# Patient Record
Sex: FEMALE | Race: WHITE | NOT HISPANIC OR LATINO | Employment: FULL TIME | URBAN - METROPOLITAN AREA
[De-identification: names, ages, dates, MRNs, and addresses within clinical notes are randomized per-mention and may not be internally consistent; named-entity substitution may affect disease eponyms.]

---

## 2017-01-16 ENCOUNTER — TRANSFERRED RECORDS (OUTPATIENT)
Dept: HEALTH INFORMATION MANAGEMENT | Facility: CLINIC | Age: 47
End: 2017-01-16

## 2017-01-18 ENCOUNTER — HOSPITAL ENCOUNTER (OUTPATIENT)
Dept: OCCUPATIONAL THERAPY | Facility: CLINIC | Age: 47
Setting detail: THERAPIES SERIES
End: 2017-01-18
Attending: INTERNAL MEDICINE
Payer: COMMERCIAL

## 2017-01-18 PROCEDURE — 97140 MANUAL THERAPY 1/> REGIONS: CPT | Mod: GO | Performed by: OCCUPATIONAL THERAPIST

## 2017-01-18 PROCEDURE — 40000445 ZZHC STATISTIC OT VISIT, LYMPHEDEMA: Performed by: OCCUPATIONAL THERAPIST

## 2017-01-27 ENCOUNTER — TRANSFERRED RECORDS (OUTPATIENT)
Dept: HEALTH INFORMATION MANAGEMENT | Facility: CLINIC | Age: 47
End: 2017-01-27

## 2017-02-01 ENCOUNTER — HOSPITAL ENCOUNTER (OUTPATIENT)
Facility: CLINIC | Age: 47
Setting detail: SPECIMEN
Discharge: HOME | End: 2017-02-01
Attending: INTERNAL MEDICINE | Admitting: INTERNAL MEDICINE
Payer: COMMERCIAL

## 2017-02-01 ENCOUNTER — ONCOLOGY VISIT (OUTPATIENT)
Dept: ONCOLOGY | Facility: CLINIC | Age: 47
End: 2017-02-01
Attending: INTERNAL MEDICINE
Payer: COMMERCIAL

## 2017-02-01 VITALS
HEIGHT: 64 IN | SYSTOLIC BLOOD PRESSURE: 137 MMHG | BODY MASS INDEX: 32.95 KG/M2 | HEART RATE: 95 BPM | OXYGEN SATURATION: 97 % | WEIGHT: 193 LBS | RESPIRATION RATE: 16 BRPM | TEMPERATURE: 97 F | DIASTOLIC BLOOD PRESSURE: 97 MMHG

## 2017-02-01 DIAGNOSIS — C50.919 MALIGNANT NEOPLASM OF FEMALE BREAST, UNSPECIFIED LATERALITY, UNSPECIFIED SITE OF BREAST: Primary | ICD-10-CM

## 2017-02-01 LAB
ALBUMIN SERPL-MCNC: 4 G/DL (ref 3.4–5)
ALP SERPL-CCNC: 68 U/L (ref 40–150)
ALT SERPL W P-5'-P-CCNC: 39 U/L (ref 0–50)
ANION GAP SERPL CALCULATED.3IONS-SCNC: 9 MMOL/L (ref 3–14)
AST SERPL W P-5'-P-CCNC: 29 U/L (ref 0–45)
BILIRUB SERPL-MCNC: 0.4 MG/DL (ref 0.2–1.3)
BUN SERPL-MCNC: 17 MG/DL (ref 7–30)
CALCIUM SERPL-MCNC: 9.5 MG/DL (ref 8.5–10.1)
CHLORIDE SERPL-SCNC: 104 MMOL/L (ref 94–109)
CO2 SERPL-SCNC: 26 MMOL/L (ref 20–32)
CREAT SERPL-MCNC: 0.69 MG/DL (ref 0.52–1.04)
ERYTHROCYTE [DISTWIDTH] IN BLOOD BY AUTOMATED COUNT: 13.7 % (ref 10–15)
GFR SERPL CREATININE-BSD FRML MDRD: NORMAL ML/MIN/1.7M2
GLUCOSE SERPL-MCNC: 97 MG/DL (ref 70–99)
HCT VFR BLD AUTO: 42.2 % (ref 35–47)
HGB BLD-MCNC: 13.6 G/DL (ref 11.7–15.7)
MCH RBC QN AUTO: 26.9 PG (ref 26.5–33)
MCHC RBC AUTO-ENTMCNC: 32.2 G/DL (ref 31.5–36.5)
MCV RBC AUTO: 83 FL (ref 78–100)
PLATELET # BLD AUTO: 335 10E9/L (ref 150–450)
POTASSIUM SERPL-SCNC: 4 MMOL/L (ref 3.4–5.3)
PROT SERPL-MCNC: 8 G/DL (ref 6.8–8.8)
RBC # BLD AUTO: 5.06 10E12/L (ref 3.8–5.2)
SODIUM SERPL-SCNC: 139 MMOL/L (ref 133–144)
WBC # BLD AUTO: 5.2 10E9/L (ref 4–11)

## 2017-02-01 PROCEDURE — 99211 OFF/OP EST MAY X REQ PHY/QHP: CPT | Mod: 25

## 2017-02-01 PROCEDURE — 85027 COMPLETE CBC AUTOMATED: CPT | Performed by: INTERNAL MEDICINE

## 2017-02-01 PROCEDURE — 99211 OFF/OP EST MAY X REQ PHY/QHP: CPT

## 2017-02-01 PROCEDURE — 36415 COLL VENOUS BLD VENIPUNCTURE: CPT

## 2017-02-01 PROCEDURE — 86300 IMMUNOASSAY TUMOR CA 15-3: CPT | Performed by: INTERNAL MEDICINE

## 2017-02-01 PROCEDURE — 99213 OFFICE O/P EST LOW 20 MIN: CPT | Performed by: INTERNAL MEDICINE

## 2017-02-01 PROCEDURE — 80053 COMPREHEN METABOLIC PANEL: CPT | Performed by: INTERNAL MEDICINE

## 2017-02-01 ASSESSMENT — PAIN SCALES - GENERAL: PAINLEVEL: NO PAIN (0)

## 2017-02-01 NOTE — PROGRESS NOTES
"Shantell Wagner is a 46 year old female who presents for:  Chief Complaint   Patient presents with     Oncology Clinic Visit        Initial Vitals:  There were no vitals taken for this visit. Estimated body mass index is 32.65 kg/(m^2) as calculated from the following:    Height as of 11/2/16: 1.626 m (5' 4\").    Weight as of 11/21/16: 86.32 kg (190 lb 4.8 oz).. There is no height or weight on file to calculate BSA. BP completed using cuff size: regular  Data Unavailable No LMP recorded. Allergies and medications reviewed.     Medications: Medication refills not needed today.  Pharmacy name entered into [employer]: [id number] - [place], [place] - [address]    Comments: Follow up    8 minutes for nursing intake (face to face time)   Ching Sevilla CMA     DISCHARGE PLAN:  Next appointments: See patient instruction section  Departure Mode: Ambulatory  Accompanied by:   0 minutes for nursing discharge (face to face time)   Ching Sevilla CMA              "

## 2017-02-01 NOTE — PATIENT INSTRUCTIONS
RTC MD early March - Scheduled for 3/1/17   JV    Labs today-Jessica       AVS given to patient 2/1/17  JV

## 2017-02-01 NOTE — MR AVS SNAPSHOT
After Visit Summary   2/1/2017    Shantell Wagner    MRN: [id number]           Patient Information     Date Of Birth          1970        Visit Information        Provider Department      2/1/2017 4:00 PM Tata Knott MD [facility]        Today's Diagnoses     Malignant neoplasm of female breast, unspecified laterality, unspecified site of breast (H)    -  1       Care Instructions    RTC MD early March - Scheduled for 3/1/17   JV    Labs today-done per dirk       AVS given to patient 2/1/17  JV            Follow-ups after your visit        Your next 10 appointments already scheduled     Mar 01, 2017  3:30 PM   Return Visit with Tata Knott MD   [facility] ([facility])    [facility]  [address]  [facility] [id number] [phone]            Mar 17, 2017 11:00 AM   Lymphedema Treatment with Layla Redmond OT   [facility] Lymphedema OT ([facility])    [address]  [facility] [address] [phone]              Who to contact     If you have questions or need follow up information about today's clinic visit or your schedule please contact [facility] directly at [phone].  Normal or non-critical lab and imaging results will be communicated to you by MyChart, letter or phone within 4 business days after the clinic has received the results. If you do not hear from us within 7 days, please contact the clinic through MyChart or phone. If you have a critical or abnormal lab result, we will notify you by phone as soon as possible.  Submit refill requests through [employer] or call your pharmacy and they will forward the refill request to us. Please allow 3 business days for your refill to be completed.          Additional Information About Your Visit        [employer] Information     [employer] gives you secure access to  "your electronic health record. If you see a primary care provider, you can also send messages to your care team and make appointments. If you have questions, please call your primary care clinic.  If you do not have a primary care provider, please call [phone] and they will assist you.        Care EveryWhere ID     This is your Care EveryWhere ID. This could be used by other organizations to access your [place] medical records  [id number]        Your Vitals Were     Pulse Temperature Respirations Height BMI (Body Mass Index) Pulse Oximetry    95 97  F (36.1  C) (Tympanic) 16 1.626 m (5' 4\") 33.11 kg/m2 97%       Blood Pressure from Last 3 Encounters:   02/01/17 137/97   11/21/16 106/80   11/02/16 123/87    Weight from Last 3 Encounters:   02/01/17 87.544 kg (193 lb)   11/21/16 86.32 kg (190 lb 4.8 oz)   11/02/16 86.637 kg (191 lb)              We Performed the Following     Ca27.29  breast tumor marker     CBC with platelets     Comprehensive metabolic panel (BMP + Alb, Alk Phos, ALT, AST, Total. Bili, TP)        Primary Care Provider Office Phone # Fax #    NIKKI Santos Ra [facility] [phone]       [facility] [address] [address] [facility] [address]        Thank you!     Thank you for choosing [facility]  for your care. Our goal is always to provide you with excellent care. Hearing back from our patients is one way we can continue to improve our services. Please take a few minutes to complete the written survey that you may receive in the mail after your visit with us. Thank you!             Your Updated Medication List - Protect others around you: Learn how to safely use, store and throw away your medicines at www.disposemymeds.org.          This list is accurate as of: 2/1/17  5:14 PM.  Always use your most recent med list.                   Brand Name Dispense Instructions for use    B-12 1000 MCG Tbcr     100 tablet    Take 1,000 mcg by mouth daily    "    calcium carb 1250 mg (500 mg [place])/vitamin D 200 units 500-200 MG-UNIT per tablet    OSCAL with D     Take 1 tablet by mouth daily       desonide 0.05 % cream    DESOWEN     Apply topically 2 times daily as needed       fluticasone 50 MCG/ACT spray    FLONASE     Spray 1-2 sprays into both nostrils daily as needed for rhinitis or allergies       gabapentin 100 MG capsule    NEURONTIN    270 capsule    Take 3 capsules (300 mg) by mouth At Bedtime Okay to increase by 1 capsule every 3 days at bedtime until a dose of 300mg at night is reached.       glucosamine-chondroitin 500-400 MG Caps per capsule      Take 1 capsule by mouth daily       HYDROcodone-acetaminophen 5-325 MG per tablet    NORCO    10 tablet    Take 1-2 tablets by mouth every 4 hours as needed for other (Moderate to Severe Pain)       IBU- MG Tabs      1 TABLET EVERY 4 TO 6 HOURS AS NEEDED       ketoconazole 2 % cream    NIZORAL     Apply topically daily as needed for itching       LORazepam 0.5 MG tablet    ATIVAN    30 tablet    Take 1 tablet (0.5 mg) by mouth every 4 hours as needed (Anxiety, Nausea/Vomiting or Sleep)       omeprazole 20 MG CR capsule    priLOSEC     Take 20 mg by mouth daily       order for DME     1 Units    Hair prosthesis for chemotherapy induced alopecia       simvastatin 20 MG tablet    ZOCOR    90 tablet    Take 1 tablet (20 mg) by mouth At Bedtime       tamoxifen 20 MG tablet    NOLVADEX    90 tablet    Take 1 tablet (20 mg) by mouth daily

## 2017-02-02 LAB — CANCER AG27-29 SERPL-ACNC: 26 U/ML (ref 0–39)

## 2017-02-02 NOTE — PROGRESS NOTES
HISTORY OF PRESENT ILLNESS:  Shantell Wagner is a 46-year-old patient who comes in today for interim followup.  She has a history of right-sided breast cancer.  She has a followup with Shantell Che MD.  She carries a genetic mutation called CHEK2.  She presented with a locally advanced right-sided breast cancer, a 2.2 cm mass in the right breast with a large 2.7 cm mass in the right axilla.  She had Adriamycin, Cytoxan and weekly Taxol.  Had a very nice response.  Tumor was estrogen receptor positive and HER-2 receptor negative.  She had bilateral mastectomies done and currently has tissue expanders in.  She is now right in the middle of radiation therapy.  She had an excellent response to the neoadjuvant chemotherapy.  Once she has finished her radiation, she will be a candidate for adjuvant hormonal therapy.  She was premenopausal coming into it so standard therapy would be to start with tamoxifen.  She is currently in the menopause.  The other option for her would be to see if she would be a candidate for the Pallas trial.  I think to be a candidate for the Pallas trial, we would need to push her into the menopause permanently.  I will discuss this with the research nurse and then I will get back to the patient before she finishes her radiation therapy.      REVIEW OF SYSTEMS:  A 14-point comprehensive review of systems is otherwise unremarkable.      MEDICATIONS:  As charted.      ALLERGIES:  As charted.      PHYSICAL EXAMINATION:   VITAL SIGNS:  Stable.   HEENT:  Oropharynx clear, mucous membranes moist.   NECK:  Has no masses or goiter.   LYMPH:  There is no cervical, supraclavicular or infraclavicular adenopathy.   CHEST:  Clear to auscultation and percussion bilaterally.   HEART:  S1, S2 normal.  No added sounds or murmurs.   ABDOMEN:  Soft and nontender, no hepatosplenomegaly.   EXTREMITIES:  Legs are without tenderness or edema.   LYMPHATIC:  No evidence of lymphedema.   BREASTS:  The patient is status  post bilateral mastectomies.  The scars look good.  She has got tissue expanders in place.  Right and left axillae are negative.   SKIN:  Has no rashes or lesions.      IMPRESSION:  A 46-year-old patient with a locally advanced right-sided breast cancer.  She had a very nice pathological response to neoadjuvant chemotherapy.  In the end she had some isolated tumor cells in 3 lymph nodes, but nothing in the breast.  She is undergoing adjuvant radiation therapy.  She will see me in early March.  We will either start her on standard therapy which would be tamoxifen and then switch over to an aromatase inhibitor if she continues in the menopause.  The other option is a Pallas trial which we may have to push her into the menopause for.  I will discuss this with the research nurse and get back to her.  All of the above has been discussed with the patient and her  today and they are in agreement with the plan.         MICH ORTIZ MD             D: 2017 18:34   T: 2017 22:34   MT: LQ      Name:     CADE GARCÍA   MRN:      [id number]        Account:      [id number]   :      1970           Visit Date:   2017      Document: [id number]       cc: Margaret Longo NP

## 2017-02-14 ENCOUNTER — TELEPHONE (OUTPATIENT)
Dept: FAMILY MEDICINE | Facility: CLINIC | Age: 47
End: 2017-02-14

## 2017-02-14 NOTE — TELEPHONE ENCOUNTER
Can we please call the pt and make sure she is okay to wait until tomorrow with symptoms listed regarding tomorrows appt.  ENRIQUE

## 2017-02-15 ENCOUNTER — OFFICE VISIT (OUTPATIENT)
Dept: FAMILY MEDICINE | Facility: CLINIC | Age: 47
End: 2017-02-15
Payer: COMMERCIAL

## 2017-02-15 VITALS
HEART RATE: 90 BPM | BODY MASS INDEX: 32.82 KG/M2 | OXYGEN SATURATION: 97 % | SYSTOLIC BLOOD PRESSURE: 138 MMHG | WEIGHT: 191.2 LBS | DIASTOLIC BLOOD PRESSURE: 88 MMHG | TEMPERATURE: 97.8 F

## 2017-02-15 DIAGNOSIS — R07.89 ATYPICAL CHEST PAIN: Primary | ICD-10-CM

## 2017-02-15 LAB
D DIMER PPP FEU-MCNC: 1.1 UG/ML FEU (ref 0–0.5)
ERYTHROCYTE [DISTWIDTH] IN BLOOD BY AUTOMATED COUNT: 14 % (ref 10–15)
HCT VFR BLD AUTO: 41.6 % (ref 35–47)
HGB BLD-MCNC: 13.6 G/DL (ref 11.7–15.7)
MCH RBC QN AUTO: 27.4 PG (ref 26.5–33)
MCHC RBC AUTO-ENTMCNC: 32.7 G/DL (ref 31.5–36.5)
MCV RBC AUTO: 84 FL (ref 78–100)
PLATELET # BLD AUTO: 291 10E9/L (ref 150–450)
RBC # BLD AUTO: 4.97 10E12/L (ref 3.8–5.2)
WBC # BLD AUTO: 5.7 10E9/L (ref 4–11)

## 2017-02-15 PROCEDURE — 85027 COMPLETE CBC AUTOMATED: CPT | Performed by: NURSE PRACTITIONER

## 2017-02-15 PROCEDURE — 36415 COLL VENOUS BLD VENIPUNCTURE: CPT | Performed by: NURSE PRACTITIONER

## 2017-02-15 PROCEDURE — 99214 OFFICE O/P EST MOD 30 MIN: CPT | Performed by: NURSE PRACTITIONER

## 2017-02-15 PROCEDURE — 85379 FIBRIN DEGRADATION QUANT: CPT | Performed by: NURSE PRACTITIONER

## 2017-02-15 PROCEDURE — 80053 COMPREHEN METABOLIC PANEL: CPT | Performed by: NURSE PRACTITIONER

## 2017-02-15 PROCEDURE — 93000 ELECTROCARDIOGRAM COMPLETE: CPT | Performed by: NURSE PRACTITIONER

## 2017-02-15 NOTE — PROGRESS NOTES
SUBJECTIVE:                                                    Shantell Wagner is a 46 year old female who presents to clinic today for the following health issues:      Chest Pain      Onset: 2 weeks    Description (location/character/radiation/duration): Chest pain, SOB    Intensity:  moderate    Accompanying signs and symptoms:        Shortness of breath: YES       Sweating: no        Nausea/vomitting: no        Palpitations: no        Other (fevers/chills/cough/heartburn/lightheadedness): no     History (similar episodes/previous evaluation): None    Precipitating or alleviating factors:       Worse with exertion: YES       Worse with breathing: YES       Related to eating: no        Better with burping: no     Therapies tried and outcome: None     Symptoms started about 2.5 weeks ago.  She has the pain with and without exertion.  Mostly notable with deep inspiration.  She recently started running, now consistent over the past 1 month.  She is running/walking about 3 miles twice weekly.  She has a hx of R sided breast cancer, s/p chemo, double mastectomy, tissue expanders in place.  Now receiving radiation, daily.  Finishes 2/27/17.    No fevers, no recent illness.  No cough.  There is pain in the chest, mostly with inspiration.  No GI symptoms.  Normal urinary and bowel habits.    She is a non smoker.  No personal or family hx of blood clots.  No recent travel, no recent leg pain.    Problem list and histories reviewed & adjusted, as indicated.  Additional history: as documented    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     Lymphedema     Past Surgical History   Procedure Laterality Date     Hc  reconstr nose  1983     after accident     Carpal tunnel release rt/lt  3/2010     Lasik bilateral       Insert port vascular access Left 4/22/2016     Procedure: INSERT PORT VASCULAR ACCESS;  Surgeon: Nereyda Flowers MD;  Location: [facility] OR     Mastectomy modified radical Right 10/17/2016     Procedure: MASTECTOMY MODIFIED RADICAL;  Surgeon: Nereyda Flowers MD;  Location: [facility] OR     Mastectomy modified radical, sentinel node, combined Left 10/17/2016     Procedure: COMBINED MASTECTOMY MODIFIED RADICAL, SENTINEL NODE;  Surgeon: Nereyda Flowers MD;  Location: [facility] OR     Remove catheter vascular access Left 10/17/2016     Procedure: REMOVE CATHETER VASCULAR ACCESS;  Surgeon: Nereyda Flowers MD;  Location: [facility] OR     Insert tissue expander breast bilateral Bilateral 10/17/2016     Procedure: INSERT TISSUE EXPANDER BREAST BILATERAL;  Surgeon: Jenna Vazquez MD;  Location: [facility] OR       Social History   Substance Use Topics     Smoking status: Never Smoker     Smokeless tobacco: Never Used     Alcohol use No     Family History   Problem Relation Age of Onset     C.A.D. Father      bypass surg age 68     Breast Cancer Maternal Grandmother 70     dx'ed age 80s     Breast Cancer Sister 38     breast ca     Type 1 Diabetes Mother      DIABETES Mother      Coronary Artery Disease Father      Prostate Cancer Father          Problem list, Medication list, Allergies, and Medical/Social/Surgical histories reviewed in [facility] and updated as appropriate.    ROS:  SEE HPI.    OBJECTIVE:                                                    /88 (BP Location: Left arm, Cuff Size: Adult Large)  Pulse 90  Temp 97.8  F (36.6  C) (Oral)  Wt 191 lb 3.2 oz (86.7 kg)  SpO2 97%  BMI 32.82 kg/m2  Body mass index is 32.82 kg/(m^2).  GENERAL: healthy, alert and no distress  NECK: no adenopathy, no asymmetry, masses, or scars and thyroid normal to palpation  RESP: lungs clear to auscultation - no rales, rhonchi or wheezes  CV:  regular rate and rhythm, normal S1 S2, no S3 or S4, no murmur, click or rub, no peripheral edema and peripheral pulses strong  ABDOMEN: soft, nontender, no hepatosplenomegaly, no masses and bowel sounds normal  PSYCH: mentation appears normal, affect normal/bright    Diagnostic Test Results:  No results found for this or any previous visit (from the past 24 hour(s)).     ASSESSMENT/PLAN:                                                    1. Atypical chest pain  46 y.o. Female, hx of breast cancer, s/p chemo, mastectomies, now receiving radiation.  Here today with c/o chest pain.  Discussed initial eval today.  EKG sinus rhythm.  After labs reviewed, will message Dr. Knott to see if there is anything additional that is recommended from them.    I am wondering if this is relating more to her tissue expanders and recent increase in activity that is causing the discomfort in the chest wall.  Pt agrees with plan and verbalized understanding.  - CBC with platelets  - D dimer, quantitative  - Comprehensive metabolic panel (BMP + Alb, Alk Phos, ALT, AST, Total. Bili, TP)  - EKG 12-lead complete w/read - Clinics    NIKKI Santos Ra, CNP  [facility]

## 2017-02-15 NOTE — NURSING NOTE
"Chief Complaint   Patient presents with     Chest Pain       Initial /88 (BP Location: Left arm, Cuff Size: Adult Large)  Pulse 90  Temp 97.8  F (36.6  C) (Oral)  Wt 191 lb 3.2 oz (86.7 kg)  SpO2 97%  BMI 32.82 kg/m2 Estimated body mass index is 32.82 kg/(m^2) as calculated from the following:    Height as of 2/1/17: 5' 4\" (1.626 m).    Weight as of this encounter: 191 lb 3.2 oz (86.7 kg).  Medication Reconciliation: complete   Jeane MENDOZA M.A.      "

## 2017-02-15 NOTE — MR AVS SNAPSHOT
After Visit Summary   2/15/2017    Shantell Wagner    MRN: [id number]           Patient Information     Date Of Birth          1970        Visit Information        Provider Department      2/15/2017 11:00 AM Margaret Longo Ra, APRN CNP [facility]        Today's Diagnoses     Atypical chest pain    -  1      Care Instructions    I will talk with Dr. Knott.  Monitor your symptoms closely, get help if needed.  I will let you know about your labs.        Follow-ups after your visit        Your next 10 appointments already scheduled     Mar 01, 2017  3:30 PM CST   Return Visit with Tata Knott MD   [facility] ([facility])    [facility]  [address]  [facility] [id number] [phone]            Mar 17, 2017 11:00 AM CDT   Lymphedema Treatment with Layla Redmond OT   [facility] Lymphedema OT ([facility])    [address]  [facility] [address] [phone]              Who to contact     If you have questions or need follow up information about today's clinic visit or your schedule please contact [facility] directly at [phone].  Normal or non-critical lab and imaging results will be communicated to you by MyChart, letter or phone within 4 business days after the clinic has received the results. If you do not hear from us within 7 days, please contact the clinic through MyChart or phone. If you have a critical or abnormal lab result, we will notify you by phone as soon as possible.  Submit refill requests through [employer] or call your pharmacy and they will forward the refill request to us. Please allow 3 business days for your refill to be completed.          Additional Information About Your Visit        MyChart Information     [employer] gives you secure access to your electronic health record. If you see a primary care provider, you can  also send messages to your care team and make appointments. If you have questions, please call your primary care clinic.  If you do not have a primary care provider, please call [phone] and they will assist you.        Care EveryWhere ID     This is your Care EveryWhere ID. This could be used by other organizations to access your [place] medical records  [id number]        Your Vitals Were     Pulse Temperature Pulse Oximetry BMI (Body Mass Index)          90 97.8  F (36.6  C) (Oral) 97% 32.82 kg/m2         Blood Pressure from Last 3 Encounters:   02/15/17 138/88   02/01/17 (!) 137/97   11/21/16 106/80    Weight from Last 3 Encounters:   02/15/17 191 lb 3.2 oz (86.7 kg)   02/01/17 193 lb (87.5 kg)   11/21/16 190 lb 4.8 oz (86.3 kg)              We Performed the Following     CBC with platelets     Comprehensive metabolic panel (BMP + Alb, Alk Phos, ALT, AST, Total. Bili, TP)     D dimer, quantitative        Primary Care Provider Office Phone # Fax #    Margaret NIKKI Condon [facility] [phone]       [facility] [address] [address] [address]        Thank you!     Thank you for choosing [facility]  for your care. Our goal is always to provide you with excellent care. Hearing back from our patients is one way we can continue to improve our services. Please take a few minutes to complete the written survey that you may receive in the mail after your visit with us. Thank you!             Your Updated Medication List - Protect others around you: Learn how to safely use, store and throw away your medicines at www.disposemymeds.org.          This list is accurate as of: 2/15/17 11:59 AM.  Always use your most recent med list.                   Brand Name Dispense Instructions for use    B-12 1000 MCG Tbcr     100 tablet    Take 1,000 mcg by mouth daily       calcium carb 1250 mg (500 mg [place])/vitamin D 200 units 500-200 MG-UNIT per tablet    OSCAL with D     Take 1  tablet by mouth daily       desonide 0.05 % cream    DESOWEN     Apply topically 2 times daily as needed       fluticasone 50 MCG/ACT spray    FLONASE     Spray 1-2 sprays into both nostrils daily as needed for rhinitis or allergies       gabapentin 100 MG capsule    NEURONTIN    270 capsule    Take 3 capsules (300 mg) by mouth At Bedtime Okay to increase by 1 capsule every 3 days at bedtime until a dose of 300mg at night is reached.       glucosamine-chondroitin 500-400 MG Caps per capsule      Take 1 capsule by mouth daily       HYDROcodone-acetaminophen 5-325 MG per tablet    NORCO    10 tablet    Take 1-2 tablets by mouth every 4 hours as needed for other (Moderate to Severe Pain)       IBU- MG Tabs      1 TABLET EVERY 4 TO 6 HOURS AS NEEDED       ketoconazole 2 % cream    NIZORAL     Apply topically daily as needed for itching       LORazepam 0.5 MG tablet    ATIVAN    30 tablet    Take 1 tablet (0.5 mg) by mouth every 4 hours as needed (Anxiety, Nausea/Vomiting or Sleep)       omeprazole 20 MG CR capsule    priLOSEC     Take 20 mg by mouth daily       order for DME     1 Units    Hair prosthesis for chemotherapy induced alopecia       simvastatin 20 MG tablet    ZOCOR    90 tablet    Take 1 tablet (20 mg) by mouth At Bedtime       tamoxifen 20 MG tablet    NOLVADEX    90 tablet    Take 1 tablet (20 mg) by mouth daily

## 2017-02-15 NOTE — PATIENT INSTRUCTIONS
I will talk with Dr. Knott.  Monitor your symptoms closely, get help if needed.  I will let you know about your labs.

## 2017-02-16 ENCOUNTER — TELEPHONE (OUTPATIENT)
Dept: FAMILY MEDICINE | Facility: CLINIC | Age: 47
End: 2017-02-16

## 2017-02-16 LAB
ALBUMIN SERPL-MCNC: 4.3 G/DL (ref 3.4–5)
ALP SERPL-CCNC: 75 U/L (ref 40–150)
ALT SERPL W P-5'-P-CCNC: 37 U/L (ref 0–50)
ANION GAP SERPL CALCULATED.3IONS-SCNC: 9 MMOL/L (ref 3–14)
AST SERPL W P-5'-P-CCNC: 19 U/L (ref 0–45)
BILIRUB SERPL-MCNC: 0.6 MG/DL (ref 0.2–1.3)
BUN SERPL-MCNC: 17 MG/DL (ref 7–30)
CALCIUM SERPL-MCNC: 10 MG/DL (ref 8.5–10.1)
CHLORIDE SERPL-SCNC: 104 MMOL/L (ref 94–109)
CO2 SERPL-SCNC: 30 MMOL/L (ref 20–32)
CREAT SERPL-MCNC: 0.93 MG/DL (ref 0.52–1.04)
GFR SERPL CREATININE-BSD FRML MDRD: 65 ML/MIN/1.7M2
GLUCOSE SERPL-MCNC: 88 MG/DL (ref 70–99)
POTASSIUM SERPL-SCNC: 4.1 MMOL/L (ref 3.4–5.3)
PROT SERPL-MCNC: 7.9 G/DL (ref 6.8–8.8)
SODIUM SERPL-SCNC: 143 MMOL/L (ref 133–144)

## 2017-02-16 NOTE — TELEPHONE ENCOUNTER
Called pt to discuss lab results.  Normal EKG, normal CBC.  Elevated d dimer.  Pt continues to report feeling fine.  No change.  She is on her way to [place], returning Saturday.  Reviewed what this test could mean, risks, benefits.  Because her symptoms have been mild/stable, no change over the past 2.5 weeks, still able to exercise decision was made to monitor very closely.  Watch for leg swelling, chest pain, sob, really any change at all.  If any change she will present to the ED in [place].  Otherwise, she will call here on Monday and we will discuss plan further.  Pt agrees with plan and verbalized understanding.  ENRIQUE

## 2017-02-17 ENCOUNTER — TELEPHONE (OUTPATIENT)
Dept: FAMILY MEDICINE | Facility: CLINIC | Age: 47
End: 2017-02-17

## 2017-02-17 ENCOUNTER — TRANSFERRED RECORDS (OUTPATIENT)
Dept: HEALTH INFORMATION MANAGEMENT | Facility: CLINIC | Age: 47
End: 2017-02-17

## 2017-02-17 NOTE — TELEPHONE ENCOUNTER
Patient spoke to triage nurse stating did not need order or records faxed to hospital. They would do CT as she had pos D-dimer.    LM on patient's voice mail to have results faxed to us and gave fax number.    Lila Heaton RN

## 2017-02-17 NOTE — TELEPHONE ENCOUNTER
Patient asking if can drive back from [place] today to have CT done. Huddled with ENRIQUE.     Advised patient can have done at ER in [place]. Instructed patient to call back with hospital info and we will fax order and labs to them.    Patient agreed to call back with information requested.    Lila Heaton RN

## 2017-03-01 ENCOUNTER — ONCOLOGY VISIT (OUTPATIENT)
Dept: ONCOLOGY | Facility: CLINIC | Age: 47
End: 2017-03-01
Attending: INTERNAL MEDICINE
Payer: COMMERCIAL

## 2017-03-01 ENCOUNTER — HOSPITAL ENCOUNTER (OUTPATIENT)
Facility: CLINIC | Age: 47
Setting detail: SPECIMEN
Discharge: HOME | End: 2017-03-01
Attending: INTERNAL MEDICINE | Admitting: INTERNAL MEDICINE
Payer: COMMERCIAL

## 2017-03-01 VITALS
OXYGEN SATURATION: 98 % | DIASTOLIC BLOOD PRESSURE: 92 MMHG | BODY MASS INDEX: 32.78 KG/M2 | HEART RATE: 85 BPM | RESPIRATION RATE: 16 BRPM | HEIGHT: 64 IN | WEIGHT: 192 LBS | SYSTOLIC BLOOD PRESSURE: 133 MMHG | TEMPERATURE: 97 F

## 2017-03-01 DIAGNOSIS — C50.911 MALIGNANT NEOPLASM OF RIGHT FEMALE BREAST, UNSPECIFIED SITE OF BREAST: Primary | ICD-10-CM

## 2017-03-01 LAB
HBA1C MFR BLD: 5.8 % (ref 4.3–6)
HCG SERPL QL: NEGATIVE

## 2017-03-01 PROCEDURE — 83036 HEMOGLOBIN GLYCOSYLATED A1C: CPT | Performed by: INTERNAL MEDICINE

## 2017-03-01 PROCEDURE — 99211 OFF/OP EST MAY X REQ PHY/QHP: CPT | Mod: 25

## 2017-03-01 PROCEDURE — 99211 OFF/OP EST MAY X REQ PHY/QHP: CPT

## 2017-03-01 PROCEDURE — 99214 OFFICE O/P EST MOD 30 MIN: CPT | Performed by: INTERNAL MEDICINE

## 2017-03-01 PROCEDURE — 84703 CHORIONIC GONADOTROPIN ASSAY: CPT | Performed by: INTERNAL MEDICINE

## 2017-03-01 PROCEDURE — 36415 COLL VENOUS BLD VENIPUNCTURE: CPT

## 2017-03-01 RX ORDER — TAMOXIFEN CITRATE 20 MG/1
20 TABLET ORAL DAILY
Qty: 90 TABLET | Refills: 3 | Status: SHIPPED | OUTPATIENT
Start: 2017-03-01 | End: 2017-06-15

## 2017-03-01 ASSESSMENT — PAIN SCALES - GENERAL: PAINLEVEL: NO PAIN (0)

## 2017-03-01 NOTE — PROGRESS NOTES
HISTORY OF PRESENT ILLNESS:  Ms. Shantell Wagner is 46 years old with a history of right-sided breast cancer.  She also carries a CHEK2 mutation.  She presented with a locally advanced right-sided breast cancer, 2.2 cm mass in the right breast with a 2.7 cm mass in the right axilla.  Had a very nice response to Adriamycin and Cytoxan followed by Taxol.  Her tumor is ER positive and HER-2 receptor negative.  She had bilateral mastectomies done and now has had radiation therapy to the right breast and axillary area.  She is about to embark upon adjuvant hormonal therapy.  I have talked to her about the [place] trial.  I would start her on tamoxifen as she was premenopausal coming in to the disease and she has not had any resumption of her menstrual periods yet, which she may do over the next 18 months.  We have discussed the risks, benefits and side effects of tamoxifen today.  I have also discussed the Pallas trial with her today and she will meet with the research nurse to discuss further.  The patient understands the rationale for recommending the Pallas trial and is willing to proceed.      REVIEW OF SYSTEMS:  A 14-point comprehensive review of systems is remarkable for some fatigue as she recovering from radiation therapy, but she tolerated radiation therapy quite well.      MEDICATIONS:  As charted.      ALLERGIES:  As charted.      PHYSICAL EXAMINATION:   VITAL SIGNS:  Stable.   HEENT:  Oropharynx clear, mucous membranes moist.   NECK:  Has no masses or goiter.   LYMPH:  There is no cervical, supraclavicular or infraclavicular adenopathy.   CHEST:  Clear to auscultation and percussion bilaterally.   HEART:  S1, S2 normal.  No added sounds or murmurs.   ABDOMEN:  Soft and nontender, no hepatosplenomegaly.   EXTREMITIES:  Legs are without tenderness or edema.   BREASTS:  The patient is status post bilateral mastectomies, scars look good.  She has got tissue expanders in place.  Right and left axilla are negative.   She has got radiation changes on the right side.   SKIN:  Has no rashes or lesions.   LYMPHATIC:  No evidence of lymphedema.      IMPRESSION:  A 46-year-old patient with a history of locally advanced right-sided breast cancer with a very nice response to neoadjuvant chemotherapy and she is now status post adjuvant radiation therapy.  We are going to just start her on tamoxifen and she is a candidate for the P[place] trial.  She will meet with the research nurse today to discuss the details of the Pallas trial.  I have discussed the risks, benefits and side effects of the tamoxifen today and I have discussed the rationale behind the Pallas trial.  She is in agreement with the plan.         MICH ORTIZ MD             D: 2017 16:04   T: 2017 16:32   MT: JUSTINO      Name:     CADE GARCÍA   MRN:      [id number]        Account:      [id number]   :      1970           Visit Date:   2017      Document: [id number]       cc: Margaret Longo NP

## 2017-03-01 NOTE — NURSING NOTE
"Shantell Wagner is a 46 year old female who presents for:  Chief Complaint   Patient presents with     Oncology Clinic Visit     Malignant neoplasm of female breast-Follow up        Initial Vitals:  BP (!) 133/92  Pulse 85  Temp 97  F (36.1  C) (Tympanic)  Resp 16  Ht 1.626 m (5' 4\")  Wt 87.1 kg (192 lb)  SpO2 98%  BMI 32.96 kg/m2 Estimated body mass index is 32.96 kg/(m^2) as calculated from the following:    Height as of this encounter: 1.626 m (5' 4\").    Weight as of this encounter: 87.1 kg (192 lb).. Body surface area is 1.98 meters squared. BP completed using cuff size: regular  No Pain (0) No LMP recorded. Allergies and medications reviewed.     Medications: Medication refills not needed today.  Pharmacy name entered into [employer]: [id number] - [place], [place] - [address]    Comments: Follow up    8 minutes for nursing intake (face to face time)   Ching Sevilla CMA     DISCHARGE PLAN:  Next appointments: See patient instruction section  Departure Mode: Ambulatory  Accompanied by: self  0 minutes for nursing discharge (face to face time)   Ching Sevilla CMA            "

## 2017-03-13 DIAGNOSIS — E78.5 HYPERLIPIDEMIA LDL GOAL <160: ICD-10-CM

## 2017-03-13 NOTE — TELEPHONE ENCOUNTER
simvastatin (ZOCOR) 20 MG tablet     Last Written Prescription Date: 3/18/16  Last Fill Quantity: 90, # refills: 3  Last Office Visit with G, P or [facility] prescribing provider: 2/15/17       Lab Results   Component Value Date    CHOL 218 03/18/2016     Lab Results   Component Value Date    HDL 46 03/18/2016     Lab Results   Component Value Date     03/18/2016     Lab Results   Component Value Date    TRIG 175 03/18/2016     Lab Results   Component Value Date    CHOLHDLRATIO 3.6 03/06/2015

## 2017-03-16 RX ORDER — SIMVASTATIN 20 MG
20 TABLET ORAL AT BEDTIME
Qty: 30 TABLET | Refills: 0 | Status: SHIPPED | OUTPATIENT
Start: 2017-03-16 | End: 2017-03-28

## 2017-03-17 ENCOUNTER — HOSPITAL ENCOUNTER (OUTPATIENT)
Dept: OCCUPATIONAL THERAPY | Facility: CLINIC | Age: 47
Setting detail: THERAPIES SERIES
End: 2017-03-17
Attending: INTERNAL MEDICINE
Payer: COMMERCIAL

## 2017-03-17 PROCEDURE — 97140 MANUAL THERAPY 1/> REGIONS: CPT | Mod: GO | Performed by: OCCUPATIONAL THERAPIST

## 2017-03-17 PROCEDURE — 40000445 ZZHC STATISTIC OT VISIT, LYMPHEDEMA: Performed by: OCCUPATIONAL THERAPIST

## 2017-03-17 NOTE — PROGRESS NOTES
Outpatient Occupational Therapy Discharge Note     Patient: Shantell Wagner  : 1970  Insurance:   Payor/Plan Subscriber Name Rel Member # Group #   [facility] HSHANTELL MERCADO*  [id number]       [id number]       Beginning/End Dates of Reporting Period:  17 to 3/17/2017    Referring Provider: Dr. Knott    Therapy Diagnosis: Lymphedema, decreased ROM    Client Self Report:   Pt has no pain.  She didn't realize her ROM was less again.    Objective Measurements:    Sh flex R 146*, L 157*  Her best was R 156*.  She will resume stretching.      Outcome Measures (most recent score):  Lymphedema Life Impact Scale (score range 0-72). A higher score indicates greater impairment.: 0    Goals:   Goal Identifier 1   Goal Description Pt will increase B sh ROM 10* to fit into the XRT machine comfortably.   Target Date 17   Date Met  17   Progress:goal met     Goal Identifier 2   Goal Description Pt will be indepedent in ex precautions and know weight training for breast ca survivors to decrease risk of flares of lymphedema.   Target Date 17   Date Met  16   Progress:goal met     Goal Identifier 3   Goal Description Pt will reports better movement at work and less pain /swelling with chest wall reduction.   Target Date 17   Date Met  17   Progress:goal met   Progress Toward Goals:   Progress this reporting period: Pt was seen for 6 sessions. She initially presented with pain, loss of ROM and pea'de orange in the breast.  She has no further issues with this.  She lost her gain in ROM due to radiation, but will now resume the stretching program.  She has a bit of fibrosis on the R deltoid and is doing MLD with this.      Plan:  Discharge from therapy.    Discharge:    Reason for Discharge: Patient has met all goals.    Equipment Issued: none    Discharge Plan: Patient to continue home program.

## 2017-03-23 NOTE — PROGRESS NOTES
SUBJECTIVE:     CC: Shantell Wagner is an 46 year old woman who presents for preventive health visit.     Physical   Annual:     Getting at least 3 servings of Calcium per day::  Yes    Bi-annual eye exam::  Yes    Dental care twice a year::  Yes    Sleep apnea or symptoms of sleep apnea::  None    Frequency of exercise::  2-3 days/week    Duration of exercise::  30-45 minutes    Taking medications regularly::  Yes    Medication side effects::  None    Additional concerns today::  No      Today's PHQ-2 Score:   PHQ-2 ( 1999 Pfizer) 3/21/2017   Little interest or pleasure in doing things Not at all   Feeling down, depressed or hopeless Not at all   PHQ-2 Score 0       Abuse: Current or Past(Physical, Sexual or Emotional)- No  Do you feel safe in your environment - Yes    Social History   Substance Use Topics     Smoking status: Never Smoker     Smokeless tobacco: Never Used     Alcohol use No     The patient does not drink >3 drinks per day nor >7 drinks per week.    Recent Labs   Lab Test  03/18/16   1020  03/06/15   0846  02/20/14   0847   CHOL  218*  217*  184   HDL  46*  60  48*   LDL  137*  121  112   TRIG  175*  180*  123   CHOLHDLRATIO   --   3.6  3.8   NHDL  172*   --    --        Reviewed orders with patient.  Reviewed health maintenance and updated orders accordingly - Yes    Pertinent mammograms are reviewed under the imaging tab.  History of abnormal Pap smear: NO - age 30- 65 PAP every 3 years recommended    Reviewed and updated as needed this visit by clinical staff         Reviewed and updated as needed this visit by Provider        Past Medical History:   Diagnosis Date     Abnormal Pap smear, can't excl hi gd sq intraepithelial lesion (ASC-H) 02/20/13    Cylinder/ECC= NEGRITA 1. Repeat HPV screen and ECC 12 months from colp.     Nose fracture 1983    Accident, needed surgery        ROS:  C: NEGATIVE for fever, chills, change in weight  I: NEGATIVE for worrisome rashes, moles or lesions  E: NEGATIVE for  vision changes or irritation  ENT: NEGATIVE for ear, mouth and throat problems  R: NEGATIVE for significant cough or SOB  B: NEGATIVE for masses, tenderness or discharge  CV: NEGATIVE for chest pain, palpitations or peripheral edema  GI: NEGATIVE for nausea, abdominal pain, heartburn, or change in bowel habits  : NEGATIVE for unusual urinary or vaginal symptoms. Periods are regular.  M: NEGATIVE for significant arthralgias or myalgia  N: NEGATIVE for weakness, dizziness or paresthesias  P: NEGATIVE for changes in mood or affect    Problem list, Medication list, Allergies, and Medical/Social/Surgical histories reviewed in [facility] and updated as appropriate.  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     Lymphedema     Past Surgical History:   Procedure Laterality Date     CARPAL TUNNEL RELEASE RT/LT  3/2010     HC RECONSTR NOSE  1983    after accident     INSERT PORT VASCULAR ACCESS Left 4/22/2016    Procedure: INSERT PORT VASCULAR ACCESS;  Surgeon: Nereyda Flowers MD;  Location: [facility] OR     INSERT TISSUE EXPANDER BREAST BILATERAL Bilateral 10/17/2016    Procedure: INSERT TISSUE EXPANDER BREAST BILATERAL;  Surgeon: Jenna Vazquez MD;  Location: [facility] OR     LASIK BILATERAL       MASTECTOMY MODIFIED RADICAL Right 10/17/2016    Procedure: MASTECTOMY MODIFIED RADICAL;  Surgeon: Nereyda Flowers MD;  Location: [facility] OR     MASTECTOMY MODIFIED RADICAL, SENTINEL NODE, COMBINED Left 10/17/2016    Procedure: COMBINED MASTECTOMY MODIFIED RADICAL, SENTINEL NODE;  Surgeon: Nereyda Flowers MD;  Location: [facility] OR     REMOVE CATHETER VASCULAR ACCESS Left 10/17/2016    Procedure: REMOVE CATHETER VASCULAR ACCESS;   Surgeon: Nereyda Flowers MD;  Location: [facility] OR       Social History   Substance Use Topics     Smoking status: Never Smoker     Smokeless tobacco: Never Used     Alcohol use No     Family History   Problem Relation Age of Onset     C.A.D. Father      bypass surg age 68     Coronary Artery Disease Father      Prostate Cancer Father      Breast Cancer Sister 38     breast ca     Type 1 Diabetes Mother      DIABETES Mother      Breast Cancer Maternal Grandmother 70     dx'ed age 80s         OBJECTIVE:     There were no vitals taken for this visit.  EXAM:  GENERAL: healthy, alert and no distress  EYES: Eyes grossly normal to inspection, PERRL and conjunctivae and sclerae normal  HENT: ear canals and TM's normal, nose and mouth without ulcers or lesions  NECK: no adenopathy, no asymmetry, masses, or scars and thyroid normal to palpation  RESP: lungs clear to auscultation - no rales, rhonchi or wheezes  CV: regular rate and rhythm, normal S1 S2, no S3 or S4, no murmur, click or rub, no peripheral edema and peripheral pulses strong  ABDOMEN: soft, nontender, no hepatosplenomegaly, no masses and bowel sounds normal  MS: no gross musculoskeletal defects noted, no edema  SKIN: no suspicious lesions or rashes  NEURO: Normal strength and tone, mentation intact and speech normal  PSYCH: mentation appears normal, affect normal/bright    ASSESSMENT/PLAN:     1. Encounter for routine adult health examination without abnormal findings  46 y.o. Female, hx of breast cancer, s/p chemo, radiation.  Here for WWE.    2. Hyperlipidemia LDL goal <160  Labs today.  Compliant with med.  Will refill after reviewed.  Pt agrees with plan and verbalized understanding.  - LIPID REFLEX TO DIRECT LDL PANEL  - Comprehensive metabolic panel (BMP + Alb, Alk Phos, ALT, AST, Total. Bili, TP)    3. Pain in both lower extremities  Uses for neuropathy following chemo.  Works well, takes every night.  Refilled.  - gabapentin (NEURONTIN) 300 MG capsule;  "Take 1 capsule (300 mg) by mouth At Bedtime  Dispense: 90 capsule; Refill: 3    COUNSELING:  Reviewed preventive health counseling, as reflected in patient instructions         reports that she has never smoked. She has never used smokeless tobacco.    Estimated body mass index is 32.96 kg/(m^2) as calculated from the following:    Height as of 3/1/17: 5' 4\" (1.626 m).    Weight as of 3/1/17: 192 lb (87.1 kg).       Counseling Resources:  ATP IV Guidelines  Pooled Cohorts Equation Calculator  Breast Cancer Risk Calculator  FRAX Risk Assessment  ICSI Preventive Guidelines  Dietary Guidelines for Americans, 2010  USDA's MyPlate  ASA Prophylaxis  Lung CA Screening    NIKKI Santos Ra, CNP  [facility]  "

## 2017-03-24 ENCOUNTER — HOSPITAL ENCOUNTER (OUTPATIENT)
Facility: CLINIC | Age: 47
Setting detail: SPECIMEN
Discharge: HOME | End: 2017-03-24
Attending: INTERNAL MEDICINE | Admitting: INTERNAL MEDICINE
Payer: COMMERCIAL

## 2017-03-24 ENCOUNTER — OFFICE VISIT (OUTPATIENT)
Dept: FAMILY MEDICINE | Facility: CLINIC | Age: 47
End: 2017-03-24
Payer: COMMERCIAL

## 2017-03-24 ENCOUNTER — ONCOLOGY VISIT (OUTPATIENT)
Dept: ONCOLOGY | Facility: CLINIC | Age: 47
End: 2017-03-24
Attending: INTERNAL MEDICINE
Payer: COMMERCIAL

## 2017-03-24 VITALS
OXYGEN SATURATION: 96 % | DIASTOLIC BLOOD PRESSURE: 80 MMHG | RESPIRATION RATE: 10 BRPM | SYSTOLIC BLOOD PRESSURE: 112 MMHG | WEIGHT: 189 LBS | HEIGHT: 64 IN | BODY MASS INDEX: 32.27 KG/M2 | TEMPERATURE: 98.1 F

## 2017-03-24 DIAGNOSIS — M79.604 PAIN IN BOTH LOWER EXTREMITIES: ICD-10-CM

## 2017-03-24 DIAGNOSIS — Z00.00 ENCOUNTER FOR ROUTINE ADULT HEALTH EXAMINATION WITHOUT ABNORMAL FINDINGS: Primary | ICD-10-CM

## 2017-03-24 DIAGNOSIS — C50.911 MALIGNANT NEOPLASM OF RIGHT FEMALE BREAST, UNSPECIFIED SITE OF BREAST: ICD-10-CM

## 2017-03-24 DIAGNOSIS — M79.605 PAIN IN BOTH LOWER EXTREMITIES: ICD-10-CM

## 2017-03-24 DIAGNOSIS — E78.5 HYPERLIPIDEMIA LDL GOAL <160: ICD-10-CM

## 2017-03-24 LAB
BASOPHILS # BLD AUTO: 0 10E9/L (ref 0–0.2)
BASOPHILS NFR BLD AUTO: 0.9 %
DIFFERENTIAL METHOD BLD: NORMAL
EOSINOPHIL # BLD AUTO: 0.1 10E9/L (ref 0–0.7)
EOSINOPHIL NFR BLD AUTO: 1.9 %
ERYTHROCYTE [DISTWIDTH] IN BLOOD BY AUTOMATED COUNT: 13.3 % (ref 10–15)
HCG SERPL QL: NEGATIVE
HCT VFR BLD AUTO: 44.2 % (ref 35–47)
HGB BLD-MCNC: 14.8 G/DL (ref 11.7–15.7)
IMM GRANULOCYTES # BLD: 0 10E9/L (ref 0–0.4)
IMM GRANULOCYTES NFR BLD: 0.2 %
LYMPHOCYTES # BLD AUTO: 1 10E9/L (ref 0.8–5.3)
LYMPHOCYTES NFR BLD AUTO: 21 %
MCH RBC QN AUTO: 28.6 PG (ref 26.5–33)
MCHC RBC AUTO-ENTMCNC: 33.5 G/DL (ref 31.5–36.5)
MCV RBC AUTO: 85 FL (ref 78–100)
MONOCYTES # BLD AUTO: 0.3 10E9/L (ref 0–1.3)
MONOCYTES NFR BLD AUTO: 6.9 %
NEUTROPHILS # BLD AUTO: 3.2 10E9/L (ref 1.6–8.3)
NEUTROPHILS NFR BLD AUTO: 69.1 %
NRBC # BLD AUTO: 0 10*3/UL
NRBC BLD AUTO-RTO: 0 /100
PLATELET # BLD AUTO: 277 10E9/L (ref 150–450)
RBC # BLD AUTO: 5.18 10E12/L (ref 3.8–5.2)
WBC # BLD AUTO: 4.6 10E9/L (ref 4–11)

## 2017-03-24 PROCEDURE — 85025 COMPLETE CBC W/AUTO DIFF WBC: CPT | Performed by: INTERNAL MEDICINE

## 2017-03-24 PROCEDURE — 99396 PREV VISIT EST AGE 40-64: CPT | Performed by: NURSE PRACTITIONER

## 2017-03-24 PROCEDURE — 36415 COLL VENOUS BLD VENIPUNCTURE: CPT

## 2017-03-24 PROCEDURE — 80053 COMPREHEN METABOLIC PANEL: CPT | Performed by: NURSE PRACTITIONER

## 2017-03-24 PROCEDURE — 84703 CHORIONIC GONADOTROPIN ASSAY: CPT | Performed by: INTERNAL MEDICINE

## 2017-03-24 PROCEDURE — 80061 LIPID PANEL: CPT | Performed by: NURSE PRACTITIONER

## 2017-03-24 RX ORDER — GABAPENTIN 300 MG/1
300 CAPSULE ORAL AT BEDTIME
Qty: 90 CAPSULE | Refills: 3 | Status: SHIPPED | OUTPATIENT
Start: 2017-03-24 | End: 2018-03-06

## 2017-03-24 RX ORDER — SIMVASTATIN 20 MG
20 TABLET ORAL AT BEDTIME
Qty: 30 TABLET | Refills: 0 | Status: CANCELLED | OUTPATIENT
Start: 2017-03-24

## 2017-03-24 NOTE — MR AVS SNAPSHOT
After Visit Summary   3/24/2017    Shantell Wagner    MRN: [id number]           Patient Information     Date Of Birth          1970        Visit Information        Provider Department      3/24/2017 10:30 AM  ONCOLOGY NURSE [facility]        Today's Diagnoses     Malignant neoplasm of right female breast, unspecified site of breast (H)           Follow-ups after your visit        Your next 10 appointments already scheduled     Apr 03, 2017  3:30 PM CDT   Return Visit with Tata Knott MD   [facility])    [facility]  [address]  [address]  [facility] [id number] [phone]              Who to contact     If you have questions or need follow up information about today's clinic visit or your schedule please contact [facility] directly at [phone].  Normal or non-critical lab and imaging results will be communicated to you by MyChart, letter or phone within 4 business days after the clinic has received the results. If you do not hear from us within 7 days, please contact the clinic through [employer] or phone. If you have a critical or abnormal lab result, we will notify you by phone as soon as possible.  Submit refill requests through [employer] or call your pharmacy and they will forward the refill request to us. Please allow 3 business days for your refill to be completed.          Additional Information About Your Visit        MyChart Information     [employer] gives you secure access to your electronic health record. If you see a primary care provider, you can also send messages to your care team and make appointments. If you have questions, please call your primary care clinic.  If you do not have a primary care provider, please call [phone] and they will assist you.        Care EveryWhere ID     This is your Care EveryWhere ID. This could be used  by other organizations to access your [place] medical records  [id number]         Blood Pressure from Last 3 Encounters:   03/24/17 112/80   03/01/17 (!) 133/92   02/15/17 138/88    Weight from Last 3 Encounters:   03/24/17 85.7 kg (189 lb)   03/01/17 87.1 kg (192 lb)   02/15/17 86.7 kg (191 lb 3.2 oz)              We Performed the Following     CBC with platelets and differential     HCG, qualitative          Today's Medication Changes          These changes are accurate as of: 3/24/17 10:51 AM.  If you have any questions, ask your nurse or doctor.               These medicines have changed or have updated prescriptions.        Dose/Directions    gabapentin 300 MG capsule   Commonly known as:  NEURONTIN   This may have changed:    - medication strength  - additional instructions   Used for:  Pain in both lower extremities   Changed by:  Margaret Longo Ra, APRN CNP        Dose:  300 mg   Take 1 capsule (300 mg) by mouth At Bedtime   Quantity:  90 capsule   Refills:  3            Where to get your medicines      These medications were sent to [id number] - [place], [place] - [address] [address]  [address] [address] [address]     Phone:  [phone]     gabapentin 300 MG capsule                Primary Care Provider Office Phone # Fax #    NIKKI Santos Ra, [phone]       [facility] [address] [facility] [address]        Thank you!     Thank you for choosing [facility]  for your care. Our goal is always to provide you with excellent care. Hearing back from our patients is one way we can continue to improve our services. Please take a few minutes to complete the written survey that you may receive in the mail after your visit with us. Thank you!             Your Updated Medication List - Protect others around you: Learn how to safely use, store and throw away your medicines at www.disposemymeds.org.          This list is accurate  as of: 3/24/17 10:51 AM.  Always use your most recent med list.                   Brand Name Dispense Instructions for use    B-12 1000 MCG Tbcr     100 tablet    Take 1,000 mcg by mouth daily       calcium carb 1250 mg (500 mg [place])/vitamin D 200 units 500-200 MG-UNIT per tablet    OSCAL with D     Take 1 tablet by mouth daily       desonide 0.05 % cream    DESOWEN     Apply topically 2 times daily as needed       fluticasone 50 MCG/ACT spray    FLONASE     Spray 1-2 sprays into both nostrils daily as needed for rhinitis or allergies Reported on 3/24/2017       gabapentin 300 MG capsule    NEURONTIN    90 capsule    Take 1 capsule (300 mg) by mouth At Bedtime       glucosamine-chondroitin 500-400 MG Caps per capsule      Take 1 capsule by mouth daily       HYDROcodone-acetaminophen 5-325 MG per tablet    NORCO    10 tablet    Take 1-2 tablets by mouth every 4 hours as needed for other (Moderate to Severe Pain)       IBU- MG Tabs      1 TABLET EVERY 4 TO 6 HOURS AS NEEDED       ketoconazole 2 % cream    NIZORAL     Apply topically daily as needed for itching       LORazepam 0.5 MG tablet    ATIVAN    30 tablet    Take 1 tablet (0.5 mg) by mouth every 4 hours as needed (Anxiety, Nausea/Vomiting or Sleep)       omeprazole 20 MG CR capsule    priLOSEC     Take 20 mg by mouth daily       order for DME     1 Units    Hair prosthesis for chemotherapy induced alopecia       simvastatin 20 MG tablet    ZOCOR    30 tablet    Take 1 tablet (20 mg) by mouth At Bedtime       * tamoxifen 20 MG tablet    NOLVADEX    90 tablet    Take 1 tablet (20 mg) by mouth daily       * tamoxifen 20 MG tablet    NOLVADEX    90 tablet    Take 1 tablet (20 mg) by mouth daily       * Notice:  This list has 2 medication(s) that are the same as other medications prescribed for you. Read the directions carefully, and ask your doctor or other care provider to review them with you.

## 2017-03-24 NOTE — MR AVS SNAPSHOT
After Visit Summary   3/24/2017    Shantell Wagner    MRN: [id number]           Patient Information     Date Of Birth          1970        Visit Information        Provider Department      3/24/2017 9:00 AM Margaret Longo Ra, NIKKI [facility]        Today's Diagnoses     Hyperlipidemia LDL goal <160        Pain in both lower extremities          Care Instructions      Preventive Health Recommendations  Female Ages 40 to 49    Yearly exam:     See your health care provider every year in order to  1. Review health changes.   2. Discuss preventive care.    3. Review your medicines if your doctor prescribed any.      Get a Pap test every three years (unless you have an abnormal result and your provider advises testing more often).      If you get Pap tests with HPV test, you only need to test every 5 years, unless you have an abnormal result. You do not need a Pap test if your uterus was removed (hysterectomy) and you have not had cancer.      You should be tested each year for STDs (sexually transmitted diseases), if you're at risk.       Ask your doctor if you should have a mammogram.      Have a colonoscopy (test for colon cancer) if someone in your family has had colon cancer or polyps before age 50.       Have a cholesterol test every 5 years.       Have a diabetes test (fasting glucose) after age 45. If you are at risk for diabetes, you should have this test every 3 years.    Shots: Get a flu shot each year. Get a tetanus shot every 10 years.     Nutrition:     Eat at least 5 servings of fruits and vegetables each day.    Eat whole-grain bread, whole-wheat pasta and brown rice instead of white grains and rice.    Talk to your provider about Calcium and Vitamin D.     Lifestyle    Exercise at least 150 minutes a week (an average of 30 minutes a day, 5 days a week). This will help you control your weight and prevent disease.    Limit alcohol to one drink per day.    No  smoking.     Wear sunscreen to prevent skin cancer.    See your dentist every six months for an exam and cleaning.        Follow-ups after your visit        Your next 10 appointments already scheduled     Mar 24, 2017 10:30 AM CDT   Return Visit with RH ONCOLOGY NURSE   [facility] ([facility])    [facility]  [address]  [facility] [address]   [phone]            Apr 03, 2017  3:30 PM CDT   Return Visit with Tata Knott MD   [facility] ([facility])    [facility]  [address]  [facility] [address]   [phone]              Who to contact     If you have questions or need follow up information about today's clinic visit or your schedule please contact [facility] directly at [phone].  Normal or non-critical lab and imaging results will be communicated to you by [employer], letter or phone within 4 business days after the clinic has received the results. If you do not hear from us within 7 days, please contact the clinic through [employer] or phone. If you have a critical or abnormal lab result, we will notify you by phone as soon as possible.  Submit refill requests through [employer] or call your pharmacy and they will forward the refill request to us. Please allow 3 business days for your refill to be completed.          Additional Information About Your Visit        [employer] Information     [employer] gives you secure access to your electronic health record. If you see a primary care provider, you can also send messages to your care team and make appointments. If you have questions, please call your primary care clinic.  If you do not have a primary care provider, please call [phone] and they will assist you.        Care EveryWhere ID     This is your Care EveryWhere ID. This could be used by other organizations to access your  "[facility] medical records  [id number]        Your Vitals Were     Temperature Respirations Height Pulse Oximetry BMI (Body Mass Index)       98.1  F (36.7  C) (Oral) 10 5' 4\" (1.626 m) 96% 32.44 kg/m2        Blood Pressure from Last 3 Encounters:   03/24/17 112/80   03/01/17 (!) 133/92   02/15/17 138/88    Weight from Last 3 Encounters:   03/24/17 189 lb (85.7 kg)   03/01/17 192 lb (87.1 kg)   02/15/17 191 lb 3.2 oz (86.7 kg)              We Performed the Following     Comprehensive metabolic panel (BMP + Alb, Alk Phos, ALT, AST, Total. Bili, TP)     LIPID REFLEX TO DIRECT LDL PANEL          Today's Medication Changes          These changes are accurate as of: 3/24/17  9:37 AM.  If you have any questions, ask your nurse or doctor.               These medicines have changed or have updated prescriptions.        Dose/Directions    gabapentin 300 MG capsule   Commonly known as:  NEURONTIN   This may have changed:    - medication strength  - additional instructions   Used for:  Pain in both lower extremities   Changed by:  Margaret Longo Ra, APRN CNP        Dose:  300 mg   Take 1 capsule (300 mg) by mouth At Bedtime   Quantity:  90 capsule   Refills:  3            Where to get your medicines      These medications were sent to [facility]/pharmacy #[id number] - [place], [place] - [address] [address]  [address] [facility] [address]     Phone:  [phone]     gabapentin 300 MG capsule                Primary Care Provider Office Phone # Fax #    NIKKI Santos Ra [phone]       [facility] [address] [address] [facility] [facility] [address]        Thank you!     Thank you for choosing [facility]  for your care. Our goal is always to provide you with excellent care. Hearing back from our patients is one way we can continue to improve our services. Please take a few minutes to complete the written survey that you may receive in the mail after your visit with us. Thank you!           "   Your Updated Medication List - Protect others around you: Learn how to safely use, store and throw away your medicines at www.disposemymeds.org.          This list is accurate as of: 3/24/17  9:37 AM.  Always use your most recent med list.                   Brand Name Dispense Instructions for use    B-12 1000 MCG Tbcr     100 tablet    Take 1,000 mcg by mouth daily       calcium carb 1250 mg (500 mg [place])/vitamin D 200 units 500-200 MG-UNIT per tablet    OSCAL with D     Take 1 tablet by mouth daily       desonide 0.05 % cream    DESOWEN     Apply topically 2 times daily as needed       fluticasone 50 MCG/ACT spray    FLONASE     Spray 1-2 sprays into both nostrils daily as needed for rhinitis or allergies Reported on 3/24/2017       gabapentin 300 MG capsule    NEURONTIN    90 capsule    Take 1 capsule (300 mg) by mouth At Bedtime       glucosamine-chondroitin 500-400 MG Caps per capsule      Take 1 capsule by mouth daily       HYDROcodone-acetaminophen 5-325 MG per tablet    NORCO    10 tablet    Take 1-2 tablets by mouth every 4 hours as needed for other (Moderate to Severe Pain)       IBU- MG Tabs      1 TABLET EVERY 4 TO 6 HOURS AS NEEDED       ketoconazole 2 % cream    NIZORAL     Apply topically daily as needed for itching       LORazepam 0.5 MG tablet    ATIVAN    30 tablet    Take 1 tablet (0.5 mg) by mouth every 4 hours as needed (Anxiety, Nausea/Vomiting or Sleep)       omeprazole 20 MG CR capsule    priLOSEC     Take 20 mg by mouth daily       order for DME     1 Units    Hair prosthesis for chemotherapy induced alopecia       simvastatin 20 MG tablet    ZOCOR    30 tablet    Take 1 tablet (20 mg) by mouth At Bedtime       * tamoxifen 20 MG tablet    NOLVADEX    90 tablet    Take 1 tablet (20 mg) by mouth daily       * tamoxifen 20 MG tablet    NOLVADEX    90 tablet    Take 1 tablet (20 mg) by mouth daily       * Notice:  This list has 2 medication(s) that are the same as other medications  prescribed for you. Read the directions carefully, and ask your doctor or other care provider to review them with you.

## 2017-03-24 NOTE — NURSING NOTE
"Chief Complaint   Patient presents with     Physical       Initial /80 (BP Location: Left arm, Patient Position: Chair, Cuff Size: Adult Regular)  Temp 98.1  F (36.7  C) (Oral)  Resp 10  Ht 5' 4\" (1.626 m)  Wt 189 lb (85.7 kg)  SpO2 96%  BMI 32.44 kg/m2 Estimated body mass index is 32.44 kg/(m^2) as calculated from the following:    Height as of this encounter: 5' 4\" (1.626 m).    Weight as of this encounter: 189 lb (85.7 kg).  Medication Reconciliation: complete   Dee Ramirez,CHAPO      "

## 2017-03-24 NOTE — NURSING NOTE
Medical Assistant Note:  Shantell Wagner presents today for Lab draw.    Patient seen by provider today: No.   present during visit today: Not Applicable.    Concerns: No Concerns.    Procedure:  Labs drawn: Yes, Lab was also drawn for research today, was was given to them.    Post Assessment:  Labs drawn without difficulty: Yes.    Discharge Plan:  Patient discharged in stable condition accompanied by: self.    Jessica Fuentes MA

## 2017-03-25 LAB
ALBUMIN SERPL-MCNC: 4.2 G/DL (ref 3.4–5)
ALP SERPL-CCNC: 60 U/L (ref 40–150)
ALT SERPL W P-5'-P-CCNC: 26 U/L (ref 0–50)
ANION GAP SERPL CALCULATED.3IONS-SCNC: 7 MMOL/L (ref 3–14)
AST SERPL W P-5'-P-CCNC: 18 U/L (ref 0–45)
BILIRUB SERPL-MCNC: 0.8 MG/DL (ref 0.2–1.3)
BUN SERPL-MCNC: 18 MG/DL (ref 7–30)
CALCIUM SERPL-MCNC: 9.9 MG/DL (ref 8.5–10.1)
CHLORIDE SERPL-SCNC: 105 MMOL/L (ref 94–109)
CHOLEST SERPL-MCNC: 210 MG/DL
CO2 SERPL-SCNC: 30 MMOL/L (ref 20–32)
CREAT SERPL-MCNC: 0.79 MG/DL (ref 0.52–1.04)
GFR SERPL CREATININE-BSD FRML MDRD: 79 ML/MIN/1.7M2
GLUCOSE SERPL-MCNC: 89 MG/DL (ref 70–99)
HDLC SERPL-MCNC: 52 MG/DL
LDLC SERPL CALC-MCNC: 125 MG/DL
NONHDLC SERPL-MCNC: 158 MG/DL
POTASSIUM SERPL-SCNC: 4.3 MMOL/L (ref 3.4–5.3)
PROT SERPL-MCNC: 8.1 G/DL (ref 6.8–8.8)
SODIUM SERPL-SCNC: 142 MMOL/L (ref 133–144)
TRIGL SERPL-MCNC: 164 MG/DL

## 2017-03-28 RX ORDER — SIMVASTATIN 20 MG
20 TABLET ORAL AT BEDTIME
Qty: 90 TABLET | Refills: 3 | Status: SHIPPED | OUTPATIENT
Start: 2017-03-28 | End: 2018-04-10

## 2017-03-30 ENCOUNTER — TELEPHONE (OUTPATIENT)
Dept: ONCOLOGY | Facility: CLINIC | Age: 47
End: 2017-03-30

## 2017-03-30 NOTE — TELEPHONE ENCOUNTER
Sasha Oden, Research RN called to inform staff that patient has been randomized to Endocrine therapy versus Investigational drug.  Sasha will contact patient to let her know.  Patient will meet with Research RN at next appt on 4/13/17 to discuss specifics of study.  Patient will also need study labs on return.  Alejandro Martinez RN, BSN, OCN  [facility]  Patient Care Coordinator

## 2017-03-30 NOTE — TELEPHONE ENCOUNTER
Received message from Randi Research RN ~ change in appt for trial start date.  4/3 was orig requested start date for study and date had been changed to 4/13.  Writer was not informed of change and had no idea as to rational for change.  Did contact Sasha DE LEÓN In Randi's absence who confirmed starting on 4/13 was not a problem and would not compromise the study.  Randi notified.  Alejandro Martinez, RN, BSN, OCN  [facility]  Patient Care Coordinator

## 2017-04-13 ENCOUNTER — ONCOLOGY VISIT (OUTPATIENT)
Dept: ONCOLOGY | Facility: CLINIC | Age: 47
End: 2017-04-13
Attending: INTERNAL MEDICINE
Payer: COMMERCIAL

## 2017-04-13 ENCOUNTER — HOSPITAL ENCOUNTER (OUTPATIENT)
Facility: CLINIC | Age: 47
Setting detail: SPECIMEN
Discharge: HOME | End: 2017-04-13
Attending: INTERNAL MEDICINE | Admitting: INTERNAL MEDICINE
Payer: COMMERCIAL

## 2017-04-13 VITALS
BODY MASS INDEX: 32.01 KG/M2 | OXYGEN SATURATION: 97 % | DIASTOLIC BLOOD PRESSURE: 87 MMHG | RESPIRATION RATE: 16 BRPM | HEIGHT: 64 IN | HEART RATE: 82 BPM | WEIGHT: 187.5 LBS | TEMPERATURE: 97 F | SYSTOLIC BLOOD PRESSURE: 128 MMHG

## 2017-04-13 DIAGNOSIS — C50.411 MALIGNANT NEOPLASM OF UPPER-OUTER QUADRANT OF RIGHT FEMALE BREAST (H): Primary | ICD-10-CM

## 2017-04-13 LAB
ALBUMIN SERPL-MCNC: 4.1 G/DL (ref 3.4–5)
ALP SERPL-CCNC: 52 U/L (ref 40–150)
ALT SERPL W P-5'-P-CCNC: 33 U/L (ref 0–50)
ANION GAP SERPL CALCULATED.3IONS-SCNC: 6 MMOL/L (ref 3–14)
AST SERPL W P-5'-P-CCNC: 24 U/L (ref 0–45)
BASOPHILS # BLD AUTO: 0 10E9/L (ref 0–0.2)
BASOPHILS NFR BLD AUTO: 0.7 %
BILIRUB SERPL-MCNC: 0.9 MG/DL (ref 0.2–1.3)
BUN SERPL-MCNC: 19 MG/DL (ref 7–30)
CALCIUM SERPL-MCNC: 9.5 MG/DL (ref 8.5–10.1)
CHLORIDE SERPL-SCNC: 102 MMOL/L (ref 94–109)
CO2 SERPL-SCNC: 29 MMOL/L (ref 20–32)
CREAT SERPL-MCNC: 0.75 MG/DL (ref 0.52–1.04)
DIFFERENTIAL METHOD BLD: NORMAL
EOSINOPHIL # BLD AUTO: 0.1 10E9/L (ref 0–0.7)
EOSINOPHIL NFR BLD AUTO: 2.6 %
ERYTHROCYTE [DISTWIDTH] IN BLOOD BY AUTOMATED COUNT: 13.5 % (ref 10–15)
GFR SERPL CREATININE-BSD FRML MDRD: 83 ML/MIN/1.7M2
GLUCOSE SERPL-MCNC: 94 MG/DL (ref 70–99)
HCT VFR BLD AUTO: 44.5 % (ref 35–47)
HGB BLD-MCNC: 14.6 G/DL (ref 11.7–15.7)
IMM GRANULOCYTES # BLD: 0 10E9/L (ref 0–0.4)
IMM GRANULOCYTES NFR BLD: 0.2 %
LYMPHOCYTES # BLD AUTO: 1.1 10E9/L (ref 0.8–5.3)
LYMPHOCYTES NFR BLD AUTO: 24.9 %
MCH RBC QN AUTO: 28.3 PG (ref 26.5–33)
MCHC RBC AUTO-ENTMCNC: 32.8 G/DL (ref 31.5–36.5)
MCV RBC AUTO: 86 FL (ref 78–100)
MONOCYTES # BLD AUTO: 0.3 10E9/L (ref 0–1.3)
MONOCYTES NFR BLD AUTO: 7.5 %
NEUTROPHILS # BLD AUTO: 2.7 10E9/L (ref 1.6–8.3)
NEUTROPHILS NFR BLD AUTO: 64.1 %
NRBC # BLD AUTO: 0 10*3/UL
NRBC BLD AUTO-RTO: 0 /100
PLATELET # BLD AUTO: 286 10E9/L (ref 150–450)
POTASSIUM SERPL-SCNC: 3.8 MMOL/L (ref 3.4–5.3)
PROT SERPL-MCNC: 7.5 G/DL (ref 6.8–8.8)
RBC # BLD AUTO: 5.16 10E12/L (ref 3.8–5.2)
SODIUM SERPL-SCNC: 137 MMOL/L (ref 133–144)
WBC # BLD AUTO: 4.3 10E9/L (ref 4–11)

## 2017-04-13 PROCEDURE — 80053 COMPREHEN METABOLIC PANEL: CPT | Performed by: INTERNAL MEDICINE

## 2017-04-13 PROCEDURE — 99213 OFFICE O/P EST LOW 20 MIN: CPT | Performed by: INTERNAL MEDICINE

## 2017-04-13 PROCEDURE — 99211 OFF/OP EST MAY X REQ PHY/QHP: CPT

## 2017-04-13 PROCEDURE — 85025 COMPLETE CBC W/AUTO DIFF WBC: CPT | Performed by: INTERNAL MEDICINE

## 2017-04-13 ASSESSMENT — PAIN SCALES - GENERAL: PAINLEVEL: NO PAIN (0)

## 2017-04-13 NOTE — MR AVS SNAPSHOT
After Visit Summary   4/13/2017    Shantell Wagner    MRN: [id number]           Patient Information     Date Of Birth          1970        Visit Information        Provider Department      4/13/2017 8:00 AM Tata Knott MD [facility]      Today's Diagnoses     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)    -  1      Care Instructions    RTC MD 1 month   Scheduled  Romana MENDOZA    Cbc cmp today-Jessica       AVS given to patient          Follow-ups after your visit        Your next 10 appointments already scheduled     May 17, 2017  8:00 AM CDT   Return Visit with Tata Knott MD   [facility] ([facility])    [facility]  [address]  [facility] [address]  [facility] [id number] [phone]              Future tests that were ordered for you today     Open Future Orders        Priority Expected Expires Ordered    CBC with platelets and differential Routine 4/13/2017 4/13/2018 4/13/2017            Who to contact     If you have questions or need follow up information about today's clinic visit or your schedule please contact [facility] directly at [phone].  Normal or non-critical lab and imaging results will be communicated to you by MyChart, letter or phone within 4 business days after the clinic has received the results. If you do not hear from us within 7 days, please contact the clinic through [employer] or phone. If you have a critical or abnormal lab result, we will notify you by phone as soon as possible.  Submit refill requests through [employer] or call your pharmacy and they will forward the refill request to us. Please allow 3 business days for your refill to be completed.          Additional Information About Your Visit        MyChart Information     [employer] gives you secure access to your electronic health record. If you  "see a primary care provider, you can also send messages to your care team and make appointments. If you have questions, please call your primary care clinic.  If you do not have a primary care provider, please call [phone] and they will assist you.        Care EveryWhere ID     This is your Care EveryWhere ID. This could be used by other organizations to access your [place] medical records  [id number]        Your Vitals Were     Pulse Temperature Respirations Height Pulse Oximetry BMI (Body Mass Index)    82 97  F (36.1  C) (Tympanic) 16 1.626 m (5' 4\") 97% 32.18 kg/m2       Blood Pressure from Last 3 Encounters:   04/13/17 128/87   03/24/17 112/80   03/01/17 (!) 133/92    Weight from Last 3 Encounters:   04/13/17 85 kg (187 lb 8 oz)   03/24/17 85.7 kg (189 lb)   03/01/17 87.1 kg (192 lb)              We Performed the Following     CBC with platelets and differential     Comprehensive metabolic panel (BMP + Alb, Alk Phos, ALT, AST, Total. Bili, TP)          Today's Medication Changes          These changes are accurate as of: 4/13/17  8:50 AM.  If you have any questions, ask your nurse or doctor.               Stop taking these medicines if you haven't already. Please contact your care team if you have questions.     [address] [facility]                    Primary Care Provider Office Phone # Fax #    Margaret NIKKI Condon [facility] [phone]       [facility] [address]  [facility] [address]        Thank you!     Thank you for choosing [facility]  for your care. Our goal is always to provide you with excellent care. Hearing back from our patients is one way we can continue to improve our services. Please take a few minutes to complete the written survey that you may receive in the mail after your visit with us. Thank you!             Your Updated Medication List - Protect others around you: Learn how to safely use, store and throw away your medicines at " www.disposemymeds.org.          This list is accurate as of: 4/13/17  8:50 AM.  Always use your most recent med list.                   Brand Name Dispense Instructions for use    calcium carb 1250 mg (500 mg [place])/vitamin D 200 units 500-200 MG-UNIT per tablet    OSCAL with D     Take 1 tablet by mouth daily Reported on 4/13/2017       desonide 0.05 % cream    DESOWEN     Apply topically 2 times daily as needed       fluticasone 50 MCG/ACT spray    FLONASE     Spray 1-2 sprays into both nostrils daily as needed for rhinitis or allergies Reported on 3/24/2017       gabapentin 300 MG capsule    NEURONTIN    90 capsule    Take 1 capsule (300 mg) by mouth At Bedtime       glucosamine-chondroitin 500-400 MG Caps per capsule      Take 1 capsule by mouth daily       HYDROcodone-acetaminophen 5-325 MG per tablet    NORCO    10 tablet    Take 1-2 tablets by mouth every 4 hours as needed for other (Moderate to Severe Pain)       IBU- MG Tabs      1 TABLET EVERY 4 TO 6 HOURS AS NEEDED       ketoconazole 2 % cream    NIZORAL     Apply topically daily as needed for itching       LORazepam 0.5 MG tablet    ATIVAN    30 tablet    Take 1 tablet (0.5 mg) by mouth every 4 hours as needed (Anxiety, Nausea/Vomiting or Sleep)       omeprazole 20 MG CR capsule    priLOSEC     Take 20 mg by mouth daily       order for DME     1 Units    Hair prosthesis for chemotherapy induced alopecia       simvastatin 20 MG tablet    ZOCOR    90 tablet    Take 1 tablet (20 mg) by mouth At Bedtime       * tamoxifen 20 MG tablet    NOLVADEX    90 tablet    Take 1 tablet (20 mg) by mouth daily       * tamoxifen 20 MG tablet    NOLVADEX    90 tablet    Take 1 tablet (20 mg) by mouth daily       * Notice:  This list has 2 medication(s) that are the same as other medications prescribed for you. Read the directions carefully, and ask your doctor or other care provider to review them with you.

## 2017-04-13 NOTE — NURSING NOTE
"Shantell Wagner is a 46 year old female who presents for:  Chief Complaint   Patient presents with     Oncology Clinic Visit     Breast Cancer - Study appt        Initial Vitals:  /87  Pulse 82  Temp 97  F (36.1  C) (Tympanic)  Resp 16  Ht 1.626 m (5' 4\")  Wt 85 kg (187 lb 8 oz)  SpO2 97%  BMI 32.18 kg/m2 Estimated body mass index is 32.18 kg/(m^2) as calculated from the following:    Height as of this encounter: 1.626 m (5' 4\").    Weight as of this encounter: 85 kg (187 lb 8 oz).. Body surface area is 1.96 meters squared. BP completed using cuff size: regular  Data Unavailable No LMP recorded. Allergies and medications reviewed.     Medications: Medication refills not needed today.  Pharmacy name entered into [employer]: [id number] - [facility] [address]    Comments: Follow up    8 minutes for nursing intake (face to face time)   Ching Sevilla CMA     DISCHARGE PLAN:  Next appointments: See patient instruction section  Departure Mode: Ambulatory  Accompanied by: self  0 minutes for nursing discharge (face to face time)   Ching Sevilla CMA            "

## 2017-04-13 NOTE — PROGRESS NOTES
HISTORY OF PRESENT ILLNESS:  Shantell Wagner is 46 years old with a history of a JAK2 mutation and locally advanced right-sided breast cancer.  She is here today for interim followup.  She presented with a 2.2 cm mass in the right breast and a 2.7 cm mass in the right axilla, had a nice response to Adriamycin and Cytoxan, followed by Taxol.  The tumor is estrogen receptor positive, HER-2 receptor negative.  She then had bilateral mastectomies and finished up on adjuvant radiation therapy on the right side in 02/2017.  She is currently on the PALLAS trial.  She got randomized to the tamoxifen only arm.  She comes in today for interim followup.  She is doing well on the PALLAS trial.  The tamoxifen is causing some hot flashes, but overall she is tolerating it quite well.  We discussed the side effect profile of tamoxifen today.      REVIEW OF SYSTEMS:  A 14-point comprehensive review of systems is otherwise unremarkable.      MEDICATIONS:  As charted.      ALLERGIES:  As charted.      PHYSICAL EXAMINATION:   GENERAL:  She is a well-appearing lady in no acute distress.   VITAL SIGNS:  Stable.   HEENT:  Oropharynx clear, mucous membranes moist.   NECK:  Has no masses or goiter.   LYMPH:  There is no cervical, supraclavicular or infraclavicular adenopathy.   CHEST:  Clear to auscultation and percussion bilaterally.   HEART:  S1, S2 normal.  No added sounds or murmurs.   ABDOMEN:  Soft and nontender, no hepatosplenomegaly.   EXTREMITIES:  Legs are without tenderness or edema.   BREASTS:  The patient is status post bilateral mastectomies, scars look good.  She has tissue expanders in.  Right and left axilla are negative.  She has radiation changes on the right side.   SKIN:  There are no rashes or lesions.   LYMPHATIC:  No evidence of lymphedema.      IMPRESSION:  Patient with a history of an advanced right-sided breast cancer with a nice response to neoadjuvant chemotherapy.  She also has a JAK2 mutation.  She is going  to continue on tamoxifen.  She is on the PALLAS trial.  I will see her back per the trial in a month.  She will get study labs done today including regular routine labs.         MICH ORTIZ MD             D: 2017 09:11   T: 2017 09:28   MT: SK      Name:     CADE GARCÍA   MRN:      [id number]        Account:      [id number]   :      1970           Visit Date:   2017      Document: [id number]

## 2017-05-01 DIAGNOSIS — L20.9 ATOPIC DERMATITIS: ICD-10-CM

## 2017-05-01 DIAGNOSIS — R21 RASH: Primary | ICD-10-CM

## 2017-05-01 NOTE — TELEPHONE ENCOUNTER
desonide (DESOWEN)      Last Written Prescription Date: historical  Last Fill Quantity: na,  # refills: na   Last Office Visit with [facility] or  Health prescribing provider: 3/24/17                                         Next 5 appointments (look out 90 days)     May 17, 2017  8:00 AM CDT   Return Visit with Tata Knott MD   [facility] ([facility])    [facility]  [address]  [place] [address]   [phone]                    ketoconazole (NIZORAL)      Last Written Prescription Date: historical  Last Fill Quantity: na,  # refills: na   Last Office Visit with [facility] or  Health prescribing provider: 3/24/17                                         Next 5 appointments (look out 90 days)     May 17, 2017  8:00 AM CDT   Return Visit with Tata Knott MD   [facility] ([facility])    [facility]  [address]  [place] [address]   [phone]

## 2017-05-01 NOTE — TELEPHONE ENCOUNTER
Called patient to ask about how/where she is using this.   Left message on her mobile number to call back.   Keeley Westfall, RN  Triage Nurse

## 2017-05-01 NOTE — TELEPHONE ENCOUNTER
I am okay refilling these but can you check with pt regarding use.  I am wondering where she is applying- I need to associate with a dx.  ENRIQUE

## 2017-05-02 NOTE — TELEPHONE ENCOUNTER
Patient uses occasionally for red, scaley, itching spots around lips and behind ears. Ok to wait until pcp back to send prescriptions.    Lila Heaton RN

## 2017-05-03 RX ORDER — KETOCONAZOLE 20 MG/G
CREAM TOPICAL
Qty: 30 G | Refills: 1 | Status: SHIPPED | OUTPATIENT
Start: 2017-05-03 | End: 2017-09-06

## 2017-05-03 RX ORDER — DESONIDE 0.5 MG/G
CREAM TOPICAL
Qty: 60 G | Refills: 0 | Status: SHIPPED | OUTPATIENT
Start: 2017-05-03 | End: 2017-09-06

## 2017-05-03 NOTE — TELEPHONE ENCOUNTER
Called patient to notify her of the refill.   Left message on her cell.     Keeley Westfall, KRISHNA  Triage Nurse

## 2017-05-17 ENCOUNTER — HOSPITAL ENCOUNTER (OUTPATIENT)
Facility: CLINIC | Age: 47
Setting detail: SPECIMEN
Discharge: HOME | End: 2017-05-17
Attending: INTERNAL MEDICINE | Admitting: INTERNAL MEDICINE
Payer: COMMERCIAL

## 2017-05-17 ENCOUNTER — ONCOLOGY VISIT (OUTPATIENT)
Dept: ONCOLOGY | Facility: CLINIC | Age: 47
End: 2017-05-17
Attending: INTERNAL MEDICINE
Payer: COMMERCIAL

## 2017-05-17 VITALS
OXYGEN SATURATION: 97 % | RESPIRATION RATE: 16 BRPM | BODY MASS INDEX: 31.92 KG/M2 | DIASTOLIC BLOOD PRESSURE: 87 MMHG | TEMPERATURE: 97.4 F | SYSTOLIC BLOOD PRESSURE: 125 MMHG | HEART RATE: 85 BPM | WEIGHT: 187 LBS | HEIGHT: 64 IN

## 2017-05-17 DIAGNOSIS — C50.411 MALIGNANT NEOPLASM OF UPPER-OUTER QUADRANT OF RIGHT FEMALE BREAST (H): Primary | ICD-10-CM

## 2017-05-17 DIAGNOSIS — C50.911 MALIGNANT NEOPLASM OF RIGHT FEMALE BREAST, UNSPECIFIED SITE OF BREAST: Primary | ICD-10-CM

## 2017-05-17 LAB
ALBUMIN SERPL-MCNC: 3.9 G/DL (ref 3.4–5)
ALP SERPL-CCNC: 59 U/L (ref 40–150)
ALT SERPL W P-5'-P-CCNC: 33 U/L (ref 0–50)
ANION GAP SERPL CALCULATED.3IONS-SCNC: 5 MMOL/L (ref 3–14)
AST SERPL W P-5'-P-CCNC: 21 U/L (ref 0–45)
BILIRUB SERPL-MCNC: 0.8 MG/DL (ref 0.2–1.3)
BUN SERPL-MCNC: 16 MG/DL (ref 7–30)
CALCIUM SERPL-MCNC: 10.2 MG/DL (ref 8.5–10.1)
CANCER AG27-29 SERPL-ACNC: 28 U/ML (ref 0–39)
CHLORIDE SERPL-SCNC: 106 MMOL/L (ref 94–109)
CO2 SERPL-SCNC: 30 MMOL/L (ref 20–32)
CREAT SERPL-MCNC: 0.76 MG/DL (ref 0.52–1.04)
ERYTHROCYTE [DISTWIDTH] IN BLOOD BY AUTOMATED COUNT: 12.7 % (ref 10–15)
GFR SERPL CREATININE-BSD FRML MDRD: 82 ML/MIN/1.7M2
GLUCOSE SERPL-MCNC: 97 MG/DL (ref 70–99)
HCT VFR BLD AUTO: 43.4 % (ref 35–47)
HGB BLD-MCNC: 14.2 G/DL (ref 11.7–15.7)
MCH RBC QN AUTO: 28.4 PG (ref 26.5–33)
MCHC RBC AUTO-ENTMCNC: 32.7 G/DL (ref 31.5–36.5)
MCV RBC AUTO: 87 FL (ref 78–100)
PLATELET # BLD AUTO: 285 10E9/L (ref 150–450)
POTASSIUM SERPL-SCNC: 4 MMOL/L (ref 3.4–5.3)
PROT SERPL-MCNC: 7.9 G/DL (ref 6.8–8.8)
RBC # BLD AUTO: 5 10E12/L (ref 3.8–5.2)
SODIUM SERPL-SCNC: 141 MMOL/L (ref 133–144)
WBC # BLD AUTO: 4.5 10E9/L (ref 4–11)

## 2017-05-17 PROCEDURE — 99211 OFF/OP EST MAY X REQ PHY/QHP: CPT | Mod: 25

## 2017-05-17 PROCEDURE — 36415 COLL VENOUS BLD VENIPUNCTURE: CPT

## 2017-05-17 PROCEDURE — 99214 OFFICE O/P EST MOD 30 MIN: CPT | Performed by: INTERNAL MEDICINE

## 2017-05-17 PROCEDURE — 99211 OFF/OP EST MAY X REQ PHY/QHP: CPT

## 2017-05-17 PROCEDURE — 85027 COMPLETE CBC AUTOMATED: CPT | Performed by: INTERNAL MEDICINE

## 2017-05-17 PROCEDURE — 86300 IMMUNOASSAY TUMOR CA 15-3: CPT | Performed by: INTERNAL MEDICINE

## 2017-05-17 PROCEDURE — 80053 COMPREHEN METABOLIC PANEL: CPT | Performed by: INTERNAL MEDICINE

## 2017-05-17 ASSESSMENT — PAIN SCALES - GENERAL: PAINLEVEL: NO PAIN (0)

## 2017-05-17 NOTE — MR AVS SNAPSHOT
After Visit Summary   5/17/2017    Shantell Wagner    MRN: [id number]           Patient Information     Date Of Birth          1970        Visit Information        Provider Department      5/17/2017 8:00 AM Tata Knott MD [facility]        Today's Diagnoses     Malignant neoplasm of right female breast, unspecified site of breast (H)    -  1      Care Instructions          RTC MD 4 weeks Scheduled  Romana D    Labs today -drawn by GN and on return Scheduled  Romana D  AVS given to patient          Follow-ups after your visit        Your next 10 appointments already scheduled     Antony 15, 2017  4:00 PM CDT   Return Visit with Tata Knott MD   [facility] ([facility])    [facility]  [address] [place]  [address] [address] [phone]              Who to contact     If you have questions or need follow up information about today's clinic visit or your schedule please contact [facility] directly at [phone].  Normal or non-critical lab and imaging results will be communicated to you by MyChart, letter or phone within 4 business days after the clinic has received the results. If you do not hear from us within 7 days, please contact the clinic through [employer] or phone. If you have a critical or abnormal lab result, we will notify you by phone as soon as possible.  Submit refill requests through [employer] or call your pharmacy and they will forward the refill request to us. Please allow 3 business days for your refill to be completed.          Additional Information About Your Visit        [employer] Information     [employer] gives you secure access to your electronic health record. If you see a primary care provider, you can also send messages to your care team and make appointments. If you have questions, please call your primary care clinic.  If you do  "not have a primary care provider, please call [phone] and they will assist you.        Care EveryWhere ID     This is your Care EveryWhere ID. This could be used by other organizations to access your [place] medical records  [id number]        Your Vitals Were     Pulse Temperature Respirations Height Pulse Oximetry BMI (Body Mass Index)    85 97.4  F (36.3  C) (Tympanic) 16 1.626 m (5' 4\") 97% 32.1 kg/m2       Blood Pressure from Last 3 Encounters:   05/17/17 125/87   04/13/17 128/87   03/24/17 112/80    Weight from Last 3 Encounters:   05/17/17 84.8 kg (187 lb)   04/13/17 85 kg (187 lb 8 oz)   03/24/17 85.7 kg (189 lb)              We Performed the Following     Ca27.29  breast tumor marker     CBC with platelets     Comprehensive metabolic panel (BMP + Alb, Alk Phos, ALT, AST, Total. Bili, TP)        Primary Care Provider Office Phone # Fax #    NIKKI Santos Ra [facility] [phone]       [facility] [address] [facility] [address]        Thank you!     Thank you for choosing [facility]  for your care. Our goal is always to provide you with excellent care. Hearing back from our patients is one way we can continue to improve our services. Please take a few minutes to complete the written survey that you may receive in the mail after your visit with us. Thank you!             Your Updated Medication List - Protect others around you: Learn how to safely use, store and throw away your medicines at www.disposemymeds.org.          This list is accurate as of: 5/17/17  9:03 AM.  Always use your most recent med list.                   Brand Name Dispense Instructions for use    calcium carb 1250 mg (500 mg [place])/vitamin D 200 units 500-200 MG-UNIT per tablet    OSCAL with D     Take 1 tablet by mouth daily Reported on 4/13/2017       * desonide 0.05 % cream    DESOWEN     Apply topically 2 times daily as needed       * desonide 0.05 % cream    DESOWEN    60 " g    APPLY TOPICALLY 2 TIMES DAILY       fluticasone 50 MCG/ACT spray    FLONASE     Spray 1-2 sprays into both nostrils daily as needed for rhinitis or allergies Reported on 3/24/2017       gabapentin 300 MG capsule    NEURONTIN    90 capsule    Take 1 capsule (300 mg) by mouth At Bedtime       glucosamine-chondroitin 500-400 MG Caps per capsule      Take 1 capsule by mouth daily       HYDROcodone-acetaminophen 5-325 MG per tablet    NORCO    10 tablet    Take 1-2 tablets by mouth every 4 hours as needed for other (Moderate to Severe Pain)       IBU- MG Tabs      1 TABLET EVERY 4 TO 6 HOURS AS NEEDED       * ketoconazole 2 % cream    NIZORAL     Apply topically daily as needed for itching       * ketoconazole 2 % cream    NIZORAL    30 g    APPLY TOPICALLY DAILY       LORazepam 0.5 MG tablet    ATIVAN    30 tablet    Take 1 tablet (0.5 mg) by mouth every 4 hours as needed (Anxiety, Nausea/Vomiting or Sleep)       omeprazole 20 MG CR capsule    priLOSEC     Take 20 mg by mouth daily       order for DME     1 Units    Hair prosthesis for chemotherapy induced alopecia       simvastatin 20 MG tablet    ZOCOR    90 tablet    Take 1 tablet (20 mg) by mouth At Bedtime       * tamoxifen 20 MG tablet    NOLVADEX    90 tablet    Take 1 tablet (20 mg) by mouth daily       * tamoxifen 20 MG tablet    NOLVADEX    90 tablet    Take 1 tablet (20 mg) by mouth daily       * Notice:  This list has 6 medication(s) that are the same as other medications prescribed for you. Read the directions carefully, and ask your doctor or other care provider to review them with you.

## 2017-05-17 NOTE — NURSING NOTE
"Oncology Rooming Note    May 17, 2017 8:09 AM   Shantell Wagner is a 46 year old female who presents for:    Chief Complaint   Patient presents with     Oncology Clinic Visit     Follow-up     Initial Vitals: /87  Pulse 85  Temp 97.4  F (36.3  C) (Tympanic)  Resp 16  Ht 1.626 m (5' 4\")  Wt 84.8 kg (187 lb)  SpO2 97%  BMI 32.1 kg/m2 Estimated body mass index is 32.1 kg/(m^2) as calculated from the following:    Height as of this encounter: 1.626 m (5' 4\").    Weight as of this encounter: 84.8 kg (187 lb). Body surface area is 1.96 meters squared.  No Pain (0) Comment: Data Unavailable   No LMP recorded.  Allergies reviewed: Yes  Medications reviewed: Yes    Medications: Medication refills not needed today.  Pharmacy name entered into [employer]: [id number] [address] [address]    Clinical concerns: Follow-up    8 minutes for nursing intake (face to face time)     Jessica Fuentes  DISCHARGE PLAN:  Next appointments: See patient instruction section  Departure Mode: Ambulatory  Accompanied by: self  0 minutes for nursing discharge (face to face time)   Jessica Fuentes                "

## 2017-05-17 NOTE — PATIENT INSTRUCTIONS
RTC MD 4 weeks Scheduled  Romana MENDOZA    Labs today -drawn by GN and on return Scheduled  Romana MENDOZA  AVS given to patient

## 2017-05-18 NOTE — PROGRESS NOTES
HISTORY OF PRESENT ILLNESS:  Shantell Wagner is a 46-year-old patient who comes in today for interim followup.  She has a CHEK2 mutation and she presented with a locally advanced right-sided breast cancer.  Her tumor was a 2.2 cm mass in the right breast with a 2.7 cm right axillary mass.  She had nice response to Adriamycin and Cytoxan followed by Taxol.  Her tumor was estrogen receptor positive, progesterone receptor negative.  She is currently on the Pallas trial.  She got randomized to tamoxifen only.  She comes in today for interim followup.  She is doing quite well.  She still has some fatigue.  She also has some hot flashes from the tamoxifen.  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  Her performance status today is 0.      REVIEW OF SYSTEMS:  A 14-point comprehensive review of systems is otherwise unremarkable.      MEDICATIONS:  As charted.      ALLERGIES:  As charted.      PHYSICAL EXAMINATION:   GENERAL:  She is well-appearing lady in no acute distress.   VITAL SIGNS:  Stable.     PERFORMANCE STATUS:  0.   HEENT:  Oropharynx clear, mucous membranes moist.   NECK:  Has no masses or goiter.   LYMPH:  There is no cervical, supraclavicular or infraclavicular adenopathy.   CHEST:  Clear to auscultation and percussion bilaterally.   HEART:  S1, S2 normal.  No added sounds or murmurs.   ABDOMEN:  Soft and nontender, no hepatosplenomegaly.   EXTREMITIES:  Legs are without tenderness or edema.   BREASTS:  The patient is status post bilateral mastectomies, scars look good.  Right and left axillae negative.  No palpable masses on the chest wall.  She still has some radiation changes on the right side.  Skin has no rashes or lesions.   LYMPHATIC:  No evidence of lymphedema.      DATA REVIEW:  Labs are pending at time of dictation.      IMPRESSION:  Patient with a history of right-sided breast cancer as outlined above.  She has a CHEK2 mutation.  She  will continue on the Pallas trial and on tamoxifen.  I am happy with how things are going for her.  I will see her back per the trial in 1 month.         MICH ORTIZ MD             D: 2017 12:45   T: 2017 21:21   MT:       Name:     CADE GARCÍA   MRN:      [id number]        Account:      [id number]   :      1970           Visit Date:   2017      Document: [id number]

## 2017-05-31 ENCOUNTER — TELEPHONE (OUTPATIENT)
Dept: ONCOLOGY | Facility: CLINIC | Age: 47
End: 2017-05-31

## 2017-05-31 NOTE — TELEPHONE ENCOUNTER
Called patient yesterday and left a voicemail and again today. Requested she call back to complete D15 phone assessment of AE's.

## 2017-06-01 NOTE — TELEPHONE ENCOUNTER
Patient responded to [employer] message. See [employer] encounter fur further documentation. Thanks.

## 2017-06-15 ENCOUNTER — HOSPITAL ENCOUNTER (OUTPATIENT)
Facility: CLINIC | Age: 47
Setting detail: SPECIMEN
Discharge: HOME | End: 2017-06-15
Attending: INTERNAL MEDICINE | Admitting: INTERNAL MEDICINE
Payer: COMMERCIAL

## 2017-06-15 ENCOUNTER — ONCOLOGY VISIT (OUTPATIENT)
Dept: ONCOLOGY | Facility: CLINIC | Age: 47
End: 2017-06-15
Attending: INTERNAL MEDICINE
Payer: COMMERCIAL

## 2017-06-15 VITALS
HEIGHT: 64 IN | RESPIRATION RATE: 16 BRPM | BODY MASS INDEX: 32.44 KG/M2 | OXYGEN SATURATION: 98 % | DIASTOLIC BLOOD PRESSURE: 96 MMHG | TEMPERATURE: 98 F | SYSTOLIC BLOOD PRESSURE: 158 MMHG | WEIGHT: 190 LBS | HEART RATE: 79 BPM

## 2017-06-15 DIAGNOSIS — C50.411 MALIGNANT NEOPLASM OF UPPER-OUTER QUADRANT OF RIGHT FEMALE BREAST (H): ICD-10-CM

## 2017-06-15 LAB
ALBUMIN SERPL-MCNC: 4 G/DL (ref 3.4–5)
ALP SERPL-CCNC: 55 U/L (ref 40–150)
ALT SERPL W P-5'-P-CCNC: 28 U/L (ref 0–50)
ANION GAP SERPL CALCULATED.3IONS-SCNC: 3 MMOL/L (ref 3–14)
AST SERPL W P-5'-P-CCNC: 21 U/L (ref 0–45)
BASOPHILS # BLD AUTO: 0 10E9/L (ref 0–0.2)
BASOPHILS NFR BLD AUTO: 0.9 %
BILIRUB SERPL-MCNC: 0.6 MG/DL (ref 0.2–1.3)
BUN SERPL-MCNC: 16 MG/DL (ref 7–30)
CALCIUM SERPL-MCNC: 9.2 MG/DL (ref 8.5–10.1)
CHLORIDE SERPL-SCNC: 104 MMOL/L (ref 94–109)
CO2 SERPL-SCNC: 32 MMOL/L (ref 20–32)
CREAT SERPL-MCNC: 0.78 MG/DL (ref 0.52–1.04)
DIFFERENTIAL METHOD BLD: NORMAL
EOSINOPHIL # BLD AUTO: 0.1 10E9/L (ref 0–0.7)
EOSINOPHIL NFR BLD AUTO: 2 %
ERYTHROCYTE [DISTWIDTH] IN BLOOD BY AUTOMATED COUNT: 12.1 % (ref 10–15)
GFR SERPL CREATININE-BSD FRML MDRD: 79 ML/MIN/1.7M2
GLUCOSE SERPL-MCNC: 118 MG/DL (ref 70–99)
HCT VFR BLD AUTO: 38.2 % (ref 35–47)
HGB BLD-MCNC: 12.5 G/DL (ref 11.7–15.7)
IMM GRANULOCYTES # BLD: 0 10E9/L (ref 0–0.4)
IMM GRANULOCYTES NFR BLD: 0.2 %
LYMPHOCYTES # BLD AUTO: 1.5 10E9/L (ref 0.8–5.3)
LYMPHOCYTES NFR BLD AUTO: 32.6 %
MCH RBC QN AUTO: 28.6 PG (ref 26.5–33)
MCHC RBC AUTO-ENTMCNC: 32.7 G/DL (ref 31.5–36.5)
MCV RBC AUTO: 87 FL (ref 78–100)
MONOCYTES # BLD AUTO: 0.3 10E9/L (ref 0–1.3)
MONOCYTES NFR BLD AUTO: 7.5 %
NEUTROPHILS # BLD AUTO: 2.6 10E9/L (ref 1.6–8.3)
NEUTROPHILS NFR BLD AUTO: 56.8 %
NRBC # BLD AUTO: 0 10*3/UL
NRBC BLD AUTO-RTO: 0 /100
PLATELET # BLD AUTO: 253 10E9/L (ref 150–450)
POTASSIUM SERPL-SCNC: 3.5 MMOL/L (ref 3.4–5.3)
PROT SERPL-MCNC: 7.5 G/DL (ref 6.8–8.8)
RBC # BLD AUTO: 4.37 10E12/L (ref 3.8–5.2)
SODIUM SERPL-SCNC: 139 MMOL/L (ref 133–144)
WBC # BLD AUTO: 4.5 10E9/L (ref 4–11)

## 2017-06-15 PROCEDURE — 85025 COMPLETE CBC W/AUTO DIFF WBC: CPT | Performed by: INTERNAL MEDICINE

## 2017-06-15 PROCEDURE — 80053 COMPREHEN METABOLIC PANEL: CPT | Performed by: INTERNAL MEDICINE

## 2017-06-15 PROCEDURE — 99214 OFFICE O/P EST MOD 30 MIN: CPT | Performed by: INTERNAL MEDICINE

## 2017-06-15 ASSESSMENT — PAIN SCALES - GENERAL: PAINLEVEL: MILD PAIN (2)

## 2017-06-15 NOTE — PATIENT INSTRUCTIONS
Research study requires that we see patient back in early September week of Sept 4th-8th with labs Hgb A1C, CBC with diff, and CMP plus study lab kit. Scheduled  Romana MENDOZA  AVS given to patient    Bone scan next available -Scheduled for 6/23/17 @ 9am for injection and 11am for scan. Yaritza LOVING MD 3 months with research labs-Scheduled. Yaritza NETTLESS printed and given to Pt. Yaritza FULLER.

## 2017-06-15 NOTE — NURSING NOTE
"Oncology Rooming Note    Ruth 15, 2017 4:08 PM   Shantell Wagner is a 46 year old female who presents for:    Chief Complaint   Patient presents with     Oncology Clinic Visit     Breast Cancer - follow up - Labs drawn today     Initial Vitals: BP (!) 165/109  Pulse 79  Temp 98  F (36.7  C) (Tympanic)  Resp 16  Ht 1.626 m (5' 4\")  Wt 86.2 kg (190 lb)  SpO2 98%  BMI 32.61 kg/m2 Estimated body mass index is 32.61 kg/(m^2) as calculated from the following:    Height as of this encounter: 1.626 m (5' 4\").    Weight as of this encounter: 86.2 kg (190 lb). Body surface area is 1.97 meters squared.  Mild Pain (2) Comment: 2-3 low back pain    No LMP recorded.  Allergies reviewed: Yes  Medications reviewed: Yes    Medications: Medication refills not needed today.  Pharmacy name entered into [employer]: [id number] [place] [place] [facility] [address] [facility]    Clinical concerns: 8     8 minutes for nursing intake (face to face time)     Ching Sevilla CMA     DISCHARGE PLAN:  Next appointments: See patient instruction section  Departure Mode: Ambulatory  Accompanied by: self  0 minutes for nursing discharge (face to face time)   Ching Sevilla CMA                  "

## 2017-06-16 NOTE — PROGRESS NOTES
Shantell Wagner is a 46-year-old patient who comes in today for interim followup.  Shantell has CHEK2 mutation.  She presented with a locally advanced right-sided breast cancer 2.2 cm and a 2.7 cm right axillary mass.  She had a very nice response to neoadjuvant chemotherapy with Adriamycin and Cytoxan, followed by Taxol.  Her tumor was estrogen receptor positive, progesterone receptor negative.  She is currently on the PALLAS trial and got randomized to tamoxifen only.  She comes in today for interim followup.  She has a little bit of hypertension in clinic today and on recheck it was 158/96.  We are going to recheck it again at the end of the clinic visit.  She is suffering from some low back pain.  She has had low back pain now for approximately 2 weeks.  It has been exacerbated by anything.  It does not seem to be getting better.  She has taken ibuprofen and Tylenol every day.  It seems severe enough to me that we would need to do a bone scan to make sure there is no evidence of bone metastasis.  On a PET/CT scan about a year ago she did have some degenerative disease at L5-S1 area, but this pain is around the area of the lower thoracic upper lumbar spine.  Patient also complains of some mild fatigue.  She has some hot flashes from the tamoxifen.  I have offered some medication for the hot flashes today, but she has declined.  Her performance status today is 0.      REVIEW OF SYSTEMS:  A 14-point comprehensive review of systems apart from what is outlined above is unremarkable.      MEDICATIONS:  As charted.      ALLERGIES:  As charted.      PHYSICAL EXAMINATION:     GENERAL:  Well-appearing lady in no acute distress.   VITAL SIGNS:  Stable.   HEENT:  Oropharynx clear, mucous membranes moist.   NECK:  No masses or goiter.   LYMPH:  There is no cervical, supraclavicular or infraclavicular adenopathy.   CHEST:  Clear to auscultation and percussion bilaterally.   HEART:  S1, S2 normal.  No added sounds or murmurs.    ABDOMEN:  Soft and nontender, no hepatosplenomegaly.   EXTREMITIES:  Legs are without tenderness or edema.   BREASTS:  The patient is status post bilateral mastectomy.  She has tissue expanders in.  No palpable masses on the chest wall.  She has radiation changes on the right side.  Skin has no rashes or lesions.  There is no evidence of lymphedema.  She has no palpable tenderness on the spine on her musculoskeletal exam.      DATA REVIEW:  White cell count 4.5, hemoglobin 12.5, platelets 253.  Alkaline phosphatase is normal today at 55.  The rest of her liver function tests are normal.      IMPRESSION:  Patient with a history of breast cancer.  She started with fairly high risk right-sided breast cancer.  She is currently on tamoxifen.  I am concerned about some low back pain that she is having.  We are going to do a bone scan on her.  We will also recheck her blood pressure before she leaves today.  Part of that might just be because she is in some discomfort.         MICH ORTIZ MD             D: 06/15/2017 16:46   T: 2017 06:35   MT: SK      Name:     CADE GARCÍA   MRN:      [id number]        Account:      [id number]   :      1970           Visit Date:   06/15/2017      Document: [id number]

## 2017-06-23 ENCOUNTER — HOSPITAL ENCOUNTER (OUTPATIENT)
Dept: NUCLEAR MEDICINE | Facility: CLINIC | Age: 47
Setting detail: NUCLEAR MEDICINE
Discharge: HOME | End: 2017-06-23
Attending: INTERNAL MEDICINE | Admitting: INTERNAL MEDICINE
Payer: COMMERCIAL

## 2017-06-23 ENCOUNTER — HOSPITAL ENCOUNTER (OUTPATIENT)
Dept: NUCLEAR MEDICINE | Facility: CLINIC | Age: 47
Setting detail: NUCLEAR MEDICINE
End: 2017-06-23
Attending: INTERNAL MEDICINE
Payer: COMMERCIAL

## 2017-06-23 DIAGNOSIS — C50.411 MALIGNANT NEOPLASM OF UPPER-OUTER QUADRANT OF RIGHT FEMALE BREAST (H): ICD-10-CM

## 2017-06-23 PROCEDURE — 34300033 ZZH RX 343: Performed by: INTERNAL MEDICINE

## 2017-06-23 PROCEDURE — 78306 BONE IMAGING WHOLE BODY: CPT

## 2017-06-23 PROCEDURE — A9561 TC99M OXIDRONATE: HCPCS | Performed by: INTERNAL MEDICINE

## 2017-06-23 RX ADMIN — Medication 25 MCI.: at 09:00

## 2017-08-18 ENCOUNTER — OFFICE VISIT (OUTPATIENT)
Dept: FAMILY MEDICINE | Facility: CLINIC | Age: 47
End: 2017-08-18
Payer: COMMERCIAL

## 2017-08-18 VITALS
DIASTOLIC BLOOD PRESSURE: 80 MMHG | OXYGEN SATURATION: 100 % | TEMPERATURE: 98.7 F | BODY MASS INDEX: 32.56 KG/M2 | SYSTOLIC BLOOD PRESSURE: 118 MMHG | WEIGHT: 189.7 LBS | HEART RATE: 84 BPM

## 2017-08-18 DIAGNOSIS — C50.411 MALIGNANT NEOPLASM OF UPPER-OUTER QUADRANT OF RIGHT FEMALE BREAST, UNSPECIFIED ESTROGEN RECEPTOR STATUS (H): ICD-10-CM

## 2017-08-18 DIAGNOSIS — Z01.818 PREOP GENERAL PHYSICAL EXAM: Primary | ICD-10-CM

## 2017-08-18 LAB
ERYTHROCYTE [DISTWIDTH] IN BLOOD BY AUTOMATED COUNT: 12.2 % (ref 10–15)
HCT VFR BLD AUTO: 41.3 % (ref 35–47)
HGB BLD-MCNC: 13.6 G/DL (ref 11.7–15.7)
MCH RBC QN AUTO: 28.6 PG (ref 26.5–33)
MCHC RBC AUTO-ENTMCNC: 32.9 G/DL (ref 31.5–36.5)
MCV RBC AUTO: 87 FL (ref 78–100)
PLATELET # BLD AUTO: 258 10E9/L (ref 150–450)
RBC # BLD AUTO: 4.75 10E12/L (ref 3.8–5.2)
WBC # BLD AUTO: 4 10E9/L (ref 4–11)

## 2017-08-18 PROCEDURE — 99214 OFFICE O/P EST MOD 30 MIN: CPT | Performed by: NURSE PRACTITIONER

## 2017-08-18 PROCEDURE — 85027 COMPLETE CBC AUTOMATED: CPT | Performed by: NURSE PRACTITIONER

## 2017-08-18 PROCEDURE — 36415 COLL VENOUS BLD VENIPUNCTURE: CPT | Performed by: NURSE PRACTITIONER

## 2017-08-18 PROCEDURE — 80053 COMPREHEN METABOLIC PANEL: CPT | Performed by: NURSE PRACTITIONER

## 2017-08-18 NOTE — PROGRESS NOTES
[facility]  [address] [facility] [address] [phone]  Dept: [phone]    PRE-OP EVALUATION:  Today's date: 2017    Shantell Wagner (: 1970) presents for pre-operative evaluation assessment as requested by Dr. Jenna Vazquez.  She requires evaluation and anesthesia risk assessment prior to undergoing surgery/procedure for treatment of breast reconstruction .  Proposed procedure:     COMBINED RECONSTRUCT BREAST BILATERAL, IMPLANT PROSTHESIS BILATERAL Bilateral General   BILATERAL SECOND STAGE BREAST RECONSTRUCTION WITH IMPLANT (ARLEN HAS NOT DECIDED SALINE VS SILICONE         Date of Surgery/ Procedure: 17  Time of Surgery/ Procedure: 9:20AM  Hospital/Surgical Facility: [facility]  Fax: [phone]  Primary Physician: Margaret Longo Ra  Type of Anesthesia Anticipated: General    Patient has a Health Care Directive or Living Will:  NO    Preop Questions 8/15/2017   1.  Do you have a history of heart attack, stroke, stent, bypass or surgery on an artery in the head, neck, heart or legs? No   2.  Do you ever have any pain or discomfort in your chest? No   3.  Do you have a history of  Heart Failure? No   4.   Are you troubled by shortness of breath when:  walking on a level surface, or up a slight hill, or at night? No   5.  Do you currently have a cold, bronchitis or other respiratory infection? No   6.  Do you have a cough, shortness of breath, or wheezing? No   7.  Do you sometimes get pains in the calves of your legs when you walk? No   8. Do you or anyone in your family have previous history of blood clots? No   9.  Do you or does anyone in your family have a serious bleeding problem such as prolonged bleeding following surgeries or cuts? No   10. Have you ever had problems with anemia or been told to take iron pills? No   11. Have you had any abnormal blood loss such as black, tarry or bloody stools, or abnormal vaginal bleeding? No   12. Have you ever had  a blood transfusion? No   13. Have you or any of your relatives ever had problems with anesthesia? No   14. Do you have sleep apnea, excessive snoring or daytime drowsiness? No   15. Do you have any prosthetic heart valves? No   16. Do you have prosthetic joints? No   17. Is there any chance that you may be pregnant? No           HPI:                                                      Brief HPI related to upcoming procedure:   Hx of R sided breast cancer and R axillary mass.  Responded to neoadjuvant chemotherapy.  She is currently on tamoxifen.    S/p bilateral mastectomy, tissue expanders in place, planned implant.        See problem list for active medical problems.  Problems all longstanding and stable, except as noted/documented.  See ROS for pertinent symptoms related to these conditions.                                                                                                  .    MEDICAL HISTORY:                                                    Patient Active Problem List    Diagnosis Date Noted     Lymphedema 11/04/2016     Priority: Medium     Breast cancer (H) 10/17/2016     Priority: Medium     Acquired absence of both breasts and nipples 10/17/2016     Priority: Medium     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) 04/28/2016     Priority: Medium     Abnormal Pap smear, can't excl hi gd sq intraepithelial lesion (ASC-H) 02/20/2013     Priority: Medium     02/20/13 ASC-H.   03/11/13 [place]/ECC= NEGRITA 1. Repeat HPV screen and ECC 12 months from colp. Due 03/2014 02/20/14 Pap= Normal, Neg HPV. Repeat pap with HPV in 12 months. Due 02/2015  03/06/15 Pap= Normal, Neg HPV. 3 yr co-testing       Ganglion of left wrist 02/09/2012     Priority: Medium     dorsum; seems a bit larger.       Hyperlipidemia LDL goal <160 05/04/2009     Priority: Medium     Obesity 05/01/2009     Priority: Medium     Plantar fasciitis 07/29/2008     Priority: Medium      Past Medical History:    Diagnosis Date     Abnormal Pap smear, can't excl hi gd sq intraepithelial lesion (ASC-H) 02/20/13    [place]/ECC= NEGRITA 1. Repeat HPV screen and ECC 12 months from colp.     Nose fracture 1983    Accident, needed surgery     Past Surgical History:   Procedure Laterality Date     CARPAL TUNNEL RELEASE RT/LT  3/2010     HC RECONSTR NOSE  1983    after accident     INSERT PORT VASCULAR ACCESS Left 4/22/2016    Procedure: INSERT PORT VASCULAR ACCESS;  Surgeon: Nereyda Flowers MD;  Location: [facility] OR     INSERT TISSUE EXPANDER BREAST BILATERAL Bilateral 10/17/2016    Procedure: INSERT TISSUE EXPANDER BREAST BILATERAL;  Surgeon: Jenna Vazquez MD;  Location: [facility] OR     LASIK BILATERAL       MASTECTOMY MODIFIED RADICAL Right 10/17/2016    Procedure: MASTECTOMY MODIFIED RADICAL;  Surgeon: Nereyda Flowers MD;  Location: [facility] OR     MASTECTOMY MODIFIED RADICAL, SENTINEL NODE, COMBINED Left 10/17/2016    Procedure: COMBINED MASTECTOMY MODIFIED RADICAL, SENTINEL NODE;  Surgeon: Nereyda Flowers MD;  Location: [facility] OR     REMOVE CATHETER VASCULAR ACCESS Left 10/17/2016    Procedure: REMOVE CATHETER VASCULAR ACCESS;  Surgeon: Nereyda Flowers MD;  Location: [facility] OR     Current Outpatient Prescriptions   Medication Sig Dispense Refill     Acetaminophen (TYLENOL PO) Take 500 mg by mouth       desonide (DESOWEN) 0.05 % cream APPLY TOPICALLY 2 TIMES DAILY 60 g 0     ketoconazole (NIZORAL) 2 % cream APPLY TOPICALLY DAILY 30 g 1     simvastatin (ZOCOR) 20 MG tablet Take 1 tablet (20 mg) by mouth At Bedtime 90 tablet 3     gabapentin (NEURONTIN) 300 MG capsule Take 1 capsule (300 mg) by mouth At Bedtime 90 capsule 3     tamoxifen (NOLVADEX) 20 MG tablet Take 1 tablet (20 mg) by mouth daily 90 tablet 3     desonide (DESOWEN) 0.05 % cream Apply topically 2 times daily as needed       fluticasone (FLONASE) 50 MCG/ACT nasal spray Spray 1-2 sprays into both nostrils daily as needed for rhinitis or allergies Reported on 3/24/2017        ketoconazole (NIZORAL) 2 % cream Apply topically daily as needed for itching       calcium carb 1250 mg, 500 mg [place],/vitamin D 200 units (OSCAL WITH D) 500-200 MG-UNIT per tablet Take 1 tablet by mouth daily Reported on 4/13/2017       omeprazole (PRILOSEC) 20 MG capsule Take 20 mg by mouth daily       glucosamine-chondroitin 500-400 MG CAPS Take 1 capsule by mouth daily       HYDROcodone-acetaminophen (NORCO) 5-325 MG per tablet Take 1-2 tablets by mouth every 4 hours as needed for other (Moderate to Severe Pain) 10 tablet 0     LORazepam (ATIVAN) 0.5 MG tablet Take 1 tablet (0.5 mg) by mouth every 4 hours as needed (Anxiety, Nausea/Vomiting or Sleep) 30 tablet 5     order for DME Hair prosthesis for chemotherapy induced alopecia 1 Units 0     IBU- MG OR TABS 1 TABLET EVERY 4 TO 6 HOURS AS NEEDED       OTC products: occasional use of ativan if she wakes in the night, nothing regular.  Occasionally uses ibuprofen or tylenol, aware of the need to DC tylenol 10 days prior to surgery.    Allergies   Allergen Reactions     No Known Drug Allergies       Latex Allergy: NO    Social History   Substance Use Topics     Smoking status: Never Smoker     Smokeless tobacco: Never Used     Alcohol use No     History   Drug Use No       REVIEW OF SYSTEMS:                                                    C: NEGATIVE for fever, chills, change in weight  I: NEGATIVE for worrisome rashes, moles or lesions  E: NEGATIVE for vision changes or irritation  E/M: NEGATIVE for ear, mouth and throat problems  R: NEGATIVE for significant cough or SOB  CV: NEGATIVE for chest pain, palpitations or peripheral edema  GI: NEGATIVE for nausea, abdominal pain, heartburn, or change in bowel habits  : NEGATIVE for frequency, dysuria, or hematuria  MUSCULOSKELETAL:Recent back pain resolved.  N: NEGATIVE for weakness, dizziness or paresthesias  E: NEGATIVE for temperature intolerance, skin/hair changes  H: NEGATIVE for bleeding problems  P:  NEGATIVE for changes in mood or affect    EXAM:                                                    /80  Pulse 84  Temp 98.7  F (37.1  C) (Oral)  Wt 189 lb 11.2 oz (86 kg)  SpO2 100%  BMI 32.56 kg/m2    GENERAL APPEARANCE: healthy, alert and no distress     EYES: Eyes grossly normal to inspection     HENT: ear canals and TM's normal and nose and mouth without ulcers or lesions     NECK: no adenopathy, no asymmetry, masses, or scars and thyroid normal to palpation     RESP: lungs clear to auscultation - no rales, rhonchi or wheezes     CV: regular rates and rhythm, normal S1 S2, no S3 or S4 and no murmur, click or rub     ABDOMEN:  soft, nontender, no HSM or masses and bowel sounds normal     SKIN: no suspicious lesions or rashes     NEURO: Normal strength and tone, sensory exam grossly normal, mentation intact and speech normal     PSYCH: mentation appears normal. and affect normal/bright     LYMPHATICS: No axillary, cervical, or supraclavicular nodes    DIAGNOSTICS:                                                      Labs Resulted Today:   Results for orders placed or performed in visit on 08/18/17   Comprehensive metabolic panel (BMP + Alb, Alk Phos, ALT, AST, Total. Bili, TP)   Result Value Ref Range    Sodium 141 133 - 144 mmol/L    Potassium 4.4 3.4 - 5.3 mmol/L    Chloride 105 94 - 109 mmol/L    Carbon Dioxide 28 20 - 32 mmol/L    Anion Gap 8 3 - 14 mmol/L    Glucose 97 70 - 99 mg/dL    Urea Nitrogen 25 7 - 30 mg/dL    Creatinine 0.84 0.52 - 1.04 mg/dL    GFR Estimate 73 >60 mL/min/1.7m2    GFR Estimate If Black 89 >60 mL/min/1.7m2    Calcium 9.8 8.5 - 10.1 mg/dL    Bilirubin Total 0.9 0.2 - 1.3 mg/dL    Albumin 4.0 3.4 - 5.0 g/dL    Protein Total 7.9 6.8 - 8.8 g/dL    Alkaline Phosphatase 57 40 - 150 U/L    ALT 34 0 - 50 U/L    AST 28 0 - 45 U/L   CBC with platelets   Result Value Ref Range    WBC 4.0 4.0 - 11.0 10e9/L    RBC Count 4.75 3.8 - 5.2 10e12/L    Hemoglobin 13.6 11.7 - 15.7 g/dL     Hematocrit 41.3 35.0 - 47.0 %    MCV 87 78 - 100 fl    MCH 28.6 26.5 - 33.0 pg    MCHC 32.9 31.5 - 36.5 g/dL    RDW 12.2 10.0 - 15.0 %    Platelet Count 258 150 - 450 10e9/L       Recent Labs   Lab Test  06/15/17   1559  05/17/17   0849   03/01/17   1636   HGB  12.5  14.2   < >   --    PLT  253  285   < >   --    NA  139  141   < >   --    POTASSIUM  3.5  4.0   < >   --    CR  0.78  0.76   < >   --    A1C   --    --    --   5.8    < > = values in this interval not displayed.        IMPRESSION:                                                    Reason for surgery/procedure: S/p bilateral mastectomy, tissue expanders in place, planned implants.  Diagnosis/reason for consult: preop    The proposed surgical procedure is considered INTERMEDIATE risk.    REVISED CARDIAC RISK INDEX  The patient has the following serious cardiovascular risks for perioperative complications such as (MI, PE, VFib and 3  AV Block):  No serious cardiac risks  INTERPRETATION: 0 risks: Class I (very low risk - 0.4% complication rate)  Exercises without cardiac symptoms.    The patient has the following additional risks for perioperative complications:  No identified additional risks      ICD-10-CM    1. Preop general physical exam Z01.818 Comprehensive metabolic panel (BMP + Alb, Alk Phos, ALT, AST, Total. Bili, TP)     CBC with platelets   2. Malignant neoplasm of 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 C50.411        RECOMMENDATIONS:                                                      APPROVAL GIVEN to proceed with proposed procedure, without further diagnostic evaluation       Signed Electronically by: NIKKI Santos Ra CNP    Copy of this evaluation report is provided to requesting physician.    [place] Preop Guidelines

## 2017-08-18 NOTE — MR AVS SNAPSHOT
After Visit Summary   8/18/2017    Shantell Wagner    MRN: [id number]           Patient Information     Date Of Birth          1970        Visit Information        Provider Department      8/18/2017 10:00 AM Margraet Longo Ra, APRN CNP [place] [facility]        Today's Diagnoses     Preop general physical exam    -  1      Care Instructions      Before Your Surgery      Call your surgeon if there is any change in your health. This includes signs of a cold or flu (such as a sore throat, runny nose, cough, rash or fever).    Do not smoke, drink alcohol or take over the counter medicine (unless your surgeon or primary care doctor tells you to) for the 24 hours before and after surgery.    If you take prescribed drugs: Follow your doctor s orders about which medicines to take and which to stop until after surgery.    Eating and drinking prior to surgery: follow the instructions from your surgeon    Take a shower or bath the night before surgery. Use the soap your surgeon gave you to gently clean your skin. If you do not have soap from your surgeon, use your regular soap. Do not shave or scrub the surgery site.  Wear clean pajamas and have clean sheets on your bed.           Follow-ups after your visit        Your next 10 appointments already scheduled     Sep 06, 2017  3:30 PM CDT   Return Visit with Tata Knott MD   [facility]    [facility]  [address]  [facility] [id number] [phone]            Sep 13, 2017   Procedure with Jenna Vazquez MD   [facility] (--)    [address]  [facility] [id number] [phone]              Who to contact     If you have questions or need follow up information about today's clinic visit or your schedule please contact [facility] directly at [phone].  Normal or non-critical lab and  imaging results will be communicated to you by MyChart, letter or phone within 4 business days after the clinic has received the results. If you do not hear from us within 7 days, please contact the clinic through [employer] or phone. If you have a critical or abnormal lab result, we will notify you by phone as soon as possible.  Submit refill requests through [employer] or call your pharmacy and they will forward the refill request to us. Please allow 3 business days for your refill to be completed.          Additional Information About Your Visit        [employer] Information     [employer] gives you secure access to your electronic health record. If you see a primary care provider, you can also send messages to your care team and make appointments. If you have questions, please call your primary care clinic.  If you do not have a primary care provider, please call [phone] and they will assist you.        Care EveryWhere ID     This is your Care EveryWhere ID. This could be used by other organizations to access your [place] medical records  [id number]        Your Vitals Were     Pulse Temperature Pulse Oximetry BMI (Body Mass Index)          84 98.7  F (37.1  C) (Oral) 100% 32.56 kg/m2         Blood Pressure from Last 3 Encounters:   08/18/17 118/80   06/15/17 (!) 158/96   05/17/17 125/87    Weight from Last 3 Encounters:   08/18/17 189 lb 11.2 oz (86 kg)   06/15/17 190 lb (86.2 kg)   05/17/17 187 lb (84.8 kg)              We Performed the Following     CBC with platelets     Comprehensive metabolic panel (BMP + Alb, Alk Phos, ALT, AST, Total. Bili, TP)        Primary Care Provider Office Phone # Fax #    Margaret NIKKI Condon [facility] [phone] [address]  [facility] [address]        Equal Access to Services     TOSHIA JOSEPH : Hadii see escalera Sobertram, waaxda luqadaha, qaybta kaalmada akuayakiara, jie patel. So [facility] [phone].    ATENCIÓN: Marifer lopez  español, tiene a barber disposición servicios gratuitos de asistencia lingüística. [place] huggins [phone].    We comply with applicable federal civil rights laws and [place] laws. We do not discriminate on the basis of race, color, national origin, age, disability sex, sexual orientation or gender identity.            Thank you!     Thank you for choosing [facility]  for your care. Our goal is always to provide you with excellent care. Hearing back from our patients is one way we can continue to improve our services. Please take a few minutes to complete the written survey that you may receive in the mail after your visit with us. Thank you!             Your Updated Medication List - Protect others around you: Learn how to safely use, store and throw away your medicines at www.disposemymeds.org.          This list is accurate as of: 8/18/17 10:26 AM.  Always use your most recent med list.                   Brand Name Dispense Instructions for use Diagnosis    calcium carb 1250 mg (500 mg [place])/vitamin D 200 units 500-200 MG-UNIT per tablet    OSCAL with D     Take 1 tablet by mouth daily Reported on 4/13/2017        * desonide 0.05 % cream    DESOWEN     Apply topically 2 times daily as needed        * desonide 0.05 % cream    DESOWEN    60 g    APPLY TOPICALLY 2 TIMES DAILY    Rash       gabapentin 300 MG capsule    NEURONTIN    90 capsule    Take 1 capsule (300 mg) by mouth At Bedtime    Pain in both lower extremities       glucosamine-chondroitin 500-400 MG Caps per capsule      Take 1 capsule by mouth daily        HYDROcodone-acetaminophen 5-325 MG per tablet    NORCO    10 tablet    Take 1-2 tablets by mouth every 4 hours as needed for other (Moderate to Severe Pain)    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)       IBU- MG Tabs      1 TABLET EVERY 4 TO 6 HOURS AS NEEDED    Acute pharyngitis       * ketoconazole 2 % cream    NIZORAL     Apply topically daily as  needed for itching        * ketoconazole 2 % cream    NIZORAL    30 g    APPLY TOPICALLY DAILY    Rash       LORazepam 0.5 MG tablet    ATIVAN    30 tablet    Take 1 tablet (0.5 mg) by mouth every 4 hours as needed (Anxiety, Nausea/Vomiting or Sleep)    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)       omeprazole 20 MG CR capsule    priLOSEC     Take 20 mg by mouth daily        order for DME     1 Units    Hair prosthesis for chemotherapy induced alopecia    Malignant neoplasm of right female breast, unspecified site of breast       simvastatin 20 MG tablet    ZOCOR    90 tablet    Take 1 tablet (20 mg) by mouth At Bedtime    Hyperlipidemia LDL goal <160       tamoxifen 20 MG tablet    NOLVADEX    90 tablet    Take 1 tablet (20 mg) by mouth daily    Malignant neoplasm of right female breast, unspecified site of breast       TYLENOL PO      Take 500 mg by mouth        * Notice:  This list has 4 medication(s) that are the same as other medications prescribed for you. Read the directions carefully, and ask your doctor or other care provider to review them with you.

## 2017-08-19 LAB
ALBUMIN SERPL-MCNC: 4 G/DL (ref 3.4–5)
ALP SERPL-CCNC: 57 U/L (ref 40–150)
ALT SERPL W P-5'-P-CCNC: 34 U/L (ref 0–50)
ANION GAP SERPL CALCULATED.3IONS-SCNC: 8 MMOL/L (ref 3–14)
AST SERPL W P-5'-P-CCNC: 28 U/L (ref 0–45)
BILIRUB SERPL-MCNC: 0.9 MG/DL (ref 0.2–1.3)
BUN SERPL-MCNC: 25 MG/DL (ref 7–30)
CALCIUM SERPL-MCNC: 9.8 MG/DL (ref 8.5–10.1)
CHLORIDE SERPL-SCNC: 105 MMOL/L (ref 94–109)
CO2 SERPL-SCNC: 28 MMOL/L (ref 20–32)
CREAT SERPL-MCNC: 0.84 MG/DL (ref 0.52–1.04)
GFR SERPL CREATININE-BSD FRML MDRD: 73 ML/MIN/1.7M2
GLUCOSE SERPL-MCNC: 97 MG/DL (ref 70–99)
POTASSIUM SERPL-SCNC: 4.4 MMOL/L (ref 3.4–5.3)
PROT SERPL-MCNC: 7.9 G/DL (ref 6.8–8.8)
SODIUM SERPL-SCNC: 141 MMOL/L (ref 133–144)

## 2017-09-06 ENCOUNTER — ONCOLOGY VISIT (OUTPATIENT)
Dept: ONCOLOGY | Facility: CLINIC | Age: 47
End: 2017-09-06
Attending: INTERNAL MEDICINE
Payer: COMMERCIAL

## 2017-09-06 ENCOUNTER — HOSPITAL ENCOUNTER (OUTPATIENT)
Facility: CLINIC | Age: 47
Setting detail: SPECIMEN
Discharge: HOME | End: 2017-09-06
Attending: INTERNAL MEDICINE | Admitting: INTERNAL MEDICINE
Payer: COMMERCIAL

## 2017-09-06 VITALS
RESPIRATION RATE: 16 BRPM | OXYGEN SATURATION: 97 % | BODY MASS INDEX: 32.95 KG/M2 | WEIGHT: 193 LBS | SYSTOLIC BLOOD PRESSURE: 144 MMHG | TEMPERATURE: 97.2 F | HEIGHT: 64 IN | HEART RATE: 92 BPM | DIASTOLIC BLOOD PRESSURE: 101 MMHG

## 2017-09-06 DIAGNOSIS — Z17.0 MALIGNANT NEOPLASM OF UPPER-OUTER QUADRANT OF RIGHT BREAST IN FEMALE, ESTROGEN RECEPTOR POSITIVE (H): Primary | ICD-10-CM

## 2017-09-06 DIAGNOSIS — E66.01 MORBID OBESITY, UNSPECIFIED OBESITY TYPE (H): ICD-10-CM

## 2017-09-06 DIAGNOSIS — C50.411 MALIGNANT NEOPLASM OF UPPER-OUTER QUADRANT OF RIGHT BREAST IN FEMALE, ESTROGEN RECEPTOR POSITIVE (H): Primary | ICD-10-CM

## 2017-09-06 LAB
ALBUMIN SERPL-MCNC: 3.9 G/DL (ref 3.4–5)
ALP SERPL-CCNC: 64 U/L (ref 40–150)
ALT SERPL W P-5'-P-CCNC: 31 U/L (ref 0–50)
ANION GAP SERPL CALCULATED.3IONS-SCNC: 5 MMOL/L (ref 3–14)
AST SERPL W P-5'-P-CCNC: 28 U/L (ref 0–45)
BASOPHILS # BLD AUTO: 0 10E9/L (ref 0–0.2)
BASOPHILS NFR BLD AUTO: 0.5 %
BILIRUB SERPL-MCNC: 0.3 MG/DL (ref 0.2–1.3)
BUN SERPL-MCNC: 21 MG/DL (ref 7–30)
CALCIUM SERPL-MCNC: 9.5 MG/DL (ref 8.5–10.1)
CHLORIDE SERPL-SCNC: 103 MMOL/L (ref 94–109)
CO2 SERPL-SCNC: 29 MMOL/L (ref 20–32)
CREAT SERPL-MCNC: 0.85 MG/DL (ref 0.52–1.04)
DIFFERENTIAL METHOD BLD: NORMAL
EOSINOPHIL # BLD AUTO: 0.1 10E9/L (ref 0–0.7)
EOSINOPHIL NFR BLD AUTO: 2.3 %
ERYTHROCYTE [DISTWIDTH] IN BLOOD BY AUTOMATED COUNT: 12.2 % (ref 10–15)
GFR SERPL CREATININE-BSD FRML MDRD: 72 ML/MIN/1.7M2
GLUCOSE SERPL-MCNC: 100 MG/DL (ref 70–99)
HBA1C MFR BLD: 5.7 % (ref 4.3–6)
HCT VFR BLD AUTO: 40.9 % (ref 35–47)
HGB BLD-MCNC: 13.7 G/DL (ref 11.7–15.7)
IMM GRANULOCYTES # BLD: 0 10E9/L (ref 0–0.4)
IMM GRANULOCYTES NFR BLD: 0.2 %
LYMPHOCYTES # BLD AUTO: 1.7 10E9/L (ref 0.8–5.3)
LYMPHOCYTES NFR BLD AUTO: 29.7 %
MCH RBC QN AUTO: 29.1 PG (ref 26.5–33)
MCHC RBC AUTO-ENTMCNC: 33.5 G/DL (ref 31.5–36.5)
MCV RBC AUTO: 87 FL (ref 78–100)
MONOCYTES # BLD AUTO: 0.4 10E9/L (ref 0–1.3)
MONOCYTES NFR BLD AUTO: 6.8 %
NEUTROPHILS # BLD AUTO: 3.4 10E9/L (ref 1.6–8.3)
NEUTROPHILS NFR BLD AUTO: 60.5 %
NRBC # BLD AUTO: 0 10*3/UL
NRBC BLD AUTO-RTO: 0 /100
PLATELET # BLD AUTO: 274 10E9/L (ref 150–450)
POTASSIUM SERPL-SCNC: 4.1 MMOL/L (ref 3.4–5.3)
PROT SERPL-MCNC: 7.9 G/DL (ref 6.8–8.8)
RBC # BLD AUTO: 4.7 10E12/L (ref 3.8–5.2)
SODIUM SERPL-SCNC: 137 MMOL/L (ref 133–144)
WBC # BLD AUTO: 5.6 10E9/L (ref 4–11)

## 2017-09-06 PROCEDURE — 99214 OFFICE O/P EST MOD 30 MIN: CPT | Performed by: INTERNAL MEDICINE

## 2017-09-06 PROCEDURE — 83036 HEMOGLOBIN GLYCOSYLATED A1C: CPT | Performed by: INTERNAL MEDICINE

## 2017-09-06 PROCEDURE — 85025 COMPLETE CBC W/AUTO DIFF WBC: CPT | Performed by: INTERNAL MEDICINE

## 2017-09-06 PROCEDURE — 36415 COLL VENOUS BLD VENIPUNCTURE: CPT

## 2017-09-06 PROCEDURE — 99211 OFF/OP EST MAY X REQ PHY/QHP: CPT

## 2017-09-06 PROCEDURE — 80053 COMPREHEN METABOLIC PANEL: CPT | Performed by: INTERNAL MEDICINE

## 2017-09-06 ASSESSMENT — PAIN SCALES - GENERAL: PAINLEVEL: NO PAIN (0)

## 2017-09-06 NOTE — PATIENT INSTRUCTIONS
Please await Dr. Knott's final recommendations.     Study requests patient be seen again November 16th with labs (CBC with diff, CMP) prior.     Lauren Aase, RN [facility] [phone] [facility]. [phone]      RTC MD Nov 16th-Scheduled. Yaritza DE LEÓN     Labs on return-Scheduled. Yaritza FULLER.  AVS printed and given to Pt. Yaritza FULLER.

## 2017-09-06 NOTE — MR AVS SNAPSHOT
After Visit Summary   9/6/2017    Shantell Wagner    MRN: [id number]           Patient Information     Date Of Birth          1970        Visit Information        Provider Department      9/6/2017 3:30 PM Tata Knott MD [facility]      Today's Diagnoses     Malignant neoplasm of 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    -  1    Morbid obesity, unspecified obesity type (H)        Malignant neoplasm of right female breast (H)        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)          Care Instructions    Please await Dr. Knott's final recommendations.     Study requests patient be seen again November 16th with labs (CBC with diff, CMP) prior.     Lauren Aase, RN [facility] [phone] Pgr. [phone]      RTC MD Nov 16th     Labs on return           Follow-ups after your visit        Your next 10 appointments already scheduled     Sep 13, 2017   Procedure with Jenna Vazquez MD   [facility] (--)    [address]  [facility] [address]   [phone]            Nov 16, 2017  8:00 AM CST   Return Visit with Tata Knott MD   [facility] ([facility])    [facility]  [address] [place] [address] [place] [id number] [phone]              Who to contact     If you have questions or need follow up information about today's clinic visit or your schedule please contact [facility] directly at [phone].  Normal or non-critical lab and imaging results will be communicated to you by MyChart, letter or phone within 4 business days after the clinic has received the results. If you do not hear from us within 7 days, please contact the clinic through MyChart or phone. If you have a critical or abnormal lab result, we will  "notify you by phone as soon as possible.  Submit refill requests through [employer] or call your pharmacy and they will forward the refill request to us. Please allow 3 business days for your refill to be completed.          Additional Information About Your Visit        [facility] Information     [employer] gives you secure access to your electronic health record. If you see a primary care provider, you can also send messages to your care team and make appointments. If you have questions, please call your primary care clinic.  If you do not have a primary care provider, please call [phone] and they will assist you.        Care EveryWhere ID     This is your Care EveryWhere ID. This could be used by other organizations to access your [facility] medical records  [id number]        Your Vitals Were     Pulse Temperature Respirations Height Pulse Oximetry BMI (Body Mass Index)    92 97.2  F (36.2  C) (Tympanic) 16 1.626 m (5' 4\") 97% 33.13 kg/m2       Blood Pressure from Last 3 Encounters:   09/06/17 (!) 144/101   08/18/17 118/80   06/15/17 (!) 158/96    Weight from Last 3 Encounters:   09/06/17 87.5 kg (193 lb)   08/18/17 86 kg (189 lb 11.2 oz)   06/15/17 86.2 kg (190 lb)              We Performed the Following     **A1C FUTURE anytime     CBC with platelets and differential     Comprehensive metabolic panel (BMP + Alb, Alk Phos, ALT, AST, Total. Bili, TP)          Today's Medication Changes          These changes are accurate as of: 9/6/17  4:16 PM.  If you have any questions, ask your nurse or doctor.               These medicines have changed or have updated prescriptions.        Dose/Directions    desonide 0.05 % cream   Commonly known as:  DESOWEN   This may have changed:  Another medication with the same name was removed. Continue taking this medication, and follow the directions you see here.        Apply topically 2 times daily as needed   Refills:  0       ketoconazole 2 % cream   Commonly known as:  NIZORAL "   This may have changed:  Another medication with the same name was removed. Continue taking this medication, and follow the directions you see here.        Apply topically daily as needed for itching   Refills:  0                Primary Care Provider Office Phone # Fax #    NIKKI Santos Ra, [phone] [address] [place] [address]        Equal Access to Services     [facility]: Hadii aad ku hadasho Soomaali, waaxda luqadaha, qaybta kaalmada adeegyada, waxay idiin hayaan adeeg kharash la'aan . So [facility] [phone].    ATENCIÓN: Si habla español, tiene a barber disposición servicios gratuitos de asistencia lingüística. Llame al [phone].    We comply with applicable federal civil rights laws and [place] laws. We do not discriminate on the basis of race, color, national origin, age, disability sex, sexual orientation or gender identity.            Thank you!     Thank you for choosing [facility]  for your care. Our goal is always to provide you with excellent care. Hearing back from our patients is one way we can continue to improve our services. Please take a few minutes to complete the written survey that you may receive in the mail after your visit with us. Thank you!             Your Updated Medication List - Protect others around you: Learn how to safely use, store and throw away your medicines at www.disposemymeds.org.          This list is accurate as of: 9/6/17  4:16 PM.  Always use your most recent med list.                   Brand Name Dispense Instructions for use Diagnosis    calcium carb 1250 mg (500 mg [place])/vitamin D 200 units 500-200 MG-UNIT per tablet    OSCAL with D     Take 1 tablet by mouth daily Reported on 4/13/2017        desonide 0.05 % cream    DESOWEN     Apply topically 2 times daily as needed        gabapentin 300 MG capsule    NEURONTIN    90 capsule    Take 1 capsule (300 mg) by mouth At Bedtime    Pain in both lower  extremities       glucosamine-chondroitin 500-400 MG Caps per capsule      Take 1 capsule by mouth daily        HYDROcodone-acetaminophen 5-325 MG per tablet    NORCO    10 tablet    Take 1-2 tablets by mouth every 4 hours as needed for other (Moderate to Severe Pain)    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)       IBU- MG Tabs      1 TABLET EVERY 4 TO 6 HOURS AS NEEDED    Acute pharyngitis       ketoconazole 2 % cream    NIZORAL     Apply topically daily as needed for itching        LORazepam 0.5 MG tablet    ATIVAN    30 tablet    Take 1 tablet (0.5 mg) by mouth every 4 hours as needed (Anxiety, Nausea/Vomiting or Sleep)    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)       omeprazole 20 MG CR capsule    priLOSEC     Take 20 mg by mouth daily        order for DME     1 Units    Hair prosthesis for chemotherapy induced alopecia    Malignant neoplasm of right female breast, unspecified site of breast       simvastatin 20 MG tablet    ZOCOR    90 tablet    Take 1 tablet (20 mg) by mouth At Bedtime    Hyperlipidemia LDL goal <160       tamoxifen 20 MG tablet    NOLVADEX    90 tablet    Take 1 tablet (20 mg) by mouth daily    Malignant neoplasm of right female breast, unspecified site of breast       TYLENOL PO      Take 500 mg by mouth

## 2017-09-06 NOTE — NURSING NOTE
"Oncology Rooming Note    September 6, 2017 3:50 PM   Shantell Wagner is a 46 year old female who presents for:    Chief Complaint   Patient presents with     Oncology Clinic Visit     Follow up     Initial Vitals: BP (!) 144/101  Pulse 92  Temp 97.2  F (36.2  C) (Tympanic)  Resp 16  Ht 1.626 m (5' 4\")  Wt 87.5 kg (193 lb)  SpO2 97%  BMI 33.13 kg/m2 Estimated body mass index is 33.13 kg/(m^2) as calculated from the following:    Height as of this encounter: 1.626 m (5' 4\").    Weight as of this encounter: 87.5 kg (193 lb). Body surface area is 1.99 meters squared.  No Pain (0) Comment: Data Unavailable   No LMP recorded.  Allergies reviewed: Yes  Medications reviewed: Yes    Medications: Medication refills not needed today.  Pharmacy name entered into [email or web address]: [id number] - [place], [place] - [address]    Clinical concerns: follow up     8 minutes for nursing intake (face to face time)     Ching Sevilla CMA     DISCHARGE PLAN:  Next appointments: See patient instruction section  Departure Mode: Ambulatory  Accompanied by: self  0 minutes for nursing discharge (face to face time)   Ching Sevilla CMA                  "

## 2017-09-06 NOTE — PROGRESS NOTES
HISTORY OF PRESENT ILLNESS:  Ms. Shantell Wagner is a 46-year-old patient who comes in today for interim followup.  Shantell presented in the spring of 2016 with a locally advanced right-sided breast cancer, 2.2 cm and a 2.7 cm right axillary mass.  She had a very nice response to neoadjuvant Adriamycin and Cytoxan followed by Taxol.  Her tumor was estrogen receptor positive and progesterone receptor negative.  She started on adjuvant hormonal therapy and is currently on the [place] trial.  She got randomized to tamoxifen only.  Last time I saw her, she was complaining of some bony discomfort and we did a bone scan on 06/23/2017, that turned out to be negative.  She is feeling much better today.  She gets hot flashes from the tamoxifen, and she occasionally gets some leg cramping.  We have discussed both of these today.  Other than that, she is working full-time and tolerating the tamoxifen quite well.      REVIEW OF SYSTEMS:  A 14-point comprehensive review of systems apart from what is outlined is unremarkable.      MEDICATIONS:  As charted.      ALLERGIES:  As charted.      PHYSICAL EXAMINATION:   GENERAL:  Her overall performance status is 0.   VITAL SIGNS:  Stable.   HEENT:  Oropharynx clear, mucous membranes moist.   NECK:  Has no masses or goiter.  No cervical, supraclavicular or infraclavicular adenopathy.   CHEST:  Clear to auscultation and percussion bilaterally.   HEART:  S1, S2 normal.  No added sounds, no murmurs.   ABDOMEN:  Soft and nontender, no hepatosplenomegaly.   EXTREMITIES:  Legs are without tenderness or edema.   BREASTS:  Patient is status post bilateral mastectomies.  The scars look good.  Right and left axillae are negative.  She has got tissue expanders in.  She is getting them switched out next week.   SKIN:  There are no rashes or lesions.   LYMPHATIC:  No evidence of lymphedema.      DATA REVIEW:  White cell count 5.6, hemoglobin 13.7, platelets 274.      IMPRESSION:  Patient with a  history of a high risk right-sided breast cancer currently on tamoxifen on the P[place] trial.  She is doing well on the tamoxifen.  Her performance status is good today.  She also carries CHEK2 mutation.  I will see her back in 2017.         MICH ORTIZ MD             D: 2017 16:07   T: 2017 17:36   MT: TD      Name:     CADE GARCÍA   MRN:      [id number]        Account:      [id number]   :      1970           Visit Date:   2017      Document: [id number]

## 2017-09-11 NOTE — H&P (VIEW-ONLY)
[facility]  [address] [facility] [address] [phone]  Dept: [phone]    PRE-OP EVALUATION:  Today's date: 2017    Shantell Wagner (: 1970) presents for pre-operative evaluation assessment as requested by Dr. Jenna Vazquez.  She requires evaluation and anesthesia risk assessment prior to undergoing surgery/procedure for treatment of breast reconstruction .  Proposed procedure:     COMBINED RECONSTRUCT BREAST BILATERAL, IMPLANT PROSTHESIS BILATERAL Bilateral General   BILATERAL SECOND STAGE BREAST RECONSTRUCTION WITH IMPLANT (ARLEN HAS NOT DECIDED SALINE VS SILICONE         Date of Surgery/ Procedure: 17  Time of Surgery/ Procedure: 9:20AM  Hospital/Surgical Facility: [facility]  Fax: [phone]  Primary Physician: Margaret Longo Ra  Type of Anesthesia Anticipated: General    Patient has a Health Care Directive or Living Will:  NO    Preop Questions 8/15/2017   1.  Do you have a history of heart attack, stroke, stent, bypass or surgery on an artery in the head, neck, heart or legs? No   2.  Do you ever have any pain or discomfort in your chest? No   3.  Do you have a history of  Heart Failure? No   4.   Are you troubled by shortness of breath when:  walking on a level surface, or up a slight hill, or at night? No   5.  Do you currently have a cold, bronchitis or other respiratory infection? No   6.  Do you have a cough, shortness of breath, or wheezing? No   7.  Do you sometimes get pains in the calves of your legs when you walk? No   8. Do you or anyone in your family have previous history of blood clots? No   9.  Do you or does anyone in your family have a serious bleeding problem such as prolonged bleeding following surgeries or cuts? No   10. Have you ever had problems with anemia or been told to take iron pills? No   11. Have you had any abnormal blood loss such as black, tarry or bloody stools, or abnormal vaginal bleeding? No   12. Have you ever had  a blood transfusion? No   13. Have you or any of your relatives ever had problems with anesthesia? No   14. Do you have sleep apnea, excessive snoring or daytime drowsiness? No   15. Do you have any prosthetic heart valves? No   16. Do you have prosthetic joints? No   17. Is there any chance that you may be pregnant? No           HPI:                                                      Brief HPI related to upcoming procedure:   Hx of R sided breast cancer and R axillary mass.  Responded to neoadjuvant chemotherapy.  She is currently on tamoxifen.    S/p bilateral mastectomy, tissue expanders in place, planned implant.        See problem list for active medical problems.  Problems all longstanding and stable, except as noted/documented.  See ROS for pertinent symptoms related to these conditions.                                                                                                  .    MEDICAL HISTORY:                                                    Patient Active Problem List    Diagnosis Date Noted     Lymphedema 11/04/2016     Priority: Medium     Breast cancer (H) 10/17/2016     Priority: Medium     Acquired absence of both breasts and nipples 10/17/2016     Priority: Medium     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) 04/28/2016     Priority: Medium     Abnormal Pap smear, can't excl hi gd sq intraepithelial lesion (ASC-H) 02/20/2013     Priority: Medium     02/20/13 ASC-H.   03/11/13 [place]/ECC= NEGRITA 1. Repeat HPV screen and ECC 12 months from colp. Due 03/2014 02/20/14 Pap= Normal, Neg HPV. Repeat pap with HPV in 12 months. Due 02/2015  03/06/15 Pap= Normal, Neg HPV. 3 yr co-testing       Ganglion of left wrist 02/09/2012     Priority: Medium     dorsum; seems a bit larger.       Hyperlipidemia LDL goal <160 05/04/2009     Priority: Medium     Obesity 05/01/2009     Priority: Medium     Plantar fasciitis 07/29/2008     Priority: Medium      Past Medical History:    Diagnosis Date     Abnormal Pap smear, can't excl hi gd sq intraepithelial lesion (ASC-H) 02/20/13    [place]/ECC= NEGRITA 1. Repeat HPV screen and ECC 12 months from colp.     Nose fracture 1983    Accident, needed surgery     Past Surgical History:   Procedure Laterality Date     CARPAL TUNNEL RELEASE RT/LT  3/2010     HC RECONSTR NOSE  1983    after accident     INSERT PORT VASCULAR ACCESS Left 4/22/2016    Procedure: INSERT PORT VASCULAR ACCESS;  Surgeon: Nereyda Flowers MD;  Location: [facility] OR     INSERT TISSUE EXPANDER BREAST BILATERAL Bilateral 10/17/2016    Procedure: INSERT TISSUE EXPANDER BREAST BILATERAL;  Surgeon: Jenna Vazquez MD;  Location: [facility] OR     LASIK BILATERAL       MASTECTOMY MODIFIED RADICAL Right 10/17/2016    Procedure: MASTECTOMY MODIFIED RADICAL;  Surgeon: Nereyda Flowers MD;  Location: [facility] OR     MASTECTOMY MODIFIED RADICAL, SENTINEL NODE, COMBINED Left 10/17/2016    Procedure: COMBINED MASTECTOMY MODIFIED RADICAL, SENTINEL NODE;  Surgeon: Nereyda Flowers MD;  Location: [facility] OR     REMOVE CATHETER VASCULAR ACCESS Left 10/17/2016    Procedure: REMOVE CATHETER VASCULAR ACCESS;  Surgeon: Nereyda Flowers MD;  Location: [facility] OR     Current Outpatient Prescriptions   Medication Sig Dispense Refill     Acetaminophen (TYLENOL PO) Take 500 mg by mouth       desonide (DESOWEN) 0.05 % cream APPLY TOPICALLY 2 TIMES DAILY 60 g 0     ketoconazole (NIZORAL) 2 % cream APPLY TOPICALLY DAILY 30 g 1     simvastatin (ZOCOR) 20 MG tablet Take 1 tablet (20 mg) by mouth At Bedtime 90 tablet 3     gabapentin (NEURONTIN) 300 MG capsule Take 1 capsule (300 mg) by mouth At Bedtime 90 capsule 3     tamoxifen (NOLVADEX) 20 MG tablet Take 1 tablet (20 mg) by mouth daily 90 tablet 3     desonide (DESOWEN) 0.05 % cream Apply topically 2 times daily as needed       fluticasone (FLONASE) 50 MCG/ACT nasal spray Spray 1-2 sprays into both nostrils daily as needed for rhinitis or allergies Reported on 3/24/2017        ketoconazole (NIZORAL) 2 % cream Apply topically daily as needed for itching       calcium carb 1250 mg, 500 mg [place],/vitamin D 200 units (OSCAL WITH D) 500-200 MG-UNIT per tablet Take 1 tablet by mouth daily Reported on 4/13/2017       omeprazole (PRILOSEC) 20 MG capsule Take 20 mg by mouth daily       glucosamine-chondroitin 500-400 MG CAPS Take 1 capsule by mouth daily       HYDROcodone-acetaminophen (NORCO) 5-325 MG per tablet Take 1-2 tablets by mouth every 4 hours as needed for other (Moderate to Severe Pain) 10 tablet 0     LORazepam (ATIVAN) 0.5 MG tablet Take 1 tablet (0.5 mg) by mouth every 4 hours as needed (Anxiety, Nausea/Vomiting or Sleep) 30 tablet 5     order for DME Hair prosthesis for chemotherapy induced alopecia 1 Units 0     IBU- MG OR TABS 1 TABLET EVERY 4 TO 6 HOURS AS NEEDED       OTC products: occasional use of ativan if she wakes in the night, nothing regular.  Occasionally uses ibuprofen or tylenol, aware of the need to DC tylenol 10 days prior to surgery.    Allergies   Allergen Reactions     No Known Drug Allergies       Latex Allergy: NO    Social History   Substance Use Topics     Smoking status: Never Smoker     Smokeless tobacco: Never Used     Alcohol use No     History   Drug Use No       REVIEW OF SYSTEMS:                                                    C: NEGATIVE for fever, chills, change in weight  I: NEGATIVE for worrisome rashes, moles or lesions  E: NEGATIVE for vision changes or irritation  E/M: NEGATIVE for ear, mouth and throat problems  R: NEGATIVE for significant cough or SOB  CV: NEGATIVE for chest pain, palpitations or peripheral edema  GI: NEGATIVE for nausea, abdominal pain, heartburn, or change in bowel habits  : NEGATIVE for frequency, dysuria, or hematuria  MUSCULOSKELETAL:Recent back pain resolved.  N: NEGATIVE for weakness, dizziness or paresthesias  E: NEGATIVE for temperature intolerance, skin/hair changes  H: NEGATIVE for bleeding problems  P:  NEGATIVE for changes in mood or affect    EXAM:                                                    /80  Pulse 84  Temp 98.7  F (37.1  C) (Oral)  Wt 189 lb 11.2 oz (86 kg)  SpO2 100%  BMI 32.56 kg/m2    GENERAL APPEARANCE: healthy, alert and no distress     EYES: Eyes grossly normal to inspection     HENT: ear canals and TM's normal and nose and mouth without ulcers or lesions     NECK: no adenopathy, no asymmetry, masses, or scars and thyroid normal to palpation     RESP: lungs clear to auscultation - no rales, rhonchi or wheezes     CV: regular rates and rhythm, normal S1 S2, no S3 or S4 and no murmur, click or rub     ABDOMEN:  soft, nontender, no HSM or masses and bowel sounds normal     SKIN: no suspicious lesions or rashes     NEURO: Normal strength and tone, sensory exam grossly normal, mentation intact and speech normal     PSYCH: mentation appears normal. and affect normal/bright     LYMPHATICS: No axillary, cervical, or supraclavicular nodes    DIAGNOSTICS:                                                      Labs Resulted Today:   Results for orders placed or performed in visit on 08/18/17   Comprehensive metabolic panel (BMP + Alb, Alk Phos, ALT, AST, Total. Bili, TP)   Result Value Ref Range    Sodium 141 133 - 144 mmol/L    Potassium 4.4 3.4 - 5.3 mmol/L    Chloride 105 94 - 109 mmol/L    Carbon Dioxide 28 20 - 32 mmol/L    Anion Gap 8 3 - 14 mmol/L    Glucose 97 70 - 99 mg/dL    Urea Nitrogen 25 7 - 30 mg/dL    Creatinine 0.84 0.52 - 1.04 mg/dL    GFR Estimate 73 >60 mL/min/1.7m2    GFR Estimate If Black 89 >60 mL/min/1.7m2    Calcium 9.8 8.5 - 10.1 mg/dL    Bilirubin Total 0.9 0.2 - 1.3 mg/dL    Albumin 4.0 3.4 - 5.0 g/dL    Protein Total 7.9 6.8 - 8.8 g/dL    Alkaline Phosphatase 57 40 - 150 U/L    ALT 34 0 - 50 U/L    AST 28 0 - 45 U/L   CBC with platelets   Result Value Ref Range    WBC 4.0 4.0 - 11.0 10e9/L    RBC Count 4.75 3.8 - 5.2 10e12/L    Hemoglobin 13.6 11.7 - 15.7 g/dL     Hematocrit 41.3 35.0 - 47.0 %    MCV 87 78 - 100 fl    MCH 28.6 26.5 - 33.0 pg    MCHC 32.9 31.5 - 36.5 g/dL    RDW 12.2 10.0 - 15.0 %    Platelet Count 258 150 - 450 10e9/L       Recent Labs   Lab Test  06/15/17   1559  05/17/17   0849   03/01/17   1636   HGB  12.5  14.2   < >   --    PLT  253  285   < >   --    NA  139  141   < >   --    POTASSIUM  3.5  4.0   < >   --    CR  0.78  0.76   < >   --    A1C   --    --    --   5.8    < > = values in this interval not displayed.        IMPRESSION:                                                    Reason for surgery/procedure: S/p bilateral mastectomy, tissue expanders in place, planned implants.  Diagnosis/reason for consult: preop    The proposed surgical procedure is considered INTERMEDIATE risk.    REVISED CARDIAC RISK INDEX  The patient has the following serious cardiovascular risks for perioperative complications such as (MI, PE, VFib and 3  AV Block):  No serious cardiac risks  INTERPRETATION: 0 risks: Class I (very low risk - 0.4% complication rate)  Exercises without cardiac symptoms.    The patient has the following additional risks for perioperative complications:  No identified additional risks      ICD-10-CM    1. Preop general physical exam Z01.818 Comprehensive metabolic panel (BMP + Alb, Alk Phos, ALT, AST, Total. Bili, TP)     CBC with platelets   2. Malignant neoplasm of 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 C50.411        RECOMMENDATIONS:                                                      APPROVAL GIVEN to proceed with proposed procedure, without further diagnostic evaluation       Signed Electronically by: NIKKI Santos Ra CNP    Copy of this evaluation report is provided to requesting physician.    [place] Preop Guidelines

## 2017-09-13 ENCOUNTER — ANESTHESIA EVENT (OUTPATIENT)
Dept: SURGERY | Facility: CLINIC | Age: 47
End: 2017-09-13
Payer: COMMERCIAL

## 2017-09-13 ENCOUNTER — SURGERY (OUTPATIENT)
Age: 47
End: 2017-09-13

## 2017-09-13 ENCOUNTER — HOSPITAL ENCOUNTER (OUTPATIENT)
Facility: CLINIC | Age: 47
Discharge: HOME | End: 2017-09-13
Attending: PLASTIC SURGERY | Admitting: PLASTIC SURGERY
Payer: COMMERCIAL

## 2017-09-13 ENCOUNTER — ANESTHESIA (OUTPATIENT)
Dept: SURGERY | Facility: CLINIC | Age: 47
End: 2017-09-13
Payer: COMMERCIAL

## 2017-09-13 VITALS
RESPIRATION RATE: 20 BRPM | TEMPERATURE: 97 F | HEIGHT: 63 IN | DIASTOLIC BLOOD PRESSURE: 94 MMHG | SYSTOLIC BLOOD PRESSURE: 135 MMHG | OXYGEN SATURATION: 97 % | BODY MASS INDEX: 33.61 KG/M2 | WEIGHT: 189.7 LBS

## 2017-09-13 DIAGNOSIS — Z90.13 ACQUIRED ABSENCE OF BOTH BREASTS AND NIPPLES: Primary | ICD-10-CM

## 2017-09-13 PROCEDURE — 37000009 ZZH ANESTHESIA TECHNICAL FEE, EACH ADDTL 15 MIN: Performed by: PLASTIC SURGERY

## 2017-09-13 PROCEDURE — 27210794 ZZH OR GENERAL SUPPLY STERILE: Performed by: PLASTIC SURGERY

## 2017-09-13 PROCEDURE — S0020 INJECTION, BUPIVICAINE HYDRO: HCPCS | Performed by: PLASTIC SURGERY

## 2017-09-13 PROCEDURE — 25000132 ZZH RX MED GY IP 250 OP 250 PS 637: Performed by: PLASTIC SURGERY

## 2017-09-13 PROCEDURE — 40000170 ZZH STATISTIC PRE-PROCEDURE ASSESSMENT II: Performed by: PLASTIC SURGERY

## 2017-09-13 PROCEDURE — 25000128 H RX IP 250 OP 636: Performed by: NURSE ANESTHETIST, CERTIFIED REGISTERED

## 2017-09-13 PROCEDURE — 71000012 ZZH RECOVERY PHASE 1 LEVEL 1 FIRST HR: Performed by: PLASTIC SURGERY

## 2017-09-13 PROCEDURE — 25000128 H RX IP 250 OP 636: Performed by: PLASTIC SURGERY

## 2017-09-13 PROCEDURE — 36000056 ZZH SURGERY LEVEL 3 1ST 30 MIN: Performed by: PLASTIC SURGERY

## 2017-09-13 PROCEDURE — 71000013 ZZH RECOVERY PHASE 1 LEVEL 1 EA ADDTL HR: Performed by: PLASTIC SURGERY

## 2017-09-13 PROCEDURE — 36000058 ZZH SURGERY LEVEL 3 EA 15 ADDTL MIN: Performed by: PLASTIC SURGERY

## 2017-09-13 PROCEDURE — 27210995 ZZH RX 272: Performed by: PLASTIC SURGERY

## 2017-09-13 PROCEDURE — 25000125 ZZHC RX 250: Performed by: PLASTIC SURGERY

## 2017-09-13 PROCEDURE — 37000008 ZZH ANESTHESIA TECHNICAL FEE, 1ST 30 MIN: Performed by: PLASTIC SURGERY

## 2017-09-13 PROCEDURE — 25000125 ZZHC RX 250: Performed by: NURSE ANESTHETIST, CERTIFIED REGISTERED

## 2017-09-13 PROCEDURE — 71000027 ZZH RECOVERY PHASE 2 EACH 15 MINS: Performed by: PLASTIC SURGERY

## 2017-09-13 PROCEDURE — 25000128 H RX IP 250 OP 636

## 2017-09-13 PROCEDURE — 25000125 ZZHC RX 250

## 2017-09-13 PROCEDURE — 25000128 H RX IP 250 OP 636: Performed by: ANESTHESIOLOGY

## 2017-09-13 PROCEDURE — L8600 IMPLANT BREAST SILICONE/EQ: HCPCS | Performed by: PLASTIC SURGERY

## 2017-09-13 DEVICE — IMPLANTABLE DEVICE: Type: IMPLANTABLE DEVICE | Site: BREAST | Status: FUNCTIONAL

## 2017-09-13 RX ORDER — HYDROMORPHONE HYDROCHLORIDE 1 MG/ML
.3-.5 INJECTION, SOLUTION INTRAMUSCULAR; INTRAVENOUS; SUBCUTANEOUS EVERY 10 MIN PRN
Status: DISCONTINUED | OUTPATIENT
Start: 2017-09-13 | End: 2017-09-13 | Stop reason: HOSPADM

## 2017-09-13 RX ORDER — FENTANYL CITRATE 50 UG/ML
INJECTION, SOLUTION INTRAMUSCULAR; INTRAVENOUS PRN
Status: DISCONTINUED | OUTPATIENT
Start: 2017-09-13 | End: 2017-09-13

## 2017-09-13 RX ORDER — ONDANSETRON 2 MG/ML
4 INJECTION INTRAMUSCULAR; INTRAVENOUS EVERY 30 MIN PRN
Status: DISCONTINUED | OUTPATIENT
Start: 2017-09-13 | End: 2017-09-13 | Stop reason: HOSPADM

## 2017-09-13 RX ORDER — PHYSOSTIGMINE SALICYLATE 1 MG/ML
1.2 INJECTION INTRAVENOUS
Status: DISCONTINUED | OUTPATIENT
Start: 2017-09-13 | End: 2017-09-13 | Stop reason: HOSPADM

## 2017-09-13 RX ORDER — DEXAMETHASONE SODIUM PHOSPHATE 4 MG/ML
INJECTION, SOLUTION INTRA-ARTICULAR; INTRALESIONAL; INTRAMUSCULAR; INTRAVENOUS; SOFT TISSUE PRN
Status: DISCONTINUED | OUTPATIENT
Start: 2017-09-13 | End: 2017-09-13

## 2017-09-13 RX ORDER — SODIUM CHLORIDE, SODIUM LACTATE, POTASSIUM CHLORIDE, CALCIUM CHLORIDE 600; 310; 30; 20 MG/100ML; MG/100ML; MG/100ML; MG/100ML
INJECTION, SOLUTION INTRAVENOUS CONTINUOUS PRN
Status: DISCONTINUED | OUTPATIENT
Start: 2017-09-13 | End: 2017-09-13

## 2017-09-13 RX ORDER — BUPIVACAINE HYDROCHLORIDE 2.5 MG/ML
INJECTION, SOLUTION INFILTRATION; PERINEURAL PRN
Status: DISCONTINUED | OUTPATIENT
Start: 2017-09-13 | End: 2017-09-13 | Stop reason: HOSPADM

## 2017-09-13 RX ORDER — SODIUM CHLORIDE, SODIUM LACTATE, POTASSIUM CHLORIDE, CALCIUM CHLORIDE 600; 310; 30; 20 MG/100ML; MG/100ML; MG/100ML; MG/100ML
INJECTION, SOLUTION INTRAVENOUS CONTINUOUS
Status: DISCONTINUED | OUTPATIENT
Start: 2017-09-13 | End: 2017-09-13 | Stop reason: HOSPADM

## 2017-09-13 RX ORDER — LIDOCAINE HYDROCHLORIDE 20 MG/ML
INJECTION, SOLUTION INFILTRATION; PERINEURAL PRN
Status: DISCONTINUED | OUTPATIENT
Start: 2017-09-13 | End: 2017-09-13

## 2017-09-13 RX ORDER — CEFAZOLIN SODIUM 1 G/3ML
1 INJECTION, POWDER, FOR SOLUTION INTRAMUSCULAR; INTRAVENOUS SEE ADMIN INSTRUCTIONS
Status: DISCONTINUED | OUTPATIENT
Start: 2017-09-13 | End: 2017-09-13 | Stop reason: HOSPADM

## 2017-09-13 RX ORDER — HYDROCODONE BITARTRATE AND ACETAMINOPHEN 5; 325 MG/1; MG/1
1 TABLET ORAL
Status: COMPLETED | OUTPATIENT
Start: 2017-09-13 | End: 2017-09-13

## 2017-09-13 RX ORDER — ACETAMINOPHEN 500 MG
1000 TABLET ORAL ONCE
Status: COMPLETED | OUTPATIENT
Start: 2017-09-13 | End: 2017-09-13

## 2017-09-13 RX ORDER — PROPOFOL 10 MG/ML
INJECTION, EMULSION INTRAVENOUS CONTINUOUS PRN
Status: DISCONTINUED | OUTPATIENT
Start: 2017-09-13 | End: 2017-09-13

## 2017-09-13 RX ORDER — CHLORHEXIDINE GLUCONATE 40 MG/ML
SOLUTION TOPICAL ONCE
Status: DISCONTINUED | OUTPATIENT
Start: 2017-09-13 | End: 2017-09-13 | Stop reason: HOSPADM

## 2017-09-13 RX ORDER — MEPERIDINE HYDROCHLORIDE 25 MG/ML
12.5 INJECTION INTRAMUSCULAR; INTRAVENOUS; SUBCUTANEOUS
Status: DISCONTINUED | OUTPATIENT
Start: 2017-09-13 | End: 2017-09-13 | Stop reason: HOSPADM

## 2017-09-13 RX ORDER — FENTANYL CITRATE 50 UG/ML
25-50 INJECTION, SOLUTION INTRAMUSCULAR; INTRAVENOUS EVERY 5 MIN PRN
Status: DISCONTINUED | OUTPATIENT
Start: 2017-09-13 | End: 2017-09-13 | Stop reason: HOSPADM

## 2017-09-13 RX ORDER — CEFAZOLIN SODIUM 2 G/100ML
2 INJECTION, SOLUTION INTRAVENOUS
Status: COMPLETED | OUTPATIENT
Start: 2017-09-13 | End: 2017-09-13

## 2017-09-13 RX ORDER — PROPOFOL 10 MG/ML
INJECTION, EMULSION INTRAVENOUS PRN
Status: DISCONTINUED | OUTPATIENT
Start: 2017-09-13 | End: 2017-09-13

## 2017-09-13 RX ORDER — ONDANSETRON 2 MG/ML
INJECTION INTRAMUSCULAR; INTRAVENOUS PRN
Status: DISCONTINUED | OUTPATIENT
Start: 2017-09-13 | End: 2017-09-13

## 2017-09-13 RX ORDER — ONDANSETRON 4 MG/1
4 TABLET, ORALLY DISINTEGRATING ORAL EVERY 30 MIN PRN
Status: DISCONTINUED | OUTPATIENT
Start: 2017-09-13 | End: 2017-09-13 | Stop reason: HOSPADM

## 2017-09-13 RX ORDER — FENTANYL CITRATE 50 UG/ML
25-50 INJECTION, SOLUTION INTRAMUSCULAR; INTRAVENOUS
Status: DISCONTINUED | OUTPATIENT
Start: 2017-09-13 | End: 2017-09-13 | Stop reason: HOSPADM

## 2017-09-13 RX ORDER — NALOXONE HYDROCHLORIDE 0.4 MG/ML
.1-.4 INJECTION, SOLUTION INTRAMUSCULAR; INTRAVENOUS; SUBCUTANEOUS
Status: DISCONTINUED | OUTPATIENT
Start: 2017-09-13 | End: 2017-09-13 | Stop reason: HOSPADM

## 2017-09-13 RX ORDER — HYDROCODONE BITARTRATE AND ACETAMINOPHEN 5; 325 MG/1; MG/1
1-2 TABLET ORAL EVERY 4 HOURS PRN
Qty: 20 TABLET | Refills: 0 | Status: SHIPPED | OUTPATIENT
Start: 2017-09-13 | End: 2017-11-10

## 2017-09-13 RX ADMIN — SODIUM CHLORIDE, POTASSIUM CHLORIDE, SODIUM LACTATE AND CALCIUM CHLORIDE: 600; 310; 30; 20 INJECTION, SOLUTION INTRAVENOUS at 09:05

## 2017-09-13 RX ADMIN — ACETAMINOPHEN 975 MG: 325 TABLET, FILM COATED ORAL at 08:27

## 2017-09-13 RX ADMIN — ONDANSETRON 4 MG: 2 INJECTION INTRAMUSCULAR; INTRAVENOUS at 09:11

## 2017-09-13 RX ADMIN — CEFAZOLIN SODIUM 2 G: 2 INJECTION, SOLUTION INTRAVENOUS at 09:15

## 2017-09-13 RX ADMIN — BUPIVACAINE HYDROCHLORIDE 10 ML: 2.5 INJECTION, SOLUTION EPIDURAL; INFILTRATION; INTRACAUDAL; PERINEURAL at 09:55

## 2017-09-13 RX ADMIN — FENTANYL CITRATE 50 MCG: 50 INJECTION, SOLUTION INTRAMUSCULAR; INTRAVENOUS at 09:09

## 2017-09-13 RX ADMIN — Medication 245 ML: at 09:59

## 2017-09-13 RX ADMIN — PROPOFOL 200 MG: 10 INJECTION, EMULSION INTRAVENOUS at 09:09

## 2017-09-13 RX ADMIN — PROPOFOL 200 MCG/KG/MIN: 10 INJECTION, EMULSION INTRAVENOUS at 09:09

## 2017-09-13 RX ADMIN — FENTANYL CITRATE 50 MCG: 50 INJECTION, SOLUTION INTRAMUSCULAR; INTRAVENOUS at 11:25

## 2017-09-13 RX ADMIN — GENTAMICIN SULFATE 1000 ML: 40 INJECTION, SOLUTION INTRAMUSCULAR; INTRAVENOUS at 09:04

## 2017-09-13 RX ADMIN — LIDOCAINE HYDROCHLORIDE 60 MG: 20 INJECTION, SOLUTION INFILTRATION; PERINEURAL at 09:09

## 2017-09-13 RX ADMIN — HYDROCODONE BITARTRATE AND ACETAMINOPHEN 1 TABLET: 5; 325 TABLET ORAL at 11:28

## 2017-09-13 RX ADMIN — BUPIVACAINE HYDROCHLORIDE 10 ML: 2.5 INJECTION, SOLUTION EPIDURAL; INFILTRATION; INTRACAUDAL; PERINEURAL at 09:39

## 2017-09-13 RX ADMIN — FENTANYL CITRATE 50 MCG: 50 INJECTION, SOLUTION INTRAMUSCULAR; INTRAVENOUS at 10:41

## 2017-09-13 RX ADMIN — FENTANYL CITRATE 50 MCG: 50 INJECTION, SOLUTION INTRAMUSCULAR; INTRAVENOUS at 09:20

## 2017-09-13 RX ADMIN — MIDAZOLAM HYDROCHLORIDE 2 MG: 1 INJECTION, SOLUTION INTRAMUSCULAR; INTRAVENOUS at 09:06

## 2017-09-13 RX ADMIN — DEXAMETHASONE SODIUM PHOSPHATE 4 MG: 4 INJECTION, SOLUTION INTRA-ARTICULAR; INTRALESIONAL; INTRAMUSCULAR; INTRAVENOUS; SOFT TISSUE at 09:14

## 2017-09-13 NOTE — DISCHARGE INSTRUCTIONS
Discharge Instructions following Breast Surgery  [facility] Same Day Surgery    Diet:   Resume diet as tolerated.  Drink plenty of fluids to prevent constipation.    Activity:   Gentle rotation & stretching of your arms/shoulders will prevent stiffness in joints   Increase activity gradually   No heavy lifting greater than 10-15 pounds & no strenuous activity until  approved by surgeon    Bathing/Incision Care:   You may shower as directed by surgeon   Pat incisions dry.  No lotions, powders or perfumes to incisions   Tape dressings (steri strips) will fall off in 7-10 days (if present)    What to expect:   A tingly or itchy sensation around the incision is a normal sign of healing   Some clear, pink drainage from incisions is normal.      Notify your surgeon for the following signs & symptoms:   Redness, warmth, or swelling of the incision    Foul smelling or increased drainage   Chills or temperature greater than 101               Pain not controlled by pain medications      Same Day Surgery Discharge Instructions for  Sedation and General Anesthesia       It's not unusual to feel dizzy, light-headed or faint for up to 24 hours after surgery or while taking pain medication.  If you have these symptoms: sit for a few minutes before standing and have someone assist you when you get up to walk or use the bathroom.      You should rest and relax for the next 24 hours. We recommend you make arrangements to have an adult stay with you for at least 24 hours after your discharge.  Avoid hazardous and strenuous activity.      DO NOT DRIVE any vehicle or operate mechanical equipment for 24 hours following the end of your surgery.  Even though you may feel normal, your reactions may be affected by the medication you have received.      Do not drink alcoholic beverages for 24 hours following surgery.       Slowly progress to your regular diet as you feel able. It's not unusual to feel nauseated and/or vomit  after receiving anesthesia.  If you develop these symptoms, drink clear liquids (apple juice, ginger ale, broth, 7-up, etc. ) until you feel better.  If your nausea and vomiting persists for 24 hours, please notify your surgeon.        All narcotic pain medications, along with inactivity and anesthesia, can cause constipation. Drinking plenty of liquids and increasing fiber intake will help.      For any questions of a medical nature, call your surgeon.      Do not make important decisions for 24 hours.      If you had general anesthesia, you may have a sore throat for a couple of days related to the breathing tube used during surgery.  You may use Cepacol lozenges to help with this discomfort.  If it worsens or if you develop a fever, contact your surgeon.       If you feel your pain is not well managed with the pain medications prescribed by your surgeon, please contact your surgeon's office to let them know so they can address your concerns.           **If you have questions or concerns about your procedure,  call  at [phone]**        While you were at the hospital today you were given 975 mg of Tylenol at 8:30 9/13/17. The recommended daily maximum dose is 4000 mg.

## 2017-09-13 NOTE — ANESTHESIA PREPROCEDURE EVALUATION
Anesthesia Evaluation     . Pt has had prior anesthetic. Type: General           ROS/MED HX    ENT/Pulmonary:       Neurologic:       Cardiovascular:         METS/Exercise Tolerance:     Hematologic:         Musculoskeletal:         GI/Hepatic:         Renal/Genitourinary:         Endo:     (+) Obesity, .      Psychiatric:         Infectious Disease:         Malignancy:   (+) Malignancy History of Breast          Other:                                    Anesthesia Plan      History & Physical Review  History and physical reviewed and following examination; no interval change.    ASA Status:  2 .        Plan for General with Intravenous induction. Maintenance will be TIVA.    PONV prophylaxis:  Ondansetron (or other 5HT-3) and Dexamethasone or Solumedrol  Propofol, Zofran, and decadron      Postoperative Care  Postoperative pain management:  IV analgesics and Oral pain medications.      Consents  Anesthetic plan, risks, benefits and alternatives discussed with:  Patient..                          .

## 2017-09-13 NOTE — ANESTHESIA CARE TRANSFER NOTE
Patient: Shantell Wagner    Procedure(s):  BILATERAL SECOND STAGE BREAST RECONSTRUCTION WITH IMPLANT. - Wound Class: I-Clean    Diagnosis: BREAST RECONSTRUCTION FROM BREAST CANCER  Diagnosis Additional Information: No value filed.    Anesthesia Type:   General     Note:  Airway :Face Mask  Patient transferred to:PACU        Vitals: (Last set prior to Anesthesia Care Transfer)    CRNA VITALS  9/13/2017 0946 - 9/13/2017 1019      9/13/2017             Resp Rate (set): 10    EKG: Sinus rhythm                Electronically Signed By: NIKKI Kaiser CRNA  September 13, 2017  10:19 AM

## 2017-09-13 NOTE — IP AVS SNAPSHOT
[facility] Same Day Surgery    [address]    [place] [address]    Phone:  [phone]    Fax:  [phone]                                       After Visit Summary   9/13/2017    Shantell Wagner    MRN: [id number]           After Visit Summary Signature Page     I have received my discharge instructions, and my questions have been answered. I have discussed any challenges I see with this plan with the nurse or doctor.    ..........................................................................................................................................  Patient/Patient Representative Signature      ..........................................................................................................................................  Patient Representative Print Name and Relationship to Patient    ..................................................               ................................................  Date                                            Time    ..........................................................................................................................................  Reviewed by Signature/Title    ...................................................              ..............................................  Date                                                            Time

## 2017-09-13 NOTE — OR NURSING
Dr. Vazquez infused  245cc of klein's solution into pt. And removed 250 cc's of aspirate from pt. During bilateral second stage breast reconstruction surgery.

## 2017-09-13 NOTE — ANESTHESIA POSTPROCEDURE EVALUATION
Patient: Shantell Wagner    Procedure(s):  BILATERAL SECOND STAGE BREAST RECONSTRUCTION WITH IMPLANT. - Wound Class: I-Clean    Diagnosis:BREAST RECONSTRUCTION FROM BREAST CANCER  Diagnosis Additional Information: No value filed.    Anesthesia Type:  General    Note:  Anesthesia Post Evaluation    Patient location during evaluation: PACU  Patient participation: Able to fully participate in evaluation  Level of consciousness: awake  Pain management: adequate  Airway patency: patent  Cardiovascular status: acceptable  Respiratory status: acceptable  Hydration status: acceptable  PONV: controlled     Anesthetic complications: None          Last vitals:  Vitals:    09/13/17 1045 09/13/17 1100 09/13/17 1115   BP: (!) 134/99  140/90   Resp: 16 11 15   Temp: 35.7  C (96.3  F) 35.8  C (96.4  F) 35.9  C (96.6  F)   SpO2: 97% 93% 94%         Electronically Signed By: Dylan Chandra MD  September 13, 2017  11:29 AM

## 2017-09-13 NOTE — IP AVS SNAPSHOT
MRN:[id number]                      After Visit Summary   9/13/2017    Shantell Wagner    MRN: [id number]           Thank you!     Thank you for choosing [facility] for your care. Our goal is always to provide you with excellent care. Hearing back from our patients is one way we can continue to improve our services. Please take a few minutes to complete the written survey that you may receive in the mail after you visit with us. Thank you!        Patient Information     Date Of Birth          1970        About your hospital stay     You were admitted on:  September 13, 2017 You last received care in the:  [facility] Same Day Surgery    You were discharged on:  September 13, 2017        Reason for your hospital stay       Surgery.                  Who to Call     For medical emergencies, please call 911.  For non-urgent questions about your medical care, please call your primary care provider or clinic, [phone]  For questions related to your surgery, please call your surgery clinic        Attending Provider     Provider Jenna Yousif MD Plastic Surgery       Primary Care Provider Office Phone # Fax #    Margaret NIKKI Condon [facility] [phone]      After Care Instructions     Discharge Instructions       Follow up with Dr. Vazquez in 2 weeks.            Wound care and dressings       Remove dressing 24-48 hours after surgery.  Then may shower with incisions uncovered. Leave steristrips in place. Do not use Aquaphor.  No restrictions on arm movements. Avoid heavy lifting or strenuous exercise for 2 weeks. Camisole/soft bra prn comfort. May use OTC ibuprofen/tylenol for discomfort.                  Your next 10 appointments already scheduled     Nov 16, 2017  8:00 AM CST   Return Visit with Tata Knott MD   [facility] ([facility])    [facility]  [address] [place] [address]  [facility] [id number] [phone]              Further instructions from your care team                 Discharge Instructions following Breast Surgery  [facility] Same Day Surgery    Diet:   Resume diet as tolerated.  Drink plenty of fluids to prevent constipation.    Activity:   Gentle rotation & stretching of your arms/shoulders will prevent stiffness in joints   Increase activity gradually   No heavy lifting greater than 10-15 pounds & no strenuous activity until  approved by surgeon    Bathing/Incision Care:   You may shower as directed by surgeon   Pat incisions dry.  No lotions, powders or perfumes to incisions   Tape dressings (steri strips) will fall off in 7-10 days (if present)    What to expect:   A tingly or itchy sensation around the incision is a normal sign of healing   Some clear, pink drainage from incisions is normal.      Notify your surgeon for the following signs & symptoms:   Redness, warmth, or swelling of the incision    Foul smelling or increased drainage   Chills or temperature greater than 101               Pain not controlled by pain medications      Same Day Surgery Discharge Instructions for  Sedation and General Anesthesia       It's not unusual to feel dizzy, light-headed or faint for up to 24 hours after surgery or while taking pain medication.  If you have these symptoms: sit for a few minutes before standing and have someone assist you when you get up to walk or use the bathroom.      You should rest and relax for the next 24 hours. We recommend you make arrangements to have an adult stay with you for at least 24 hours after your discharge.  Avoid hazardous and strenuous activity.      DO NOT DRIVE any vehicle or operate mechanical equipment for 24 hours following the end of your surgery.  Even though you may feel normal, your reactions may be affected by the medication you have received.      Do not drink alcoholic beverages for 24 hours following surgery.    "    Slowly progress to your regular diet as you feel able. It's not unusual to feel nauseated and/or vomit after receiving anesthesia.  If you develop these symptoms, drink clear liquids (apple juice, ginger ale, broth, 7-up, etc. ) until you feel better.  If your nausea and vomiting persists for 24 hours, please notify your surgeon.        All narcotic pain medications, along with inactivity and anesthesia, can cause constipation. Drinking plenty of liquids and increasing fiber intake will help.      For any questions of a medical nature, call your surgeon.      Do not make important decisions for 24 hours.      If you had general anesthesia, you may have a sore throat for a couple of days related to the breathing tube used during surgery.  You may use Cepacol lozenges to help with this discomfort.  If it worsens or if you develop a fever, contact your surgeon.       If you feel your pain is not well managed with the pain medications prescribed by your surgeon, please contact your surgeon's office to let them know so they can address your concerns.           **If you have questions or concerns about your procedure,  call  at [phone]**        While you were at the hospital today you were given 975 mg of Tylenol at 8:30 9/13/17. The recommended daily maximum dose is 4000 mg.       Pending Results     No orders found from 9/11/2017 to 9/14/2017.            Admission Information     Date & Time Provider Department Dept. Phone    9/13/2017 Jenna Vazquez MD [facility] Same Day Surgery [phone]      Your Vitals Were     Blood Pressure Temperature Respirations Height Weight Pulse Oximetry    140/90 97  F (36.1  C) 14 1.6 m (5' 3\") 86 kg (189 lb 11.2 oz) 97%    BMI (Body Mass Index)                   33.6 kg/m2           [employer] Information     [employer] gives you secure access to your electronic health record. If you see a primary care provider, you can also send messages to your care team " and make appointments. If you have questions, please call your primary care clinic.  If you do not have a primary care provider, please call [phone] and they will assist you.        Care EveryWhere ID     This is your Care EveryWhere ID. This could be used by other organizations to access your [facility] medical records  [id number]        Equal Access to Services     TOSHIA JOSEPH : Hadyvrose Kennedy, wabassam osullivan, qajose de jesus moisealkarla mckeon, jie real . So [facility] [phone].    ATENCIÓN: Si habla aviva, tiene a barber disposición servicios gratuitos de asistencia lingüística. [place] al [phone].    We comply with applicable federal civil rights laws and [place] laws. We do not discriminate on the basis of race, color, national origin, age, disability sex, sexual orientation or gender identity.               Review of your medicines      START taking        Dose / Directions    HYDROcodone-acetaminophen 5-325 MG per tablet   Commonly known as:  NORCO   Used for:  Acquired absence of both breasts and nipples   Notes to Patient:  One pill given at 11: 28 AM 9/13/17        Dose:  1-2 tablet   Take 1-2 tablets by mouth every 4 hours as needed for moderate to severe pain   Quantity:  20 tablet   Refills:  0         CONTINUE these medicines which have NOT CHANGED        Dose / Directions    calcium carb 1250 mg (500 mg [place])/vitamin D 200 units 500-200 MG-UNIT per tablet   Commonly known as:  OSCAL with D        Dose:  1 tablet   Take 1 tablet by mouth daily Reported on 4/13/2017   Refills:  0       desonide 0.05 % cream   Commonly known as:  DESOWEN        Apply topically 2 times daily as needed   Refills:  0       gabapentin 300 MG capsule   Commonly known as:  NEURONTIN   Used for:  Pain in both lower extremities        Dose:  300 mg   Take 1 capsule (300 mg) by mouth At Bedtime   Quantity:  90 capsule   Refills:  3       glucosamine-chondroitin 500-400 MG Caps per  capsule        Dose:  1 capsule   Take 1 capsule by mouth daily   Refills:  0       IBU- MG Tabs   Used for:  Acute pharyngitis        1 TABLET EVERY 4 TO 6 HOURS AS NEEDED   Refills:  0       ketoconazole 2 % cream   Commonly known as:  NIZORAL        Apply topically daily as needed for itching   Refills:  0       omeprazole 20 MG CR capsule   Commonly known as:  priLOSEC        Dose:  20 mg   Take 20 mg by mouth daily   Refills:  0       order for DME   Used for:  Malignant neoplasm of right female breast, unspecified site of breast        Hair prosthesis for chemotherapy induced alopecia   Quantity:  1 Units   Refills:  0       simvastatin 20 MG tablet   Commonly known as:  ZOCOR   Used for:  Hyperlipidemia LDL goal <160        Dose:  20 mg   Take 1 tablet (20 mg) by mouth At Bedtime   Quantity:  90 tablet   Refills:  3       tamoxifen 20 MG tablet   Commonly known as:  NOLVADEX   Used for:  Malignant neoplasm of right female breast, unspecified site of breast        Dose:  20 mg   Take 1 tablet (20 mg) by mouth daily   Quantity:  90 tablet   Refills:  3       TYLENOL PO        Dose:  500 mg   Take 500 mg by mouth   Refills:  0            Where to get your medicines      Some of these will need a paper prescription and others can be bought over the counter. Ask your nurse if you have questions.     Bring a paper prescription for each of these medications     HYDROcodone-acetaminophen 5-325 MG per tablet                Protect others around you: Learn how to safely use, store and throw away your medicines at www.disposemymeds.org.             Medication List: This is a list of all your medications and when to take them. Check marks below indicate your daily home schedule. Keep this list as a reference.      Medications           Morning Afternoon Evening Bedtime As Needed    calcium carb 1250 mg (500 mg [place])/vitamin D 200 units 500-200 MG-UNIT per tablet   Commonly known as:  OSCAL with D   Take 1 tablet  by mouth daily Reported on 4/13/2017                                desonide 0.05 % cream   Commonly known as:  DESOWEN   Apply topically 2 times daily as needed                                gabapentin 300 MG capsule   Commonly known as:  NEURONTIN   Take 1 capsule (300 mg) by mouth At Bedtime                                glucosamine-chondroitin 500-400 MG Caps per capsule   Take 1 capsule by mouth daily                                HYDROcodone-acetaminophen 5-325 MG per tablet   Commonly known as:  NORCO   Take 1-2 tablets by mouth every 4 hours as needed for moderate to severe pain   Last time this was given:  1 tablet on 9/13/2017 11:28 AM   Notes to Patient:  One pill given at 11: 28 AM 9/13/17                                IBU- MG Tabs   1 TABLET EVERY 4 TO 6 HOURS AS NEEDED                                ketoconazole 2 % cream   Commonly known as:  NIZORAL   Apply topically daily as needed for itching                                omeprazole 20 MG CR capsule   Commonly known as:  priLOSEC   Take 20 mg by mouth daily                                order for DME   Hair prosthesis for chemotherapy induced alopecia                                simvastatin 20 MG tablet   Commonly known as:  ZOCOR   Take 1 tablet (20 mg) by mouth At Bedtime                                tamoxifen 20 MG tablet   Commonly known as:  NOLVADEX   Take 1 tablet (20 mg) by mouth daily                                TYLENOL PO   Take 500 mg by mouth   Last time this was given:  975 mg on 9/13/2017  8:27 AM

## 2017-09-13 NOTE — OR NURSING
Reviewed discharge instructions. Appropriate questions asked and answered. VSS; denies pain. IV removed. Patient urinated with no issues. Discharged into the care of her

## 2017-09-13 NOTE — BRIEF OP NOTE
[facility] Brief Operative Note    Pre-operative diagnosis: BREAST RECONSTRUCTION FROM BREAST CANCER   Post-operative diagnosis breast reconstruction     Procedure: Procedure(s):  BILATERAL SECOND STAGE BREAST RECONSTRUCTION WITH IMPLANT. - Wound Class: I-Clean   Surgeon(s): Surgeon(s) and Role:     * Jenna Vazquez MD - Primary   Estimated blood loss: 10 mL    Specimens: * No specimens in log *   Findings: See op note.

## 2017-09-14 DIAGNOSIS — C50.411 MALIGNANT NEOPLASM OF UPPER-OUTER QUADRANT OF RIGHT FEMALE BREAST, UNSPECIFIED ESTROGEN RECEPTOR STATUS (H): Primary | ICD-10-CM

## 2017-09-14 NOTE — OP NOTE
PLASTIC SURGERY OPERATIVE NOTE  Patient Name:  Shantell Wagner     Date of Service:  9/13/2017     PREOPERATIVE DIAGNOSES:   1.  BREAST RECONSTRUCTION FROM BREAST CANCER     POSTOPERATIVE DIAGNOSES:   1.  BREAST RECONSTRUCTION FROM BREAST CANCER       SURGEON:  Jenna Vazquez MD   OR STAFF:  Circulator: Josiah Ahumada RN; Jennifer Peres RN  Relief Circulator: Oma Garcia RN  Relief Scrub: Beronica Herman RN  Scrub Person: Deepti Casey     ANESTHESIA:  General     ESTIMATED BLOOD LOSS:  * No blood loss amount entered *   SPECIMEN(S):  * No specimens in log *     PROCEDURES:   1.  Bilateral silicone breast reconstruction      DESCRIPTION OF PROCEDURE:  The patient was marked for incisions and taken to the operating room.  General anesthesia was administered.  The chest area was prepped and draped in a sterile manner.  On the right side incision was made through the mastectomy scar and dissection was carried down to the underlying pectoralis muscle.  The pectoralis muscle was divided along its fibers and the underlying tissue expander was ruptured and removed from the pocket.  Scoring of the capsule was done circumferentially and inferiorly radial scoring was done as well.  Various sized sizers were trialed and then a silicone implant was chosen.  The pocket was irrigated with antibiotic solution.  A silicone implant was obtained and placed in the pocket.  The pectoralis muscle was reapproximated with interrupted 3-0 Monocryl suture.  The skin was closed temporarily with staples.    On the left side, an incision was made through the mastectomy scar and dissection was carried down to the underlying pectoralis muscle.  The muscle was divided along its fibers and the underlying tissue expander was ruptured and removed the pocket.  Scoring of the capsule was done circumferentially.  Various sized sizers were placed within the pocket and a silicone implant was chosen after bringing the patient to the  upright position and inspecting symmetry.    Patient was returned to supine position.  The sizer was removed and the pocket was irrigated with antibiotic solution.  A silicone implant was placed within the pocket.  The pectoralis muscle was reapproximated with interrupted 3-0 Monocryl suture.  The skin was closed temporarily with staples.    Patient was brought to the upright position and excess skin along the left side was marked and the patient was returned to supine position.  This excess skin was sharply excised.  Hemostasis was achieved.  The incision was reapproximated with interrupted 3-0 Monocryl in the subcutaneous tissue.    Klein's solution was infiltrated in the pre-axillary areas bilaterally and standard liposuction was completed with a 4 mm cannula.  Both incisions were then reapproximated with interrupted 3-0 Monocryl in the subcutaneous tissue followed by running 4-0 subcuticular Monocryl suture.  Xeroform followed by light dressing was applied and the patient was circumferentially wrapped with double 4 inch Ace bandage.    There was 245 cc of Klein's solution infiltrated and 250 cc of lipoaspirate obtained.    Implants are Natrelle Inspira SRF-650; 650 cc implants.      IMPLANTS:    Implant Name Type Inv. Item Serial No.  Lot No. LRB No. Used   Natrelle Inspira breast implant 650cc   [id number] [employer]GAN  Right 1   TISSUE EXPANDER IMPLANT ALLERGAN 500ML 133MX-[id number]  TISSUE EXPANDER IMPLANT ALLERGAN 500ML 133MX-[id number] [employer]  Left 1   TISSUE EXPANDER IMPLANT [employer]ERGAN 500ML 133MX-[id number]  TISSUE EXPANDER IMPLANT ALLERGAN 500ML 133MX-[id number] [employer]  Right 1   Natrelle inspira smooth round full profile gel breast implant     [id number] [employer] [id number] Left 1       Jenna Vazquez MD  Plastic Surgery  [facility]  [phone] (office)

## 2017-09-15 ENCOUNTER — TRANSFERRED RECORDS (OUTPATIENT)
Dept: HEALTH INFORMATION MANAGEMENT | Facility: CLINIC | Age: 47
End: 2017-09-15

## 2017-09-27 PROBLEM — Z15.09 MONOALLELIC MUTATION OF CHEK2 GENE: Status: ACTIVE | Noted: 2017-09-27

## 2017-10-06 ENCOUNTER — TELEPHONE (OUTPATIENT)
Dept: INFUSION THERAPY | Facility: CLINIC | Age: 47
End: 2017-10-06

## 2017-10-06 NOTE — TELEPHONE ENCOUNTER
Left message for patient in regards to the treatment summary that was mailed. Will attempt to call patient again. Left number for patient to call back if needed [phone].

## 2017-10-13 ENCOUNTER — TELEPHONE (OUTPATIENT)
Dept: INFUSION THERAPY | Facility: CLINIC | Age: 47
End: 2017-10-13

## 2017-10-13 NOTE — TELEPHONE ENCOUNTER
Left 2nd message regarding treatment summary sent out. Instructed to refer to physician at their next follow up in regards to the TS/Survivorship Program.

## 2017-11-03 ENCOUNTER — TRANSFERRED RECORDS (OUTPATIENT)
Dept: HEALTH INFORMATION MANAGEMENT | Facility: CLINIC | Age: 47
End: 2017-11-03

## 2017-11-10 ENCOUNTER — OFFICE VISIT (OUTPATIENT)
Dept: FAMILY MEDICINE | Facility: CLINIC | Age: 47
End: 2017-11-10
Payer: COMMERCIAL

## 2017-11-10 VITALS
TEMPERATURE: 98.3 F | DIASTOLIC BLOOD PRESSURE: 92 MMHG | BODY MASS INDEX: 33.82 KG/M2 | SYSTOLIC BLOOD PRESSURE: 128 MMHG | WEIGHT: 190.9 LBS | HEART RATE: 84 BPM | OXYGEN SATURATION: 98 %

## 2017-11-10 DIAGNOSIS — R03.0 ELEVATED BLOOD PRESSURE READING WITHOUT DIAGNOSIS OF HYPERTENSION: Primary | ICD-10-CM

## 2017-11-10 PROCEDURE — 82043 UR ALBUMIN QUANTITATIVE: CPT | Performed by: NURSE PRACTITIONER

## 2017-11-10 PROCEDURE — 99213 OFFICE O/P EST LOW 20 MIN: CPT | Performed by: NURSE PRACTITIONER

## 2017-11-10 NOTE — MR AVS SNAPSHOT
After Visit Summary   11/10/2017    Shantell Wagner    MRN: [id number]           Patient Information     Date Of Birth          1970        Visit Information        Provider Department      11/10/2017 10:00 AM Margaret Longo Ra, APRN CNP [facility]        Today's Diagnoses     Elevated blood pressure reading without diagnosis of hypertension    -  1       Follow-ups after your visit        Your next 10 appointments already scheduled     Nov 16, 2017  8:00 AM CST   Return Visit with Tata Knott MD   [facility] ([facility])    [facility]  [address]  [address]  [facility] [address] [phone]              Who to contact     If you have questions or need follow up information about today's clinic visit or your schedule please contact [facility] directly at [phone].  Normal or non-critical lab and imaging results will be communicated to you by MyChart, letter or phone within 4 business days after the clinic has received the results. If you do not hear from us within 7 days, please contact the clinic through MyChart or phone. If you have a critical or abnormal lab result, we will notify you by phone as soon as possible.  Submit refill requests through Awesomi or call your pharmacy and they will forward the refill request to us. Please allow 3 business days for your refill to be completed.          Additional Information About Your Visit        MyChart Information     Awesomi gives you secure access to your electronic health record. If you see a primary care provider, you can also send messages to your care team and make appointments. If you have questions, please call your primary care clinic.  If you do not have a primary care provider, please call [phone] and they will assist you.        Care EveryWhere ID     This is your Care EveryWhere ID. This could be used by other  organizations to access your [place] medical records  [id number]        Your Vitals Were     Pulse Temperature Pulse Oximetry BMI (Body Mass Index)          84 98.3  F (36.8  C) (Oral) 98% 33.82 kg/m2         Blood Pressure from Last 3 Encounters:   11/10/17 (!) 128/92   09/13/17 (!) 135/94   09/06/17 (!) 144/101    Weight from Last 3 Encounters:   11/10/17 190 lb 14.4 oz (86.6 kg)   09/13/17 189 lb 11.2 oz (86 kg)   09/06/17 193 lb (87.5 kg)              We Performed the Following     Albumin Random Urine Quantitative with Creat Ratio        Primary Care Provider Office Phone # Fax #    Margaret NIKKI Condon [facility] [phone] [address] [facility] [address]        Equal Access to Services     [facility] : Hadii aad ku hadasho Soomaali, waaxda luqadaha, qaybta kaalmada adeegyada, waxay idiin hayaan akua real . So [facility] [phone].    ATENCIÓN: Si habla español, tiene a barber disposición servicios gratuitos de asistencia lingüística. Llame al [phone].    We comply with applicable federal civil rights laws and [place] laws. We do not discriminate on the basis of race, color, national origin, age, disability, sex, sexual orientation, or gender identity.            Thank you!     Thank you for choosing [facility]  for your care. Our goal is always to provide you with excellent care. Hearing back from our patients is one way we can continue to improve our services. Please take a few minutes to complete the written survey that you may receive in the mail after your visit with us. Thank you!             Your Updated Medication List - Protect others around you: Learn how to safely use, store and throw away your medicines at www.disposemymeds.org.          This list is accurate as of: 11/10/17 10:34 AM.  Always use your most recent med list.                   Brand Name Dispense Instructions for use Diagnosis    Calcium carb-Vitamin D 500 mg [place]-200 units  500-200 MG-UNIT per tablet    OSCAL with D;Oyster Shell Calcium     Take 1 tablet by mouth daily Reported on 4/13/2017        desonide 0.05 % cream    DESOWEN     Apply topically 2 times daily as needed        gabapentin 300 MG capsule    NEURONTIN    90 capsule    Take 1 capsule (300 mg) by mouth At Bedtime    Pain in both lower extremities       glucosamine-chondroitin 500-400 MG Caps per capsule      Take 1 capsule by mouth daily        IBU- MG Tabs      1 TABLET EVERY 4 TO 6 HOURS AS NEEDED    Acute pharyngitis       ketoconazole 2 % cream    NIZORAL     Apply topically daily as needed for itching        omeprazole 20 MG CR capsule    priLOSEC     Take 20 mg by mouth daily        order for DME     1 Units    Hair prosthesis for chemotherapy induced alopecia    Malignant neoplasm of right female breast, unspecified site of breast       simvastatin 20 MG tablet    ZOCOR    90 tablet    Take 1 tablet (20 mg) by mouth At Bedtime    Hyperlipidemia LDL goal <160       tamoxifen 20 MG tablet    NOLVADEX    90 tablet    Take 1 tablet (20 mg) by mouth daily    Malignant neoplasm of right female breast, unspecified site of breast       TYLENOL PO      Take 500 mg by mouth

## 2017-11-10 NOTE — NURSING NOTE
"Chief Complaint   Patient presents with     Hypertension       Initial /90  Pulse 84  Temp 98.3  F (36.8  C) (Oral)  Wt 190 lb 14.4 oz (86.6 kg)  SpO2 98%  BMI 33.82 kg/m2 Estimated body mass index is 33.82 kg/(m^2) as calculated from the following:    Height as of 9/13/17: 5' 3\" (1.6 m).    Weight as of this encounter: 190 lb 14.4 oz (86.6 kg).  Medication Reconciliation: complete   Jeane MENDOZA M.A.      "

## 2017-11-10 NOTE — PROGRESS NOTES
SUBJECTIVE:   Shantell Wagner is a 47 year old female who presents to clinic today for the following health issues:      BP check      Duration: High the last month or so    Description (location/character/radiation): When in for appointments has had high readings    Intensity:  moderate    Accompanying signs and symptoms: None    History (similar episodes/previous evaluation): None    Precipitating or alleviating factors: None    Therapies tried and outcome: None     Pt presents for bp recheck.  She has been at a few appts lately when her bp has run high, systolic in the 150s, diastolic around 100.  She denies any headaches, vision change, chest pain, sob, LE edema.  She has been eating and drinking as usual, has normal urinary and bowel habits.  She has not been exercising.  In the past she has had mildly elevated bp but it has normalized completely.    Problem list and histories reviewed & adjusted, as indicated.  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     Lymphedema     Monoallelic mutation of CHEK2 gene     Past Surgical History:   Procedure Laterality Date     CARPAL TUNNEL RELEASE RT/LT  3/2010     HC RECONSTR NOSE  1983    after accident     INSERT PORT VASCULAR ACCESS Left 4/22/2016    Procedure: INSERT PORT VASCULAR ACCESS;  Surgeon: Nereyda Flowers MD;  Location: [facility] OR     INSERT TISSUE EXPANDER BREAST BILATERAL Bilateral 10/17/2016    Procedure: INSERT TISSUE EXPANDER BREAST BILATERAL;  Surgeon: Jenna Vazquez MD;  Location: [facility] OR     LASIK BILATERAL       MASTECTOMY MODIFIED RADICAL Right 10/17/2016    Procedure: MASTECTOMY MODIFIED RADICAL;  Surgeon: Lionel  MD Nereyda;  Location: [facility] OR     MASTECTOMY MODIFIED RADICAL, SENTINEL NODE, COMBINED Left 10/17/2016    Procedure: COMBINED MASTECTOMY MODIFIED RADICAL, SENTINEL NODE;  Surgeon: Nereyda Flowers MD;  Location: [facility] OR     RECONSTRUCT BREAST BILATERAL, IMPLANT PROSTHESIS BILATERAL, COMBINED Bilateral 9/13/2017    Procedure: COMBINED RECONSTRUCT BREAST BILATERAL, IMPLANT PROSTHESIS BILATERAL;  BILATERAL SECOND STAGE BREAST RECONSTRUCTION WITH IMPLANT.;  Surgeon: Jenna Vazquez MD;  Location:  [facility]     REMOVE CATHETER VASCULAR ACCESS Left 10/17/2016    Procedure: REMOVE CATHETER VASCULAR ACCESS;  Surgeon: Nereyda Flowers MD;  Location: [facility] OR       Social History   Substance Use Topics     Smoking status: Never Smoker     Smokeless tobacco: Never Used     Alcohol use 0.0 oz/week     0 Standard drinks or equivalent per week      Comment: rare     Family History   Problem Relation Age of Onset     C.A.D. Father      bypass surg age 68     Coronary Artery Disease Father      Prostate Cancer Father      Breast Cancer Sister 38     breast ca     Type 1 Diabetes Mother      DIABETES Mother      Breast Cancer Maternal Grandmother 70     dx'ed age 80s             Reviewed and updated as needed this visit by clinical staffTobacco  Allergies  Meds  Med Hx  Surg Hx  Fam Hx  Soc Hx      Reviewed and updated as needed this visit by Provider         ROS:  SEE HPI.    OBJECTIVE:     BP (!) 128/92  Pulse 84  Temp 98.3  F (36.8  C) (Oral)  Wt 190 lb 14.4 oz (86.6 kg)  SpO2 98%  BMI 33.82 kg/m2  Body mass index is 33.82 kg/(m^2).  GENERAL: healthy, alert and no distress  NECK: no adenopathy, no asymmetry, masses, or scars and thyroid normal to palpation  RESP: lungs clear to auscultation - no rales, rhonchi or wheezes  CV: regular rate and rhythm, normal S1 S2, no S3 or S4, no murmur, click or rub, no peripheral edema and peripheral pulses strong  ABDOMEN: soft, nontender, no hepatosplenomegaly, no masses and bowel  sounds normal  PSYCH: mentation appears normal, affect normal/bright    Diagnostic Test Results:  none     ASSESSMENT/PLAN:   1. Elevated blood pressure reading without diagnosis of hypertension  47 y.o. Female, here today for follow up of recently elevated bp readings.  No changes in lifestyle, no regular activity.  She is asymptomatic.  Has had elevated bp in the past, but normalized without treatment.  Normal cmp recently.  Did discuss exercise recommendations.  Also, monitor over the next 4-6 weeks, she will bring her home monitor with as well to make sure it is calibrated.  If needed we will start a medication after further eval.  Pt agrees with plan and verbalized understanding.  - Albumin Random Urine Quantitative with Creat Ratio    NIKKI Santos Ra, CNP  [facility]

## 2017-11-11 ENCOUNTER — HEALTH MAINTENANCE LETTER (OUTPATIENT)
Age: 47
End: 2017-11-11

## 2017-11-11 LAB
CREAT UR-MCNC: 130 MG/DL
MICROALBUMIN UR-MCNC: 78 MG/L
MICROALBUMIN/CREAT UR: 59.62 MG/G CR (ref 0–25)

## 2017-11-16 ENCOUNTER — ONCOLOGY VISIT (OUTPATIENT)
Dept: ONCOLOGY | Facility: CLINIC | Age: 47
End: 2017-11-16
Attending: INTERNAL MEDICINE
Payer: COMMERCIAL

## 2017-11-16 ENCOUNTER — HOSPITAL ENCOUNTER (OUTPATIENT)
Facility: CLINIC | Age: 47
Setting detail: SPECIMEN
Discharge: HOME | End: 2017-11-16
Attending: INTERNAL MEDICINE | Admitting: INTERNAL MEDICINE
Payer: COMMERCIAL

## 2017-11-16 VITALS
HEART RATE: 86 BPM | OXYGEN SATURATION: 99 % | TEMPERATURE: 97.4 F | BODY MASS INDEX: 33.66 KG/M2 | SYSTOLIC BLOOD PRESSURE: 144 MMHG | WEIGHT: 190 LBS | HEIGHT: 63 IN | RESPIRATION RATE: 16 BRPM | DIASTOLIC BLOOD PRESSURE: 97 MMHG

## 2017-11-16 DIAGNOSIS — C50.411 MALIGNANT NEOPLASM OF UPPER-OUTER QUADRANT OF RIGHT FEMALE BREAST, UNSPECIFIED ESTROGEN RECEPTOR STATUS (H): ICD-10-CM

## 2017-11-16 LAB
ALBUMIN SERPL-MCNC: 3.7 G/DL (ref 3.4–5)
ALP SERPL-CCNC: 57 U/L (ref 40–150)
ALT SERPL W P-5'-P-CCNC: 28 U/L (ref 0–50)
ANION GAP SERPL CALCULATED.3IONS-SCNC: 6 MMOL/L (ref 3–14)
AST SERPL W P-5'-P-CCNC: 21 U/L (ref 0–45)
BASOPHILS # BLD AUTO: 0 10E9/L (ref 0–0.2)
BASOPHILS NFR BLD AUTO: 0.8 %
BILIRUB SERPL-MCNC: 0.9 MG/DL (ref 0.2–1.3)
BUN SERPL-MCNC: 25 MG/DL (ref 7–30)
CALCIUM SERPL-MCNC: 8.9 MG/DL (ref 8.5–10.1)
CHLORIDE SERPL-SCNC: 106 MMOL/L (ref 94–109)
CO2 SERPL-SCNC: 27 MMOL/L (ref 20–32)
CREAT SERPL-MCNC: 0.88 MG/DL (ref 0.52–1.04)
DIFFERENTIAL METHOD BLD: ABNORMAL
EOSINOPHIL # BLD AUTO: 0.1 10E9/L (ref 0–0.7)
EOSINOPHIL NFR BLD AUTO: 3.4 %
ERYTHROCYTE [DISTWIDTH] IN BLOOD BY AUTOMATED COUNT: 12.4 % (ref 10–15)
GFR SERPL CREATININE-BSD FRML MDRD: 69 ML/MIN/1.7M2
GLUCOSE SERPL-MCNC: 99 MG/DL (ref 70–99)
HCT VFR BLD AUTO: 40.2 % (ref 35–47)
HGB BLD-MCNC: 13.4 G/DL (ref 11.7–15.7)
IMM GRANULOCYTES # BLD: 0 10E9/L (ref 0–0.4)
IMM GRANULOCYTES NFR BLD: 0.3 %
LYMPHOCYTES # BLD AUTO: 1.6 10E9/L (ref 0.8–5.3)
LYMPHOCYTES NFR BLD AUTO: 40.6 %
MCH RBC QN AUTO: 28.9 PG (ref 26.5–33)
MCHC RBC AUTO-ENTMCNC: 33.3 G/DL (ref 31.5–36.5)
MCV RBC AUTO: 87 FL (ref 78–100)
MONOCYTES # BLD AUTO: 0.4 10E9/L (ref 0–1.3)
MONOCYTES NFR BLD AUTO: 9.6 %
NEUTROPHILS # BLD AUTO: 1.7 10E9/L (ref 1.6–8.3)
NEUTROPHILS NFR BLD AUTO: 45.3 %
NRBC # BLD AUTO: 0 10*3/UL
NRBC BLD AUTO-RTO: 0 /100
PLATELET # BLD AUTO: 238 10E9/L (ref 150–450)
POTASSIUM SERPL-SCNC: 4 MMOL/L (ref 3.4–5.3)
PROT SERPL-MCNC: 7.6 G/DL (ref 6.8–8.8)
RBC # BLD AUTO: 4.64 10E12/L (ref 3.8–5.2)
SODIUM SERPL-SCNC: 139 MMOL/L (ref 133–144)
WBC # BLD AUTO: 3.8 10E9/L (ref 4–11)

## 2017-11-16 PROCEDURE — 36415 COLL VENOUS BLD VENIPUNCTURE: CPT

## 2017-11-16 PROCEDURE — 85025 COMPLETE CBC W/AUTO DIFF WBC: CPT | Performed by: INTERNAL MEDICINE

## 2017-11-16 PROCEDURE — 99214 OFFICE O/P EST MOD 30 MIN: CPT | Performed by: INTERNAL MEDICINE

## 2017-11-16 PROCEDURE — 99211 OFF/OP EST MAY X REQ PHY/QHP: CPT

## 2017-11-16 PROCEDURE — 80053 COMPREHEN METABOLIC PANEL: CPT | Performed by: INTERNAL MEDICINE

## 2017-11-16 RX ORDER — VENLAFAXINE 37.5 MG/1
37.5 TABLET ORAL 2 TIMES DAILY
Qty: 60 TABLET | Refills: 3 | Status: SHIPPED | OUTPATIENT
Start: 2017-11-16 | End: 2018-01-05

## 2017-11-16 ASSESSMENT — PAIN SCALES - GENERAL: PAINLEVEL: NO PAIN (0)

## 2017-11-16 NOTE — PROGRESS NOTES
HISTORY OF PRESENT ILLNESS:  Shantell Garrett is 47 years, comes in today for interim followup.  She is here for interim followup of right-sided breast cancer.  Shantell presented in the spring of 2006 with a locally advanced right-sided breast cancer.  It was a 2.2 cm tumor with a 2.7 cm right axillary mass.  She had a very nice response to neoadjuvant Adriamycin and Cytoxan followed by Taxol.  The tumor was estrogen receptor positive and progesterone receptor negative and HER-2 receptor negative.  She then started on adjuvant hormonal therapy and is on the PALLAS trial.  She is on tamoxifen only.  She comes in today for interim followup.  She is actually feeling quite good today.  She denies cough, shortness of breath or bony discomfort.  She does get 10-15 hot flashes per day so we have talked about starting her on Effexor for hot flashes.  Otherwise, she is doing quite well on the tamoxifen.        REVIEW OF SYSTEMS:  A 14-point comprehensive review of systems is otherwise unremarkable.      MEDICATIONS:  As charted.      ALLERGIES:  As charted.      PHYSICAL EXAMINATION:   GENERAL:  Well-appearing lady in no acute distress.   VITAL SIGNS:  Stable.   HEENT:  Oropharynx clear, mucous membranes moist.   NECK:  Has no masses or goiter.   LYMPH:  There is no cervical, supraclavicular or infraclavicular adenopathy.   CHEST:  Clear to auscultation and percussion bilaterally.   HEART:  S1, S2 normal.  No added sounds or murmurs.   ABDOMEN:  Soft and nontender, no hepatosplenomegaly.   EXTREMITIES:  Legs are without tenderness or edema.   BREASTS:  The patient is status post bilateral mastectomies, scars look good.  Right and left axillae are negative.  She has her final implants and they look good.   SKIN:  Has no rashes or lesions.   LYMPHATIC:  No evidence of lymphedema.   Her overall performance status is 0.      DATA REVIEW:  White cell count 3.8, hemoglobin 13.4, platelets 238.      IMPRESSION:  Patient with a  history of high-risk right-sided breast cancer.  She is getting some hot flashes on tamoxifen.  We have talked about this today.  I have started her on some Effexor.  We will see how that goes over the next couple of months.  Shantell carries a CHEK2 mutation.  She is due to see me back in approximately 3 months.         MICH ORTIZ MD             D: 2017 10:20   T: 2017 10:43   MT: MACO      Name:     SHANTELL GARCÍA   MRN:      [id number]        Account:      [id number]   :      1970           Visit Date:   2017      Document: [id number]

## 2017-11-16 NOTE — MR AVS SNAPSHOT
After Visit Summary   11/16/2017    Shantell Wagner    MRN: [id number]           Patient Information     Date Of Birth          1970        Visit Information        Provider Department      11/16/2017 8:00 AM Tata Knott MD [facility]      Today's Diagnoses     Malignant neoplasm of 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          Care Instructions      Study would like patient to return for MD visit on February 15th, 2018 with labs (CBC with diff, CMP, and HgbA1c).     Lauren Aase, RN [facility] Research Ph. [phone] [facility]. [phone]                Follow-ups after your visit        Your next 10 appointments already scheduled     Feb 14, 2018  8:00 AM CST   Return Visit with Tata Knott MD   [facility] ([facility])    [facility]  [address]  [address]  [facility] [id number] [phone]              Who to contact     If you have questions or need follow up information about today's clinic visit or your schedule please contact [facility] directly at [phone].  Normal or non-critical lab and imaging results will be communicated to you by MyChart, letter or phone within 4 business days after the clinic has received the results. If you do not hear from us within 7 days, please contact the clinic through MyChart or phone. If you have a critical or abnormal lab result, we will notify you by phone as soon as possible.  Submit refill requests through ClickToShop or call your pharmacy and they will forward the refill request to us. Please allow 3 business days for your refill to be completed.          Additional Information About Your Visit        MediaLinkhart Information     ClickToShop gives you secure access to your electronic health record. If you see a primary care provider, you can also send  "messages to your care team and make appointments. If you have questions, please call your primary care clinic.  If you do not have a primary care provider, please call [phone] and they will assist you.        Care EveryWhere ID     This is your Care EveryWhere ID. This could be used by other organizations to access your [place] medical records  [id number]        Your Vitals Were     Pulse Temperature Respirations Height Pulse Oximetry BMI (Body Mass Index)    86 97.4  F (36.3  C) (Tympanic) 16 1.6 m (5' 3\") 99% 33.66 kg/m2       Blood Pressure from Last 3 Encounters:   11/16/17 (!) 144/97   11/10/17 (!) 128/92   09/13/17 (!) 135/94    Weight from Last 3 Encounters:   11/16/17 86.2 kg (190 lb)   11/10/17 86.6 kg (190 lb 14.4 oz)   09/13/17 86 kg (189 lb 11.2 oz)              We Performed the Following     CBC with platelets and differential     Comprehensive metabolic panel (BMP + Alb, Alk Phos, ALT, AST, Total. Bili, TP)          Today's Medication Changes          These changes are accurate as of: 11/16/17  8:47 AM.  If you have any questions, ask your nurse or doctor.               Start taking these medicines.        Dose/Directions    venlafaxine 37.5 MG tablet   Commonly known as:  EFFEXOR   Used for:  Malignant neoplasm of 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        Dose:  37.5 mg   Take 1 tablet (37.5 mg) by mouth 2 times daily   Quantity:  60 tablet   Refills:  3            Where to get your medicines      These medications were sent to [facility]/pharmacy #[id number] - [place], [place] - [address]  [address] [facility] [address]     Phone:  [phone]     venlafaxine 37.5 MG tablet                Primary Care Provider Office Phone # Fax #    NIKKI Santos Ra [facility] [phone] [address]  [facility] [address]        Equal Access to Services     TOSHIA JOSEPH AH: Hadii see escalera Sobertram, waaxda luqadaha, qaybta " jie lynnmichael patel. So [facility] [phone].    ATENCIÓN: Si john chicas, tiene a barber disposición servicios gratuitos de asistencia lingüística. [place] al [phone].    We comply with applicable federal civil rights laws and [place] laws. We do not discriminate on the basis of race, color, national origin, age, disability, sex, sexual orientation, or gender identity.            Thank you!     Thank you for choosing [facility]  for your care. Our goal is always to provide you with excellent care. Hearing back from our patients is one way we can continue to improve our services. Please take a few minutes to complete the written survey that you may receive in the mail after your visit with us. Thank you!             Your Updated Medication List - Protect others around you: Learn how to safely use, store and throw away your medicines at www.disposemymeds.org.          This list is accurate as of: 11/16/17  8:47 AM.  Always use your most recent med list.                   Brand Name Dispense Instructions for use Diagnosis    Calcium carb-Vitamin D 500 mg [place]-200 units 500-200 MG-UNIT per tablet    OSCAL with D;Oyster Shell Calcium     Take 1 tablet by mouth daily Reported on 4/13/2017        desonide 0.05 % cream    DESOWEN     Apply topically 2 times daily as needed        gabapentin 300 MG capsule    NEURONTIN    90 capsule    Take 1 capsule (300 mg) by mouth At Bedtime    Pain in both lower extremities       glucosamine-chondroitin 500-400 MG Caps per capsule      Take 1 capsule by mouth daily        IBU- MG Tabs      1 TABLET EVERY 4 TO 6 HOURS AS NEEDED    Acute pharyngitis       ketoconazole 2 % cream    NIZORAL     Apply topically daily as needed for itching        omeprazole 20 MG CR capsule    priLOSEC     Take 20 mg by mouth daily        order for DME     1 Units    Hair prosthesis for chemotherapy induced alopecia    Malignant  neoplasm of right female breast, unspecified site of breast       simvastatin 20 MG tablet    ZOCOR    90 tablet    Take 1 tablet (20 mg) by mouth At Bedtime    Hyperlipidemia LDL goal <160       tamoxifen 20 MG tablet    NOLVADEX    90 tablet    Take 1 tablet (20 mg) by mouth daily    Malignant neoplasm of right female breast, unspecified site of breast       TYLENOL PO      Take 500 mg by mouth        venlafaxine 37.5 MG tablet    EFFEXOR    60 tablet    Take 1 tablet (37.5 mg) by mouth 2 times daily    Malignant neoplasm of 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

## 2017-11-16 NOTE — LETTER
11/16/2017         RE: Shantell Wagner  [address]  [facility] [address]        Dear Colleague,    Thank you for referring your patient, Shantell Wagner, to the [facility]. Please see a copy of my visit note below.    HISTORY OF PRESENT ILLNESS:  Shantell Garrett is 47 years, comes in today for interim followup.  She is here for interim followup of right-sided breast cancer.  Shantell presented in the spring of 2006 with a locally advanced right-sided breast cancer.  It was a 2.2 cm tumor with a 2.7 cm right axillary mass.  She had a very nice response to neoadjuvant Adriamycin and Cytoxan followed by Taxol.  The tumor was estrogen receptor positive and progesterone receptor negative and HER-2 receptor negative.  She then started on adjuvant hormonal therapy and is on the PALLAS trial.  She is on tamoxifen only.  She comes in today for interim followup.  She is actually feeling quite good today.  She denies cough, shortness of breath or bony discomfort.  She does get 10-15 hot flashes per day so we have talked about starting her on Effexor for hot flashes.  Otherwise, she is doing quite well on the tamoxifen.        REVIEW OF SYSTEMS:  A 14-point comprehensive review of systems is otherwise unremarkable.      MEDICATIONS:  As charted.      ALLERGIES:  As charted.      PHYSICAL EXAMINATION:   GENERAL:  Well-appearing lady in no acute distress.   VITAL SIGNS:  Stable.   HEENT:  Oropharynx clear, mucous membranes moist.   NECK:  Has no masses or goiter.   LYMPH:  There is no cervical, supraclavicular or infraclavicular adenopathy.   CHEST:  Clear to auscultation and percussion bilaterally.   HEART:  S1, S2 normal.  No added sounds or murmurs.   ABDOMEN:  Soft and nontender, no hepatosplenomegaly.   EXTREMITIES:  Legs are without tenderness or edema.   BREASTS:  The patient is status post bilateral mastectomies, scars look good.  Right and left axillae are negative.  She has  her final implants and they look good.   SKIN:  Has no rashes or lesions.   LYMPHATIC:  No evidence of lymphedema.   Her overall performance status is 0.      DATA REVIEW:  White cell count 3.8, hemoglobin 13.4, platelets 238.      IMPRESSION:  Patient with a history of high-risk right-sided breast cancer.  She is getting some hot flashes on tamoxifen.  We have talked about this today.  I have started her on some Effexor.  We will see how that goes over the next couple of months.  Cade carries a CHEK2 mutation.  She is due to see me back in approximately 3 months.         MICH KNOTT MD             D: 2017 10:20   T: 2017 10:43   MT: MACO      Name:     CADE GARCÍA   MRN:      [id number]        Account:      [id number]   :      1970           Visit Date:   2017      Document: [id number]       Again, thank you for allowing me to participate in the care of your patient.        Sincerely,        Mich Knott MD

## 2017-11-16 NOTE — PATIENT INSTRUCTIONS
Study would like patient to return for MD visit on February 15th, 2018 with labs (CBC with diff, CMP, and HgbA1c).-Scheduled for 2/14/18, ok'd per Dr. Nikos Prince is not here on 2/15/18. Kim S. Lauren Aase, RN [facility] Research Ph. [phone] [facility]. [phone]  AVS printed and given to PtShirley DE LEÓN

## 2017-11-16 NOTE — NURSING NOTE
"Oncology Rooming Note    November 16, 2017 8:22 AM   Shantell Wagner is a 47 year old female who presents for:    Chief Complaint   Patient presents with     Oncology Clinic Visit     Follow up     Initial Vitals: BP (!) 144/97 (Patient Position: Chair)  Pulse 86  Temp 97.4  F (36.3  C) (Tympanic)  Resp 16  Ht 1.6 m (5' 3\")  Wt 86.2 kg (190 lb)  SpO2 99%  BMI 33.66 kg/m2 Estimated body mass index is 33.66 kg/(m^2) as calculated from the following:    Height as of this encounter: 1.6 m (5' 3\").    Weight as of this encounter: 86.2 kg (190 lb). Body surface area is 1.96 meters squared.  No Pain (0) Comment: Data Unavailable   No LMP recorded. Patient is not currently having periods (Reason: Chemotherapy).  Allergies reviewed: Yes  Medications reviewed: Yes    Medications: Medication refills not needed today.  Pharmacy name entered into [employer]: [id number] - [place], [place] - [address]    Clinical concerns: Follow-up     8 minutes for nursing intake (face to face time)   DISCHARGE PLAN:  Next appointments: See patient instruction section  Departure Mode: Ambulatory  Accompanied by: self  0 minutes for nursing discharge (face to face time)   Jessica Fuentes                "

## 2017-11-17 ENCOUNTER — MYC MEDICAL ADVICE (OUTPATIENT)
Dept: FAMILY MEDICINE | Facility: CLINIC | Age: 47
End: 2017-11-17

## 2017-11-17 ENCOUNTER — ALLIED HEALTH/NURSE VISIT (OUTPATIENT)
Dept: FAMILY MEDICINE | Facility: CLINIC | Age: 47
End: 2017-11-17
Payer: COMMERCIAL

## 2017-11-17 VITALS — DIASTOLIC BLOOD PRESSURE: 88 MMHG | SYSTOLIC BLOOD PRESSURE: 138 MMHG

## 2017-11-17 DIAGNOSIS — I10 ESSENTIAL HYPERTENSION: Primary | ICD-10-CM

## 2017-11-17 DIAGNOSIS — Z01.30 BP CHECK: Primary | ICD-10-CM

## 2017-11-17 PROCEDURE — 99207 ZZC NO CHARGE NURSE ONLY: CPT | Performed by: NURSE PRACTITIONER

## 2017-11-17 RX ORDER — LISINOPRIL 20 MG/1
20 TABLET ORAL DAILY
Qty: 30 TABLET | Refills: 1 | Status: SHIPPED | OUTPATIENT
Start: 2017-11-17 | End: 2017-12-22

## 2017-11-17 NOTE — TELEPHONE ENCOUNTER
Please review BP reading, would you like to start new medication?  Keeley Westfall, RN  Triage Nurse

## 2017-11-17 NOTE — MR AVS SNAPSHOT
After Visit Summary   11/17/2017    Shantell Wagner    MRN: [id number]           Patient Information     Date Of Birth          1970        Visit Information        Provider Department      11/17/2017 5:55 PM Margaret Longo Ra, APRN CNP [facility]        Today's Diagnoses     BP check    -  1       Follow-ups after your visit        Your next 10 appointments already scheduled     Feb 14, 2018  8:00 AM CST   Return Visit with Tata Knott MD   [facility] ([facility])    [facility]  [address]  [address]  [facility] [address]   [phone]              Future tests that were ordered for you today     Open Future Orders        Priority Expected Expires Ordered    **Basic metabolic panel FUTURE 1yr Routine 10/18/2018 11/17/2018 11/17/2017            Who to contact     If you have questions or need follow up information about today's clinic visit or your schedule please contact [facility] directly at [phone].  Normal or non-critical lab and imaging results will be communicated to you by JUNIQEhart, letter or phone within 4 business days after the clinic has received the results. If you do not hear from us within 7 days, please contact the clinic through JUNIQEhart or phone. If you have a critical or abnormal lab result, we will notify you by phone as soon as possible.  Submit refill requests through FiFully or call your pharmacy and they will forward the refill request to us. Please allow 3 business days for your refill to be completed.          Additional Information About Your Visit        JUNIQEhart Information     FiFully gives you secure access to your electronic health record. If you see a primary care provider, you can also send messages to your care team and make appointments. If you have questions, please call your primary care clinic.  If you do not have a primary care provider,  please call [phone] and they will assist you.        Care EveryWhere ID     This is your Care EveryWhere ID. This could be used by other organizations to access your [place] medical records  [id number]         Blood Pressure from Last 3 Encounters:   11/17/17 138/88   11/16/17 (!) 144/97   11/10/17 (!) 128/92    Weight from Last 3 Encounters:   11/16/17 190 lb (86.2 kg)   11/10/17 190 lb 14.4 oz (86.6 kg)   09/13/17 189 lb 11.2 oz (86 kg)              Today, you had the following     No orders found for display         Today's Medication Changes          These changes are accurate as of: 11/17/17  5:56 PM.  If you have any questions, ask your nurse or doctor.               Start taking these medicines.        Dose/Directions    lisinopril 20 MG tablet   Commonly known as:  PRINIVIL/ZESTRIL   Used for:  Essential hypertension   Started by:  Margaret Longo Ra, APRN CNP        Dose:  20 mg   Take 1 tablet (20 mg) by mouth daily   Quantity:  30 tablet   Refills:  1            Where to get your medicines      These medications were sent to [id number] - [facility] [address]  [address] [facility] [address]     Phone:  [phone]     lisinopril 20 MG tablet                Primary Care Provider Office Phone # Fax #    NIKKI Santos Ra [phone]       [address] [facility] [address]        Equal Access to Services     [facility] AH: Hadii aad ku hadasho Soomaali, waaxda luqadaha, qaybta kaalmada adeegyada, waxay idiin hayaan adeeg kharash la'aan . So [facility] [phone].    ATENCIÓN: Si habla español, tiene a barber disposición servicios gratuitos de asistencia lingüística. Llame al [phone].    We comply with applicable federal civil rights laws and [place] laws. We do not discriminate on the basis of race, color, national origin, age, disability, sex, sexual orientation, or gender identity.            Thank you!     Thank you for choosing  [facility]  for your care. Our goal is always to provide you with excellent care. Hearing back from our patients is one way we can continue to improve our services. Please take a few minutes to complete the written survey that you may receive in the mail after your visit with us. Thank you!             Your Updated Medication List - Protect others around you: Learn how to safely use, store and throw away your medicines at www.disposemymeds.org.          This list is accurate as of: 11/17/17  5:56 PM.  Always use your most recent med list.                   Brand Name Dispense Instructions for use Diagnosis    Calcium carb-Vitamin D 500 mg [place]-200 units 500-200 MG-UNIT per tablet    OSCAL with D;Oyster Shell Calcium     Take 1 tablet by mouth daily Reported on 4/13/2017        desonide 0.05 % cream    DESOWEN     Apply topically 2 times daily as needed        gabapentin 300 MG capsule    NEURONTIN    90 capsule    Take 1 capsule (300 mg) by mouth At Bedtime    Pain in both lower extremities       glucosamine-chondroitin 500-400 MG Caps per capsule      Take 1 capsule by mouth daily        IBU- MG Tabs      1 TABLET EVERY 4 TO 6 HOURS AS NEEDED    Acute pharyngitis       ketoconazole 2 % cream    NIZORAL     Apply topically daily as needed for itching        lisinopril 20 MG tablet    PRINIVIL/ZESTRIL    30 tablet    Take 1 tablet (20 mg) by mouth daily    Essential hypertension       omeprazole 20 MG CR capsule    priLOSEC     Take 20 mg by mouth daily        order for DME     1 Units    Hair prosthesis for chemotherapy induced alopecia    Malignant neoplasm of right female breast, unspecified site of breast       simvastatin 20 MG tablet    ZOCOR    90 tablet    Take 1 tablet (20 mg) by mouth At Bedtime    Hyperlipidemia LDL goal <160       tamoxifen 20 MG tablet    NOLVADEX    90 tablet    Take 1 tablet (20 mg) by mouth daily    Malignant neoplasm of right female breast, unspecified site of  breast       TYLENOL PO      Take 500 mg by mouth        venlafaxine 37.5 MG tablet    EFFEXOR    60 tablet    Take 1 tablet (37.5 mg) by mouth 2 times daily    Malignant neoplasm of 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

## 2017-11-17 NOTE — PROGRESS NOTES
Shantell Wagner is enrolled/participating in the retail pharmacy Blood Pressure Goals Achievement Program (BPGAP).  Shantell Wagner was evaluated at [facility] on November 17, 2017 at which time her blood pressure was:    BP Readings from Last 3 Encounters:   11/17/17 138/88   11/16/17 (!) 144/97   11/10/17 (!) 128/92     Reviewed lifestyle modifications for blood pressure control and reduction: including making healthy food choices, managing weight, getting regular exercise, smoking cessation, reducing alcohol consumption, monitoring blood pressure regularly.     Shantell Wagner is not experiencing symptoms.    Follow-Up: BP is at goal of < 140/90mmHg (patient 18+ years of age with or without diabetes).  Recommended follow-up at pharmacy in 6 months.     Recommendation to Provider: none    Shantell Wagner was evaluated for enrollment into the PGEN study today.    Patient eligible for enrollment:  No  Patient interested in enrollment:  No    Completed by: Ezra Ritchie    Thank You   Lola Verma [facility]

## 2017-12-15 ENCOUNTER — ALLIED HEALTH/NURSE VISIT (OUTPATIENT)
Dept: FAMILY MEDICINE | Facility: CLINIC | Age: 47
End: 2017-12-15

## 2017-12-15 VITALS — SYSTOLIC BLOOD PRESSURE: 130 MMHG | DIASTOLIC BLOOD PRESSURE: 81 MMHG

## 2017-12-15 DIAGNOSIS — I10 ESSENTIAL HYPERTENSION: ICD-10-CM

## 2017-12-15 DIAGNOSIS — Z01.30 BLOOD PRESSURE CHECK: Primary | ICD-10-CM

## 2017-12-15 PROCEDURE — 36415 COLL VENOUS BLD VENIPUNCTURE: CPT | Performed by: NURSE PRACTITIONER

## 2017-12-15 PROCEDURE — 99207 ZZC NO CHARGE NURSE ONLY: CPT | Performed by: NURSE PRACTITIONER

## 2017-12-15 PROCEDURE — 80048 BASIC METABOLIC PNL TOTAL CA: CPT | Performed by: NURSE PRACTITIONER

## 2017-12-15 NOTE — PROGRESS NOTES
Shantell Wagner is enrolled/participating in the retail pharmacy Blood Pressure Goals Achievement Program (BPGAP).  Shantell Wagner was evaluated at [facility] on December 15, 2017 at which time her blood pressure was:    BP Readings from Last 3 Encounters:   12/15/17 130/81   11/17/17 138/88   11/16/17 (!) 144/97     Reviewed lifestyle modifications for blood pressure control and reduction: including making healthy food choices, managing weight, getting regular exercise, smoking cessation, reducing alcohol consumption, monitoring blood pressure regularly.     Shantell Wagner is not experiencing symptoms.    Follow-Up: BP is at goal of < 140/90mmHg (patient 18+ years of age with or without diabetes).  Recommended follow-up at pharmacy in 6 months.     Recommendation to Provider: none    Shantell Wagner was evaluated for enrollment into the PGEN study today.    Patient eligible for enrollment:  No  Patient interested in enrollment:  No    Completed by: Anamika Berrios, PharmD  [facility]

## 2017-12-15 NOTE — MR AVS SNAPSHOT
After Visit Summary   12/15/2017    Shantell Wagner    MRN: [id number]           Patient Information     Date Of Birth          1970        Visit Information        Provider Department      12/15/2017 10:30 AM Margaret Longo Ra, APRN CNP [facility]        Today's Diagnoses     Blood pressure check    -  1       Follow-ups after your visit        Your next 10 appointments already scheduled     Feb 14, 2018  8:00 AM CST   Return Visit with Tata Knott MD   [facility] ([facility])    [facility]  [address]  [address]  [facility] [id number] [phone]              Who to contact     If you have questions or need follow up information about today's clinic visit or your schedule please contact [facility] directly at [phone].  Normal or non-critical lab and imaging results will be communicated to you by MyChart, letter or phone within 4 business days after the clinic has received the results. If you do not hear from us within 7 days, please contact the clinic through MyChart or phone. If you have a critical or abnormal lab result, we will notify you by phone as soon as possible.  Submit refill requests through BidThatProject or call your pharmacy and they will forward the refill request to us. Please allow 3 business days for your refill to be completed.          Additional Information About Your Visit        MyChart Information     BidThatProject gives you secure access to your electronic health record. If you see a primary care provider, you can also send messages to your care team and make appointments. If you have questions, please call your primary care clinic.  If you do not have a primary care provider, please call [phone] and they will assist you.        Care EveryWhere ID     This is your Care EveryWhere ID. This could be used by other organizations to access your [facility]  records  [id number]         Blood Pressure from Last 3 Encounters:   12/15/17 130/81   11/17/17 138/88   11/16/17 (!) 144/97    Weight from Last 3 Encounters:   11/16/17 190 lb (86.2 kg)   11/10/17 190 lb 14.4 oz (86.6 kg)   09/13/17 189 lb 11.2 oz (86 kg)              Today, you had the following     No orders found for display       Primary Care Provider Office Phone # Fax #    Margaret Longo APRLUCY [facility] [phone]       [address] [address] [facility] [address]        Equal Access to Services     [facility]AT : Hadii aad ku hadasho Soramonaali, waaxda luqadaha, qaybta kaalmada adeegyada, jie real . So [facility] [phone].    ATENCIÓN: Si habla español, tiene a barber disposición servicios gratuitos de asistencia lingüística. Llame al [phone].    We comply with applicable federal civil rights laws and [place] laws. We do not discriminate on the basis of race, color, national origin, age, disability, sex, sexual orientation, or gender identity.            Thank you!     Thank you for choosing [facility]  for your care. Our goal is always to provide you with excellent care. Hearing back from our patients is one way we can continue to improve our services. Please take a few minutes to complete the written survey that you may receive in the mail after your visit with us. Thank you!             Your Updated Medication List - Protect others around you: Learn how to safely use, store and throw away your medicines at www.disposemymeds.org.          This list is accurate as of: 12/15/17 10:47 AM.  Always use your most recent med list.                   Brand Name Dispense Instructions for use Diagnosis    Calcium carb-Vitamin D 500 mg [place]-200 units 500-200 MG-UNIT per tablet    OSCAL with D;Oyster Shell Calcium     Take 1 tablet by mouth daily Reported on 4/13/2017        desonide 0.05 % cream    DESOWEN     Apply topically 2 times daily as needed         gabapentin 300 MG capsule    NEURONTIN    90 capsule    Take 1 capsule (300 mg) by mouth At Bedtime    Pain in both lower extremities       glucosamine-chondroitin 500-400 MG Caps per capsule      Take 1 capsule by mouth daily        IBU- MG Tabs      1 TABLET EVERY 4 TO 6 HOURS AS NEEDED    Acute pharyngitis       ketoconazole 2 % cream    NIZORAL     Apply topically daily as needed for itching        lisinopril 20 MG tablet    PRINIVIL/ZESTRIL    30 tablet    Take 1 tablet (20 mg) by mouth daily    Essential hypertension       omeprazole 20 MG CR capsule    priLOSEC     Take 20 mg by mouth daily        order for DME     1 Units    Hair prosthesis for chemotherapy induced alopecia    Malignant neoplasm of right female breast, unspecified site of breast       simvastatin 20 MG tablet    ZOCOR    90 tablet    Take 1 tablet (20 mg) by mouth At Bedtime    Hyperlipidemia LDL goal <160       tamoxifen 20 MG tablet    NOLVADEX    90 tablet    Take 1 tablet (20 mg) by mouth daily    Malignant neoplasm of right female breast, unspecified site of breast       TYLENOL PO      Take 500 mg by mouth        venlafaxine 37.5 MG tablet    EFFEXOR    60 tablet    Take 1 tablet (37.5 mg) by mouth 2 times daily    Malignant neoplasm of 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

## 2017-12-16 LAB
ANION GAP SERPL CALCULATED.3IONS-SCNC: 7 MMOL/L (ref 3–14)
BUN SERPL-MCNC: 19 MG/DL (ref 7–30)
CALCIUM SERPL-MCNC: 9.1 MG/DL (ref 8.5–10.1)
CHLORIDE SERPL-SCNC: 106 MMOL/L (ref 94–109)
CO2 SERPL-SCNC: 26 MMOL/L (ref 20–32)
CREAT SERPL-MCNC: 0.82 MG/DL (ref 0.52–1.04)
GFR SERPL CREATININE-BSD FRML MDRD: 74 ML/MIN/1.7M2
GLUCOSE SERPL-MCNC: 96 MG/DL (ref 70–99)
POTASSIUM SERPL-SCNC: 4.2 MMOL/L (ref 3.4–5.3)
SODIUM SERPL-SCNC: 139 MMOL/L (ref 133–144)

## 2017-12-22 DIAGNOSIS — I10 ESSENTIAL HYPERTENSION: ICD-10-CM

## 2017-12-22 RX ORDER — LISINOPRIL 20 MG/1
20 TABLET ORAL DAILY
Qty: 90 TABLET | Refills: 1 | Status: SHIPPED | OUTPATIENT
Start: 2017-12-22 | End: 2018-04-06

## 2018-01-05 DIAGNOSIS — C50.411 MALIGNANT NEOPLASM OF UPPER-OUTER QUADRANT OF RIGHT FEMALE BREAST, UNSPECIFIED ESTROGEN RECEPTOR STATUS (H): ICD-10-CM

## 2018-01-05 RX ORDER — VENLAFAXINE 37.5 MG/1
37.5 TABLET ORAL 2 TIMES DAILY
Qty: 180 TABLET | Refills: 1 | Status: SHIPPED | OUTPATIENT
Start: 2018-01-05 | End: 2018-05-10

## 2018-01-05 NOTE — TELEPHONE ENCOUNTER
Signed Prescriptions:                        Disp   Refills    venlafaxine (EFFEXOR) 37.5 MG tablet       180 ta*1        Sig: Take 1 tablet (37.5 mg) by mouth 2 times daily  Authorizing Provider: AMRIK ALFRED    Rx has been approved by Amrik Alfred NP.  Dot Paul RN BSN

## 2018-01-05 NOTE — TELEPHONE ENCOUNTER
Pending Prescriptions:                       Disp   Refills    venlafaxine (EFFEXOR) 37.5 MG tablet      180 ta*1            Sig: Take 1 tablet (37.5 mg) by mouth 2 times daily          Last Written Prescription Date:  11/16/2017  Last Fill Quantity: 60,   # refills: 3  Last Office Visit: 11/16/2017  Future Office visit:    Next 5 appointments (look out 90 days)     Feb 14, 2018  8:00 AM CST   Return Visit with Tata Knott MD   [facility])    [facility]  [address]  [address]  [facility] [address]   [phone]                   Previous Rx was written for 30 day supply with 3 refills.  Pharmacy is requesting 90-day supply.  Will route to Amrik Reeves NP, for review.  Dot Paul RN BSN

## 2018-02-08 DIAGNOSIS — C50.919 MALIGNANT NEOPLASM OF BREAST IN FEMALE, ESTROGEN RECEPTOR POSITIVE, UNSPECIFIED LATERALITY, UNSPECIFIED SITE OF BREAST (H): Primary | ICD-10-CM

## 2018-02-08 DIAGNOSIS — Z17.0 MALIGNANT NEOPLASM OF BREAST IN FEMALE, ESTROGEN RECEPTOR POSITIVE, UNSPECIFIED LATERALITY, UNSPECIFIED SITE OF BREAST (H): Primary | ICD-10-CM

## 2018-02-08 DIAGNOSIS — C50.411 MALIGNANT NEOPLASM OF UPPER-OUTER QUADRANT OF RIGHT FEMALE BREAST, UNSPECIFIED ESTROGEN RECEPTOR STATUS (H): Primary | ICD-10-CM

## 2018-02-08 RX ORDER — TAMOXIFEN CITRATE 20 MG/1
20 TABLET ORAL DAILY
Qty: 90 TABLET | Refills: 3 | Status: SHIPPED | OUTPATIENT
Start: 2018-02-08 | End: 2019-01-30

## 2018-02-08 NOTE — TELEPHONE ENCOUNTER
Pending Prescriptions:                       Disp   Refills    tamoxifen (NOLVADEX) 20 MG tablet         90 tab*3            Sig: Take 1 tablet (20 mg) by mouth daily          Last Written Prescription Date:  11/2/2016  Last Fill Quantity: 90,   # refills: 3  Last Office Visit: 11/16/2017  Future Office visit:    Next 5 appointments (look out 90 days)     Feb 14, 2018  8:00 AM CST   Return Visit with Tata Knott MD   [facility] ([facility])    [facility]  [address]  [facility] [address]  [facility] [address]   [phone]                   Routing refill request to provider for review/approval.  Dot Paul RN BSN

## 2018-02-08 NOTE — TELEPHONE ENCOUNTER
Signed Prescriptions:                        Disp   Refills    tamoxifen (NOLVADEX) 20 MG tablet          90 tab*3        Sig: Take 1 tablet (20 mg) by mouth daily  Authorizing Provider: MICH KNOTT    Rx has been approved by Dr Knott.  Dot Paul RN BSN

## 2018-02-14 ENCOUNTER — HOSPITAL ENCOUNTER (OUTPATIENT)
Facility: CLINIC | Age: 48
Setting detail: SPECIMEN
Discharge: HOME | End: 2018-02-14
Attending: INTERNAL MEDICINE | Admitting: INTERNAL MEDICINE
Payer: COMMERCIAL

## 2018-02-14 ENCOUNTER — ONCOLOGY VISIT (OUTPATIENT)
Dept: ONCOLOGY | Facility: CLINIC | Age: 48
End: 2018-02-14
Attending: INTERNAL MEDICINE
Payer: COMMERCIAL

## 2018-02-14 VITALS
OXYGEN SATURATION: 96 % | DIASTOLIC BLOOD PRESSURE: 80 MMHG | BODY MASS INDEX: 32.6 KG/M2 | WEIGHT: 184 LBS | HEART RATE: 96 BPM | HEIGHT: 63 IN | SYSTOLIC BLOOD PRESSURE: 124 MMHG | TEMPERATURE: 97 F | RESPIRATION RATE: 16 BRPM

## 2018-02-14 DIAGNOSIS — C50.411 MALIGNANT NEOPLASM OF UPPER-OUTER QUADRANT OF RIGHT FEMALE BREAST, UNSPECIFIED ESTROGEN RECEPTOR STATUS (H): ICD-10-CM

## 2018-02-14 LAB
ALBUMIN SERPL-MCNC: 3.7 G/DL (ref 3.4–5)
ALP SERPL-CCNC: 55 U/L (ref 40–150)
ALT SERPL W P-5'-P-CCNC: 30 U/L (ref 0–50)
ANION GAP SERPL CALCULATED.3IONS-SCNC: 6 MMOL/L (ref 3–14)
AST SERPL W P-5'-P-CCNC: 16 U/L (ref 0–45)
BASOPHILS # BLD AUTO: 0 10E9/L (ref 0–0.2)
BASOPHILS NFR BLD AUTO: 0.8 %
BILIRUB SERPL-MCNC: 0.6 MG/DL (ref 0.2–1.3)
BUN SERPL-MCNC: 17 MG/DL (ref 7–30)
CALCIUM SERPL-MCNC: 9.3 MG/DL (ref 8.5–10.1)
CHLORIDE SERPL-SCNC: 104 MMOL/L (ref 94–109)
CO2 SERPL-SCNC: 29 MMOL/L (ref 20–32)
CREAT SERPL-MCNC: 0.76 MG/DL (ref 0.52–1.04)
DIFFERENTIAL METHOD BLD: ABNORMAL
EOSINOPHIL # BLD AUTO: 0.1 10E9/L (ref 0–0.7)
EOSINOPHIL NFR BLD AUTO: 3.1 %
ERYTHROCYTE [DISTWIDTH] IN BLOOD BY AUTOMATED COUNT: 12.8 % (ref 10–15)
GFR SERPL CREATININE-BSD FRML MDRD: 81 ML/MIN/1.7M2
GLUCOSE SERPL-MCNC: 92 MG/DL (ref 70–99)
HBA1C MFR BLD: 5.9 % (ref 4.3–6)
HCT VFR BLD AUTO: 40.4 % (ref 35–47)
HGB BLD-MCNC: 13.1 G/DL (ref 11.7–15.7)
IMM GRANULOCYTES # BLD: 0 10E9/L (ref 0–0.4)
IMM GRANULOCYTES NFR BLD: 0.3 %
LYMPHOCYTES # BLD AUTO: 1.7 10E9/L (ref 0.8–5.3)
LYMPHOCYTES NFR BLD AUTO: 44.1 %
MCH RBC QN AUTO: 28.9 PG (ref 26.5–33)
MCHC RBC AUTO-ENTMCNC: 32.4 G/DL (ref 31.5–36.5)
MCV RBC AUTO: 89 FL (ref 78–100)
MONOCYTES # BLD AUTO: 0.4 10E9/L (ref 0–1.3)
MONOCYTES NFR BLD AUTO: 10.3 %
NEUTROPHILS # BLD AUTO: 1.6 10E9/L (ref 1.6–8.3)
NEUTROPHILS NFR BLD AUTO: 41.4 %
NRBC # BLD AUTO: 0 10*3/UL
NRBC BLD AUTO-RTO: 0 /100
PLATELET # BLD AUTO: 246 10E9/L (ref 150–450)
POTASSIUM SERPL-SCNC: 4 MMOL/L (ref 3.4–5.3)
PROT SERPL-MCNC: 7.6 G/DL (ref 6.8–8.8)
RBC # BLD AUTO: 4.54 10E12/L (ref 3.8–5.2)
SODIUM SERPL-SCNC: 139 MMOL/L (ref 133–144)
WBC # BLD AUTO: 3.9 10E9/L (ref 4–11)

## 2018-02-14 PROCEDURE — G0463 HOSPITAL OUTPT CLINIC VISIT: HCPCS

## 2018-02-14 PROCEDURE — 85025 COMPLETE CBC W/AUTO DIFF WBC: CPT | Performed by: INTERNAL MEDICINE

## 2018-02-14 PROCEDURE — 83036 HEMOGLOBIN GLYCOSYLATED A1C: CPT | Performed by: INTERNAL MEDICINE

## 2018-02-14 PROCEDURE — 80053 COMPREHEN METABOLIC PANEL: CPT | Performed by: INTERNAL MEDICINE

## 2018-02-14 PROCEDURE — 99213 OFFICE O/P EST LOW 20 MIN: CPT | Performed by: INTERNAL MEDICINE

## 2018-02-14 ASSESSMENT — PAIN SCALES - GENERAL: PAINLEVEL: NO PAIN (0)

## 2018-02-14 NOTE — NURSING NOTE
"Oncology Rooming Note    February 14, 2018 8:20 AM   Shantell Wagner is a 47 year old female who presents for:    Chief Complaint   Patient presents with     Oncology Clinic Visit     Follow up Breast Cancer     Initial Vitals: /80  Pulse 96  Temp 97  F (36.1  C) (Tympanic)  Resp 16  Ht 1.6 m (5' 3\")  Wt 83.5 kg (184 lb)  SpO2 96%  BMI 32.59 kg/m2 Estimated body mass index is 32.59 kg/(m^2) as calculated from the following:    Height as of this encounter: 1.6 m (5' 3\").    Weight as of this encounter: 83.5 kg (184 lb). Body surface area is 1.93 meters squared.  No Pain (0) Comment: Data Unavailable   No LMP recorded. Patient is not currently having periods (Reason: Chemotherapy).  Allergies reviewed: Yes  Medications reviewed: Yes    Medications: Medication refills not needed today.  Ph[facility] into [id number] [place] [place] [facility] [address] [facility]    Clinical concerns: follow up     8 minutes for nursing intake (face to face time)     Ching Sevilla CMA     DISCHARGE PLAN:  Next appointments: See patient instruction section  Departure Mode: Ambulatory  Accompanied by:   0 minutes for nursing discharge (face to face time)   Ching Sevilla CMA                  "

## 2018-02-14 NOTE — LETTER
2/14/2018         RE: Shantell Wagner  [address]  [facility] [address]        Dear Colleague,    Thank you for referring your patient, Shantell Wagner, to the [facility]. Please see a copy of my visit note below.    Visit Date:   02/14/2018      HISTORY OF PRESENT ILLNESS:  Shantell Wagner is a 47-year-old patient who comes in today for interim followup.  She has a history of a right-sided breast cancer.  She presented in the spring of 2016 with a locally advanced right-sided breast cancer that was 2.2 cm with a 2.7 cm right axillary mass.  She was treated with neoadjuvant Adriamycin and Cytoxan followed by Taxol.  Her tumor was estrogen receptor positive, progesterone receptor negative, HER-2 receptor negative.  She started adjuvant hormonal therapy and is on the Pallas trial.  She got randomized to tamoxifen only.  She comes in today for her 3-month interim followup.  She is feeling well today without any major problems.  She does get some myalgias which are mild.  She also has been diagnosed with hypertension and has started a new antihypertensive medication.  She is up-to-date on health screening.  She had a colonoscopy done in October.  She has had bilateral mastectomies, so we will not be doing any mammograms.      REVIEW OF SYSTEMS:  A 14-point comprehensive review of systems today is otherwise unremarkable.      MEDICATIONS:  As charted.      ALLERGIES:  As charted.        PHYSICAL EXAMINATION:   GENERAL:  She is a well-appearing lady in no acute distress.   VITAL SIGNS:  Stable.   HEENT:  Oropharynx clear, mucous membranes moist.   NECK:  Has no masses or goiter.   NECK:  There is no cervical, supraclavicular or infraclavicular adenopathy.   CHEST:  Clear to auscultation and percussion bilaterally.   HEART:  S1, S2 normal.  No added sounds or murmurs.   ABDOMEN:  Soft and nontender, no hepatosplenomegaly.   EXTREMITIES:  Legs are without tenderness or edema.    BREASTS:  The patient is status post bilateral mastectomies.  Scars look good.  Right and left axilla are negative.     SKIN:  There are no rashes or lesions.   LYMPHATIC:  No evidence of lymphedema.        Her overall performance status is 0.      DATA REVIEW:  White cell count 3.9, hemoglobin 13.1, platelets 246.      IMPRESSION:  Patient with a high risk right-sided breast cancer who has a nice response to neoadjuvant chemotherapy.  She is now on adjuvant hormonal therapy and is on the Pallas trial.  Of note, she also carries a genetic mutation and the JAK2 mutation.  She is up-to-date on her colonoscopy for screening for colon cancer.  I will see her back in clinic in 3 months.         MICH KNOTT MD             D: 02/15/2018   T: 02/15/2018   MT: ARLYN      Name:     CADE GARCÍA   MRN:      [id number]        Account:      [id number]   :      1970           Visit Date:   2018      Document: [id number]       Again, thank you for allowing me to participate in the care of your patient.        Sincerely,        Mich Knott MD

## 2018-02-14 NOTE — MR AVS SNAPSHOT
After Visit Summary   2/14/2018    Shantell Wagner    MRN: [id number]           Patient Information     Date Of Birth          1970        Visit Information        Provider Department      2/14/2018 8:00 AM Tata Knott MD [facility] On[facility]      Today's Diagnoses     Malignant neoplasm of 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          Care Instructions    Please have patient schedule follow up appointment with Dr. Knott for May 10th with labs prior ( CBC with diff, CMP, and HgbA1c) per study.     Lauren Aase, RN [facility] [phone] [facility]. [phone]            Follow-ups after your visit        Your next 10 appointments already scheduled     May 10, 2018  4:00 PM CDT   Return Visit with Tata Knott MD   [facility] ([facility])    [facility]  [address]  [facility] [address]  [facility] [id number] [phone]              Who to contact     If you have questions or need follow up information about today's clinic visit or your schedule please contact [facility] directly at [phone].  Normal or non-critical lab and imaging results will be communicated to you by MyChart, letter or phone within 4 business days after the clinic has received the results. If you do not hear from us within 7 days, please contact the clinic through [employer] or phone. If you have a critical or abnormal lab result, we will notify you by phone as soon as possible.  Submit refill requests through [employer] or call your pharmacy and they will forward the refill request to us. Please allow 3 business days for your refill to be completed.          Additional Information About Your Visit        [employer] Information     [employer] gives you secure access to your electronic health record. If you see a primary care provider, you  "can also send messages to your care team and make appointments. If you have questions, please call your primary care clinic.  If you do not have a primary care provider, please call [phone] and they will assist you.        Care EveryWhere ID     This is your Care EveryWhere ID. This could be used by other organizations to access your [place] medical records  [id number]        Your Vitals Were     Pulse Temperature Respirations Height Pulse Oximetry BMI (Body Mass Index)    96 97  F (36.1  C) (Tympanic) 16 1.6 m (5' 3\") 96% 32.59 kg/m2       Blood Pressure from Last 3 Encounters:   02/14/18 124/80   12/15/17 130/81   11/17/17 138/88    Weight from Last 3 Encounters:   02/14/18 83.5 kg (184 lb)   11/16/17 86.2 kg (190 lb)   11/10/17 86.6 kg (190 lb 14.4 oz)              We Performed the Following     **A1C FUTURE anytime     **Comprehensive metabolic panel FUTURE anytime     CBC with platelets and differential        Primary Care Provider Office Phone # Fax #    Margaret Kwame Longo, APRN [facility] [phone]       [address] [facility] [address]        Equal Access to Services     TOSHIA JOSEPH AH: Hadii aad ku hadasho Soomaali, waaxda luqadaha, qaybta kaalmada adeegyada, waxay idiin hayvladislavn akua navarro lamarilu . So [facility] [phone].    ATENCIÓN: Si habla español, tiene a barber disposición servicios gratuitos de asistencia lingüística. Llame al [phone].    We comply with applicable federal civil rights laws and [place] laws. We do not discriminate on the basis of race, color, national origin, age, disability, sex, sexual orientation, or gender identity.            Thank you!     Thank you for choosing [facility]  for your care. Our goal is always to provide you with excellent care. Hearing back from our patients is one way we can continue to improve our services. Please take a few minutes to complete the written survey that you may receive in the mail after your visit " with us. Thank you!             Your Updated Medication List - Protect others around you: Learn how to safely use, store and throw away your medicines at www.disposemymeds.org.          This list is accurate as of 2/14/18  8:47 AM.  Always use your most recent med list.                   Brand Name Dispense Instructions for use Diagnosis    Calcium carb-Vitamin D 500 mg [place]-200 units 500-200 MG-UNIT per tablet    OSCAL with D;Oyster Shell Calcium     Take 1 tablet by mouth daily Reported on 4/13/2017        desonide 0.05 % cream    DESOWEN     Apply topically 2 times daily as needed        gabapentin 300 MG capsule    NEURONTIN    90 capsule    Take 1 capsule (300 mg) by mouth At Bedtime    Pain in both lower extremities       glucosamine-chondroitin 500-400 MG Caps per capsule      Take 1 capsule by mouth daily        IBU- MG Tabs      1 TABLET EVERY 4 TO 6 HOURS AS NEEDED    Acute pharyngitis       ketoconazole 2 % cream    NIZORAL     Apply topically daily as needed for itching        lisinopril 20 MG tablet    PRINIVIL/ZESTRIL    90 tablet    Take 1 tablet (20 mg) by mouth daily    Essential hypertension       omeprazole 20 MG CR capsule    priLOSEC     Take 20 mg by mouth daily        order for DME     1 Units    Hair prosthesis for chemotherapy induced alopecia    Malignant neoplasm of right female breast, unspecified site of breast       simvastatin 20 MG tablet    ZOCOR    90 tablet    Take 1 tablet (20 mg) by mouth At Bedtime    Hyperlipidemia LDL goal <160       tamoxifen 20 MG tablet    NOLVADEX    90 tablet    Take 1 tablet (20 mg) by mouth daily    Malignant neoplasm of breast in female, estrogen receptor positive, unspecified laterality, unspecified site of breast (H)       TYLENOL PO      Take 500 mg by mouth        venlafaxine 37.5 MG tablet    EFFEXOR    180 tablet    Take 1 tablet (37.5 mg) by mouth 2 times daily    Malignant neoplasm of upper-outer quadrant of right female breast,  unspecified estrogen receptor status (H)

## 2018-02-14 NOTE — PATIENT INSTRUCTIONS
Please have patient schedule follow up appointment with Dr. Knott for May 10th with labs prior ( CBC with diff, CMP, and HgbA1c) per study. -Scheduled. Kim S. Lauren Aase, RN [facility]. [phone] Pgr. [phone]  AVS printed and given to PtShirley DE LEÓN

## 2018-02-15 NOTE — PROGRESS NOTES
Visit Date:   02/14/2018      HISTORY OF PRESENT ILLNESS:  Shantell Wagner is a 47-year-old patient who comes in today for interim followup.  She has a history of a right-sided breast cancer.  She presented in the spring of 2016 with a locally advanced right-sided breast cancer that was 2.2 cm with a 2.7 cm right axillary mass.  She was treated with neoadjuvant Adriamycin and Cytoxan followed by Taxol.  Her tumor was estrogen receptor positive, progesterone receptor negative, HER-2 receptor negative.  She started adjuvant hormonal therapy and is on the Pallas trial.  She got randomized to tamoxifen only.  She comes in today for her 3-month interim followup.  She is feeling well today without any major problems.  She does get some myalgias which are mild.  She also has been diagnosed with hypertension and has started a new antihypertensive medication.  She is up-to-date on health screening.  She had a colonoscopy done in October.  She has had bilateral mastectomies, so we will not be doing any mammograms.      REVIEW OF SYSTEMS:  A 14-point comprehensive review of systems today is otherwise unremarkable.      MEDICATIONS:  As charted.      ALLERGIES:  As charted.        PHYSICAL EXAMINATION:   GENERAL:  She is a well-appearing lady in no acute distress.   VITAL SIGNS:  Stable.   HEENT:  Oropharynx clear, mucous membranes moist.   NECK:  Has no masses or goiter.   NECK:  There is no cervical, supraclavicular or infraclavicular adenopathy.   CHEST:  Clear to auscultation and percussion bilaterally.   HEART:  S1, S2 normal.  No added sounds or murmurs.   ABDOMEN:  Soft and nontender, no hepatosplenomegaly.   EXTREMITIES:  Legs are without tenderness or edema.   BREASTS:  The patient is status post bilateral mastectomies.  Scars look good.  Right and left axilla are negative.     SKIN:  There are no rashes or lesions.   LYMPHATIC:  No evidence of lymphedema.        Her overall performance status is 0.      DATA REVIEW:   White cell count 3.9, hemoglobin 13.1, platelets 246.      IMPRESSION:  Patient with a high risk right-sided breast cancer who has a nice response to neoadjuvant chemotherapy.  She is now on adjuvant hormonal therapy and is on the Pallas trial.  Of note, she also carries a genetic mutation and the JAK2 mutation.  She is up-to-date on her colonoscopy for screening for colon cancer.  I will see her back in clinic in 3 months.         MICH ORTIZ MD             D: 02/15/2018   T: 02/15/2018   MT: ARLYN      Name:     CADE GARCÍA   MRN:      [id number]        Account:      [id number]   :      1970           Visit Date:   2018      Document: [id number]

## 2018-02-20 DIAGNOSIS — C50.411 MALIGNANT NEOPLASM OF UPPER-OUTER QUADRANT OF RIGHT FEMALE BREAST, UNSPECIFIED ESTROGEN RECEPTOR STATUS (H): Primary | ICD-10-CM

## 2018-03-06 DIAGNOSIS — M79.604 PAIN IN BOTH LOWER EXTREMITIES: ICD-10-CM

## 2018-03-06 DIAGNOSIS — M79.605 PAIN IN BOTH LOWER EXTREMITIES: ICD-10-CM

## 2018-03-06 NOTE — TELEPHONE ENCOUNTER
gabapentin (NEURONTIN) 300 MG capsule      Last Written Prescription Date:  3/24/2017  Last Fill Quantity: 90,   # refills: 3  Last Office Visit: 11/10/2017  Future Office visit:    Next 5 appointments (look out 90 days)     Apr 06, 2018  9:20 AM CDT   PHYSICAL with NIKKI Santos Ra, CNP   [facility]    [address] [address]   [phone]            Apr 13, 2018  9:20 AM CDT   Pre-Op physical with NIKKI Santos Ra, CNP   [facility]    [address] [address]   [phone]            May 10, 2018  4:00 PM CDT   Return Visit with Tata Knott MD   [facility] ([facility]    [facility] [address]  [facility] [address]   [phone]                   Routing refill request to provider for review/approval because:  Drug not on the [facility], [facility] or [facility] refill protocol or controlled substance

## 2018-03-07 RX ORDER — GABAPENTIN 300 MG/1
CAPSULE ORAL
Qty: 90 CAPSULE | Refills: 3 | Status: SHIPPED | OUTPATIENT
Start: 2018-03-07 | End: 2018-04-06

## 2018-03-07 NOTE — TELEPHONE ENCOUNTER
Routing refill request to provider for review/approval because:  Drug not on the [facility] refill protocol   Keeley Westfall, RN  Triage Nurse

## 2018-03-08 ENCOUNTER — OFFICE VISIT (OUTPATIENT)
Dept: FAMILY MEDICINE | Facility: CLINIC | Age: 48
End: 2018-03-08
Payer: COMMERCIAL

## 2018-03-08 VITALS
RESPIRATION RATE: 18 BRPM | HEIGHT: 64 IN | OXYGEN SATURATION: 98 % | DIASTOLIC BLOOD PRESSURE: 84 MMHG | TEMPERATURE: 98 F | BODY MASS INDEX: 31 KG/M2 | HEART RATE: 109 BPM | SYSTOLIC BLOOD PRESSURE: 120 MMHG | WEIGHT: 181.6 LBS

## 2018-03-08 DIAGNOSIS — J06.9 UPPER RESPIRATORY TRACT INFECTION, UNSPECIFIED TYPE: Primary | ICD-10-CM

## 2018-03-08 PROCEDURE — 99213 OFFICE O/P EST LOW 20 MIN: CPT | Performed by: NURSE PRACTITIONER

## 2018-03-08 RX ORDER — ALBUTEROL SULFATE 90 UG/1
2 AEROSOL, METERED RESPIRATORY (INHALATION) EVERY 6 HOURS PRN
Qty: 1 INHALER | Refills: 0 | Status: SHIPPED | OUTPATIENT
Start: 2018-03-08 | End: 2018-10-25

## 2018-03-08 NOTE — MR AVS SNAPSHOT
After Visit Summary   3/8/2018    Shantell Wagner    MRN: [id number]           Patient Information     Date Of Birth          1970        Visit Information        Provider Department      3/8/2018 1:40 PM Margaret Longo Ra, APRN CNP [facility]        Today's Diagnoses     Upper respiratory tract infection, unspecified type    -  1      Care Instructions    Continue to monitor.  Hydration, humidification.  Mucinex.    Use the albuterol with a spacer if needed.  This may not be needed.    If it settles into sinuses please let me know.          Follow-ups after your visit        Your next 10 appointments already scheduled     Apr 06, 2018  9:20 AM CDT   PHYSICAL with NIKKI Santos Ra, CNP   [facility] ([facility]    [address] [address] [phone]            Apr 13, 2018  9:20 AM CDT   Pre-Op physical with NIKKI Santos Ra, CNP   [facility] ([facility])    [address] [address] [phone]            Apr 25, 2018   Procedure with Jenna Vazquez MD   [facility] (--)    [address]  [facility] [id number] [phone]            May 10, 2018  4:00 PM CDT   Return Visit with Tata Knott MD   [facility] ([facility])    [facility]  [address]  [place] [address] [phone]              Who to contact     If you have questions or need follow up information about today's clinic visit or your schedule please contact [facility] directly at [phone].  Normal or non-critical lab and imaging results will be communicated to you by MyChart, letter or phone within 4 business days after the clinic has received the results. If you do not hear from us within 7 days, please contact the clinic through  "[employer] or phone. If you have a critical or abnormal lab result, we will notify you by phone as soon as possible.  Submit refill requests through [employer] or call your pharmacy and they will forward the refill request to us. Please allow 3 business days for your refill to be completed.          Additional Information About Your Visit        [employer] Information     [employer] gives you secure access to your electronic health record. If you see a primary care provider, you can also send messages to your care team and make appointments. If you have questions, please call your primary care clinic.  If you do not have a primary care provider, please call [phone] and they will assist you.        Care EveryWhere ID     This is your Care EveryWhere ID. This could be used by other organizations to access your [place] medical records  [id number]        Your Vitals Were     Pulse Temperature Respirations Height Pulse Oximetry BMI (Body Mass Index)    109 98  F (36.7  C) (Oral) 18 5' 4\" (1.626 m) 98% 31.17 kg/m2       Blood Pressure from Last 3 Encounters:   03/08/18 120/84   02/14/18 124/80   12/15/17 130/81    Weight from Last 3 Encounters:   03/08/18 181 lb 9.6 oz (82.4 kg)   02/14/18 184 lb (83.5 kg)   11/16/17 190 lb (86.2 kg)              Today, you had the following     No orders found for display         Today's Medication Changes          These changes are accurate as of 3/8/18  2:06 PM.  If you have any questions, ask your nurse or doctor.               Start taking these medicines.        Dose/Directions    albuterol 108 (90 BASE) MCG/ACT Inhaler   Commonly known as:  PROAIR HFA/PROVENTIL HFA/VENTOLIN HFA   Used for:  Upper respiratory tract infection, unspecified type   Started by:  Margaret Longo Ra, NIKKI CNP        Dose:  2 puff   Inhale 2 puffs into the lungs every 6 hours as needed for shortness of breath / dyspnea or wheezing   Quantity:  1 Inhaler   Refills:  0            Where to get your medicines    "   These medications were sent to [facility]/pharmacy #[id number] - [place], [place] - [address]  [address] [facility] [address]     Phone:  [phone]     albuterol 108 (90 BASE) MCG/ACT Inhaler                Primary Care Provider Office Phone # Fax #    NIKKI Santos Ra, [phone]       [address]  [facility] [address]        Equal Access to Services     TOSHIA JOSEPH : Hadii aad ku hadasho Soomaali, waaxda luqadaha, qaybta kaalmada adeegyada, waxay idiin hayaan adeeg kharash la'nazanin . So [facility] [phone].    ATENCIÓN: Si habla español, tiene a barber disposición servicios gratuitos de asistencia lingüística. [facility] [phone].    We comply with applicable federal civil rights laws and [place] laws. We do not discriminate on the basis of race, color, national origin, age, disability, sex, sexual orientation, or gender identity.            Thank you!     Thank you for choosing [facility]  for your care. Our goal is always to provide you with excellent care. Hearing back from our patients is one way we can continue to improve our services. Please take a few minutes to complete the written survey that you may receive in the mail after your visit with us. Thank you!             Your Updated Medication List - Protect others around you: Learn how to safely use, store and throw away your medicines at www.disposemymeds.org.          This list is accurate as of 3/8/18  2:06 PM.  Always use your most recent med list.                   Brand Name Dispense Instructions for use Diagnosis    albuterol 108 (90 BASE) MCG/ACT Inhaler    PROAIR HFA/PROVENTIL HFA/VENTOLIN HFA    1 Inhaler    Inhale 2 puffs into the lungs every 6 hours as needed for shortness of breath / dyspnea or wheezing    Upper respiratory tract infection, unspecified type       Calcium carb-Vitamin D 500 mg [place]-200 units 500-200 MG-UNIT per tablet    OSCAL with D;Oyster Shell Calcium     Take 1 tablet by  mouth daily Reported on 4/13/2017        desonide 0.05 % cream    DESOWEN     Apply topically 2 times daily as needed        gabapentin 300 MG capsule    NEURONTIN    90 capsule    TAKE 1 CAPSULE (300 MG) BY MOUTH AT BEDTIME    Pain in both lower extremities       glucosamine-chondroitin 500-400 MG Caps per capsule      Take 1 capsule by mouth daily        IBU- MG Tabs      1 TABLET EVERY 4 TO 6 HOURS AS NEEDED    Acute pharyngitis       ketoconazole 2 % cream    NIZORAL     Apply topically daily as needed for itching        lisinopril 20 MG tablet    PRINIVIL/ZESTRIL    90 tablet    Take 1 tablet (20 mg) by mouth daily    Essential hypertension       omeprazole 20 MG CR capsule    priLOSEC     Take 20 mg by mouth daily        order for DME     1 Units    Hair prosthesis for chemotherapy induced alopecia    Malignant neoplasm of right female breast, unspecified site of breast       simvastatin 20 MG tablet    ZOCOR    90 tablet    Take 1 tablet (20 mg) by mouth At Bedtime    Hyperlipidemia LDL goal <160       tamoxifen 20 MG tablet    NOLVADEX    90 tablet    Take 1 tablet (20 mg) by mouth daily    Malignant neoplasm of breast in female, estrogen receptor positive, unspecified laterality, unspecified site of breast (H)       TYLENOL PO      Take 500 mg by mouth        venlafaxine 37.5 MG tablet    EFFEXOR    180 tablet    Take 1 tablet (37.5 mg) by mouth 2 times daily    Malignant neoplasm of 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

## 2018-03-08 NOTE — PATIENT INSTRUCTIONS
Continue to monitor.  Hydration, humidification.  Mucinex.    Use the albuterol with a spacer if needed.  This may not be needed.    If it settles into sinuses please let me know.

## 2018-03-08 NOTE — PROGRESS NOTES
SUBJECTIVE:   Shantell Wagner is a 47 year old female who presents to clinic today for the following health issues:      RESPIRATORY SYMPTOMS      Duration: 5 days    Description  rhinorrhea, cough and headache    Severity: moderate    Accompanying signs and symptoms: None    History (predisposing factors):  none    Precipitating or alleviating factors: None    Therapies tried and outcome:  Mucinex, humidifier, ibuprofen  and hot compress on chest.    Pt presents with concerns regarding loss of voice, mild rhinorrhea, cough.  Productive at times.  Intermittently symptoms have improved over the past 5 days.  No sob.  No fever.  No GI symptoms.  Taking in food and fluids.      Problem list and histories reviewed & adjusted, as indicated.  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     Lymphedema     Monoallelic mutation of CHEK2 gene     Past Surgical History:   Procedure Laterality Date     CARPAL TUNNEL RELEASE RT/LT  3/2010     HC RECONSTR NOSE  1983    after accident     INSERT PORT VASCULAR ACCESS Left 4/22/2016    Procedure: INSERT PORT VASCULAR ACCESS;  Surgeon: Nereyda Flowers MD;  Location: [facility] OR     INSERT TISSUE EXPANDER BREAST BILATERAL Bilateral 10/17/2016    Procedure: INSERT TISSUE EXPANDER BREAST BILATERAL;  Surgeon: Jenna Vazquez MD;  Location: [facility] OR     LASIK BILATERAL       MASTECTOMY MODIFIED RADICAL Right 10/17/2016    Procedure: MASTECTOMY MODIFIED RADICAL;  Surgeon: Nereyda Flowers MD;  Location: [facility] OR     MASTECTOMY MODIFIED RADICAL, SENTINEL NODE, COMBINED Left 10/17/2016    Procedure: COMBINED MASTECTOMY MODIFIED RADICAL, SENTINEL NODE;  Surgeon:  "Nereyda Flowers MD;  Location: [facility] OR     RECONSTRUCT BREAST BILATERAL, IMPLANT PROSTHESIS BILATERAL, COMBINED Bilateral 9/13/2017    Procedure: COMBINED RECONSTRUCT BREAST BILATERAL, IMPLANT PROSTHESIS BILATERAL;  BILATERAL SECOND STAGE BREAST RECONSTRUCTION WITH IMPLANT.;  Surgeon: Jenna Vazquez MD;  Location:  [facility]     REMOVE CATHETER VASCULAR ACCESS Left 10/17/2016    Procedure: REMOVE CATHETER VASCULAR ACCESS;  Surgeon: Nereyda Flowers MD;  Location: [facility] OR       Social History   Substance Use Topics     Smoking status: Never Smoker     Smokeless tobacco: Never Used     Alcohol use 0.0 oz/week     0 Standard drinks or equivalent per week      Comment: rare     Family History   Problem Relation Age of Onset     C.A.D. Father      bypass surg age 68     Coronary Artery Disease Father      Prostate Cancer Father      Breast Cancer Sister 38     breast ca     Type 1 Diabetes Mother      DIABETES Mother      Breast Cancer Maternal Grandmother 70     dx'ed age 80s           Reviewed and updated as needed this visit by clinical staff       Reviewed and updated as needed this visit by Provider         ROS:  SEE HPI.    OBJECTIVE:     /84 (BP Location: Left arm, Patient Position: Chair, Cuff Size: Adult Regular)  Pulse 109  Temp 98  F (36.7  C) (Oral)  Resp 18  Ht 5' 4\" (1.626 m)  Wt 181 lb 9.6 oz (82.4 kg)  SpO2 98%  BMI 31.17 kg/m2  Body mass index is 31.17 kg/(m^2).  GENERAL: healthy, alert and no distress  EYES: Eyes grossly normal to inspection  HENT: ear canals and TM's normal, nose and mouth without ulcers or lesions  NECK: no adenopathy, no asymmetry, masses, or scars and thyroid normal to palpation  RESP: lungs clear to auscultation - no rales, rhonchi or wheezes  CV: regular rate and rhythm, normal S1 S2, no S3 or S4, no murmur, click or rub, no peripheral edema and peripheral pulses strong  PSYCH: mentation appears normal, affect normal/bright    Diagnostic Test Results:  none "     ASSESSMENT/PLAN:   1. Upper respiratory tract infection, unspecified type  47 y.o. Female, here today with recent onset URI symptoms.  Discussed options.  Tolerating well.  Monitor.  Continue home interventions.  If she feels it would be helpful she could trial albuterol prn.  Pt agrees with plan and verbalized understanding.  - albuterol (PROAIR HFA/PROVENTIL HFA/VENTOLIN HFA) 108 (90 BASE) MCG/ACT Inhaler; Inhale 2 puffs into the lungs every 6 hours as needed for shortness of breath / dyspnea or wheezing  Dispense: 1 Inhaler; Refill: 0    NIKKI Santos Ra, CNP  [facility]

## 2018-03-13 ENCOUNTER — MYC MEDICAL ADVICE (OUTPATIENT)
Dept: FAMILY MEDICINE | Facility: CLINIC | Age: 48
End: 2018-03-13

## 2018-03-13 DIAGNOSIS — J01.90 ACUTE SINUSITIS WITH SYMPTOMS > 10 DAYS: Primary | ICD-10-CM

## 2018-03-13 NOTE — TELEPHONE ENCOUNTER
Please see mychart regarding URI sx. Patient feels it has moved to her sinuses.    Per 3/8/2018 OV note:  Instructions   Continue to monitor.  Hydration, humidification.  Mucinex.     Use the albuterol with a spacer if needed.  This may not be needed.     If it settles into sinuses please let me know.     Any additional f/u instructions?    Seema EGAN RN, BSN, PHN  [place] [facility] RN

## 2018-04-06 ENCOUNTER — OFFICE VISIT (OUTPATIENT)
Dept: FAMILY MEDICINE | Facility: CLINIC | Age: 48
End: 2018-04-06
Payer: COMMERCIAL

## 2018-04-06 VITALS
WEIGHT: 180.1 LBS | RESPIRATION RATE: 16 BRPM | DIASTOLIC BLOOD PRESSURE: 80 MMHG | BODY MASS INDEX: 30.75 KG/M2 | TEMPERATURE: 98.5 F | OXYGEN SATURATION: 98 % | SYSTOLIC BLOOD PRESSURE: 106 MMHG | HEART RATE: 98 BPM | HEIGHT: 64 IN

## 2018-04-06 DIAGNOSIS — M79.605 PAIN IN BOTH LOWER EXTREMITIES: ICD-10-CM

## 2018-04-06 DIAGNOSIS — E78.5 HYPERLIPIDEMIA LDL GOAL <160: ICD-10-CM

## 2018-04-06 DIAGNOSIS — Z00.00 ENCOUNTER FOR ROUTINE ADULT HEALTH EXAMINATION WITHOUT ABNORMAL FINDINGS: Primary | ICD-10-CM

## 2018-04-06 DIAGNOSIS — M79.604 PAIN IN BOTH LOWER EXTREMITIES: ICD-10-CM

## 2018-04-06 DIAGNOSIS — J34.89 RHINORRHEA: ICD-10-CM

## 2018-04-06 DIAGNOSIS — I10 ESSENTIAL HYPERTENSION: ICD-10-CM

## 2018-04-06 LAB
ERYTHROCYTE [DISTWIDTH] IN BLOOD BY AUTOMATED COUNT: 12.2 % (ref 10–15)
HCT VFR BLD AUTO: 41.2 % (ref 35–47)
HGB BLD-MCNC: 13.5 G/DL (ref 11.7–15.7)
MCH RBC QN AUTO: 29.6 PG (ref 26.5–33)
MCHC RBC AUTO-ENTMCNC: 32.8 G/DL (ref 31.5–36.5)
MCV RBC AUTO: 90 FL (ref 78–100)
PLATELET # BLD AUTO: 277 10E9/L (ref 150–450)
RBC # BLD AUTO: 4.56 10E12/L (ref 3.8–5.2)
WBC # BLD AUTO: 5.1 10E9/L (ref 4–11)

## 2018-04-06 PROCEDURE — 85027 COMPLETE CBC AUTOMATED: CPT | Performed by: NURSE PRACTITIONER

## 2018-04-06 PROCEDURE — 99396 PREV VISIT EST AGE 40-64: CPT | Performed by: NURSE PRACTITIONER

## 2018-04-06 PROCEDURE — 80061 LIPID PANEL: CPT | Performed by: NURSE PRACTITIONER

## 2018-04-06 PROCEDURE — 84443 ASSAY THYROID STIM HORMONE: CPT | Performed by: NURSE PRACTITIONER

## 2018-04-06 PROCEDURE — 36415 COLL VENOUS BLD VENIPUNCTURE: CPT | Performed by: NURSE PRACTITIONER

## 2018-04-06 RX ORDER — LISINOPRIL 20 MG/1
20 TABLET ORAL DAILY
Qty: 90 TABLET | Refills: 1 | Status: SHIPPED | OUTPATIENT
Start: 2018-04-06 | End: 2018-10-25

## 2018-04-06 RX ORDER — GABAPENTIN 100 MG/1
300 CAPSULE ORAL AT BEDTIME
Qty: 270 CAPSULE | Refills: 2 | Status: SHIPPED | OUTPATIENT
Start: 2018-04-06 | End: 2018-10-25

## 2018-04-06 RX ORDER — FLUTICASONE PROPIONATE 50 MCG
1-2 SPRAY, SUSPENSION (ML) NASAL DAILY
Qty: 1 BOTTLE | Refills: 11 | Status: SHIPPED | OUTPATIENT
Start: 2018-04-06 | End: 2020-01-22

## 2018-04-06 ASSESSMENT — ENCOUNTER SYMPTOMS
CHILLS: 0
FEVER: 0
NERVOUS/ANXIOUS: 0
DIARRHEA: 0
ABDOMINAL PAIN: 0
HEMATURIA: 0
CONSTIPATION: 0
EYE PAIN: 0
HEMATOCHEZIA: 0
FREQUENCY: 0
COUGH: 0
DIZZINESS: 0

## 2018-04-06 NOTE — MR AVS SNAPSHOT
After Visit Summary   4/6/2018    Shantell Wagner    MRN: [id number]           Patient Information     Date Of Birth          1970        Visit Information        Provider Department      4/6/2018 9:20 AM Margaret Longo Ra, NIKKI [facility]        Today's Diagnoses     Encounter for routine adult health examination without abnormal findings    -  1    Pain in both lower extremities        Essential hypertension        Hyperlipidemia LDL goal <160          Care Instructions      Preventive Health Recommendations  Female Ages 40 to 49    Yearly exam:     See your health care provider every year in order to  1. Review health changes.   2. Discuss preventive care.    3. Review your medicines if your doctor prescribed any.      Get a Pap test every three years (unless you have an abnormal result and your provider advises testing more often).      If you get Pap tests with HPV test, you only need to test every 5 years, unless you have an abnormal result. You do not need a Pap test if your uterus was removed (hysterectomy) and you have not had cancer.      You should be tested each year for STDs (sexually transmitted diseases), if you're at risk.       Ask your doctor if you should have a mammogram.      Have a colonoscopy (test for colon cancer) if someone in your family has had colon cancer or polyps before age 50.       Have a cholesterol test every 5 years.       Have a diabetes test (fasting glucose) after age 45. If you are at risk for diabetes, you should have this test every 3 years.    Shots: Get a flu shot each year. Get a tetanus shot every 10 years.     Nutrition:     Eat at least 5 servings of fruits and vegetables each day.    Eat whole-grain bread, whole-wheat pasta and brown rice instead of white grains and rice.    Talk to your provider about Calcium and Vitamin D.     Lifestyle    Exercise at least 150 minutes a week (an average of 30 minutes a day, 5 days a  week). This will help you control your weight and prevent disease.    Limit alcohol to one drink per day.    No smoking.     Wear sunscreen to prevent skin cancer.    See your dentist every six months for an exam and cleaning.          Follow-ups after your visit        Your next 10 appointments already scheduled     Apr 13, 2018  9:20 AM CDT   Pre-Op physical with NIKKI Santos Ra, CNP   [facility]    [address] [facility] [address]   [phone]            Apr 25, 2018   Procedure with Jenna Vazquez MD   [facility] (--)    [address]  [facility] [address] [phone]            May 10, 2018  4:00 PM CDT   Return Visit with Tata Knott MD   [facility] ([facility])    [facility]  [address]  [facility] [address] [phone]              Who to contact     If you have questions or need follow up information about today's clinic visit or your schedule please contact [facility] directly at [phone].  Normal or non-critical lab and imaging results will be communicated to you by Dekkunhart, letter or phone within 4 business days after the clinic has received the results. If you do not hear from us within 7 days, please contact the clinic through Dekkunhart or phone. If you have a critical or abnormal lab result, we will notify you by phone as soon as possible.  Submit refill requests through MedaPhor or call your pharmacy and they will forward the refill request to us. Please allow 3 business days for your refill to be completed.          Additional Information About Your Visit        MedaPhor Information     MedaPhor gives you secure access to your electronic health record. If you see a primary care provider, you can also send messages to your care team and make appointments. If you have questions,  "please call your primary care clinic.  If you do not have a primary care provider, please call [phone] and they will assist you.        Care EveryWhere ID     This is your Care EveryWhere ID. This could be used by other organizations to access your [place] medical records  [id number]        Your Vitals Were     Pulse Temperature Respirations Height Pulse Oximetry BMI (Body Mass Index)    98 98.5  F (36.9  C) (Oral) 16 5' 4\" (1.626 m) 98% 30.91 kg/m2       Blood Pressure from Last 3 Encounters:   04/06/18 106/80   03/08/18 120/84   02/14/18 124/80    Weight from Last 3 Encounters:   04/06/18 180 lb 1.6 oz (81.7 kg)   03/08/18 181 lb 9.6 oz (82.4 kg)   02/14/18 184 lb (83.5 kg)              We Performed the Following     CBC with platelets     Lipid panel reflex to direct LDL Fasting     TSH with free T4 reflex          Today's Medication Changes          These changes are accurate as of 4/6/18 10:02 AM.  If you have any questions, ask your nurse or doctor.               These medicines have changed or have updated prescriptions.        Dose/Directions    gabapentin 100 MG capsule   Commonly known as:  NEURONTIN   This may have changed:  See the new instructions.   Used for:  Pain in both lower extremities   Changed by:  Margaret Longo Ra, APRN CNP        Dose:  300 mg   Take 3 capsules (300 mg) by mouth At Bedtime   Quantity:  270 capsule   Refills:  2            Where to get your medicines      These medications were sent to [facility]/pharmacy #[id number] - [place], [place] - [address]  [address] [facility] [address]     Phone:  [phone]     gabapentin 100 MG capsule    lisinopril 20 MG tablet                Primary Care Provider Office Phone # Fax #    NIKKI Santos Ra [phone]       [address]  [facility] [id number]        Equal Access to Services     TOSHIA JOSEPH AH: Hadii see ku hadasho Soomaali, waaxda luqadaha, qaybta kaalmada adeegyada, jie cruz " akua lióscarjessica floresAnabelleaahubert ah. So [facility] [phone].    ATENCIÓN: Si john chicas, tiene a barber disposición servicios gratuitos de asistencia lingüística. [facility] huggins [phone].    We comply with applicable federal civil rights laws and [place] laws. We do not discriminate on the basis of race, color, national origin, age, disability, sex, sexual orientation, or gender identity.            Thank you!     [facility] you for [facility]  [facility] your [facility]. Our goal is always to provide you with excellent care. Hearing back from our patients is one way we can continue to improve our services. Please take a few minutes to complete the written survey that you may receive in the mail after your visit with us. Thank you!             Your Updated Medication List - Protect others around you: Learn how to safely use, store and throw away your medicines at www.disposemymeds.org.          This list is accurate as of 4/6/18 10:02 AM.  Always use your most recent med list.                   Brand Name Dispense Instructions for use Diagnosis    albuterol 108 (90 BASE) MCG/ACT Inhaler    PROAIR HFA/PROVENTIL HFA/VENTOLIN HFA    1 Inhaler    Inhale 2 puffs into the lungs every 6 hours as needed for shortness of breath / dyspnea or wheezing    Upper respiratory tract infection, unspecified type       Calcium carb-Vitamin D 500 mg [place]-200 units 500-200 MG-UNIT per tablet    OSCAL with D;Oyster Shell Calcium     Take 1 tablet by mouth daily Reported on 4/13/2017        desonide 0.05 % cream    DESOWEN     Apply topically 2 times daily as needed        gabapentin 100 MG capsule    NEURONTIN    270 capsule    Take 3 capsules (300 mg) by mouth At Bedtime    Pain in both lower extremities       glucosamine-chondroitin 500-400 MG Caps per capsule      Take 1 capsule by mouth daily        IBU- MG Tabs      1 TABLET EVERY 4 TO 6 HOURS AS NEEDED    Acute pharyngitis       ketoconazole 2 % cream    NIZORAL     Apply topically  daily as needed for itching        lisinopril 20 MG tablet    PRINIVIL/ZESTRIL    90 tablet    Take 1 tablet (20 mg) by mouth daily    Essential hypertension       omeprazole 20 MG CR capsule    priLOSEC     Take 20 mg by mouth daily        order for DME     1 Units    Hair prosthesis for chemotherapy induced alopecia    Malignant neoplasm of right female breast, unspecified site of breast       simvastatin 20 MG tablet    ZOCOR    90 tablet    Take 1 tablet (20 mg) by mouth At Bedtime    Hyperlipidemia LDL goal <160       tamoxifen 20 MG tablet    NOLVADEX    90 tablet    Take 1 tablet (20 mg) by mouth daily    Malignant neoplasm of breast in female, estrogen receptor positive, unspecified laterality, unspecified site of breast (H)       TYLENOL PO      Take 500 mg by mouth        venlafaxine 37.5 MG tablet    EFFEXOR    180 tablet    Take 1 tablet (37.5 mg) by mouth 2 times daily    Malignant neoplasm of 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

## 2018-04-06 NOTE — PROGRESS NOTES
SUBJECTIVE:   CC: Shantell Wagner is an 47 year old woman who presents for preventive health visit.     Physical   Annual:     Getting at least 3 servings of Calcium per day::  Yes    Bi-annual eye exam::  Yes    Dental care twice a year::  Yes    Sleep apnea or symptoms of sleep apnea::  None    Taking medications regularly::  Yes    Additional concerns today::  No            Rhinorrhea.  Wondering about a trial of flonase.    Today's PHQ-2 Score:   PHQ-2 ( 1999 Pfizer) 4/6/2018   Q1: Little interest or pleasure in doing things 0   Q2: Feeling down, depressed or hopeless 0   PHQ-2 Score 0   Q1: Little interest or pleasure in doing things Not at all   Q2: Feeling down, depressed or hopeless Not at all   PHQ-2 Score 0       Abuse: Current or Past(Physical, Sexual or Emotional)- No  Do you feel safe in your environment - Yes    Social History   Substance Use Topics     Smoking status: Never Smoker     Smokeless tobacco: Never Used     Alcohol use 0.0 oz/week     0 Standard drinks or equivalent per week      Comment: rare     Alcohol Use 4/6/2018   If you drink alcohol do you typically have greater than 3 drinks per day OR greater than 7 drinks per week? No       Reviewed orders with patient.  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     Lymphedema     Monoallelic mutation of CHEK2 gene     Past Surgical History:   Procedure Laterality Date     CARPAL TUNNEL RELEASE RT/LT  3/2010     HC RECONSTR NOSE  1983    after accident     INSERT PORT VASCULAR ACCESS Left 4/22/2016    Procedure: INSERT PORT VASCULAR ACCESS;  Surgeon: Nereyda Flowers MD;  Location:  OR      INSERT TISSUE EXPANDER BREAST BILATERAL Bilateral 10/17/2016    Procedure: INSERT TISSUE EXPANDER BREAST BILATERAL;  Surgeon: Jenna Vazquez MD;  Location: [facility] OR     LASIK BILATERAL       MASTECTOMY MODIFIED RADICAL Right 10/17/2016    Procedure: MASTECTOMY MODIFIED RADICAL;  Surgeon: Nereyda Flowers MD;  Location: [facility] OR     MASTECTOMY MODIFIED RADICAL, SENTINEL NODE, COMBINED Left 10/17/2016    Procedure: COMBINED MASTECTOMY MODIFIED RADICAL, SENTINEL NODE;  Surgeon: Nereyda Flowers MD;  Location: [facility] OR     RECONSTRUCT BREAST BILATERAL, IMPLANT PROSTHESIS BILATERAL, COMBINED Bilateral 9/13/2017    Procedure: COMBINED RECONSTRUCT BREAST BILATERAL, IMPLANT PROSTHESIS BILATERAL;  BILATERAL SECOND STAGE BREAST RECONSTRUCTION WITH IMPLANT.;  Surgeon: Jenna Vazquez MD;  Location:  [facility]     REMOVE CATHETER VASCULAR ACCESS Left 10/17/2016    Procedure: REMOVE CATHETER VASCULAR ACCESS;  Surgeon: Nereyda Flowers MD;  Location: [facility] OR       Social History   Substance Use Topics     Smoking status: Never Smoker     Smokeless tobacco: Never Used     Alcohol use 0.0 oz/week     0 Standard drinks or equivalent per week      Comment: rare     Family History   Problem Relation Age of Onset     C.A.D. Father      bypass surg age 68     Coronary Artery Disease Father      Prostate Cancer Father      Breast Cancer Sister 38     breast ca     Type 1 Diabetes Mother      DIABETES Mother      Breast Cancer Maternal Grandmother 70     dx'ed age 80s           Mammo discussed, not appropriate for or declined by this patient.    Pertinent mammograms are reviewed under the imaging tab.  History of abnormal Pap smear: NO - age 30-65 PAP every 5 years with negative HPV co-testing recommended    Reviewed and updated as needed this visit by clinical staff         Reviewed and updated as needed this visit by Provider            Review of Systems   Constitutional: Negative for chills and fever.   HENT: Negative for  "congestion and ear pain.    Eyes: Negative for pain.   Respiratory: Negative for cough.    Cardiovascular: Negative for chest pain.   Gastrointestinal: Negative for abdominal pain, constipation, diarrhea and hematochezia.   Genitourinary: Negative for frequency, genital sores and hematuria.   Neurological: Negative for dizziness.   Psychiatric/Behavioral: The patient is not nervous/anxious.           OBJECTIVE:   /80 (BP Location: Right arm, Patient Position: Chair, Cuff Size: Adult Regular)  Pulse 98  Temp 98.5  F (36.9  C) (Oral)  Resp 16  Ht 5' 4\" (1.626 m)  Wt 180 lb 1.6 oz (81.7 kg)  SpO2 98%  BMI 30.91 kg/m2  Physical Exam  GENERAL: healthy, alert and no distress  EYES: Eyes grossly normal to inspection, PERRL and conjunctivae and sclerae normal  HENT: ear canals and TM's normal, nose and mouth without ulcers or lesions  NECK: no adenopathy, no asymmetry, masses, or scars and thyroid normal to palpation  RESP: lungs clear to auscultation - no rales, rhonchi or wheezes  CV: regular rate and rhythm, normal S1 S2, no S3 or S4, no murmur, click or rub, no peripheral edema and peripheral pulses strong  ABDOMEN: soft, nontender, no hepatosplenomegaly, no masses and bowel sounds normal  NEURO: Normal strength and tone, mentation intact and speech normal  PSYCH: mentation appears normal, affect normal/bright    ASSESSMENT/PLAN:   1. Encounter for routine adult health examination without abnormal findings  47 y.o. Female, here today with requests for WWE.  Labs ordered.  - TSH with free T4 reflex  - CBC with platelets    2. Pain in both lower extremities  Interested in possible dose decrease.  Rx sent for 100mg capsules.  - gabapentin (NEURONTIN) 100 MG capsule; Take 3 capsules (300 mg) by mouth At Bedtime  Dispense: 270 capsule; Refill: 2    3. Essential hypertension  Well controlled, asymptomatic.  Refilled.  - lisinopril (PRINIVIL/ZESTRIL) 20 MG tablet; Take 1 tablet (20 mg) by mouth daily  Dispense: 90 " "tablet; Refill: 1    4. Hyperlipidemia LDL goal <160  Labs today.  Refill after reviewed.  - TSH with free T4 reflex  - Lipid panel reflex to direct LDL Fasting    5. Rhinorrhea  - fluticasone (FLONASE) 50 MCG/ACT spray; Spray 1-2 sprays into both nostrils daily  Dispense: 1 Bottle; Refill: 11    COUNSELING:  Reviewed preventive health counseling, as reflected in patient instructions         reports that she has never smoked. She has never used smokeless tobacco.    Estimated body mass index is 31.17 kg/(m^2) as calculated from the following:    Height as of 3/8/18: 5' 4\" (1.626 m).    Weight as of 3/8/18: 181 lb 9.6 oz (82.4 kg).   Weight management plan: Discussed healthy diet and exercise guidelines and patient will follow up in 12 months in clinic to re-evaluate.    Counseling Resources:  ATP IV Guidelines  Pooled Cohorts Equation Calculator  Breast Cancer Risk Calculator  FRAX Risk Assessment  ICSI Preventive Guidelines  Dietary Guidelines for Americans, 2010  USDA's MyPlate  ASA Prophylaxis  Lung CA Screening    NIKKI Santos Ra, CNP  [facility]  Answers for HPI/ROS submitted by the patient on 4/6/2018   PHQ-2 Score: 0    "

## 2018-04-07 LAB
CHOLEST SERPL-MCNC: 251 MG/DL
HDLC SERPL-MCNC: 54 MG/DL
LDLC SERPL CALC-MCNC: 158 MG/DL
NONHDLC SERPL-MCNC: 197 MG/DL
TRIGL SERPL-MCNC: 197 MG/DL
TSH SERPL DL<=0.005 MIU/L-ACNC: 1.74 MU/L (ref 0.4–4)

## 2018-04-10 PROBLEM — I10 BENIGN ESSENTIAL HYPERTENSION: Status: ACTIVE | Noted: 2018-04-10

## 2018-04-13 ENCOUNTER — OFFICE VISIT (OUTPATIENT)
Dept: FAMILY MEDICINE | Facility: CLINIC | Age: 48
End: 2018-04-13
Payer: COMMERCIAL

## 2018-04-13 VITALS
SYSTOLIC BLOOD PRESSURE: 102 MMHG | HEART RATE: 98 BPM | BODY MASS INDEX: 30.85 KG/M2 | RESPIRATION RATE: 16 BRPM | WEIGHT: 179.7 LBS | TEMPERATURE: 98.8 F | OXYGEN SATURATION: 98 % | DIASTOLIC BLOOD PRESSURE: 66 MMHG

## 2018-04-13 DIAGNOSIS — C50.411 MALIGNANT NEOPLASM OF UPPER-OUTER QUADRANT OF RIGHT FEMALE BREAST, UNSPECIFIED ESTROGEN RECEPTOR STATUS (H): ICD-10-CM

## 2018-04-13 DIAGNOSIS — I10 BENIGN ESSENTIAL HYPERTENSION: ICD-10-CM

## 2018-04-13 DIAGNOSIS — E78.5 HYPERLIPIDEMIA LDL GOAL <160: ICD-10-CM

## 2018-04-13 DIAGNOSIS — Z01.818 PREOP GENERAL PHYSICAL EXAM: Primary | ICD-10-CM

## 2018-04-13 PROCEDURE — 99214 OFFICE O/P EST MOD 30 MIN: CPT | Performed by: NURSE PRACTITIONER

## 2018-04-13 PROCEDURE — 93000 ELECTROCARDIOGRAM COMPLETE: CPT | Performed by: NURSE PRACTITIONER

## 2018-04-13 NOTE — MR AVS SNAPSHOT
After Visit Summary   4/13/2018    Shantell Wagner    MRN: [id number]           Patient Information     Date Of Birth          1970        Visit Information        Provider Department      4/13/2018 9:20 AM Margaret Longo Ra, [facility]N [facility]        Today's Diagnoses     Preop general physical exam    -  1      Care Instructions      Before Your Surgery      Call your surgeon if there is any change in your health. This includes signs of a cold or flu (such as a sore throat, runny nose, cough, rash or fever).    Do not smoke, drink alcohol or take over the counter medicine (unless your surgeon or primary care doctor tells you to) for the 24 hours before and after surgery.    If you take prescribed drugs: Follow your doctor s orders about which medicines to take and which to stop until after surgery.    Eating and drinking prior to surgery: follow the instructions from your surgeon    Take a shower or bath the night before surgery. Use the soap your surgeon gave you to gently clean your skin. If you do not have soap from your surgeon, use your regular soap. Do not shave or scrub the surgery site.  Wear clean pajamas and have clean sheets on your bed.           Follow-ups after your visit        Follow-up notes from your care team     Return if symptoms worsen or fail to improve.      Your next 10 appointments already scheduled     Apr 25, 2018   Procedure with Jenna Vazquez MD   [facility] (--)    [address]  [facility] [address]   [phone]            May 10, 2018  4:00 PM CDT   Return Visit with Tata Knott MD   [facility] ([facility])    [facility]  [address]  [place] [address]   [phone]              Who to contact     If you have questions or need follow up information about today's clinic visit or your schedule please  contact [facility] directly at [phone].  Normal or non-critical lab and imaging results will be communicated to you by MyChart, letter or phone within 4 business days after the clinic has received the results. If you do not hear from us within 7 days, please contact the clinic through MyChart or phone. If you have a critical or abnormal lab result, we will notify you by phone as soon as possible.  Submit refill requests through [employer] or call your pharmacy and they will forward the refill request to us. Please allow 3 business days for your refill to be completed.          Additional Information About Your Visit        InstaGISharBEETmobile Information     [employer] gives you secure access to your electronic health record. If you see a primary care provider, you can also send messages to your care team and make appointments. If you have questions, please call your primary care clinic.  If you do not have a primary care provider, please call [phone] and they will assist you.        Care EveryWhere ID     This is your Care EveryWhere ID. This could be used by other organizations to access your [place] medical records  [id number]        Your Vitals Were     Pulse Temperature Respirations Pulse Oximetry BMI (Body Mass Index)       98 98.8  F (37.1  C) (Oral) 16 98% 30.85 kg/m2        Blood Pressure from Last 3 Encounters:   04/13/18 102/66   04/06/18 106/80   03/08/18 120/84    Weight from Last 3 Encounters:   04/13/18 179 lb 11.2 oz (81.5 kg)   04/06/18 180 lb 1.6 oz (81.7 kg)   03/08/18 181 lb 9.6 oz (82.4 kg)              We Performed the Following     EKG 12-lead complete w/read - Clinics        Primary Care Provider Office Phone # Fax #    NIKKI Santos Ra [facility] [phone]       [address]  [facility] [address]        Equal Access to Services     TSOHIA JOSEPH AH: Hadii aad ku hadasho Soomaali, waaxda luqadaha, qaybta kaalmada adeegyaikara, jie real  ah. So [facility] [phone].    ATENCIÓN: Si robertla aviva, tiene a barber disposición servicios gratuitos de asistencia lingüística. [place] huggins [phone].    We comply with applicable federal civil rights laws and [place] laws. We do not discriminate on the basis of race, color, national origin, age, disability, sex, sexual orientation, or gender identity.            Thank you!     Thank you for choosing [facility] [place]  for your care. Our goal is always to provide you with excellent care. Hearing back from our patients is one way we can continue to improve our services. Please take a few minutes to complete the written survey that you may receive in the mail after your visit with us. Thank you!             Your Updated Medication List - Protect others around you: Learn how to safely use, store and throw away your medicines at www.disposemymeds.org.          This list is accurate as of 4/13/18  9:43 AM.  Always use your most recent med list.                   Brand Name Dispense Instructions for use Diagnosis    albuterol 108 (90 Base) MCG/ACT Inhaler    PROAIR HFA/PROVENTIL HFA/VENTOLIN HFA    1 Inhaler    Inhale 2 puffs into the lungs every 6 hours as needed for shortness of breath / dyspnea or wheezing    Upper respiratory tract infection, unspecified type       atorvastatin 20 MG tablet    LIPITOR    90 tablet    Take 1 tablet (20 mg) by mouth daily    Hyperlipidemia LDL goal <160       Calcium carb-Vitamin D 500 mg [place]-200 units 500-200 MG-UNIT per tablet    OSCAL with D;Oyster Shell Calcium     Take 1 tablet by mouth daily Reported on 4/13/2017        desonide 0.05 % cream    DESOWEN     Apply topically 2 times daily as needed        fluticasone 50 MCG/ACT spray    FLONASE    1 Bottle    Spray 1-2 sprays into both nostrils daily    Rhinorrhea       gabapentin 100 MG capsule    NEURONTIN    270 capsule    Take 3 capsules (300 mg) by mouth At Bedtime    Pain in both lower extremities        glucosamine-chondroitin 500-400 MG Caps per capsule      Take 1 capsule by mouth daily        IBU- MG Tabs      1 TABLET EVERY 4 TO 6 HOURS AS NEEDED    Acute pharyngitis       ketoconazole 2 % cream    NIZORAL     Apply topically daily as needed for itching        lisinopril 20 MG tablet    PRINIVIL/ZESTRIL    90 tablet    Take 1 tablet (20 mg) by mouth daily    Essential hypertension       omeprazole 20 MG CR capsule    priLOSEC     Take 20 mg by mouth daily        order for DME     1 Units    Hair prosthesis for chemotherapy induced alopecia    Malignant neoplasm of right female breast, unspecified site of breast       tamoxifen 20 MG tablet    NOLVADEX    90 tablet    Take 1 tablet (20 mg) by mouth daily    Malignant neoplasm of breast in female, estrogen receptor positive, unspecified laterality, unspecified site of breast (H)       TYLENOL PO      Take 500 mg by mouth        venlafaxine 37.5 MG tablet    EFFEXOR    180 tablet    Take 1 tablet (37.5 mg) by mouth 2 times daily    Malignant neoplasm of 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

## 2018-04-13 NOTE — PROGRESS NOTES
[facility]  [address] [facility] [id number] [phone]  Dept: [phone]    PRE-OP EVALUATION:  Today's date: 2018    Shantell Wagner (: 1970) presents for pre-operative evaluation assessment as requested by Dr. Vazquez.  She requires evaluation and anesthesia risk assessment prior to undergoing surgery/procedure for treatment of REVISE RECONSTRUCTED BREAST BILATERAL .    Fax number for surgical facility: [facility]  Primary Physician: Margaret Longo Ra  Type of Anesthesia Anticipated: General    Patient has a Health Care Directive or Living Will:  NO    Preop Questions 2018   Who is doing your surgery? Dr Vazquez   What are you having done? Breast surgery   Date of Surgery/Procedure:    Facility or Hospital where procedure/surgery will be performed: -   1.  Do you have a history of Heart attack, stroke, stent, coronary bypass surgery, or other heart surgery? No   2.  Do you ever have any pain or discomfort in your chest? No   3.  Do you have a history of  Heart Failure? No   4.   Are you troubled by shortness of breath when:  walking on a level surface, or up a slight hill, or at night? No   5.  Do you currently have a cold, bronchitis or other respiratory infection? No   6.  Do you have a cough, shortness of breath, or wheezing? No   7.  Do you sometimes get pains in the calves of your legs when you walk? No   8. Do you or anyone in your family have previous history of blood clots? No   9.  Do you or does anyone in your family have a serious bleeding problem such as prolonged bleeding following surgeries or cuts? No   10. Have you ever had problems with anemia or been told to take iron pills? No   11. Have you had any abnormal blood loss such as black, tarry or bloody stools, or abnormal vaginal bleeding? No   12. Have you ever had a blood transfusion? No   13. Have you or any of your relatives ever had problems with anesthesia? No   14. Do you  have sleep apnea, excessive snoring or daytime drowsiness? No   15. Do you have any prosthetic heart valves? No   16. Do you have prosthetic joints? No   17. Is there any chance that you may be pregnant? No         HPI:     HPI related to upcoming procedure: Hx of R sided breast cancer and R axillary mass.  Responded to neoadjuvant chemotherapy.  She is currently on tamoxifen.    S/p bilateral mastectomy, implants in place, revision planned.         HYPERLIPIDEMIA - Patient has a long history of significant Hyperlipidemia requiring medication for treatment with recent fair control. Patient reports no problems or side effects with the medication.   Recent change to atorvastatin.  Tolerating well.                                                                                                                                                   .  HYPERTENSION - Patient has longstanding history of HTN , currently denies any symptoms referable to elevated blood pressure. Specifically denies chest pain, palpitations, dyspnea, orthopnea, PND or peripheral edema. Blood pressure readings have been in normal range. Current medication regimen is as listed below. Patient denies any side effects of medication.                                                                                                                                                                                          .    MEDICAL HISTORY:     Patient Active Problem List    Diagnosis Date Noted     Benign essential hypertension 04/10/2018     Priority: Medium     Monoallelic mutation of CHEK2 gene 09/27/2017     Priority: Medium     Recent genetic testing showed CHEK2 mutation.  + constipation.  [facility] consult.  Recommended colonoscopy now and then every 3-5 years depending on results.  ENRIQUE       Lymphedema 11/04/2016     Priority: Medium     Acquired absence of both breasts and nipples 10/17/2016     Priority: Medium     Malignant neoplasm of upper-outer  quadrant of right female breast (H) 04/28/2016     Priority: Medium     Abnormal Pap smear, can't excl hi gd sq intraepithelial lesion (ASC-H) 02/20/2013     Priority: Medium     02/20/13 ASC-H.   03/11/13 [facility]/ECC= NEGRITA 1. Repeat HPV screen and ECC 12 months from colp. Due 03/2014 02/20/14 Pap= Normal, Neg HPV. Repeat pap with HPV in 12 months. Due 02/2015  03/06/15 Pap= Normal, Neg HPV. 3 yr co-testing       Ganglion of left wrist 02/09/2012     Priority: Medium     dorsum; seems a bit larger.       Hyperlipidemia LDL goal <160 05/04/2009     Priority: Medium     Obesity 05/01/2009     Priority: Medium     Plantar fasciitis 07/29/2008     Priority: Medium      Past Medical History:   Diagnosis Date     Abnormal Pap smear, can't excl hi gd sq intraepithelial lesion (ASC-H) 02/20/13    [facility]/ECC= NEGRITA 1. Repeat HPV screen and ECC 12 months from colp.     Hyperlipidemia      Neuropathy      Nose fracture 1983    Accident, needed surgery     Past Surgical History:   Procedure Laterality Date     CARPAL TUNNEL RELEASE RT/LT  3/2010     HC RECONSTR NOSE  1983    after accident     INSERT PORT VASCULAR ACCESS Left 4/22/2016    Procedure: INSERT PORT VASCULAR ACCESS;  Surgeon: Nereyda Flowers MD;  Location: [facility] OR     INSERT TISSUE EXPANDER BREAST BILATERAL Bilateral 10/17/2016    Procedure: INSERT TISSUE EXPANDER BREAST BILATERAL;  Surgeon: Jenna Vazquez MD;  Location: [facility] OR     LASIK BILATERAL       MASTECTOMY MODIFIED RADICAL Right 10/17/2016    Procedure: MASTECTOMY MODIFIED RADICAL;  Surgeon: Nereyda Flowers MD;  Location: [facility] OR     MASTECTOMY MODIFIED RADICAL, SENTINEL NODE, COMBINED Left 10/17/2016    Procedure: COMBINED MASTECTOMY MODIFIED RADICAL, SENTINEL NODE;  Surgeon: Nereyda Flowers MD;  Location: [facility] OR     RECONSTRUCT BREAST BILATERAL, IMPLANT PROSTHESIS BILATERAL, COMBINED Bilateral 9/13/2017    Procedure: COMBINED RECONSTRUCT BREAST BILATERAL, IMPLANT PROSTHESIS BILATERAL;  BILATERAL  SECOND STAGE BREAST RECONSTRUCTION WITH IMPLANT.;  Surgeon: Jenna Vazquez MD;  Location:  [facility]     REMOVE CATHETER VASCULAR ACCESS Left 10/17/2016    Procedure: REMOVE CATHETER VASCULAR ACCESS;  Surgeon: Nereyda Flowers MD;  Location:  OR     Current Outpatient Prescriptions   Medication Sig Dispense Refill     atorvastatin (LIPITOR) 20 MG tablet Take 1 tablet (20 mg) by mouth daily 90 tablet 0     gabapentin (NEURONTIN) 100 MG capsule Take 3 capsules (300 mg) by mouth At Bedtime 270 capsule 2     lisinopril (PRINIVIL/ZESTRIL) 20 MG tablet Take 1 tablet (20 mg) by mouth daily 90 tablet 1     fluticasone (FLONASE) 50 MCG/ACT spray Spray 1-2 sprays into both nostrils daily 1 Bottle 11     albuterol (PROAIR HFA/PROVENTIL HFA/VENTOLIN HFA) 108 (90 BASE) MCG/ACT Inhaler Inhale 2 puffs into the lungs every 6 hours as needed for shortness of breath / dyspnea or wheezing (Patient not taking: Reported on 4/6/2018) 1 Inhaler 0     tamoxifen (NOLVADEX) 20 MG tablet Take 1 tablet (20 mg) by mouth daily 90 tablet 3     venlafaxine (EFFEXOR) 37.5 MG tablet Take 1 tablet (37.5 mg) by mouth 2 times daily 180 tablet 1     Acetaminophen (TYLENOL PO) Take 500 mg by mouth       desonide (DESOWEN) 0.05 % cream Apply topically 2 times daily as needed       ketoconazole (NIZORAL) 2 % cream Apply topically daily as needed for itching       calcium carb 1250 mg, 500 mg [place]/vitamin D 200 units (OSCAL WITH D) 500-200 MG-UNIT per tablet Take 1 tablet by mouth daily Reported on 4/13/2017       omeprazole (PRILOSEC) 20 MG capsule Take 20 mg by mouth daily       glucosamine-chondroitin 500-400 MG CAPS Take 1 capsule by mouth daily       order for DME Hair prosthesis for chemotherapy induced alopecia (Patient not taking: Reported on 4/6/2018) 1 Units 0     IBU- MG OR TABS 1 TABLET EVERY 4 TO 6 HOURS AS NEEDED       OTC products: None, except as noted above    Allergies   Allergen Reactions     No Known Drug Allergies        Latex Allergy: NO    Social History   Substance Use Topics     Smoking status: Never Smoker     Smokeless tobacco: Never Used     Alcohol use 0.0 oz/week     0 Standard drinks or equivalent per week      Comment: rare     History   Drug Use No       REVIEW OF SYSTEMS:   CONSTITUTIONAL: NEGATIVE for fever, chills, change in weight  INTEGUMENTARY/SKIN: NEGATIVE for worrisome rashes, moles or lesions  EYES: NEGATIVE for vision changes or irritation  ENT/MOUTH: NEGATIVE for ear, mouth and throat problems  RESP: NEGATIVE for significant cough or SOB  BREAST: R sided breast cancer, s/p bilateral mastectomy.  CV: NEGATIVE for chest pain, palpitations or peripheral edema  GI: NEGATIVE for nausea, abdominal pain, heartburn, or change in bowel habits  : NEGATIVE for frequency, dysuria, or hematuria  MUSCULOSKELETAL: NEGATIVE for significant arthralgias or myalgia  NEURO: NEGATIVE for weakness, dizziness or paresthesias  ENDOCRINE: NEGATIVE for temperature intolerance, skin/hair changes  HEME: NEGATIVE for bleeding problems  PSYCHIATRIC: NEGATIVE for changes in mood or affect    EXAM:   /66 (BP Location: Right arm, Patient Position: Chair, Cuff Size: Adult Regular)  Pulse 98  Temp 98.8  F (37.1  C) (Oral)  Resp 16  Wt 179 lb 11.2 oz (81.5 kg)  SpO2 98%  BMI 30.85 kg/m2    GENERAL APPEARANCE: healthy, alert and no distress     EYES: Eyes grossly normal to inspection     HENT: ear canals and TM's normal and nose and mouth without ulcers or lesions     NECK: no adenopathy, no asymmetry, masses, or scars and thyroid normal to palpation     RESP: lungs clear to auscultation - no rales, rhonchi or wheezes     CV: regular rates and rhythm, normal S1 S2, no S3 or S4 and no murmur, click or rub     ABDOMEN:  soft, nontender, no HSM or masses and bowel sounds normal     NEURO: Normal strength and tone, sensory exam grossly normal, mentation intact and speech normal     PSYCH: mentation appears normal. and affect  normal/bright     LYMPHATICS: No cervical adenopathy    DIAGNOSTICS:   EKG: appears normal, NSR, normal axis, normal intervals, no acute ST/T changes c/w ischemia, no LVH by voltage criteria, unchanged from previous tracings  Hemoglobin (indicated for history of anemia or procedure with significant blood loss such as tonsillectomy, major intraperitoneal surgery, vascular surgery, major spine surgery, total joint replacement)  Serum Potassium  Serum Creatinine    Last Basic Metabolic Panel:  Lab Results   Component Value Date     02/14/2018      Lab Results   Component Value Date    POTASSIUM 4.0 02/14/2018     Lab Results   Component Value Date    CHLORIDE 104 02/14/2018     Lab Results   Component Value Date    CASIE 9.3 02/14/2018     Lab Results   Component Value Date    CO2 29 02/14/2018     Lab Results   Component Value Date    BUN 17 02/14/2018     Lab Results   Component Value Date    CR 0.76 02/14/2018     Lab Results   Component Value Date    GLC 92 02/14/2018     Lab Results   Component Value Date    WBC 5.1 04/06/2018     Lab Results   Component Value Date    RBC 4.56 04/06/2018     Lab Results   Component Value Date    HGB 13.5 04/06/2018     Lab Results   Component Value Date    HCT 41.2 04/06/2018     No components found for: MCT  Lab Results   Component Value Date    MCV 90 04/06/2018     Lab Results   Component Value Date    MCH 29.6 04/06/2018     Lab Results   Component Value Date    MCHC 32.8 04/06/2018     Lab Results   Component Value Date    RDW 12.2 04/06/2018     Lab Results   Component Value Date     04/06/2018         Recent Labs   Lab Test  04/06/18   1004  02/14/18   0810  12/15/17   0957   09/06/17   1545   HGB  13.5  13.1   --    < >  13.7   PLT  277  246   --    < >  274   NA   --   139  139   < >  137   POTASSIUM   --   4.0  4.2   < >  4.1   CR   --   0.76  0.82   < >  0.85   A1C   --   5.9   --    --   5.7    < > = values in this interval not displayed.        IMPRESSION:    Reason for surgery/procedure: s/p bilateral mastectomy- implants in place, revision planned.  Diagnosis/reason for consult: preop  Well controlled htn, hyperlipidemia.    The proposed surgical procedure is considered INTERMEDIATE risk.    REVISED CARDIAC RISK INDEX  The patient has the following serious cardiovascular risks for perioperative complications such as (MI, PE, VFib and 3  AV Block):  No serious cardiac risks  INTERPRETATION: 0 risks: Class I (very low risk - 0.4% complication rate)  Climbs stairs without cardiac symptoms.    The patient has the following additional risks for perioperative complications:  No identified additional risks      ICD-10-CM    1. Preop general physical exam Z01.818 EKG 12-lead complete w/read - Clinics   2. Malignant neoplasm of 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 C50.411    3. Benign essential hypertension I10    4. Hyperlipidemia LDL goal <160 E78.5        RECOMMENDATIONS:     --Patient is to take all scheduled medications on the day of surgery EXCEPT for modifications listed below.    ACE Inhibitor or Angiotensin Receptor Blocker (ARB) Use  Ace inhibitor or Angiotensin Receptor Blocker (ARB) and should HOLD this medication for the 24 hours prior to surgery.      APPROVAL GIVEN to proceed with proposed procedure, without further diagnostic evaluation     Tolerating atorvastatin well.  BP well controlled, EKG nsr.    Signed Electronically by: NIKKI Santos Ra CNP    Copy of this evaluation report is provided to requesting physician.    [facility] Preop Guidelines    Revised Cardiac Risk Index

## 2018-04-23 NOTE — H&P (VIEW-ONLY)
[facility]  [address] [facility] [id number] [phone]  Dept: [phone]    PRE-OP EVALUATION:  Today's date: 2018    Shantell Wganer (: 1970) presents for pre-operative evaluation assessment as requested by Dr. Vazquez.  She requires evaluation and anesthesia risk assessment prior to undergoing surgery/procedure for treatment of REVISE RECONSTRUCTED BREAST BILATERAL .    Fax number for surgical facility: [facility]  Primary Physician: Margaret Longo Ra  Type of Anesthesia Anticipated: General    Patient has a Health Care Directive or Living Will:  NO    Preop Questions 2018   Who is doing your surgery? Dr Vazquez   What are you having done? Breast surgery   Date of Surgery/Procedure:    Facility or Hospital where procedure/surgery will be performed: -   1.  Do you have a history of Heart attack, stroke, stent, coronary bypass surgery, or other heart surgery? No   2.  Do you ever have any pain or discomfort in your chest? No   3.  Do you have a history of  Heart Failure? No   4.   Are you troubled by shortness of breath when:  walking on a level surface, or up a slight hill, or at night? No   5.  Do you currently have a cold, bronchitis or other respiratory infection? No   6.  Do you have a cough, shortness of breath, or wheezing? No   7.  Do you sometimes get pains in the calves of your legs when you walk? No   8. Do you or anyone in your family have previous history of blood clots? No   9.  Do you or does anyone in your family have a serious bleeding problem such as prolonged bleeding following surgeries or cuts? No   10. Have you ever had problems with anemia or been told to take iron pills? No   11. Have you had any abnormal blood loss such as black, tarry or bloody stools, or abnormal vaginal bleeding? No   12. Have you ever had a blood transfusion? No   13. Have you or any of your relatives ever had problems with anesthesia? No   14. Do you  have sleep apnea, excessive snoring or daytime drowsiness? No   15. Do you have any prosthetic heart valves? No   16. Do you have prosthetic joints? No   17. Is there any chance that you may be pregnant? No         HPI:     HPI related to upcoming procedure: Hx of R sided breast cancer and R axillary mass.  Responded to neoadjuvant chemotherapy.  She is currently on tamoxifen.    S/p bilateral mastectomy, implants in place, revision planned.         HYPERLIPIDEMIA - Patient has a long history of significant Hyperlipidemia requiring medication for treatment with recent fair control. Patient reports no problems or side effects with the medication.   Recent change to atorvastatin.  Tolerating well.                                                                                                                                                   .  HYPERTENSION - Patient has longstanding history of HTN , currently denies any symptoms referable to elevated blood pressure. Specifically denies chest pain, palpitations, dyspnea, orthopnea, PND or peripheral edema. Blood pressure readings have been in normal range. Current medication regimen is as listed below. Patient denies any side effects of medication.                                                                                                                                                                                          .    MEDICAL HISTORY:     Patient Active Problem List    Diagnosis Date Noted     Benign essential hypertension 04/10/2018     Priority: Medium     Monoallelic mutation of CHEK2 gene 09/27/2017     Priority: Medium     Recent genetic testing showed CHEK2 mutation.  + constipation.  [facility] consult.  Recommended colonoscopy now and then every 3-5 years depending on results.  ENRIQUE       Lymphedema 11/04/2016     Priority: Medium     Acquired absence of both breasts and nipples 10/17/2016     Priority: Medium     Malignant neoplasm of upper-outer  quadrant of right female breast (H) 04/28/2016     Priority: Medium     Abnormal Pap smear, can't excl hi gd sq intraepithelial lesion (ASC-H) 02/20/2013     Priority: Medium     02/20/13 ASC-H.   03/11/13 [place]/ECC= NEGRITA 1. Repeat HPV screen and ECC 12 months from colp. Due 03/2014 02/20/14 Pap= Normal, Neg HPV. Repeat pap with HPV in 12 months. Due 02/2015  03/06/15 Pap= Normal, Neg HPV. 3 yr co-testing       Ganglion of left wrist 02/09/2012     Priority: Medium     dorsum; seems a bit larger.       Hyperlipidemia LDL goal <160 05/04/2009     Priority: Medium     Obesity 05/01/2009     Priority: Medium     Plantar fasciitis 07/29/2008     Priority: Medium      Past Medical History:   Diagnosis Date     Abnormal Pap smear, can't excl hi gd sq intraepithelial lesion (ASC-H) 02/20/13    [place]/ECC= NEGRITA 1. Repeat HPV screen and ECC 12 months from colp.     Hyperlipidemia      Neuropathy      Nose fracture 1983    Accident, needed surgery     Past Surgical History:   Procedure Laterality Date     CARPAL TUNNEL RELEASE RT/LT  3/2010     HC RECONSTR NOSE  1983    after accident     INSERT PORT VASCULAR ACCESS Left 4/22/2016    Procedure: INSERT PORT VASCULAR ACCESS;  Surgeon: Nereyda Flowers MD;  Location: [facility] OR     INSERT TISSUE EXPANDER BREAST BILATERAL Bilateral 10/17/2016    Procedure: INSERT TISSUE EXPANDER BREAST BILATERAL;  Surgeon: Jenna Vazquez MD;  Location: [facility] OR     LASIK BILATERAL       MASTECTOMY MODIFIED RADICAL Right 10/17/2016    Procedure: MASTECTOMY MODIFIED RADICAL;  Surgeon: Nereyda Flowers MD;  Location: [facility] OR     MASTECTOMY MODIFIED RADICAL, SENTINEL NODE, COMBINED Left 10/17/2016    Procedure: COMBINED MASTECTOMY MODIFIED RADICAL, SENTINEL NODE;  Surgeon: Nereyda Flowers MD;  Location: [facility] OR     RECONSTRUCT BREAST BILATERAL, IMPLANT PROSTHESIS BILATERAL, COMBINED Bilateral 9/13/2017    Procedure: COMBINED RECONSTRUCT BREAST BILATERAL, IMPLANT PROSTHESIS BILATERAL;  BILATERAL  SECOND STAGE BREAST RECONSTRUCTION WITH IMPLANT.;  Surgeon: Jenna Vazquez MD;  Location:  [facility]     REMOVE CATHETER VASCULAR ACCESS Left 10/17/2016    Procedure: REMOVE CATHETER VASCULAR ACCESS;  Surgeon: Nereyda Flowers MD;  Location:  OR     Current Outpatient Prescriptions   Medication Sig Dispense Refill     atorvastatin (LIPITOR) 20 MG tablet Take 1 tablet (20 mg) by mouth daily 90 tablet 0     gabapentin (NEURONTIN) 100 MG capsule Take 3 capsules (300 mg) by mouth At Bedtime 270 capsule 2     lisinopril (PRINIVIL/ZESTRIL) 20 MG tablet Take 1 tablet (20 mg) by mouth daily 90 tablet 1     fluticasone (FLONASE) 50 MCG/ACT spray Spray 1-2 sprays into both nostrils daily 1 Bottle 11     albuterol (PROAIR HFA/PROVENTIL HFA/VENTOLIN HFA) 108 (90 BASE) MCG/ACT Inhaler Inhale 2 puffs into the lungs every 6 hours as needed for shortness of breath / dyspnea or wheezing (Patient not taking: Reported on 4/6/2018) 1 Inhaler 0     tamoxifen (NOLVADEX) 20 MG tablet Take 1 tablet (20 mg) by mouth daily 90 tablet 3     venlafaxine (EFFEXOR) 37.5 MG tablet Take 1 tablet (37.5 mg) by mouth 2 times daily 180 tablet 1     Acetaminophen (TYLENOL PO) Take 500 mg by mouth       desonide (DESOWEN) 0.05 % cream Apply topically 2 times daily as needed       ketoconazole (NIZORAL) 2 % cream Apply topically daily as needed for itching       calcium carb 1250 mg, 500 mg [place],/vitamin D 200 units (OSCAL WITH D) 500-200 MG-UNIT per tablet Take 1 tablet by mouth daily Reported on 4/13/2017       omeprazole (PRILOSEC) 20 MG capsule Take 20 mg by mouth daily       glucosamine-chondroitin 500-400 MG CAPS Take 1 capsule by mouth daily       order for DME Hair prosthesis for chemotherapy induced alopecia (Patient not taking: Reported on 4/6/2018) 1 Units 0     IBU- MG OR TABS 1 TABLET EVERY 4 TO 6 HOURS AS NEEDED       OTC products: None, except as noted above    Allergies   Allergen Reactions     No Known Drug Allergies        Latex Allergy: NO    Social History   Substance Use Topics     Smoking status: Never Smoker     Smokeless tobacco: Never Used     Alcohol use 0.0 oz/week     0 Standard drinks or equivalent per week      Comment: rare     History   Drug Use No       REVIEW OF SYSTEMS:   CONSTITUTIONAL: NEGATIVE for fever, chills, change in weight  INTEGUMENTARY/SKIN: NEGATIVE for worrisome rashes, moles or lesions  EYES: NEGATIVE for vision changes or irritation  ENT/MOUTH: NEGATIVE for ear, mouth and throat problems  RESP: NEGATIVE for significant cough or SOB  BREAST: R sided breast cancer, s/p bilateral mastectomy.  CV: NEGATIVE for chest pain, palpitations or peripheral edema  GI: NEGATIVE for nausea, abdominal pain, heartburn, or change in bowel habits  : NEGATIVE for frequency, dysuria, or hematuria  MUSCULOSKELETAL: NEGATIVE for significant arthralgias or myalgia  NEURO: NEGATIVE for weakness, dizziness or paresthesias  ENDOCRINE: NEGATIVE for temperature intolerance, skin/hair changes  HEME: NEGATIVE for bleeding problems  PSYCHIATRIC: NEGATIVE for changes in mood or affect    EXAM:   /66 (BP Location: Right arm, Patient Position: Chair, Cuff Size: Adult Regular)  Pulse 98  Temp 98.8  F (37.1  C) (Oral)  Resp 16  Wt 179 lb 11.2 oz (81.5 kg)  SpO2 98%  BMI 30.85 kg/m2    GENERAL APPEARANCE: healthy, alert and no distress     EYES: Eyes grossly normal to inspection     HENT: ear canals and TM's normal and nose and mouth without ulcers or lesions     NECK: no adenopathy, no asymmetry, masses, or scars and thyroid normal to palpation     RESP: lungs clear to auscultation - no rales, rhonchi or wheezes     CV: regular rates and rhythm, normal S1 S2, no S3 or S4 and no murmur, click or rub     ABDOMEN:  soft, nontender, no HSM or masses and bowel sounds normal     NEURO: Normal strength and tone, sensory exam grossly normal, mentation intact and speech normal     PSYCH: mentation appears normal. and affect  normal/bright     LYMPHATICS: No cervical adenopathy    DIAGNOSTICS:   EKG: appears normal, NSR, normal axis, normal intervals, no acute ST/T changes c/w ischemia, no LVH by voltage criteria, unchanged from previous tracings  Hemoglobin (indicated for history of anemia or procedure with significant blood loss such as tonsillectomy, major intraperitoneal surgery, vascular surgery, major spine surgery, total joint replacement)  Serum Potassium  Serum Creatinine    Last Basic Metabolic Panel:  Lab Results   Component Value Date     02/14/2018      Lab Results   Component Value Date    POTASSIUM 4.0 02/14/2018     Lab Results   Component Value Date    CHLORIDE 104 02/14/2018     Lab Results   Component Value Date    CASIE 9.3 02/14/2018     Lab Results   Component Value Date    CO2 29 02/14/2018     Lab Results   Component Value Date    BUN 17 02/14/2018     Lab Results   Component Value Date    CR 0.76 02/14/2018     Lab Results   Component Value Date    GLC 92 02/14/2018     Lab Results   Component Value Date    WBC 5.1 04/06/2018     Lab Results   Component Value Date    RBC 4.56 04/06/2018     Lab Results   Component Value Date    HGB 13.5 04/06/2018     Lab Results   Component Value Date    HCT 41.2 04/06/2018     No components found for: MCT  Lab Results   Component Value Date    MCV 90 04/06/2018     Lab Results   Component Value Date    MCH 29.6 04/06/2018     Lab Results   Component Value Date    MCHC 32.8 04/06/2018     Lab Results   Component Value Date    RDW 12.2 04/06/2018     Lab Results   Component Value Date     04/06/2018         Recent Labs   Lab Test  04/06/18   1004  02/14/18   0810  12/15/17   0957   09/06/17   1545   HGB  13.5  13.1   --    < >  13.7   PLT  277  246   --    < >  274   NA   --   139  139   < >  137   POTASSIUM   --   4.0  4.2   < >  4.1   CR   --   0.76  0.82   < >  0.85   A1C   --   5.9   --    --   5.7    < > = values in this interval not displayed.        IMPRESSION:    Reason for surgery/procedure: s/p bilateral mastectomy- implants in place, revision planned.  Diagnosis/reason for consult: preop  Well controlled htn, hyperlipidemia.    The proposed surgical procedure is considered INTERMEDIATE risk.    REVISED CARDIAC RISK INDEX  The patient has the following serious cardiovascular risks for perioperative complications such as (MI, PE, VFib and 3  AV Block):  No serious cardiac risks  INTERPRETATION: 0 risks: Class I (very low risk - 0.4% complication rate)  Climbs stairs without cardiac symptoms.    The patient has the following additional risks for perioperative complications:  No identified additional risks      ICD-10-CM    1. Preop general physical exam Z01.818 EKG 12-lead complete w/read - Clinics   2. Malignant neoplasm of 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 C50.411    3. Benign essential hypertension I10    4. Hyperlipidemia LDL goal <160 E78.5        RECOMMENDATIONS:     --Patient is to take all scheduled medications on the day of surgery EXCEPT for modifications listed below.    ACE Inhibitor or Angiotensin Receptor Blocker (ARB) Use  Ace inhibitor or Angiotensin Receptor Blocker (ARB) and should HOLD this medication for the 24 hours prior to surgery.      APPROVAL GIVEN to proceed with proposed procedure, without further diagnostic evaluation     Tolerating atorvastatin well.  BP well controlled, EKG nsr.    Signed Electronically by: NIKKI Santos Ra CNP    Copy of this evaluation report is provided to requesting physician.    [place] Preop Guidelines    Revised Cardiac Risk Index

## 2018-04-24 ENCOUNTER — ANESTHESIA EVENT (OUTPATIENT)
Dept: SURGERY | Facility: CLINIC | Age: 48
End: 2018-04-24
Payer: COMMERCIAL

## 2018-04-25 ENCOUNTER — ANESTHESIA (OUTPATIENT)
Dept: SURGERY | Facility: CLINIC | Age: 48
End: 2018-04-25
Payer: COMMERCIAL

## 2018-04-25 ENCOUNTER — HOSPITAL ENCOUNTER (OUTPATIENT)
Facility: CLINIC | Age: 48
Discharge: HOME | End: 2018-04-25
Attending: PLASTIC SURGERY | Admitting: PLASTIC SURGERY
Payer: COMMERCIAL

## 2018-04-25 VITALS
TEMPERATURE: 97.2 F | DIASTOLIC BLOOD PRESSURE: 95 MMHG | RESPIRATION RATE: 16 BRPM | HEIGHT: 64 IN | SYSTOLIC BLOOD PRESSURE: 139 MMHG | BODY MASS INDEX: 29.71 KG/M2 | OXYGEN SATURATION: 96 % | WEIGHT: 174 LBS

## 2018-04-25 DIAGNOSIS — N65.0 DEFORMITY OF RECONSTRUCTED BREAST: Primary | ICD-10-CM

## 2018-04-25 LAB — HCG UR QL: NEGATIVE

## 2018-04-25 PROCEDURE — 40000170 ZZH STATISTIC PRE-PROCEDURE ASSESSMENT II: Performed by: PLASTIC SURGERY

## 2018-04-25 PROCEDURE — 27210794 ZZH OR GENERAL SUPPLY STERILE: Performed by: PLASTIC SURGERY

## 2018-04-25 PROCEDURE — 81025 URINE PREGNANCY TEST: CPT | Performed by: PLASTIC SURGERY

## 2018-04-25 PROCEDURE — 25000125 ZZHC RX 250: Performed by: NURSE ANESTHETIST, CERTIFIED REGISTERED

## 2018-04-25 PROCEDURE — 27210995 ZZH RX 272: Performed by: PLASTIC SURGERY

## 2018-04-25 PROCEDURE — 25000128 H RX IP 250 OP 636: Performed by: ANESTHESIOLOGY

## 2018-04-25 PROCEDURE — 25000128 H RX IP 250 OP 636: Performed by: PLASTIC SURGERY

## 2018-04-25 PROCEDURE — 71000013 ZZH RECOVERY PHASE 1 LEVEL 1 EA ADDTL HR: Performed by: PLASTIC SURGERY

## 2018-04-25 PROCEDURE — 25000132 ZZH RX MED GY IP 250 OP 250 PS 637: Performed by: PLASTIC SURGERY

## 2018-04-25 PROCEDURE — 25000125 ZZHC RX 250: Performed by: PLASTIC SURGERY

## 2018-04-25 PROCEDURE — 71000012 ZZH RECOVERY PHASE 1 LEVEL 1 FIRST HR: Performed by: PLASTIC SURGERY

## 2018-04-25 PROCEDURE — 71000027 ZZH RECOVERY PHASE 2 EACH 15 MINS: Performed by: PLASTIC SURGERY

## 2018-04-25 PROCEDURE — 36000056 ZZH SURGERY LEVEL 3 1ST 30 MIN: Performed by: PLASTIC SURGERY

## 2018-04-25 PROCEDURE — 25000132 ZZH RX MED GY IP 250 OP 250 PS 637: Performed by: ANESTHESIOLOGY

## 2018-04-25 PROCEDURE — 25000128 H RX IP 250 OP 636: Performed by: NURSE ANESTHETIST, CERTIFIED REGISTERED

## 2018-04-25 PROCEDURE — 37000009 ZZH ANESTHESIA TECHNICAL FEE, EACH ADDTL 15 MIN: Performed by: PLASTIC SURGERY

## 2018-04-25 PROCEDURE — 36000058 ZZH SURGERY LEVEL 3 EA 15 ADDTL MIN: Performed by: PLASTIC SURGERY

## 2018-04-25 PROCEDURE — 37000008 ZZH ANESTHESIA TECHNICAL FEE, 1ST 30 MIN: Performed by: PLASTIC SURGERY

## 2018-04-25 RX ORDER — FENTANYL CITRATE 50 UG/ML
25-50 INJECTION, SOLUTION INTRAMUSCULAR; INTRAVENOUS
Status: DISCONTINUED | OUTPATIENT
Start: 2018-04-25 | End: 2018-04-25 | Stop reason: HOSPADM

## 2018-04-25 RX ORDER — BUPIVACAINE HYDROCHLORIDE 2.5 MG/ML
INJECTION, SOLUTION INFILTRATION; PERINEURAL PRN
Status: DISCONTINUED | OUTPATIENT
Start: 2018-04-25 | End: 2018-04-25 | Stop reason: HOSPADM

## 2018-04-25 RX ORDER — HYDROCODONE BITARTRATE AND ACETAMINOPHEN 5; 325 MG/1; MG/1
1 TABLET ORAL ONCE
Status: COMPLETED | OUTPATIENT
Start: 2018-04-25 | End: 2018-04-25

## 2018-04-25 RX ORDER — ONDANSETRON 2 MG/ML
INJECTION INTRAMUSCULAR; INTRAVENOUS PRN
Status: DISCONTINUED | OUTPATIENT
Start: 2018-04-25 | End: 2018-04-25

## 2018-04-25 RX ORDER — PHYSOSTIGMINE SALICYLATE 1 MG/ML
1.2 INJECTION INTRAVENOUS
Status: DISCONTINUED | OUTPATIENT
Start: 2018-04-25 | End: 2018-04-25 | Stop reason: HOSPADM

## 2018-04-25 RX ORDER — ONDANSETRON 4 MG/1
4 TABLET, ORALLY DISINTEGRATING ORAL EVERY 30 MIN PRN
Status: DISCONTINUED | OUTPATIENT
Start: 2018-04-25 | End: 2018-04-25 | Stop reason: HOSPADM

## 2018-04-25 RX ORDER — CEFAZOLIN SODIUM 2 G/100ML
2 INJECTION, SOLUTION INTRAVENOUS
Status: COMPLETED | OUTPATIENT
Start: 2018-04-25 | End: 2018-04-25

## 2018-04-25 RX ORDER — BUPIVACAINE HYDROCHLORIDE 2.5 MG/ML
INJECTION, SOLUTION EPIDURAL; INFILTRATION; INTRACAUDAL
Status: DISCONTINUED
Start: 2018-04-25 | End: 2018-04-25 | Stop reason: HOSPADM

## 2018-04-25 RX ORDER — DEXAMETHASONE SODIUM PHOSPHATE 4 MG/ML
INJECTION, SOLUTION INTRA-ARTICULAR; INTRALESIONAL; INTRAMUSCULAR; INTRAVENOUS; SOFT TISSUE PRN
Status: DISCONTINUED | OUTPATIENT
Start: 2018-04-25 | End: 2018-04-25

## 2018-04-25 RX ORDER — LIDOCAINE HYDROCHLORIDE 20 MG/ML
INJECTION, SOLUTION INFILTRATION; PERINEURAL PRN
Status: DISCONTINUED | OUTPATIENT
Start: 2018-04-25 | End: 2018-04-25

## 2018-04-25 RX ORDER — PROPOFOL 10 MG/ML
INJECTION, EMULSION INTRAVENOUS PRN
Status: DISCONTINUED | OUTPATIENT
Start: 2018-04-25 | End: 2018-04-25

## 2018-04-25 RX ORDER — MEPERIDINE HYDROCHLORIDE 25 MG/ML
12.5 INJECTION INTRAMUSCULAR; INTRAVENOUS; SUBCUTANEOUS
Status: DISCONTINUED | OUTPATIENT
Start: 2018-04-25 | End: 2018-04-25 | Stop reason: HOSPADM

## 2018-04-25 RX ORDER — KETOROLAC TROMETHAMINE 30 MG/ML
30 INJECTION, SOLUTION INTRAMUSCULAR; INTRAVENOUS ONCE
Status: DISCONTINUED | OUTPATIENT
Start: 2018-04-25 | End: 2018-04-25 | Stop reason: HOSPADM

## 2018-04-25 RX ORDER — HYDROCODONE BITARTRATE AND ACETAMINOPHEN 5; 325 MG/1; MG/1
1-2 TABLET ORAL EVERY 4 HOURS PRN
Qty: 20 TABLET | Refills: 0 | Status: SHIPPED | OUTPATIENT
Start: 2018-04-25 | End: 2018-10-25

## 2018-04-25 RX ORDER — FENTANYL CITRATE 50 UG/ML
25-50 INJECTION, SOLUTION INTRAMUSCULAR; INTRAVENOUS EVERY 5 MIN PRN
Status: DISCONTINUED | OUTPATIENT
Start: 2018-04-25 | End: 2018-04-25 | Stop reason: HOSPADM

## 2018-04-25 RX ORDER — PROPOFOL 10 MG/ML
INJECTION, EMULSION INTRAVENOUS CONTINUOUS PRN
Status: DISCONTINUED | OUTPATIENT
Start: 2018-04-25 | End: 2018-04-25

## 2018-04-25 RX ORDER — HYDROMORPHONE HYDROCHLORIDE 1 MG/ML
.3-.5 INJECTION, SOLUTION INTRAMUSCULAR; INTRAVENOUS; SUBCUTANEOUS EVERY 10 MIN PRN
Status: DISCONTINUED | OUTPATIENT
Start: 2018-04-25 | End: 2018-04-25 | Stop reason: HOSPADM

## 2018-04-25 RX ORDER — ONDANSETRON 2 MG/ML
4 INJECTION INTRAMUSCULAR; INTRAVENOUS EVERY 30 MIN PRN
Status: DISCONTINUED | OUTPATIENT
Start: 2018-04-25 | End: 2018-04-25 | Stop reason: HOSPADM

## 2018-04-25 RX ORDER — SODIUM CHLORIDE, SODIUM LACTATE, POTASSIUM CHLORIDE, CALCIUM CHLORIDE 600; 310; 30; 20 MG/100ML; MG/100ML; MG/100ML; MG/100ML
INJECTION, SOLUTION INTRAVENOUS CONTINUOUS PRN
Status: DISCONTINUED | OUTPATIENT
Start: 2018-04-25 | End: 2018-04-25

## 2018-04-25 RX ORDER — FENTANYL CITRATE 50 UG/ML
INJECTION, SOLUTION INTRAMUSCULAR; INTRAVENOUS PRN
Status: DISCONTINUED | OUTPATIENT
Start: 2018-04-25 | End: 2018-04-25

## 2018-04-25 RX ORDER — NALOXONE HYDROCHLORIDE 0.4 MG/ML
.1-.4 INJECTION, SOLUTION INTRAMUSCULAR; INTRAVENOUS; SUBCUTANEOUS
Status: DISCONTINUED | OUTPATIENT
Start: 2018-04-25 | End: 2018-04-25 | Stop reason: HOSPADM

## 2018-04-25 RX ORDER — CEFAZOLIN SODIUM 1 G/3ML
1 INJECTION, POWDER, FOR SOLUTION INTRAMUSCULAR; INTRAVENOUS SEE ADMIN INSTRUCTIONS
Status: DISCONTINUED | OUTPATIENT
Start: 2018-04-25 | End: 2018-04-25 | Stop reason: HOSPADM

## 2018-04-25 RX ORDER — SODIUM CHLORIDE, SODIUM LACTATE, POTASSIUM CHLORIDE, CALCIUM CHLORIDE 600; 310; 30; 20 MG/100ML; MG/100ML; MG/100ML; MG/100ML
INJECTION, SOLUTION INTRAVENOUS CONTINUOUS
Status: DISCONTINUED | OUTPATIENT
Start: 2018-04-25 | End: 2018-04-25 | Stop reason: HOSPADM

## 2018-04-25 RX ORDER — ACETAMINOPHEN 500 MG
1000 TABLET ORAL ONCE
Status: COMPLETED | OUTPATIENT
Start: 2018-04-25 | End: 2018-04-25

## 2018-04-25 RX ADMIN — LIDOCAINE HYDROCHLORIDE 60 MG: 20 INJECTION, SOLUTION INFILTRATION; PERINEURAL at 08:35

## 2018-04-25 RX ADMIN — ONDANSETRON 4 MG: 2 INJECTION INTRAMUSCULAR; INTRAVENOUS at 09:31

## 2018-04-25 RX ADMIN — SODIUM CHLORIDE, POTASSIUM CHLORIDE, SODIUM LACTATE AND CALCIUM CHLORIDE: 600; 310; 30; 20 INJECTION, SOLUTION INTRAVENOUS at 08:30

## 2018-04-25 RX ADMIN — FENTANYL CITRATE 50 MCG: 50 INJECTION, SOLUTION INTRAMUSCULAR; INTRAVENOUS at 08:48

## 2018-04-25 RX ADMIN — CEFAZOLIN SODIUM 2 G: 2 INJECTION, SOLUTION INTRAVENOUS at 08:40

## 2018-04-25 RX ADMIN — HYDROCODONE BITARTRATE AND ACETAMINOPHEN 1 TABLET: 5; 325 TABLET ORAL at 10:58

## 2018-04-25 RX ADMIN — PROPOFOL 200 MG: 10 INJECTION, EMULSION INTRAVENOUS at 08:35

## 2018-04-25 RX ADMIN — FENTANYL CITRATE 50 MCG: 50 INJECTION, SOLUTION INTRAMUSCULAR; INTRAVENOUS at 08:32

## 2018-04-25 RX ADMIN — MIDAZOLAM 2 MG: 1 INJECTION INTRAMUSCULAR; INTRAVENOUS at 08:32

## 2018-04-25 RX ADMIN — FENTANYL CITRATE 50 MCG: 50 INJECTION, SOLUTION INTRAMUSCULAR; INTRAVENOUS at 10:26

## 2018-04-25 RX ADMIN — PROPOFOL 40 MG: 10 INJECTION, EMULSION INTRAVENOUS at 09:09

## 2018-04-25 RX ADMIN — DEXMEDETOMIDINE HYDROCHLORIDE 8 MCG: 100 INJECTION, SOLUTION INTRAVENOUS at 09:11

## 2018-04-25 RX ADMIN — ACETAMINOPHEN 1000 MG: 500 TABLET, FILM COATED ORAL at 07:34

## 2018-04-25 RX ADMIN — DEXMEDETOMIDINE HYDROCHLORIDE 8 MCG: 100 INJECTION, SOLUTION INTRAVENOUS at 09:17

## 2018-04-25 RX ADMIN — DEXAMETHASONE SODIUM PHOSPHATE 4 MG: 4 INJECTION, SOLUTION INTRA-ARTICULAR; INTRALESIONAL; INTRAMUSCULAR; INTRAVENOUS; SOFT TISSUE at 08:41

## 2018-04-25 RX ADMIN — FENTANYL CITRATE 50 MCG: 50 INJECTION, SOLUTION INTRAMUSCULAR; INTRAVENOUS at 10:07

## 2018-04-25 RX ADMIN — PROPOFOL 200 MCG/KG/MIN: 10 INJECTION, EMULSION INTRAVENOUS at 08:35

## 2018-04-25 NOTE — OR NURSING
Dr. Vazquez placed 80 cc's of fat aspirate back into right breast and 40 cc's of fat into left breast during fat grafting and revision of bilateral breast reconstruction surgery.

## 2018-04-25 NOTE — OP NOTE
PLASTIC SURGERY OPERATIVE NOTE  Patient Name:  Shantell Wagner     Date of Service:  4/25/2018     PREOPERATIVE DIAGNOSES:   1.  STATUS POST BILATERAL MASTECTOMY   2.  Deformity of bilateral reconstructed breasts    POSTOPERATIVE DIAGNOSES:   1.  STATUS POST BILATERAL MASTECTOMY     2.  Deformity of bilateral reconstructed breasts  Revision left breast reconstruction  SURGEON:  Jenna Vazquez MD   OR STAFF:  Circulator: Jennifer Peres RN  Relief Circulator: Keisha Hubbard RN  Relief Scrub: Avril Walker  Scrub Person: Deepti Casey     ANESTHESIA:  General     ESTIMATED BLOOD LOSS:  * No blood loss amount entered *   SPECIMEN(S):  * No specimens in log *     PROCEDURES:   1.  Revision left breast reconstruction  2.  Right mastectomy scar revision with complex closure 10 cm  3.  Fat grafting from bilateral axillary area to bilateral breasts      DESCRIPTION OF PROCEDURE:  Patient was marked for incisions and taken to the operating room.  General anesthesia was administered.  The chest area was prepped and draped in a sterile manner.  On the left side, an incision was made through the mastectomy scar and dissection was carried down to underlying pectoralis muscle/capsule.  This layer was divided perpendicular to the skin incision and the underlying implant was removed.  This was an intact 650 cc silicone implant.  Modifications to the pocket were made along the anterior axillary line using interrupted 2-0 PDS suture.  A sizer was obtained and placed within the pocket.  The contours are much improved.  The sizer was removed.  The suture line was reinforced with a running 2-0 V lock suture.  The pocket was irrigated antibiotic solution and the silicone implant was replaced.  The capsule/pectoralis muscle was reapproximated with interrupted 3-0 Monocryl suture.    Epinephrine solution was infiltrated into the bilateral axillary areas and standard liposuction was completed with a 4 mm cannula.  All  harvested fat was decanted and saved for fat grafting.    Patient was brought the upright position and she had improved symmetry although there is skin redundancy in the right axillary area.  This was marked.  Patient was returned to supine position.  This excess skin was sharply excised.  The incision was reapproximated with interrupted 3-0 Monocryl in the subcutaneous tissue followed by running 4-0 subcuticular Monocryl suture.    The harvested fat was then transferred to the breast area along the superior poles bilaterally and medial aspect bilaterally.  Port sites are closed with interrupted 5-0 fast-absorbing suture.  The left breast incision was then closed with running 4-0 subcuticular Monocryl suture.  Xeroform followed by light dressing was applied and the patient was circumferentially wrapped with an Ace wrap.      There was 80 cc fat grafting to the right breast and 40 cc of fat grafting to the left breast.    IMPLANTS:  * No implants in log *    Jenna Vazquez MD  Plastic Surgery  [facility]  [phone] (office)

## 2018-04-25 NOTE — ANESTHESIA CARE TRANSFER NOTE
Patient: Shantell Wagner    Procedure(s):  REVISION BILATERAL BREAST RECONSTRUCTION AND  FAT GRAFTING FROM AXILLA TO BILATERAL BREASTS. - Wound Class: I-Clean      Diagnosis: STATUS POST BILATERAL MASTECTOMY  Diagnosis Additional Information: No value filed.    Anesthesia Type:   General, LMA     Note:  Airway :Face Mask  Patient transferred to:PACU  Comments: Patient spontaneously breathing and following commands. VSS. Report given to RN.Handoff Report: Identifed the Patient, Identified the Reponsible Provider, Reviewed the pertinent medical history, Discussed the surgical course, Reviewed Intra-OP anesthesia mangement and issues during anesthesia, Set expectations for post-procedure period and Allowed opportunity for questions and acknowledgement of understanding      Vitals: (Last set prior to Anesthesia Care Transfer)    CRNA VITALS  4/25/2018 0916 - 4/25/2018 0951      4/25/2018             Pulse: 71    SpO2: 99 %    Resp Rate (observed): 15    Resp Rate (set): 10                Electronically Signed By: NIKKI Olivas CRNA  April 25, 2018  9:51 AM

## 2018-04-25 NOTE — ANESTHESIA POSTPROCEDURE EVALUATION
Patient: Shantell Wagner    Procedure(s):  REVISION BILATERAL BREAST RECONSTRUCTION AND  FAT GRAFTING FROM AXILLA TO BILATERAL BREASTS. - Wound Class: I-Clean      Diagnosis:STATUS POST BILATERAL MASTECTOMY  Diagnosis Additional Information: No value filed.    Anesthesia Type:  General, LMA    Note:  Anesthesia Post Evaluation    Patient location during evaluation: PACU  Patient participation: Able to fully participate in evaluation  Level of consciousness: awake  Pain management: adequate  Airway patency: patent  Cardiovascular status: acceptable  Respiratory status: acceptable  Hydration status: acceptable  PONV: none     Anesthetic complications: None          Last vitals:  Vitals:    04/25/18 1045 04/25/18 1100 04/25/18 1130   BP: 158/85 149/86 (!) 139/95   Resp: 13 16 16   Temp: 36.1  C (97  F) 36.2  C (97.2  F)    SpO2: 94% 98% 96%         Electronically Signed By: Skyler Owusu MD  April 25, 2018  2:20 PM

## 2018-04-25 NOTE — IP AVS SNAPSHOT
MRN:[id number]                      After Visit Summary   4/25/2018    Shantell Wagner    MRN: [id number]           Thank you!     Thank you for choosing [facility] for your care. Our goal is always to provide you with excellent care. Hearing back from our patients is one way we can continue to improve our services. Please take a few minutes to complete the written survey that you may receive in the mail after you visit with us. Thank you!        Patient Information     Date Of Birth          1970        About your hospital stay     You were admitted on:  April 25, 2018 [facility]    You were discharged on:  April 25, 2018        Reason for your hospital stay       Surgery.                  Who to Call     For medical emergencies, please call 911.  For non-urgent questions about your medical care, please call your primary care provider or clinic, [phone]  For questions related to your surgery, please call your surgery clinic        Attending Provider     Provider Jenna Yousif MD Plastic Surgery       Primary Care Provider Office Phone # Fax #    Margaret NIKKI Condon [facility] [phone]      After Care Instructions     Discharge Instructions       Follow up with Dr. Vazquez in 1-2 weeks.            Wound care and dressings       Remove dressing 24-48 hours after surgery.  Then may shower with incisions uncovered. Apply Aquaphor to incisions daily. No restrictions on arm movements. Avoid heavy lifting or strenuous exercise for 2 weeks. Camisole/soft bra prn comfort. No ice to chest area. May use OTC ibuprofen/tylenol for discomfort.                  Your next 10 appointments already scheduled     May 10, 2018  4:00 PM CDT   Return Visit with Tata Knott MD   [facility] ([facility])    [facility]  [address] [place]  [facility] [id number] [phone]              Further instructions from your care team         Today you were given 975 mg of Tylenol at 7:30 am. The recommended daily maximum dose is 4000 mg.         Same Day Surgery Discharge Instructions for  Sedation and General Anesthesia       It's not unusual to feel dizzy, light-headed or faint for up to 24 hours after surgery or while taking pain medication.  If you have these symptoms: sit for a few minutes before standing and have someone assist you when you get up to walk or use the bathroom.      You should rest and relax for the next 24 hours. We recommend you make arrangements to have an adult stay with you for at least 24 hours after your discharge.  Avoid hazardous and strenuous activity.      DO NOT DRIVE any vehicle or operate mechanical equipment for 24 hours following the end of your surgery.  Even though you may feel normal, your reactions may be affected by the medication you have received.      Do not drink alcoholic beverages for 24 hours following surgery.       Slowly progress to your regular diet as you feel able. It's not unusual to feel nauseated and/or vomit after receiving anesthesia.  If you develop these symptoms, drink clear liquids (apple juice, ginger ale, broth, 7-up, etc. ) until you feel better.  If your nausea and vomiting persists for 24 hours, please notify your surgeon.        All narcotic pain medications, along with inactivity and anesthesia, can cause constipation. Drinking plenty of liquids and increasing fiber intake will help.      For any questions of a medical nature, call your surgeon.      Do not make important decisions for 24 hours.      If you had general anesthesia, you may have a sore throat for a couple of days related to the breathing tube used during surgery.  You may use Cepacol lozenges to help with this discomfort.  If it worsens or if you develop a fever, contact your surgeon.       If you feel your pain  "is not well managed with the pain medications prescribed by your surgeon, please contact your surgeon's office to let them know so they can address your concerns.           Discharge Instructions following Breast Surgery  [facility] Same Day Surgery    Diet:   Resume diet as tolerated.  Drink plenty of fluids to prevent constipation.    Activity:   Gentle rotation & stretching of your arms/shoulders will prevent stiffness in joints   Increase activity gradually   No heavy lifting greater than 10-15 pounds & no strenuous activity until  approved by surgeon    Bathing/Incision Care:   You may shower as directed by surgeon   Pat incisions dry.  No lotions, powders or perfumes to incisions   Tape dressings (steri strips) will fall off in 7-10 days (if present)    What to expect:   A tingly or itchy sensation around the incision is a normal sign of healing   Some clear, pink drainage from incisions is normal.      Notify your surgeon for the following signs & symptoms:   Redness, warmth, or swelling of the incision    Foul smelling or increased drainage   Chills or temperature greater than 101 F   Pain not controlled by pain medications        **If you have questions or concerns about your procedure,  call  at [phone]**          Pending Results     No orders found from 4/23/2018 to 4/26/2018.            Admission Information     Date & Time Provider Department Dept. Phone    4/25/2018 Jenna Vazquez MD [facility] Same Day Surgery [phone]      Your Vitals Were     Blood Pressure Temperature Respirations Height Weight Pulse Oximetry    149/86 97.2  F (36.2  C) 16 1.626 m (5' 4\") 78.9 kg (174 lb) 98%    BMI (Body Mass Index)                   29.87 kg/m2           [employer] Information     [employer] gives you secure access to your electronic health record. If you see a primary care provider, you can also send messages to your care team and make appointments. If you have questions, " please call your primary care clinic.  If you do not have a primary care provider, please call [phone] and they will assist you.        Care EveryWhere ID     This is your Care EveryWhere ID. This could be used by other organizations to access your [facility] medical records  [id number]        Equal Access to Services     TOSHIA JOSEPH : Hadii aad ku hadcharlescarl Soramonaali, waaxda luqadaha, qaybta kaalmada linda, jie patel. So [facility] [phone].    ATENCIÓN: Si habla español, tiene a barber disposición servicios gratuitos de asistencia lingüística. [place] al [phone].    We comply with applicable federal civil rights laws and [place] laws. We do not discriminate on the basis of race, color, national origin, age, disability, sex, sexual orientation, or gender identity.               Review of your medicines      START taking        Dose / Directions    HYDROcodone-acetaminophen 5-325 MG per tablet   Commonly known as:  NORCO   Used for:  Deformity of reconstructed breast   Notes to Patient:  You had 1 Norco at 11:00 am today.        Dose:  1-2 tablet   Take 1-2 tablets by mouth every 4 hours as needed for pain maximum 10 tablet(s) per day   Quantity:  20 tablet   Refills:  0         CONTINUE these medicines which have NOT CHANGED        Dose / Directions    albuterol 108 (90 Base) MCG/ACT Inhaler   Commonly known as:  PROAIR HFA/PROVENTIL HFA/VENTOLIN HFA   Used for:  Upper respiratory tract infection, unspecified type        Dose:  2 puff   Inhale 2 puffs into the lungs every 6 hours as needed for shortness of breath / dyspnea or wheezing   Quantity:  1 Inhaler   Refills:  0       atorvastatin 20 MG tablet   Commonly known as:  LIPITOR   Used for:  Hyperlipidemia LDL goal <160        Dose:  20 mg   Take 1 tablet (20 mg) by mouth daily   Quantity:  90 tablet   Refills:  0       Calcium carb-Vitamin D 500 mg [place]-200 units 500-200 MG-UNIT per tablet   Commonly known as:  OSCAL with  D;Oyster Shell Calcium        Dose:  1 tablet   Take 1 tablet by mouth daily Reported on 4/13/2017   Refills:  0       desonide 0.05 % cream   Commonly known as:  DESOWEN        Apply topically 2 times daily as needed   Refills:  0       fluticasone 50 MCG/ACT spray   Commonly known as:  FLONASE   Used for:  Rhinorrhea        Dose:  1-2 spray   Spray 1-2 sprays into both nostrils daily   Quantity:  1 Bottle   Refills:  11       gabapentin 100 MG capsule   Commonly known as:  NEURONTIN   Used for:  Pain in both lower extremities        Dose:  300 mg   Take 3 capsules (300 mg) by mouth At Bedtime   Quantity:  270 capsule   Refills:  2       glucosamine-chondroitin 500-400 MG Caps per capsule        Dose:  1 capsule   Take 1 capsule by mouth daily   Refills:  0       IBU- MG Tabs   Used for:  Acute pharyngitis        1 TABLET EVERY 4 TO 6 HOURS AS NEEDED   Refills:  0       ketoconazole 2 % cream   Commonly known as:  NIZORAL        Apply topically daily as needed for itching   Refills:  0       lisinopril 20 MG tablet   Commonly known as:  PRINIVIL/ZESTRIL   Used for:  Essential hypertension        Dose:  20 mg   Take 1 tablet (20 mg) by mouth daily   Quantity:  90 tablet   Refills:  1       omeprazole 20 MG CR capsule   Commonly known as:  priLOSEC        Dose:  20 mg   Take 20 mg by mouth daily   Refills:  0       order for DME   Used for:  Malignant neoplasm of right female breast, unspecified site of breast        Hair prosthesis for chemotherapy induced alopecia   Quantity:  1 Units   Refills:  0       tamoxifen 20 MG tablet   Commonly known as:  NOLVADEX   Used for:  Malignant neoplasm of breast in female, estrogen receptor positive, unspecified laterality, unspecified site of breast (H)        Dose:  20 mg   Take 1 tablet (20 mg) by mouth daily   Quantity:  90 tablet   Refills:  3       TYLENOL PO        Dose:  500 mg   Take 500 mg by mouth   Refills:  0       venlafaxine 37.5 MG tablet   Commonly known  as:  EFFEXOR   Used for:  Malignant neoplasm of 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        Dose:  37.5 mg   Take 1 tablet (37.5 mg) by mouth 2 times daily   Quantity:  180 tablet   Refills:  1            Where to get your medicines      Some of these will need a paper prescription and others can be bought over the counter. Ask your nurse if you have questions.     Bring a paper prescription for each of these medications     HYDROcodone-acetaminophen 5-325 MG per tablet                Protect others around you: Learn how to safely use, store and throw away your medicines at www.disposemymeds.org.        Information about OPIOIDS     PRESCRIPTION OPIOIDS: WHAT YOU NEED TO KNOW   You have a prescription for an opioid (narcotic) pain medicine. Opioids can cause addiction. If you have a history of chemical dependency of any type, you are at a higher risk of becoming addicted to opioids. Only take this medicine after all other options have been tried. Take it for as short a time and as few doses as possible.     Do not:    Drive. If you drive while taking these medicines, you could be arrested for driving under the influence (DUI).    Operate heavy machinery    Do any other dangerous activities while taking these medicines.     Drink any alcohol while taking these medicines.      Take with any other medicines that contain acetaminophen. Read all labels carefully. Look for the word  acetaminophen  or  Tylenol.  Ask your pharmacist if you have questions or are unsure.    Store your pills in a secure place, locked if possible. We will not replace any lost or stolen medicine. If you don t finish your medicine, please throw away (dispose) as directed by your pharmacist. The [place] Pollution [employer] has more information about safe disposal: https://www.pca.state.[place]/living-green/managing-unwanted-medications    All opioids tend to cause constipation. Drink plenty of  water and eat foods that have a lot of fiber, such as fruits, vegetables, prune juice, apple juice and high-fiber cereal. Take a laxative (Miralax, milk of magnesia, Colace, Senna) if you don t move your bowels at least every other day.              Medication List: This is a list of all your medications and when to take them. Check marks below indicate your daily home schedule. Keep this list as a reference.      Medications           Morning Afternoon Evening Bedtime As Needed    albuterol 108 (90 Base) MCG/ACT Inhaler   Commonly known as:  PROAIR HFA/PROVENTIL HFA/VENTOLIN HFA   Inhale 2 puffs into the lungs every 6 hours as needed for shortness of breath / dyspnea or wheezing                                atorvastatin 20 MG tablet   Commonly known as:  LIPITOR   Take 1 tablet (20 mg) by mouth daily                                Calcium carb-Vitamin D 500 mg [place]-200 units 500-200 MG-UNIT per tablet   Commonly known as:  OSCAL with D;Oyster Shell Calcium   Take 1 tablet by mouth daily Reported on 4/13/2017                                desonide 0.05 % cream   Commonly known as:  DESOWEN   Apply topically 2 times daily as needed                                fluticasone 50 MCG/ACT spray   Commonly known as:  FLONASE   Spray 1-2 sprays into both nostrils daily                                gabapentin 100 MG capsule   Commonly known as:  NEURONTIN   Take 3 capsules (300 mg) by mouth At Bedtime                                glucosamine-chondroitin 500-400 MG Caps per capsule   Take 1 capsule by mouth daily                                HYDROcodone-acetaminophen 5-325 MG per tablet   Commonly known as:  NORCO   Take 1-2 tablets by mouth every 4 hours as needed for pain maximum 10 tablet(s) per day   Last time this was given:  1 tablet on 4/25/2018 10:58 AM   Notes to Patient:  You had 1 Norco at 11:00 am today.                                IBU- MG Tabs   1 TABLET EVERY 4 TO 6 HOURS AS NEEDED                                 ketoconazole 2 % cream   Commonly known as:  NIZORAL   Apply topically daily as needed for itching                                lisinopril 20 MG tablet   Commonly known as:  PRINIVIL/ZESTRIL   Take 1 tablet (20 mg) by mouth daily                                omeprazole 20 MG CR capsule   Commonly known as:  priLOSEC   Take 20 mg by mouth daily                                order for DME   Hair prosthesis for chemotherapy induced alopecia                                tamoxifen 20 MG tablet   Commonly known as:  NOLVADEX   Take 1 tablet (20 mg) by mouth daily                                TYLENOL PO   Take 500 mg by mouth   Last time this was given:  1,000 mg on 4/25/2018  7:34 AM                                venlafaxine 37.5 MG tablet   Commonly known as:  EFFEXOR   Take 1 tablet (37.5 mg) by mouth 2 times daily

## 2018-04-25 NOTE — ANESTHESIA PREPROCEDURE EVALUATION
Anesthesia Evaluation     . Pt has had prior anesthetic.     No history of anesthetic complications          ROS/MED HX    ENT/Pulmonary:       Neurologic:     (+)neuropathy - drug induced; resolving; tapering gabapentin,     Cardiovascular:     (+) hypertension----. : . . . :. .       METS/Exercise Tolerance:     Hematologic:         Musculoskeletal:         GI/Hepatic:         Renal/Genitourinary:         Endo:         Psychiatric:         Infectious Disease:         Malignancy:   (+) Malignancy History of Breast  Breast CA Remission status post Chemo and Surgery.         Other:                     Physical Exam  Normal systems: cardiovascular and pulmonary    Airway   Mallampati: II  TM distance: >3 FB  Neck ROM: full    Dental     Cardiovascular       Pulmonary                     Anesthesia Plan      History & Physical Review  History and physical reviewed and following examination; no interval change.    ASA Status:  2 .    NPO Status:  > 8 hours    Plan for General and LMA with Intravenous and Propofol induction. Maintenance will be TIVA.    PONV prophylaxis:  Ondansetron (or other 5HT-3) and Dexamethasone or Solumedrol       Postoperative Care  Postoperative pain management:  IV analgesics and Oral pain medications.      Consents  Anesthetic plan, risks, benefits and alternatives discussed with:  Patient..        DPreop diagnosis: STATUS POST BILATERAL MASTECTOMY  Procedure(s):  REVISE RECONSTRUCTED BREAST BILATERAL  REMOVE AND REPLACE BREAST IMPLANT PROSTHESIS  PROCURE GRAFT FAT  Allergies   Allergen Reactions     No Known Drug Allergies        No current facility-administered medications on file prior to encounter.   Current Outpatient Prescriptions on File Prior to Encounter:  Acetaminophen (TYLENOL PO) Take 500 mg by mouth   omeprazole (PRILOSEC) 20 MG capsule Take 20 mg by mouth daily   tamoxifen (NOLVADEX) 20 MG tablet Take 1 tablet (20 mg) by mouth daily   venlafaxine (EFFEXOR) 37.5 MG tablet Take 1  tablet (37.5 mg) by mouth 2 times daily   calcium carb 1250 mg, 500 mg [place],/vitamin D 200 units (OSCAL WITH D) 500-200 MG-UNIT per tablet Take 1 tablet by mouth daily Reported on 4/13/2017   desonide (DESOWEN) 0.05 % cream Apply topically 2 times daily as needed   glucosamine-chondroitin 500-400 MG CAPS Take 1 capsule by mouth daily   IBU- MG OR TABS 1 TABLET EVERY 4 TO 6 HOURS AS NEEDED   ketoconazole (NIZORAL) 2 % cream Apply topically daily as needed for itching   order for DME Hair prosthesis for chemotherapy induced alopecia     Hemoglobin   Date Value Ref Range Status   04/06/2018 13.5 11.7 - 15.7 g/dL Final     Potassium   Date Value Ref Range Status   02/14/2018 4.0 3.4 - 5.3 mmol/L Final                       .

## 2018-04-25 NOTE — DISCHARGE INSTRUCTIONS
Today you were given 975 mg of Tylenol at 7:30 am. The recommended daily maximum dose is 4000 mg.         Same Day Surgery Discharge Instructions for  Sedation and General Anesthesia       It's not unusual to feel dizzy, light-headed or faint for up to 24 hours after surgery or while taking pain medication.  If you have these symptoms: sit for a few minutes before standing and have someone assist you when you get up to walk or use the bathroom.      You should rest and relax for the next 24 hours. We recommend you make arrangements to have an adult stay with you for at least 24 hours after your discharge.  Avoid hazardous and strenuous activity.      DO NOT DRIVE any vehicle or operate mechanical equipment for 24 hours following the end of your surgery.  Even though you may feel normal, your reactions may be affected by the medication you have received.      Do not drink alcoholic beverages for 24 hours following surgery.       Slowly progress to your regular diet as you feel able. It's not unusual to feel nauseated and/or vomit after receiving anesthesia.  If you develop these symptoms, drink clear liquids (apple juice, ginger ale, broth, 7-up, etc. ) until you feel better.  If your nausea and vomiting persists for 24 hours, please notify your surgeon.        All narcotic pain medications, along with inactivity and anesthesia, can cause constipation. Drinking plenty of liquids and increasing fiber intake will help.      For any questions of a medical nature, call your surgeon.      Do not make important decisions for 24 hours.      If you had general anesthesia, you may have a sore throat for a couple of days related to the breathing tube used during surgery.  You may use Cepacol lozenges to help with this discomfort.  If it worsens or if you develop a fever, contact your surgeon.       If you feel your pain is not well managed with the pain medications prescribed by your surgeon, please contact your surgeon's  office to let them know so they can address your concerns.           Discharge Instructions following Breast Surgery  [facility] Same Day Surgery    Diet:   Resume diet as tolerated.  Drink plenty of fluids to prevent constipation.    Activity:   Gentle rotation & stretching of your arms/shoulders will prevent stiffness in joints   Increase activity gradually   No heavy lifting greater than 10-15 pounds & no strenuous activity until  approved by surgeon    Bathing/Incision Care:   You may shower as directed by surgeon   Pat incisions dry.  No lotions, powders or perfumes to incisions   Tape dressings (steri strips) will fall off in 7-10 days (if present)    What to expect:   A tingly or itchy sensation around the incision is a normal sign of healing   Some clear, pink drainage from incisions is normal.      Notify your surgeon for the following signs & symptoms:   Redness, warmth, or swelling of the incision    Foul smelling or increased drainage   Chills or temperature greater than 101 F   Pain not controlled by pain medications        **If you have questions or concerns about your procedure,  call  at [phone]**

## 2018-04-25 NOTE — IP AVS SNAPSHOT
[facility] Same Day Surgery    [address]    [place] [address]    Phone:  [phone]    Fax:  [phone]                                       After Visit Summary   4/25/2018    Shantell Wagner    MRN: [id number]           After Visit Summary Signature Page     I have received my discharge instructions, and my questions have been answered. I have discussed any challenges I see with this plan with the nurse or doctor.    ..........................................................................................................................................  Patient/Patient Representative Signature      ..........................................................................................................................................  Patient Representative Print Name and Relationship to Patient    ..................................................               ................................................  Date                                            Time    ..........................................................................................................................................  Reviewed by Signature/Title    ...................................................              ..............................................  Date                                                            Time

## 2018-05-10 ENCOUNTER — ONCOLOGY VISIT (OUTPATIENT)
Dept: ONCOLOGY | Facility: CLINIC | Age: 48
End: 2018-05-10
Attending: INTERNAL MEDICINE
Payer: COMMERCIAL

## 2018-05-10 ENCOUNTER — HOSPITAL ENCOUNTER (OUTPATIENT)
Facility: CLINIC | Age: 48
Setting detail: SPECIMEN
Discharge: HOME | End: 2018-05-10
Attending: INTERNAL MEDICINE | Admitting: INTERNAL MEDICINE
Payer: COMMERCIAL

## 2018-05-10 VITALS
OXYGEN SATURATION: 97 % | WEIGHT: 179.6 LBS | DIASTOLIC BLOOD PRESSURE: 88 MMHG | BODY MASS INDEX: 30.66 KG/M2 | TEMPERATURE: 97.6 F | HEART RATE: 89 BPM | HEIGHT: 64 IN | RESPIRATION RATE: 16 BRPM | SYSTOLIC BLOOD PRESSURE: 131 MMHG

## 2018-05-10 DIAGNOSIS — C50.411 MALIGNANT NEOPLASM OF UPPER-OUTER QUADRANT OF RIGHT FEMALE BREAST, UNSPECIFIED ESTROGEN RECEPTOR STATUS (H): ICD-10-CM

## 2018-05-10 DIAGNOSIS — Z00.6 EXAMINATION OF PARTICIPANT IN CLINICAL TRIAL: Primary | ICD-10-CM

## 2018-05-10 LAB
ALBUMIN SERPL-MCNC: 3.9 G/DL (ref 3.4–5)
ALP SERPL-CCNC: 58 U/L (ref 40–150)
ALT SERPL W P-5'-P-CCNC: 32 U/L (ref 0–50)
ANION GAP SERPL CALCULATED.3IONS-SCNC: 7 MMOL/L (ref 3–14)
AST SERPL W P-5'-P-CCNC: 19 U/L (ref 0–45)
BASOPHILS # BLD AUTO: 0.1 10E9/L (ref 0–0.2)
BASOPHILS NFR BLD AUTO: 0.8 %
BILIRUB SERPL-MCNC: 0.2 MG/DL (ref 0.2–1.3)
BUN SERPL-MCNC: 22 MG/DL (ref 7–30)
CALCIUM SERPL-MCNC: 9.5 MG/DL (ref 8.5–10.1)
CHLORIDE SERPL-SCNC: 103 MMOL/L (ref 94–109)
CO2 SERPL-SCNC: 27 MMOL/L (ref 20–32)
CREAT SERPL-MCNC: 0.83 MG/DL (ref 0.52–1.04)
DIFFERENTIAL METHOD BLD: NORMAL
EOSINOPHIL # BLD AUTO: 0.1 10E9/L (ref 0–0.7)
EOSINOPHIL NFR BLD AUTO: 2.1 %
ERYTHROCYTE [DISTWIDTH] IN BLOOD BY AUTOMATED COUNT: 12 % (ref 10–15)
GFR SERPL CREATININE-BSD FRML MDRD: 74 ML/MIN/1.7M2
GLUCOSE SERPL-MCNC: 91 MG/DL (ref 70–99)
HCT VFR BLD AUTO: 38.7 % (ref 35–47)
HGB BLD-MCNC: 12.6 G/DL (ref 11.7–15.7)
IMM GRANULOCYTES # BLD: 0 10E9/L (ref 0–0.4)
IMM GRANULOCYTES NFR BLD: 0.3 %
LYMPHOCYTES # BLD AUTO: 2 10E9/L (ref 0.8–5.3)
LYMPHOCYTES NFR BLD AUTO: 30.2 %
MCH RBC QN AUTO: 29.4 PG (ref 26.5–33)
MCHC RBC AUTO-ENTMCNC: 32.6 G/DL (ref 31.5–36.5)
MCV RBC AUTO: 90 FL (ref 78–100)
MONOCYTES # BLD AUTO: 0.6 10E9/L (ref 0–1.3)
MONOCYTES NFR BLD AUTO: 8.4 %
NEUTROPHILS # BLD AUTO: 3.9 10E9/L (ref 1.6–8.3)
NEUTROPHILS NFR BLD AUTO: 58.2 %
NRBC # BLD AUTO: 0 10*3/UL
NRBC BLD AUTO-RTO: 0 /100
PLATELET # BLD AUTO: 302 10E9/L (ref 150–450)
POTASSIUM SERPL-SCNC: 4.2 MMOL/L (ref 3.4–5.3)
PROT SERPL-MCNC: 7.7 G/DL (ref 6.8–8.8)
RBC # BLD AUTO: 4.28 10E12/L (ref 3.8–5.2)
SODIUM SERPL-SCNC: 137 MMOL/L (ref 133–144)
WBC # BLD AUTO: 6.6 10E9/L (ref 4–11)

## 2018-05-10 PROCEDURE — 99213 OFFICE O/P EST LOW 20 MIN: CPT | Performed by: INTERNAL MEDICINE

## 2018-05-10 PROCEDURE — 80053 COMPREHEN METABOLIC PANEL: CPT | Performed by: INTERNAL MEDICINE

## 2018-05-10 PROCEDURE — 85025 COMPLETE CBC W/AUTO DIFF WBC: CPT | Performed by: INTERNAL MEDICINE

## 2018-05-10 RX ORDER — VENLAFAXINE 37.5 MG/1
37.5 TABLET ORAL 2 TIMES DAILY
Qty: 180 TABLET | Refills: 1 | Status: SHIPPED | OUTPATIENT
Start: 2018-05-10 | End: 2018-11-09

## 2018-05-10 ASSESSMENT — PAIN SCALES - GENERAL: PAINLEVEL: NO PAIN (0)

## 2018-05-10 NOTE — Clinical Note
5/10/2018         RE: Shantell Wagner  [address]  [facility] [address]        Dear Colleague,    Thank you for referring your patient, Shantell Wagner, to the [facility]. Please see a copy of my visit note below.    Visit Date:   05/10/2018      HISTORY OF PRESENT ILLNESS:  Shantell Wagner is a 47-year-old patient who comes in today for interim followup.  She has a diagnosis of right-sided breast cancer in the upper outer quadrant.  She presented in the spring of 2016 with a locally advanced right-sided breast mass that was 2.2 cm and a 2.7 cm right axillary mass.  She was treated neoadjuvantly with Adriamycin/Cytoxan, followed by Taxol.  Her tumor was estrogen receptor-positive, progesterone receptor-negative, HER2-receptor negative.  She then started on adjuvant hormonal therapy and is on the PALLAS trial.  She got randomized to tamoxifen only.  She continues now on interim 3-month followup.  I have seen her today.  She continues to get some hot flashes from the tamoxifen, but they have been helped with the addition of the Effexor.  She got 1 menstrual period started back last month.  I have told her to keep a log of menstrual periods going forward.  She may be someone that I would want to shut down her ovaries if she starts to get regular menstrual periods.  In addition to hot flashes, she gets occasional leg and muscle aching with tamoxifen, but nothing other than that.  She has been put on atorvastatin for hypercholesterolemia.  There is a family history of hypercholesterolemia in her family.      REVIEW OF SYSTEMS:  A 14-point comprehensive review of systems apart from what is outlined above is otherwise unremarkable.      MEDICATIONS:  As charted.      ALLERGIES:  As charted.      PHYSICAL EXAMINATION:   GENERAL:  She is a well-appearing lady in no acute distress.   VITAL SIGNS:  Stable.  Performance status overall is 0.   HEENT:  Oropharynx clear, mucous  membranes moist.   NECK:  No masses or goiter.  There is no cervical, supraclavicular or infraclavicular adenopathy.     CHEST:  Clear to auscultation and percussion bilaterally.   HEART:  Sounds 1, 2 normal.  No added sounds or murmurs.   ABDOMEN:  Soft and nontender, no hepatosplenomegaly.   EXTREMITIES:  Legs are without tenderness or edema.   BREASTS:  The patient is status post bilateral mastectomies.  The scars look good.  Right and left axillae are negative.     SKIN:  No rashes or lesions.   LYMPHATIC:  No evidence of lymphedema.      DATA REVIEW:  White cell count 6.6, hemoglobin 12.6, platelets 302.      IMPRESSION:  This is a patient with a high-risk right-sided breast cancer in the upper outer quadrant who presented in the spring of  and underwent neoadjuvant chemotherapy.  She is now on adjuvant hormonal therapy on the PALLAS trial.  She is tolerating tamoxifen well.  Of note, she just had her menstrual periods resumed last month.  I am going to keep an eye on this over the next 3 months.  Ideally, I would like her not to be having menstrual periods at this time.      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  She continues to be up-to-date on colonoscopy screening for colon cancer.  Overall, she has some side effects from the tamoxifen, but nothing too sinister.  She will be seen back in 3 months for further review.         MICH KNOTT MD             D: 05/10/2018   T: 05/10/2018   MT: TAWANNA      Name:     CADE GARCÍA   MRN:      [id number]        Account:      [id number]   :      1970           Visit Date:   05/10/2018      Document: [id number]       Again, thank you for allowing me to participate in the care of your patient.        Sincerely,        Mich Knott MD

## 2018-05-10 NOTE — PROGRESS NOTES
Visit Date:   05/10/2018      HISTORY OF PRESENT ILLNESS:  Shantell Wagner is a 47-year-old patient who comes in today for interim followup.  She has a diagnosis of right-sided breast cancer in the upper outer quadrant.  She presented in the spring of 2016 with a locally advanced right-sided breast mass that was 2.2 cm and a 2.7 cm right axillary mass.  She was treated neoadjuvantly with Adriamycin/Cytoxan, followed by Taxol.  Her tumor was estrogen receptor-positive, progesterone receptor-negative, HER2-receptor negative.  She then started on adjuvant hormonal therapy and is on the PALLAS trial.  She got randomized to tamoxifen only.  She continues now on interim 3-month followup.  I have seen her today.  She continues to get some hot flashes from the tamoxifen, but they have been helped with the addition of the Effexor.  She got 1 menstrual period started back last month.  I have told her to keep a log of menstrual periods going forward.  She may be someone that I would want to shut down her ovaries if she starts to get regular menstrual periods.  In addition to hot flashes, she gets occasional leg and muscle aching with tamoxifen, but nothing other than that.  She has been put on atorvastatin for hypercholesterolemia.  There is a family history of hypercholesterolemia in her family.      REVIEW OF SYSTEMS:  A 14-point comprehensive review of systems apart from what is outlined above is otherwise unremarkable.      MEDICATIONS:  As charted.      ALLERGIES:  As charted.      PHYSICAL EXAMINATION:   GENERAL:  She is a well-appearing lady in no acute distress.   VITAL SIGNS:  Stable.  Performance status overall is 0.   HEENT:  Oropharynx clear, mucous membranes moist.   NECK:  No masses or goiter.  There is no cervical, supraclavicular or infraclavicular adenopathy.     CHEST:  Clear to auscultation and percussion bilaterally.   HEART:  Sounds 1, 2 normal.  No added sounds or murmurs.   ABDOMEN:  Soft and nontender,  no hepatosplenomegaly.   EXTREMITIES:  Legs are without tenderness or edema.   BREASTS:  The patient is status post bilateral mastectomies.  The scars look good.  Right and left axillae are negative.     SKIN:  No rashes or lesions.   LYMPHATIC:  No evidence of lymphedema.      DATA REVIEW:  White cell count 6.6, hemoglobin 12.6, platelets 302.      IMPRESSION:  This is a patient with a high-risk right-sided breast cancer in the upper outer quadrant who presented in the spring of  and underwent neoadjuvant chemotherapy.  She is now on adjuvant hormonal therapy on the PALLAS trial.  She is tolerating tamoxifen well.  Of note, she just had her menstrual periods resumed last month.  I am going to keep an eye on this over the next 3 months.  Ideally, I would like her not to be having menstrual periods at this time.      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  She continues to be up-to-date on colonoscopy screening for colon cancer.  Overall, she has some side effects from the tamoxifen, but nothing too sinister.  She will be seen back in 3 months for further review.         MICH ORTIZ MD             D: 05/10/2018   T: 05/10/2018   MT: TAWANNA      Name:     SHANTELL GARCÍA   MRN:      [id number]        Account:      [id number]   :      1970           Visit Date:   05/10/2018      Document: [id number]

## 2018-05-10 NOTE — MR AVS SNAPSHOT
After Visit Summary   5/10/2018    Shantell Wagner    MRN: [id number]           Patient Information     Date Of Birth          1970        Visit Information        Provider Department      5/10/2018 4:00 PM Tata Knott MD [facility] Oncology [facility]      Today's Diagnoses     Examination of participant in clinical trial    -  1    Breast cancer (H)        Malignant neoplasm of 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          Care Instructions    Per clinical trial please have patient schedule with Dr. Knott on August 2nd- labs prior (cbc with diff, CMP, and Hgba1c). Thank you.  Scheduled Sandra Russell Lauren Aase, KRISHNA [facility] [phone] Pgr. [phone]  __________________________________________________________________    __                Follow-ups after your visit        Your next 10 appointments already scheduled     Aug 02, 2018  8:00 AM CDT   Return Visit with Tata Knott MD   [facility] ([facility])    [facility]  [address] [place]  [facility] [id number] [phone]              Who to contact     If you have questions or need follow up information about today's clinic visit or your schedule please contact [facility] CANCER CARE directly at [phone].  Normal or non-critical lab and imaging results will be communicated to you by MyChart, letter or phone within 4 business days after the clinic has received the results. If you do not hear from us within 7 days, please contact the clinic through MyChart or phone. If you have a critical or abnormal lab result, we will notify you by phone as soon as possible.  Submit refill requests through [employer] or call your pharmacy and they will forward the refill request to us. Please allow 3 business days for your refill to be completed.           "Additional Information About Your Visit        MyChart Information     [employer] gives you secure access to your electronic health record. If you see a primary care provider, you can also send messages to your care team and make appointments. If you have questions, please call your primary care clinic.  If you do not have a primary care provider, please call [phone] and they will assist you.        Care EveryWhere ID     This is your Care EveryWhere ID. This could be used by other organizations to access your [place] medical records  [id number]        Your Vitals Were     Pulse Temperature Respirations Height Pulse Oximetry BMI (Body Mass Index)    89 97.6  F (36.4  C) (Tympanic) 16 1.626 m (5' 4\") 97% 30.83 kg/m2       Blood Pressure from Last 3 Encounters:   05/10/18 131/88   04/25/18 (!) 139/95   04/13/18 102/66    Weight from Last 3 Encounters:   05/10/18 81.5 kg (179 lb 9.6 oz)   04/25/18 78.9 kg (174 lb)   04/13/18 81.5 kg (179 lb 11.2 oz)              We Performed the Following     CBC with platelets and differential     Comprehensive metabolic panel (BMP + Alb, Alk Phos, ALT, AST, Total. Bili, TP)     Hemoglobin A1c          Where to get your medicines      These medications were sent to [facility] #[id number] - [facility] [address]  [address] [facility] [address]     Phone:  [phone]     venlafaxine 37.5 MG tablet          Primary Care Provider Office Phone # Fax #    Margaret Longo, NIKKI [facility] [phone]       [address] [address] [address]        Equal Access to Services     TOSHIA JOSEPH : Hadii see Kennedy, wagayeda shi, qaybta jie lynn. So [facility] [phone].    ATENCIÓN: Si habla español, tiene a barber disposición servicios gratuitos de asistencia lingüística. Llame al [phone].    We comply with applicable federal civil rights laws and [place] laws. We do not discriminate on the " basis of race, color, national origin, age, disability, sex, sexual orientation, or gender identity.            Thank you!     Thank you for choosing [facility]ER [facility]  for your care. Our goal is always to provide you with excellent care. Hearing back from our patients is one way we can continue to improve our services. Please take a few minutes to complete the written survey that you may receive in the mail after your visit with us. Thank you!             Your Updated Medication List - Protect others around you: Learn how to safely use, store and throw away your medicines at www.disposemymeds.org.          This list is accurate as of 5/10/18  4:57 PM.  Always use your most recent med list.                   Brand Name Dispense Instructions for use Diagnosis    albuterol 108 (90 Base) MCG/ACT Inhaler    PROAIR HFA/PROVENTIL HFA/VENTOLIN HFA    1 Inhaler    Inhale 2 puffs into the lungs every 6 hours as needed for shortness of breath / dyspnea or wheezing    Upper respiratory tract infection, unspecified type       atorvastatin 20 MG tablet    LIPITOR    90 tablet    Take 1 tablet (20 mg) by mouth daily    Hyperlipidemia LDL goal <160       Calcium carb-Vitamin D 500 mg Native-200 units 500-200 MG-UNIT per tablet    OSCAL with D;Oyster Shell Calcium     Take 1 tablet by mouth daily Reported on 4/13/2017        desonide 0.05 % cream    DESOWEN     Apply topically 2 times daily as needed        fluticasone 50 MCG/ACT spray    FLONASE    1 Bottle    Spray 1-2 sprays into both nostrils daily    Rhinorrhea       gabapentin 100 MG capsule    NEURONTIN    270 capsule    Take 3 capsules (300 mg) by mouth At Bedtime    Pain in both lower extremities       glucosamine-chondroitin 500-400 MG Caps per capsule      Take 1 capsule by mouth daily        HYDROcodone-acetaminophen 5-325 MG per tablet    NORCO    20 tablet    Take 1-2 tablets by mouth every 4 hours as needed for pain maximum 10 tablet(s) per day     Deformity of reconstructed breast       IBU- MG Tabs      1 TABLET EVERY 4 TO 6 HOURS AS NEEDED    Acute pharyngitis       ketoconazole 2 % cream    NIZORAL     Apply topically daily as needed for itching        lisinopril 20 MG tablet    PRINIVIL/ZESTRIL    90 tablet    Take 1 tablet (20 mg) by mouth daily    Essential hypertension       omeprazole 20 MG CR capsule    priLOSEC     Take 20 mg by mouth daily        order for DME     1 Units    Hair prosthesis for chemotherapy induced alopecia    Malignant neoplasm of right female breast, unspecified site of breast       tamoxifen 20 MG tablet    NOLVADEX    90 tablet    Take 1 tablet (20 mg) by mouth daily    Malignant neoplasm of breast in female, estrogen receptor positive, unspecified laterality, unspecified site of breast (H)       TYLENOL PO      Take 500 mg by mouth        venlafaxine 37.5 MG tablet    EFFEXOR    180 tablet    Take 1 tablet (37.5 mg) by mouth 2 times daily    Malignant neoplasm of 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

## 2018-05-10 NOTE — PATIENT INSTRUCTIONS
Per clinical trial please have patient schedule with Dr. Knott on August 2nd- labs prior (cbc with diff, CMP, and Hgba1c). Thank you.  Scheduled Sandra Russell Lauren Aase, RN [facility] [phone] [facility]. [phone]  __________________________________________________________________    __

## 2018-05-10 NOTE — NURSING NOTE
"Oncology Rooming Note    May 10, 2018 4:08 PM   Shantell Wagner is a 47 year old female who presents for:    Chief Complaint   Patient presents with     Oncology Clinic Visit     Follow up - Breast Cancer     Initial Vitals: Resp 16  Ht 1.626 m (5' 4\")  Wt 81.5 kg (179 lb 9.6 oz)  BMI 30.83 kg/m2 Estimated body mass index is 30.83 kg/(m^2) as calculated from the following:    Height as of this encounter: 1.626 m (5' 4\").    Weight as of this encounter: 81.5 kg (179 lb 9.6 oz). Body surface area is 1.92 meters squared.  No Pain (0) Comment: Data Unavailable   No LMP recorded.  Allergies reviewed: Yes  Medications reviewed: Yes    Medications: MEDICATION REFILLS NEEDED TODAY. Provider was notified. Effexor  Pharmacy name entered into CCBR-SYNARC: [id number] - [place], [place] - [address]    Clinical concerns: Follow up   8 minutes for nursing intake (face to face time)     Altagracia Interiano CMA      DISCHARGE PLAN:  Next appointments: See patient instruction section  Departure Mode: Ambulatory  Accompanied by:    0 minutes for nursing discharge (face to face time)   Altagracia Interiano CMA                "

## 2018-06-08 DIAGNOSIS — E78.5 HYPERLIPIDEMIA LDL GOAL <160: ICD-10-CM

## 2018-06-08 PROCEDURE — 36415 COLL VENOUS BLD VENIPUNCTURE: CPT | Performed by: NURSE PRACTITIONER

## 2018-06-08 PROCEDURE — 80061 LIPID PANEL: CPT | Performed by: NURSE PRACTITIONER

## 2018-06-09 LAB
CHOLEST SERPL-MCNC: 199 MG/DL
HDLC SERPL-MCNC: 47 MG/DL
LDLC SERPL CALC-MCNC: 115 MG/DL
NONHDLC SERPL-MCNC: 152 MG/DL
TRIGL SERPL-MCNC: 185 MG/DL

## 2018-06-21 DIAGNOSIS — E78.5 HYPERLIPIDEMIA LDL GOAL <160: ICD-10-CM

## 2018-06-21 RX ORDER — ATORVASTATIN CALCIUM 20 MG/1
20 TABLET, FILM COATED ORAL DAILY
Qty: 90 TABLET | Refills: 3 | Status: SHIPPED | OUTPATIENT
Start: 2018-06-21 | End: 2018-10-25

## 2018-07-01 DIAGNOSIS — I10 ESSENTIAL HYPERTENSION: ICD-10-CM

## 2018-07-02 NOTE — TELEPHONE ENCOUNTER
"Requested Prescriptions   Pending Prescriptions Disp Refills     lisinopril (PRINIVIL/ZESTRIL) 20 MG tablet [Pharmacy Med Name: LISINOPRIL 20 MG TABLET]  Last Written Prescription Date:  4/6/18  Last Fill Quantity: 90,  # refills: 1   Last office visit: 4/13/2018 with prescribing provider:  4/13/2018     Future Office Visit:   Next 5 appointments (look out 90 days)     Aug 02, 2018  8:00 AM CDT   Return Visit with Tata Knott MD   [facility] ([facility])    [facility]  [address]  [facility] [address]   [phone]                  90 tablet 1     Sig: TAKE 1 TABLET (20 MG) BY MOUTH DAILY    ACE Inhibitors (Including Combos) Protocol Passed    7/1/2018  1:24 AM       Passed - Blood pressure under 140/90 in past 12 months    BP Readings from Last 3 Encounters:   05/10/18 131/88   04/25/18 (!) 139/95   04/13/18 102/66                Passed - Recent (12 mo) or future (30 days) visit within the authorizing provider's specialty    Patient had office visit in the last 12 months or has a visit in the next 30 days with authorizing provider or within the authorizing provider's specialty.  See \"Patient Info\" tab in inbasket, or \"Choose Columns\" in Meds & Orders section of the refill encounter.           Passed - Patient is age 18 or older       Passed - No active pregnancy on record       Passed - Normal serum creatinine on file in past 12 months    Recent Labs   Lab Test  05/10/18   1604   CR  0.83            Passed - Normal serum potassium on file in past 12 months    Recent Labs   Lab Test  05/10/18   1604   POTASSIUM  4.2            Passed - No positive pregnancy test in past 12 months          "

## 2018-07-05 RX ORDER — LISINOPRIL 20 MG/1
TABLET ORAL
Qty: 90 TABLET | Refills: 2 | Status: SHIPPED | OUTPATIENT
Start: 2018-07-05 | End: 2019-04-19

## 2018-07-05 NOTE — TELEPHONE ENCOUNTER
Prescription approved per [facility] Refill Protocol.  Calixto Dickens RN

## 2018-07-06 DIAGNOSIS — E78.5 HYPERLIPIDEMIA LDL GOAL <160: ICD-10-CM

## 2018-07-06 NOTE — TELEPHONE ENCOUNTER
"Requested Prescriptions   Pending Prescriptions Disp Refills     atorvastatin (LIPITOR) 20 MG tablet [Pharmacy Med Name: ATORVASTATIN 20 MG TABLET]  Last Written Prescription Date:  6/21/18  Last Fill Quantity: 90,  # refills: 3   Last office visit: 4/13/2018 with prescribing provider:  4/13/2018     Future Office Visit:   Next 5 appointments (look out 90 days)     Aug 02, 2018  8:00 AM CDT   Return Visit with Tata Knott MD   [facility] ([facility])    [facility]  [address]  [facility] [address]   [phone]                  90 tablet 0     Sig: TAKE 1 TABLET (20 MG) BY MOUTH DAILY    Statins Protocol Passed    7/6/2018  1:42 AM       Passed - LDL on file in past 12 months    Recent Labs   Lab Test  06/08/18   0917   LDL  115*            Passed - No abnormal creatine kinase in past 12 months    No lab results found.            Passed - Recent (12 mo) or future (30 days) visit within the authorizing provider's specialty    Patient had office visit in the last 12 months or has a visit in the next 30 days with authorizing provider or within the authorizing provider's specialty.  See \"Patient Info\" tab in inbasket, or \"Choose Columns\" in Meds & Orders section of the refill encounter.           Passed - Patient is age 18 or older       Passed - No active pregnancy on record       Passed - No positive pregnancy test in past 12 months          "

## 2018-07-10 RX ORDER — ATORVASTATIN CALCIUM 20 MG/1
TABLET, FILM COATED ORAL
Qty: 90 TABLET | Refills: 2 | Status: SHIPPED | OUTPATIENT
Start: 2018-07-10 | End: 2019-04-19

## 2018-07-19 ENCOUNTER — TELEPHONE (OUTPATIENT)
Dept: FAMILY MEDICINE | Facility: CLINIC | Age: 48
End: 2018-07-19

## 2018-07-19 NOTE — TELEPHONE ENCOUNTER
Margaret,    The pap tracking team is working to update the Health Maintenance Modifiers for cervical cancer screening.  This 48 yo pt (abnormal pap hx below) is overdue for 3 year cotest per ASCCP guidelines (due 3/6/18).  However, per office visit notes on 4/6/18, it appears plan was to re-screen 5 years from previous pap/HPV result.    Ok to send pap reminder to return to clinic for cotesting?  Or would you like to repeat this at her next physical?    Pap hx:  02/20/13 ASC-H.   03/11/13 [place]/ECC= NEGRITA 1. Repeat HPV screen and ECC 12 months from colp. Due 03/2014 02/20/14 Pap= Normal, Neg HPV. Repeat pap with HPV in 12 months. Due 02/2015  03/06/15 Pap= Normal, Neg HPV. 3 yr co-testing due 3/6/18      Thanks!  Anahy Mcgraw, MKN, RN - Pap Tracking

## 2018-07-22 ENCOUNTER — HEALTH MAINTENANCE LETTER (OUTPATIENT)
Age: 48
End: 2018-07-22

## 2018-08-02 ENCOUNTER — HOSPITAL ENCOUNTER (OUTPATIENT)
Facility: CLINIC | Age: 48
Setting detail: SPECIMEN
Discharge: HOME | End: 2018-08-02
Attending: INTERNAL MEDICINE | Admitting: INTERNAL MEDICINE
Payer: COMMERCIAL

## 2018-08-02 ENCOUNTER — ONCOLOGY VISIT (OUTPATIENT)
Dept: ONCOLOGY | Facility: CLINIC | Age: 48
End: 2018-08-02
Attending: INTERNAL MEDICINE
Payer: COMMERCIAL

## 2018-08-02 VITALS
DIASTOLIC BLOOD PRESSURE: 80 MMHG | WEIGHT: 180.2 LBS | HEIGHT: 64 IN | TEMPERATURE: 97.1 F | HEART RATE: 89 BPM | SYSTOLIC BLOOD PRESSURE: 115 MMHG | BODY MASS INDEX: 30.77 KG/M2 | RESPIRATION RATE: 16 BRPM

## 2018-08-02 DIAGNOSIS — C50.911 MALIGNANT NEOPLASM OF RIGHT BREAST IN FEMALE, ESTROGEN RECEPTOR POSITIVE, UNSPECIFIED SITE OF BREAST (H): Primary | ICD-10-CM

## 2018-08-02 DIAGNOSIS — Z17.0 MALIGNANT NEOPLASM OF RIGHT BREAST IN FEMALE, ESTROGEN RECEPTOR POSITIVE, UNSPECIFIED SITE OF BREAST (H): Primary | ICD-10-CM

## 2018-08-02 LAB
ALBUMIN SERPL-MCNC: 4.1 G/DL (ref 3.4–5)
ALP SERPL-CCNC: 58 U/L (ref 40–150)
ALT SERPL W P-5'-P-CCNC: 30 U/L (ref 0–50)
ANION GAP SERPL CALCULATED.3IONS-SCNC: 4 MMOL/L (ref 3–14)
AST SERPL W P-5'-P-CCNC: 23 U/L (ref 0–45)
BILIRUB SERPL-MCNC: 0.5 MG/DL (ref 0.2–1.3)
BUN SERPL-MCNC: 18 MG/DL (ref 7–30)
CALCIUM SERPL-MCNC: 9.6 MG/DL (ref 8.5–10.1)
CANCER AG27-29 SERPL-ACNC: 24 U/ML (ref 0–39)
CHLORIDE SERPL-SCNC: 105 MMOL/L (ref 94–109)
CO2 SERPL-SCNC: 31 MMOL/L (ref 20–32)
CREAT SERPL-MCNC: 0.86 MG/DL (ref 0.52–1.04)
ERYTHROCYTE [DISTWIDTH] IN BLOOD BY AUTOMATED COUNT: 12.4 % (ref 10–15)
GFR SERPL CREATININE-BSD FRML MDRD: 70 ML/MIN/1.7M2
GLUCOSE SERPL-MCNC: 94 MG/DL (ref 70–99)
HBA1C MFR BLD: 5.9 % (ref 0–5.6)
HCT VFR BLD AUTO: 43.1 % (ref 35–47)
HGB BLD-MCNC: 13.9 G/DL (ref 11.7–15.7)
MCH RBC QN AUTO: 28.9 PG (ref 26.5–33)
MCHC RBC AUTO-ENTMCNC: 32.3 G/DL (ref 31.5–36.5)
MCV RBC AUTO: 90 FL (ref 78–100)
PLATELET # BLD AUTO: 296 10E9/L (ref 150–450)
POTASSIUM SERPL-SCNC: 4.3 MMOL/L (ref 3.4–5.3)
PROT SERPL-MCNC: 8.2 G/DL (ref 6.8–8.8)
RBC # BLD AUTO: 4.81 10E12/L (ref 3.8–5.2)
SODIUM SERPL-SCNC: 140 MMOL/L (ref 133–144)
WBC # BLD AUTO: 4.7 10E9/L (ref 4–11)

## 2018-08-02 PROCEDURE — 80053 COMPREHEN METABOLIC PANEL: CPT | Performed by: INTERNAL MEDICINE

## 2018-08-02 PROCEDURE — G0463 HOSPITAL OUTPT CLINIC VISIT: HCPCS

## 2018-08-02 PROCEDURE — 99213 OFFICE O/P EST LOW 20 MIN: CPT | Performed by: INTERNAL MEDICINE

## 2018-08-02 PROCEDURE — 86300 IMMUNOASSAY TUMOR CA 15-3: CPT | Performed by: INTERNAL MEDICINE

## 2018-08-02 PROCEDURE — 85027 COMPLETE CBC AUTOMATED: CPT | Performed by: INTERNAL MEDICINE

## 2018-08-02 PROCEDURE — 83036 HEMOGLOBIN GLYCOSYLATED A1C: CPT | Performed by: INTERNAL MEDICINE

## 2018-08-02 PROCEDURE — 36415 COLL VENOUS BLD VENIPUNCTURE: CPT

## 2018-08-02 ASSESSMENT — PAIN SCALES - GENERAL: PAINLEVEL: NO PAIN (0)

## 2018-08-02 NOTE — PROGRESS NOTES
Visit Date:   08/02/2018      HISTORY OF PRESENT ILLNESS:  Shantell Wagner is a 47-year-old patient who comes in today for interim followup.  She is here for followup of her right-sided breast cancer.  She was diagnosed with right-sided breast cancer in the upper outer quadrant in the spring of 2016.  She had a locally advanced disease at that time with a 2.2 cm tumor in the breast and a 2.7 cm right axillary mass.  She was treated neoadjuvantly with Adriamycin and Cytoxan followed by Taxol.  Her tumor was estrogen receptor positive, progesterone receptor negative, HER-2 receptor negative.  She then started on adjuvant hormonal therapy on the PALLAS trial.  She got randomized to tamoxifen only.  She is on every 3-month followup per the clinical trial.  She comes in today for interim followup.        She does take Effexor for hot flashes and she gets a moderate amount of hot flashes, although they have been helped with the Effexor.  She is also on drugs for hypercholesterolemia.  There is a history of hypercholesterolemia in her family and she is on antihypertensive medication.        I have discussed with her today weight reduction.  Her weight is slightly up today from back in the spring.  We have discussed weight reduction techniques in clinic.  She is going to try and start on an exercise protocol in the next few weeks.  She continues to work full-time.      REVIEW OF SYSTEMS:  A 14-point comprehensive review of systems is otherwise unremarkable.      MEDICATIONS:  As charted.      ALLERGIES:  AS CHARTED.      PHYSICAL EXAMINATION:     GENERAL:  Performance status is 0.  She is well-appearing lady in no acute distress.   VITAL SIGNS:  Stable.     HEENT:  Oropharynx clear, mucous membranes moist.   NECK:  No masses or goiter.  There is no cervical supraclavicular or infraclavicular adenopathy.   CHEST:  Clear to auscultation and percussion bilaterally.   HEART:  Sounds 1, 2 normal.  No added sounds or murmurs.    ABDOMEN:  Soft and nontender, no hepatosplenomegaly.   EXTREMITIES:  Legs are without tenderness or edema.    BREASTS:  The patient is status post bilateral mastectomies, scars look good.  Right and left axilla are negative.   SKIN:  Has no rashes or lesions.      DATA REVIEW:  White cell count 4.7, hemoglobin 13.9, platelets 296.  Liver function tests within normal limits.  Hemoglobin A1c was 5.9.      IMPRESSION:  Shantell is a 47-year-old patient with a high risk right-sided breast cancer diagnosed in the upper outer quadrant in the spring of .  She is status post neoadjuvant chemotherapy and is now on active treatment with adjuvant hormonal therapy and she is on the PALLAS trial.  She is tolerating tamoxifen well without any major issues.  She does take Effexor for hot flashes.        I would like to see her dropping her weight and so we have talked about weight reduction techniques today and an exercise program.        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  She is up-to-date on her colonoscopy screening.  I will see her back in clinic.  Further clinical trial again in 3 months.         MICH ORTIZ MD             D: 2018   T: 2018   MT: ARLYN      Name:     SHANTELL GARCÍA   MRN:      [id number]        Account:      [id number]   :      1970           Visit Date:   2018      Document: [id number]

## 2018-08-02 NOTE — LETTER
8/2/2018         RE: Shantell Wagner  [address]  [facility] [place] [address]        Dear Colleague,    Thank you for referring your patient, Shantell Wagner, to the [facility]. Please see a copy of my visit note below.    Visit Date:   08/02/2018      HISTORY OF PRESENT ILLNESS:  Shantell Wagner is a 47-year-old patient who comes in today for interim followup.  She is here for followup of her right-sided breast cancer.  She was diagnosed with right-sided breast cancer in the upper outer quadrant in the spring of 2016.  She had a locally advanced disease at that time with a 2.2 cm tumor in the breast and a 2.7 cm right axillary mass.  She was treated neoadjuvantly with Adriamycin and Cytoxan followed by Taxol.  Her tumor was estrogen receptor positive, progesterone receptor negative, HER-2 receptor negative.  She then started on adjuvant hormonal therapy on the PALLAS trial.  She got randomized to tamoxifen only.  She is on every 3-month followup per the clinical trial.  She comes in today for interim followup.        She does take Effexor for hot flashes and she gets a moderate amount of hot flashes, although they have been helped with the Effexor.  She is also on drugs for hypercholesterolemia.  There is a history of hypercholesterolemia in her family and she is on antihypertensive medication.        I have discussed with her today weight reduction.  Her weight is slightly up today from back in the spring.  We have discussed weight reduction techniques in clinic.  She is going to try and start on an exercise protocol in the next few weeks.  She continues to work full-time.      REVIEW OF SYSTEMS:  A 14-point comprehensive review of systems is otherwise unremarkable.      MEDICATIONS:  As charted.      ALLERGIES:  AS CHARTED.      PHYSICAL EXAMINATION:     GENERAL:  Performance status is 0.  She is well-appearing lady in no acute distress.   VITAL SIGNS:  Stable.     HEENT:   Oropharynx clear, mucous membranes moist.   NECK:  No masses or goiter.  There is no cervical supraclavicular or infraclavicular adenopathy.   CHEST:  Clear to auscultation and percussion bilaterally.   HEART:  Sounds 1, 2 normal.  No added sounds or murmurs.   ABDOMEN:  Soft and nontender, no hepatosplenomegaly.   EXTREMITIES:  Legs are without tenderness or edema.    BREASTS:  The patient is status post bilateral mastectomies, scars look good.  Right and left axilla are negative.   SKIN:  Has no rashes or lesions.      DATA REVIEW:  White cell count 4.7, hemoglobin 13.9, platelets 296.  Liver function tests within normal limits.  Hemoglobin A1c was 5.9.      IMPRESSION:  Shantell is a 47-year-old patient with a high risk right-sided breast cancer diagnosed in the upper outer quadrant in the spring of .  She is status post neoadjuvant chemotherapy and is now on active treatment with adjuvant hormonal therapy and she is on the PALLAS trial.  She is tolerating tamoxifen well without any major issues.  She does take Effexor for hot flashes.        I would like to see her dropping her weight and so we have talked about weight reduction techniques today and an exercise program.        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  She is up-to-date on her colonoscopy screening.  I will see her back in clinic.  Further clinical trial again in 3 months.         MICH KNOTT MD             D: 2018   T: 2018   MT: NTS      Name:     SHANTELL GARCÍA   MRN:      [id number]        Account:      [id number]   :      1970           Visit Date:   2018      Document: [id number]       Again, thank you for allowing me to participate in the care of your patient.        Sincerely,        Mich Knott MD

## 2018-08-02 NOTE — NURSING NOTE
"Oncology Rooming Note    August 2, 2018 8:08 AM   Shantell Wagner is a 47 year old female who presents for:    Chief Complaint   Patient presents with     Oncology Clinic Visit     Malignant neoplasm of upper-outer quadrant of right female breast     Initial Vitals: Wt 81.7 kg (180 lb 3.2 oz)  BMI 30.93 kg/m2 Estimated body mass index is 30.93 kg/(m^2) as calculated from the following:    Height as of 5/10/18: 1.626 m (5' 4\").    Weight as of this encounter: 81.7 kg (180 lb 3.2 oz). Body surface area is 1.92 meters squared.  No Pain (0) Comment: Data Unavailable   No LMP recorded.  Allergies reviewed: Yes  Medications reviewed: Yes    Medications: Medication refills not needed today.  Pharmacy name entered into [employer]: [id number] - [place], [place] - [address]    Clinical concerns: Follow-up     8 minutes for nursing intake (face to face time)     DISCHARGE PLAN:  Next appointments: See patient instruction section  Departure Mode: Ambulatory  Accompanied by: self  0 minutes for nursing discharge (face to face time)   Jessica Fuentes MA                "

## 2018-08-02 NOTE — PATIENT INSTRUCTIONS
Please have labs drawn today. CBC with diff, HgbA1c, and CMP-Jessica.     Follow up with Dr. Knott on October 25th with labs prior (CMP and CBC with diff).-Scheduled, per pt request, she want to do labs same day. Yaritza HOFFMAN printed and given to Pt. Kim S. Lauren Aase, RN [facility]/ Molly Ph. [phone]   _______________________________________________________________

## 2018-08-22 NOTE — TELEPHONE ENCOUNTER
Resending to PCP -   Please review below and advise on plan.    Thanks!  MK ManN, RN - Pap Tracking

## 2018-10-25 ENCOUNTER — HOSPITAL ENCOUNTER (OUTPATIENT)
Facility: CLINIC | Age: 48
Setting detail: SPECIMEN
Discharge: HOME | End: 2018-10-25
Attending: INTERNAL MEDICINE | Admitting: INTERNAL MEDICINE
Payer: COMMERCIAL

## 2018-10-25 ENCOUNTER — ONCOLOGY VISIT (OUTPATIENT)
Dept: ONCOLOGY | Facility: CLINIC | Age: 48
End: 2018-10-25
Attending: INTERNAL MEDICINE
Payer: COMMERCIAL

## 2018-10-25 VITALS
DIASTOLIC BLOOD PRESSURE: 83 MMHG | SYSTOLIC BLOOD PRESSURE: 123 MMHG | RESPIRATION RATE: 14 BRPM | OXYGEN SATURATION: 95 % | WEIGHT: 183 LBS | HEART RATE: 98 BPM | BODY MASS INDEX: 31.24 KG/M2 | TEMPERATURE: 98.1 F | HEIGHT: 64 IN

## 2018-10-25 DIAGNOSIS — C50.411 MALIGNANT NEOPLASM OF UPPER-OUTER QUADRANT OF RIGHT FEMALE BREAST, UNSPECIFIED ESTROGEN RECEPTOR STATUS (H): ICD-10-CM

## 2018-10-25 DIAGNOSIS — Z00.6 EXAMINATION OF PARTICIPANT IN CLINICAL TRIAL: ICD-10-CM

## 2018-10-25 LAB
ALBUMIN SERPL-MCNC: 4.2 G/DL (ref 3.4–5)
ALP SERPL-CCNC: 58 U/L (ref 40–150)
ALT SERPL W P-5'-P-CCNC: 34 U/L (ref 0–50)
ANION GAP SERPL CALCULATED.3IONS-SCNC: 6 MMOL/L (ref 3–14)
AST SERPL W P-5'-P-CCNC: 23 U/L (ref 0–45)
BASOPHILS # BLD AUTO: 0 10E9/L (ref 0–0.2)
BASOPHILS NFR BLD AUTO: 0.7 %
BILIRUB SERPL-MCNC: 0.8 MG/DL (ref 0.2–1.3)
BUN SERPL-MCNC: 18 MG/DL (ref 7–30)
CALCIUM SERPL-MCNC: 9.4 MG/DL (ref 8.5–10.1)
CANCER AG27-29 SERPL-ACNC: 24 U/ML (ref 0–39)
CHLORIDE SERPL-SCNC: 104 MMOL/L (ref 94–109)
CO2 SERPL-SCNC: 29 MMOL/L (ref 20–32)
CREAT SERPL-MCNC: 0.93 MG/DL (ref 0.52–1.04)
DIFFERENTIAL METHOD BLD: NORMAL
EOSINOPHIL # BLD AUTO: 0.2 10E9/L (ref 0–0.7)
EOSINOPHIL NFR BLD AUTO: 2.8 %
ERYTHROCYTE [DISTWIDTH] IN BLOOD BY AUTOMATED COUNT: 12.8 % (ref 10–15)
GFR SERPL CREATININE-BSD FRML MDRD: 64 ML/MIN/1.7M2
GLUCOSE SERPL-MCNC: 87 MG/DL (ref 70–99)
HBA1C MFR BLD: 5.7 % (ref 0–5.6)
HCT VFR BLD AUTO: 41.6 % (ref 35–47)
HGB BLD-MCNC: 13.5 G/DL (ref 11.7–15.7)
IMM GRANULOCYTES # BLD: 0 10E9/L (ref 0–0.4)
IMM GRANULOCYTES NFR BLD: 0.2 %
LYMPHOCYTES # BLD AUTO: 1.8 10E9/L (ref 0.8–5.3)
LYMPHOCYTES NFR BLD AUTO: 33.2 %
MCH RBC QN AUTO: 28.9 PG (ref 26.5–33)
MCHC RBC AUTO-ENTMCNC: 32.5 G/DL (ref 31.5–36.5)
MCV RBC AUTO: 89 FL (ref 78–100)
MONOCYTES # BLD AUTO: 0.5 10E9/L (ref 0–1.3)
MONOCYTES NFR BLD AUTO: 9.5 %
NEUTROPHILS # BLD AUTO: 2.9 10E9/L (ref 1.6–8.3)
NEUTROPHILS NFR BLD AUTO: 53.6 %
NRBC # BLD AUTO: 0 10*3/UL
NRBC BLD AUTO-RTO: 0 /100
PLATELET # BLD AUTO: 295 10E9/L (ref 150–450)
POTASSIUM SERPL-SCNC: 4.5 MMOL/L (ref 3.4–5.3)
PROT SERPL-MCNC: 8.3 G/DL (ref 6.8–8.8)
RBC # BLD AUTO: 4.67 10E12/L (ref 3.8–5.2)
SODIUM SERPL-SCNC: 139 MMOL/L (ref 133–144)
WBC # BLD AUTO: 5.5 10E9/L (ref 4–11)

## 2018-10-25 PROCEDURE — G0463 HOSPITAL OUTPT CLINIC VISIT: HCPCS

## 2018-10-25 PROCEDURE — 86300 IMMUNOASSAY TUMOR CA 15-3: CPT | Performed by: INTERNAL MEDICINE

## 2018-10-25 PROCEDURE — 83036 HEMOGLOBIN GLYCOSYLATED A1C: CPT | Performed by: INTERNAL MEDICINE

## 2018-10-25 PROCEDURE — 85025 COMPLETE CBC W/AUTO DIFF WBC: CPT | Performed by: INTERNAL MEDICINE

## 2018-10-25 PROCEDURE — 36415 COLL VENOUS BLD VENIPUNCTURE: CPT

## 2018-10-25 PROCEDURE — 99214 OFFICE O/P EST MOD 30 MIN: CPT | Performed by: INTERNAL MEDICINE

## 2018-10-25 PROCEDURE — 80053 COMPREHEN METABOLIC PANEL: CPT | Performed by: INTERNAL MEDICINE

## 2018-10-25 ASSESSMENT — PAIN SCALES - GENERAL: PAINLEVEL: NO PAIN (0)

## 2018-10-25 NOTE — PROGRESS NOTES
Visit Date:   10/25/2018      HISTORY OF PRESENT ILLNESS:  Shantell Wagner is a 48-year-old patient who comes in today for interim followup.  She has got a diagnosis of a right-sided breast cancer in the upper outer quadrant.  She also has a diagnosis of a JAK2 mutation.  She presented in the spring of 2016 with a locally advanced right-sided breast cancer with a 2.2 cm tumor in the right breast and a 2.7 cm axillary mass on the right side.  She was treated neoadjuvantly with Adriamycin and Cytoxan followed by Taxol.  Her tumor was ER positive, DC negative, HER2 receptor negative.  She then started on adjuvant hormonal therapy and is on the PALLAS trial.  She got randomized to tamoxifen only.  She is here today for her interim 3-month followup.  She is getting some side effects from the tamoxifen, particularly hot flashes and also leg cramping.  We have discussed these side effects today and I have given her some ideas on what to take.  She is also having some intermittent discomfort around the left-sided implant which could be attributable to some scar tissue.  Other than that, a 14-point comprehensive review of systems is unremarkable.      MEDICATIONS:  As charted.      ALLERGIES:  As charted.        PHYSICAL EXAMINATION:   GENERAL:  On physical exam, she is well-appearing lady in no acute distress.   VITAL SIGNS:  Stable.     HEENT:  Oropharynx clear.  Mucous membranes moist.  Her overall performance status is 0.   NECK:  No masses or goiter.   CHEST:  Clear to auscultation and percussion bilaterally.   HEART:  Sounds 1, 2 normal.  No added sounds or murmurs.   ABDOMEN:  Soft and nontender.  No hepatosplenomegaly.     EXTREMITIES:  Legs without tenderness or edema.     BREASTS:  The patient is status post bilateral mastectomies.  Scars look good.  Right and left axilla are negative.  She has got implants in place.  I did not feel anything palpable on the left side where she is having some intermittent  discomfort.   SKIN:  No rashes or lesions.   LYMPHATIC:  No evidence of lymphedema.      DATA REVIEW:  Her labs are pending today.  I have added as well a breast cancer marker.        IMPRESSION:  The patient with high risk right-sided breast cancer in the upper outer quadrant.  She is over 2 years out from her diagnosis.  She is status post neoadjuvant chemotherapy and is now on tamoxifen on the PALLAS trial.  She is tolerating it well.  Her performance status is 0 today.  They have given her some suggestions of what to take for hot flashes and also leg cramping.  Per her clinical trial, she will be due to be seen in 3 months.  I will check all of her labs and be in contact with her if there is anything sinister.  She is also going to follow up with Dr. Vazquez with regard to her implants.         MICH ORTIZ MD             D: 10/25/2018   T: 10/25/2018   MT: ROBERTA      Name:     CADE GARCÍA   MRN:      [id number]        Account:      [id number]   :      1970           Visit Date:   10/25/2018      Document: [id number]

## 2018-10-25 NOTE — LETTER
10/25/2018         RE: Shant[place] Wagner  [address] [facility] [facility] [address]        Dear Colleague,    Thank you for referring your patient, Shantell Wagner, to the [facility]. Please see a copy of my visit note below.    Visit Date:   10/25/2018      HISTORY OF PRESENT ILLNESS:  Shantell Wagner is a 48-year-old patient who comes in today for interim followup.  She has got a diagnosis of a right-sided breast cancer in the upper outer quadrant.  She also has a diagnosis of a JAK2 mutation.  She presented in the spring of 2016 with a locally advanced right-sided breast cancer with a 2.2 cm tumor in the right breast and a 2.7 cm axillary mass on the right side.  She was treated neoadjuvantly with Adriamycin and Cytoxan followed by Taxol.  Her tumor was ER positive, IA negative, HER2 receptor negative.  She then started on adjuvant hormonal therapy and is on the PALLAS trial.  She got randomized to tamoxifen only.  She is here today for her interim 3-month followup.  She is getting some side effects from the tamoxifen, particularly hot flashes and also leg cramping.  We have discussed these side effects today and I have given her some ideas on what to take.  She is also having some intermittent discomfort around the left-sided implant which could be attributable to some scar tissue.  Other than that, a 14-point comprehensive review of systems is unremarkable.      MEDICATIONS:  As charted.      ALLERGIES:  As charted.        PHYSICAL EXAMINATION:   GENERAL:  On physical exam, she is well-appearing lady in no acute distress.   VITAL SIGNS:  Stable.     HEENT:  Oropharynx clear.  Mucous membranes moist.  Her overall performance status is 0.   NECK:  No masses or goiter.   CHEST:  Clear to auscultation and percussion bilaterally.   HEART:  Sounds 1, 2 normal.  No added sounds or murmurs.   ABDOMEN:  Soft and nontender.  No hepatosplenomegaly.     EXTREMITIES:  Legs without  tenderness or edema.     BREASTS:  The patient is status post bilateral mastectomies.  Scars look good.  Right and left axilla are negative.  She has got implants in place.  I did not feel anything palpable on the left side where she is having some intermittent discomfort.   SKIN:  No rashes or lesions.   LYMPHATIC:  No evidence of lymphedema.      DATA REVIEW:  Her labs are pending today.  I have added as well a breast cancer marker.        IMPRESSION:  The patient with high risk right-sided breast cancer in the upper outer quadrant.  She is over 2 years out from her diagnosis.  She is status post neoadjuvant chemotherapy and is now on tamoxifen on the PALLAS trial.  She is tolerating it well.  Her performance status is 0 today.  They have given her some suggestions of what to take for hot flashes and also leg cramping.  Per her clinical trial, she will be due to be seen in 3 months.  I will check all of her labs and be in contact with her if there is anything sinister.  She is also going to follow up with Dr. Vazquez with regard to her implants.         MICH KNOTT MD             D: 10/25/2018   T: 10/25/2018   MT: ROBERTA      Name:     CADE GARCÍA   MRN:      [id number]        Account:      [id number]   :      1970           Visit Date:   10/25/2018      Document: [id number]       Again, thank you for allowing me to participate in the care of your patient.        Sincerely,        Mich Knott MD

## 2018-10-25 NOTE — NURSING NOTE
"Oncology Rooming Note    October 25, 2018 11:43 AM   Shantell Wagner is a 48 year old female who presents for:    Chief Complaint   Patient presents with     Oncology Clinic Visit     Malignant neoplasm of upper-outer quadrant of right female breast     Initial Vitals: /83  Pulse 98  Temp 98.1  F (36.7  C) (Oral)  Resp 14  Ht 1.626 m (5' 4\")  Wt 83 kg (183 lb)  SpO2 95%  BMI 31.41 kg/m2 Estimated body mass index is 31.41 kg/(m^2) as calculated from the following:    Height as of this encounter: 1.626 m (5' 4\").    Weight as of this encounter: 83 kg (183 lb). Body surface area is 1.94 meters squared.  No Pain (0) Comment: Data Unavailable   No LMP recorded.  Allergies reviewed: Yes  Medications reviewed: Yes    Medications: Medication refills not needed today.  Pharmacy name entered into [employer]: [id number] [place], [place] [facility] [address]    Clinical concerns: f/u     8 minutes for nursing intake (face to face time)     Mell Barton CMA            DISCHARGE PLAN:  Next appointments: See patient instruction section  Departure Mode: Ambulatory  Accompanied by:   0 minutes for nursing discharge (face to face time)   Mell Barton CMA      "

## 2018-11-09 DIAGNOSIS — C50.411 MALIGNANT NEOPLASM OF UPPER-OUTER QUADRANT OF RIGHT FEMALE BREAST, UNSPECIFIED ESTROGEN RECEPTOR STATUS (H): ICD-10-CM

## 2018-11-09 RX ORDER — VENLAFAXINE 37.5 MG/1
37.5 TABLET ORAL 2 TIMES DAILY
Qty: 180 TABLET | Refills: 1 | Status: SHIPPED | OUTPATIENT
Start: 2018-11-09 | End: 2019-05-02

## 2018-11-09 NOTE — TELEPHONE ENCOUNTER
Pending Prescriptions:                       Disp   Refills    venlafaxine (EFFEXOR) 37.5 MG tablet      180 ta*1            Sig: Take 1 tablet (37.5 mg) by mouth 2 times daily          Last Written Prescription Date:  05/10/2018  Last Fill Quantity: 180,   # refills: 1  Last Office Visit: 10/25/2018  Future Office visit:    Next 5 appointments (look out 90 days)     Jan 17, 2019  8:00 AM CST   Return Visit with Tata Knott MD   [facility] ([facility])    [facility]  [address]  [address]  [facility] [address] [phone]                   Routing refill request to provider for review.  Elysia Krishnamurthy, RN, BSN, OCN

## 2018-11-16 ENCOUNTER — TRANSFERRED RECORDS (OUTPATIENT)
Dept: HEALTH INFORMATION MANAGEMENT | Facility: CLINIC | Age: 48
End: 2018-11-16

## 2018-12-07 ENCOUNTER — TRANSFERRED RECORDS (OUTPATIENT)
Dept: HEALTH INFORMATION MANAGEMENT | Facility: CLINIC | Age: 48
End: 2018-12-07

## 2019-01-17 ENCOUNTER — ONCOLOGY VISIT (OUTPATIENT)
Dept: ONCOLOGY | Facility: CLINIC | Age: 49
End: 2019-01-17
Attending: INTERNAL MEDICINE
Payer: COMMERCIAL

## 2019-01-17 ENCOUNTER — HOSPITAL ENCOUNTER (OUTPATIENT)
Facility: CLINIC | Age: 49
Setting detail: SPECIMEN
Discharge: HOME | End: 2019-01-17
Attending: INTERNAL MEDICINE | Admitting: INTERNAL MEDICINE
Payer: COMMERCIAL

## 2019-01-17 VITALS
BODY MASS INDEX: 32.78 KG/M2 | TEMPERATURE: 98 F | HEART RATE: 95 BPM | WEIGHT: 192 LBS | DIASTOLIC BLOOD PRESSURE: 82 MMHG | RESPIRATION RATE: 16 BRPM | HEIGHT: 64 IN | SYSTOLIC BLOOD PRESSURE: 124 MMHG | OXYGEN SATURATION: 98 %

## 2019-01-17 DIAGNOSIS — C50.411 MALIGNANT NEOPLASM OF UPPER-OUTER QUADRANT OF RIGHT FEMALE BREAST, UNSPECIFIED ESTROGEN RECEPTOR STATUS (H): ICD-10-CM

## 2019-01-17 LAB
ALBUMIN SERPL-MCNC: 3.5 G/DL (ref 3.4–5)
ALP SERPL-CCNC: 62 U/L (ref 40–150)
ALT SERPL W P-5'-P-CCNC: 30 U/L (ref 0–50)
ANION GAP SERPL CALCULATED.3IONS-SCNC: 4 MMOL/L (ref 3–14)
AST SERPL W P-5'-P-CCNC: 20 U/L (ref 0–45)
BASOPHILS # BLD AUTO: 0 10E9/L (ref 0–0.2)
BASOPHILS NFR BLD AUTO: 0.9 %
BILIRUB SERPL-MCNC: 0.5 MG/DL (ref 0.2–1.3)
BUN SERPL-MCNC: 20 MG/DL (ref 7–30)
CALCIUM SERPL-MCNC: 9.1 MG/DL (ref 8.5–10.1)
CHLORIDE SERPL-SCNC: 104 MMOL/L (ref 94–109)
CO2 SERPL-SCNC: 30 MMOL/L (ref 20–32)
CREAT SERPL-MCNC: 0.8 MG/DL (ref 0.52–1.04)
DIFFERENTIAL METHOD BLD: NORMAL
EOSINOPHIL # BLD AUTO: 0.2 10E9/L (ref 0–0.7)
EOSINOPHIL NFR BLD AUTO: 4 %
ERYTHROCYTE [DISTWIDTH] IN BLOOD BY AUTOMATED COUNT: 12.7 % (ref 10–15)
GFR SERPL CREATININE-BSD FRML MDRD: 87 ML/MIN/{1.73_M2}
GLUCOSE SERPL-MCNC: 92 MG/DL (ref 70–99)
HBA1C MFR BLD: 5.8 % (ref 0–5.6)
HCT VFR BLD AUTO: 39.6 % (ref 35–47)
HGB BLD-MCNC: 12.9 G/DL (ref 11.7–15.7)
IMM GRANULOCYTES # BLD: 0 10E9/L (ref 0–0.4)
IMM GRANULOCYTES NFR BLD: 0.2 %
LYMPHOCYTES # BLD AUTO: 1.8 10E9/L (ref 0.8–5.3)
LYMPHOCYTES NFR BLD AUTO: 39.1 %
MCH RBC QN AUTO: 29.3 PG (ref 26.5–33)
MCHC RBC AUTO-ENTMCNC: 32.6 G/DL (ref 31.5–36.5)
MCV RBC AUTO: 90 FL (ref 78–100)
MONOCYTES # BLD AUTO: 0.4 10E9/L (ref 0–1.3)
MONOCYTES NFR BLD AUTO: 8.7 %
NEUTROPHILS # BLD AUTO: 2.1 10E9/L (ref 1.6–8.3)
NEUTROPHILS NFR BLD AUTO: 47.1 %
NRBC # BLD AUTO: 0 10*3/UL
NRBC BLD AUTO-RTO: 0 /100
PLATELET # BLD AUTO: 308 10E9/L (ref 150–450)
POTASSIUM SERPL-SCNC: 4.5 MMOL/L (ref 3.4–5.3)
PROT SERPL-MCNC: 7.5 G/DL (ref 6.8–8.8)
RBC # BLD AUTO: 4.4 10E12/L (ref 3.8–5.2)
SODIUM SERPL-SCNC: 138 MMOL/L (ref 133–144)
WBC # BLD AUTO: 4.5 10E9/L (ref 4–11)

## 2019-01-17 PROCEDURE — G0463 HOSPITAL OUTPT CLINIC VISIT: HCPCS

## 2019-01-17 PROCEDURE — 85025 COMPLETE CBC W/AUTO DIFF WBC: CPT | Performed by: INTERNAL MEDICINE

## 2019-01-17 PROCEDURE — 83036 HEMOGLOBIN GLYCOSYLATED A1C: CPT | Performed by: INTERNAL MEDICINE

## 2019-01-17 PROCEDURE — 99214 OFFICE O/P EST MOD 30 MIN: CPT | Performed by: INTERNAL MEDICINE

## 2019-01-17 PROCEDURE — 80053 COMPREHEN METABOLIC PANEL: CPT | Performed by: INTERNAL MEDICINE

## 2019-01-17 ASSESSMENT — PAIN SCALES - GENERAL: PAINLEVEL: NO PAIN (0)

## 2019-01-17 ASSESSMENT — MIFFLIN-ST. JEOR: SCORE: 1485.91

## 2019-01-17 NOTE — PATIENT INSTRUCTIONS
Please schedule provider visit for Pallas trial between May 11th-May 23rd with labs prior (cbc with diff, CMP, and HgbA1c).     Thank you.     Lauren Aase, RN [facility]/ Molly Ph. [phone]   _______________________________________________________________  Scheduled 5/23/19. Romana MENDOZA

## 2019-01-17 NOTE — LETTER
1/17/2019         RE: Shantell Wagner  [address] [facility] [address]        Dear Colleague,    Thank you for referring your patient, Shantell Wagner, to the [facility]. Please see a copy of my visit note below.    Visit Date:   01/17/2019      HISTORY OF PRESENT ILLNESS:  Shantell Wagner is a 48-year-old patient who comes in today for interim followup.  She has got a diagnosis of a right-sided breast cancer that was located in the upper outer quadrant of the right breast.  She presented with a locally advanced right-sided breast cancer with a 2.2 cm tumor in the right breast and a 2.7 cm axillary mass on the right side.  She had neoadjuvant chemotherapy with Adriamycin and Cytoxan followed by Taxol.  Her tumor was ER positive, DC negative, HER2 receptor negative.  She then started adjuvant hormonal therapy on the PALLAS trial.  She got randomized to tamoxifen only.  She comes in today for her interim followup.  She also carries a diagnosis of CHEK2 mutation.  We have discussed her mutation today.  She is up-to-date on a colonoscopy.  There has not been any history of colon cancer in her family.  The CHEK2 mutation seems to have arisen from her paternal side.  She has 3 sisters, one of whom had breast cancer in her 30s who does not carry the CHEK2 mutation and 2 other sisters in their 50s who have not yet been tested.  I have discussed the importance of testing with her today and she tells me both her sisters who have not been tested are aware that testing is recommended.  She denies today any cough, shortness of breath, bone pain or worrisome symptomatology from a breast cancer standpoint.  She is still on the tamoxifen. She had leg cramps on it but takes magnesium and the leg cramps are less.  She is also continuing to get some hot flashes.  She continues to work full-time.      REVIEW OF SYSTEMS:  A 14-point comprehensive review of systems apart from what is  outlined above is otherwise unremarkable.      MEDICATIONS:  As charted.      ALLERGIES:  As charted.      FAMILY HISTORY:  History of prostate cancer and breast cancer on her paternal side, a sister with breast cancer at age 36.      MEDICATIONS AND ALLERGIES:  Outlined in the nursing record.      PHYSICAL EXAMINATION:  She is well appearing lady in no acute distress.   VITAL SIGNS:  Stable.  Oropharynx clear.  Mucous membranes moist.   NECK:  No masses or goiter.  Her overall performance status is 0.   CHEST:  Clear to auscultation and percussion bilaterally.   HEART:  Sounds 1, 2 normal.  No added sounds, no murmurs.   ABDOMEN:  Soft and nontender.  No hepatosplenomegaly.  Legs without tenderness or edema.  The patient is status post bilateral mastectomy.  The scars are good.  Right and left axilla are negative.  She has got bilateral implants in place.  There are no palpable masses on the chest wall.   SKIN:  No rashes or lesions.   LYMPHATIC:  No evidence of lymphedema.      LABORATORY DATA:  White cell count 4.5, hemoglobin 12.9, platelets 308.  The rest of her labs are pending at the time of dictation.      ASSESSMENT AND PLAN:  A 48-year-old patient who presented in 2016 with a high risk right-sided breast cancer in the upper outer quadrant of the right breast.  She was treated neoadjuvantly and is no approaching 3 years out from her diagnosis.  She is currently on the PALLAS trial and is on tamoxifen only.  She is tolerating it well.  She does get some side effects from it.  We have discussed her CHEK2 mutation today and we have discussed testing of family members.  Per her clinical trial, she will be seen back in my clinic in 3 to 4 months, which would be approximately mid-May.  She is aware of the plan.       Consultation time 25 minutes, the majority of time was spent in counseling and directed patient care.         MICH ORTIZ MD             D: 01/17/2019   T: 01/18/2019   MT: ARLYN       Name:     CADE GARCÍA   MRN:      [id number]        Account:      [id number]   :      1970           Visit Date:   2019      Document: [id number]       Again, thank you for allowing me to participate in the care of your patient.        Sincerely,        Tata Knott MD

## 2019-01-17 NOTE — NURSING NOTE
"Oncology Rooming Note    January 17, 2019 8:26 AM   Shantell Wagner is a 48 year old female who presents for:    Chief Complaint   Patient presents with     Oncology Clinic Visit     Malignant neoplasm of right breast in female     Initial Vitals: /82   Pulse 95   Temp 98  F (36.7  C) (Tympanic)   Resp 16   Ht 1.626 m (5' 4\")   Wt 87.1 kg (192 lb)   SpO2 98%   BMI 32.96 kg/m   Estimated body mass index is 32.96 kg/m  as calculated from the following:    Height as of this encounter: 1.626 m (5' 4\").    Weight as of this encounter: 87.1 kg (192 lb). Body surface area is 1.98 meters squared.  No Pain (0) Comment: Data Unavailable   No LMP recorded.  Allergies reviewed: Yes  Medications reviewed: Yes    Medications: Medication refills not needed today.  Pharmacy name entered into [employer]: [id number] - [place], [place] - [address]    Clinical concerns: Follow Up    8 minutes for nursing intake (face to face time)     Marika Rodriguez CMA            DISCHARGE PLAN:  Next appointments: See patient instruction section  Departure Mode: Ambulatory  Accompanied by: self  0 minutes for nursing discharge (face to face time)   Marika Rodriguez CMA      "

## 2019-01-18 NOTE — PROGRESS NOTES
Visit Date:   01/17/2019      HISTORY OF PRESENT ILLNESS:  Shantell Wagner is a 48-year-old patient who comes in today for interim followup.  She has got a diagnosis of a right-sided breast cancer that was located in the upper outer quadrant of the right breast.  She presented with a locally advanced right-sided breast cancer with a 2.2 cm tumor in the right breast and a 2.7 cm axillary mass on the right side.  She had neoadjuvant chemotherapy with Adriamycin and Cytoxan followed by Taxol.  Her tumor was ER positive, CT negative, HER2 receptor negative.  She then started adjuvant hormonal therapy on the PALLAS trial.  She got randomized to tamoxifen only.  She comes in today for her interim followup.  She also carries a diagnosis of CHEK2 mutation.  We have discussed her mutation today.  She is up-to-date on a colonoscopy.  There has not been any history of colon cancer in her family.  The CHEK2 mutation seems to have arisen from her paternal side.  She has 3 sisters, one of whom had breast cancer in her 30s who does not carry the CHEK2 mutation and 2 other sisters in their 50s who have not yet been tested.  I have discussed the importance of testing with her today and she tells me both her sisters who have not been tested are aware that testing is recommended.  She denies today any cough, shortness of breath, bone pain or worrisome symptomatology from a breast cancer standpoint.  She is still on the tamoxifen. She had leg cramps on it but takes magnesium and the leg cramps are less.  She is also continuing to get some hot flashes.  She continues to work full-time.      REVIEW OF SYSTEMS:  A 14-point comprehensive review of systems apart from what is outlined above is otherwise unremarkable.      MEDICATIONS:  As charted.      ALLERGIES:  As charted.      FAMILY HISTORY:  History of prostate cancer and breast cancer on her paternal side, a sister with breast cancer at age 36.      MEDICATIONS AND ALLERGIES:   Outlined in the nursing record.      PHYSICAL EXAMINATION:  She is well appearing lady in no acute distress.   VITAL SIGNS:  Stable.  Oropharynx clear.  Mucous membranes moist.   NECK:  No masses or goiter.  Her overall performance status is 0.   CHEST:  Clear to auscultation and percussion bilaterally.   HEART:  Sounds 1, 2 normal.  No added sounds, no murmurs.   ABDOMEN:  Soft and nontender.  No hepatosplenomegaly.  Legs without tenderness or edema.  The patient is status post bilateral mastectomy.  The scars are good.  Right and left axilla are negative.  She has got bilateral implants in place.  There are no palpable masses on the chest wall.   SKIN:  No rashes or lesions.   LYMPHATIC:  No evidence of lymphedema.      LABORATORY DATA:  White cell count 4.5, hemoglobin 12.9, platelets 308.  The rest of her labs are pending at the time of dictation.      ASSESSMENT AND PLAN:  A 48-year-old patient who presented in 2016 with a high risk right-sided breast cancer in the upper outer quadrant of the right breast.  She was treated neoadjuvantly and is no approaching 3 years out from her diagnosis.  She is currently on the PALLAS trial and is on tamoxifen only.  She is tolerating it well.  She does get some side effects from it.  We have discussed her CHEK2 mutation today and we have discussed testing of family members.  Per her clinical trial, she will be seen back in my clinic in 3 to 4 months, which would be approximately mid-May.  She is aware of the plan.       Consultation time 25 minutes, the majority of time was spent in counseling and directed patient care.         MICH ORTIZ MD             D: 2019   T: 2019   MT: ARLYN      Name:     CADE GARCÍA   MRN:      [id number]        Account:      [id number]   :      1970           Visit Date:   2019      Document: [id number]

## 2019-01-30 DIAGNOSIS — Z17.0 MALIGNANT NEOPLASM OF BREAST IN FEMALE, ESTROGEN RECEPTOR POSITIVE, UNSPECIFIED LATERALITY, UNSPECIFIED SITE OF BREAST (H): ICD-10-CM

## 2019-01-30 DIAGNOSIS — C50.919 MALIGNANT NEOPLASM OF BREAST IN FEMALE, ESTROGEN RECEPTOR POSITIVE, UNSPECIFIED LATERALITY, UNSPECIFIED SITE OF BREAST (H): ICD-10-CM

## 2019-01-30 RX ORDER — TAMOXIFEN CITRATE 20 MG/1
20 TABLET ORAL DAILY
Qty: 90 TABLET | Refills: 3 | Status: SHIPPED | OUTPATIENT
Start: 2019-01-30 | End: 2020-03-10

## 2019-01-30 NOTE — TELEPHONE ENCOUNTER
Signed Prescriptions:                        Disp   Refills    tamoxifen (NOLVADEX) 20 MG tablet          90 tab*3        Sig: Take 1 tablet (20 mg) by mouth daily  Authorizing Provider: MICH KNOTT     Rx has been approved by Dr Knott.  Dot Paul, RN, BSN, OCN

## 2019-01-30 NOTE — TELEPHONE ENCOUNTER
Pending Prescriptions:                       Disp   Refills    tamoxifen (NOLVADEX) 20 MG tablet         90 tab*3            Sig: Take 1 tablet (20 mg) by mouth daily         Last Written Prescription Date:  2/8/2018  Last Fill Quantity: 90,   # refills: 3  Last Office Visit: 1/17/2019  Future Office visit:   5/23/2019    Routing refill request to provider for review/approval.  Dot Paul, RN, BSN, OCN

## 2019-03-16 DIAGNOSIS — M79.605 PAIN IN BOTH LOWER EXTREMITIES: ICD-10-CM

## 2019-03-16 DIAGNOSIS — M79.604 PAIN IN BOTH LOWER EXTREMITIES: ICD-10-CM

## 2019-03-16 NOTE — TELEPHONE ENCOUNTER
Requested Prescriptions   Pending Prescriptions Disp Refills     gabapentin (NEURONTIN) 300 MG capsule [Pharmacy Med Name: GABAPENTIN  Last Written Prescription Date:  historical  Last Fill Quantity: ?,  # refills: ?   Last office visit: 4/13/2018 with prescribing provider:  4/13/18   Future Office Visit:   Next 5 appointments (look out 90 days)    May 23, 2019 10:30 AM CDT  Return Visit with Tata Knott MD  [facility]  [address]  [address]  [facility] [address]  [phone]          300 MG CAPSULE] 90 capsule 3     Sig: TAKE 1 CAPSULE (300 MG) BY MOUTH AT BEDTIME    There is no refill protocol information for this order

## 2019-03-18 NOTE — TELEPHONE ENCOUNTER
Routing refill request to provider for review/approval because:  Drug not on the [facility] refill protocol   Unable to review     Paige Park RN

## 2019-03-19 NOTE — TELEPHONE ENCOUNTER
Can we check to see if pt is taking.  If so, go ahead and send refill #90 with 1 refill.  ENRIQUE.

## 2019-03-21 RX ORDER — GABAPENTIN 300 MG/1
CAPSULE ORAL
Qty: 90 CAPSULE | Refills: 1 | Status: SHIPPED | OUTPATIENT
Start: 2019-03-21 | End: 2019-04-19

## 2019-03-21 NOTE — TELEPHONE ENCOUNTER
Patient returned call. She is still taking the gabapentin. Informed patient that she will be given a 90 capsule supply with one refill. Patient scheduled a physical with Margaret on 4/19.    Melony LEWIS - JIN/NO  3/21/2019 8:41 AM

## 2019-03-21 NOTE — TELEPHONE ENCOUNTER
RN filled with 90 plus 1 refill with scheduled appointment as advised by PCP below.    Next 5 appointments (look out 90 days)    Apr 19, 2019  8:20 AM CDT  PHYSICAL with NIKKI Santos Ra, CNP  [facility] [address] [facility] [address] [phone]   May 23, 2019 10:30 AM CDT  Return Visit with Tata Knott MD  [facility]  [id number] [place] [address]  [address]  [facility] [address]  [phone]        Jannette JOHNSON RN - Triage  [facility]

## 2019-04-05 ENCOUNTER — TRANSFERRED RECORDS (OUTPATIENT)
Dept: HEALTH INFORMATION MANAGEMENT | Facility: CLINIC | Age: 49
End: 2019-04-05

## 2019-04-08 ENCOUNTER — OFFICE VISIT (OUTPATIENT)
Dept: SLEEP MEDICINE | Facility: CLINIC | Age: 49
End: 2019-04-08
Payer: COMMERCIAL

## 2019-04-08 VITALS
HEART RATE: 112 BPM | DIASTOLIC BLOOD PRESSURE: 89 MMHG | HEIGHT: 64 IN | RESPIRATION RATE: 20 BRPM | WEIGHT: 191 LBS | BODY MASS INDEX: 32.61 KG/M2 | SYSTOLIC BLOOD PRESSURE: 123 MMHG

## 2019-04-08 DIAGNOSIS — G47.9 SLEEP DISTURBANCE: Primary | ICD-10-CM

## 2019-04-08 DIAGNOSIS — Z72.821 POOR SLEEP HYGIENE: ICD-10-CM

## 2019-04-08 DIAGNOSIS — G25.81 RESTLESS LEGS SYNDROME (RLS): ICD-10-CM

## 2019-04-08 DIAGNOSIS — E66.811 OBESITY (BMI 30.0-34.9): ICD-10-CM

## 2019-04-08 PROCEDURE — 99203 OFFICE O/P NEW LOW 30 MIN: CPT | Performed by: INTERNAL MEDICINE

## 2019-04-08 RX ORDER — ZOLPIDEM TARTRATE 5 MG/1
TABLET ORAL
Qty: 1 TABLET | Refills: 0 | Status: SHIPPED | OUTPATIENT
Start: 2019-04-08 | End: 2019-04-19

## 2019-04-08 ASSESSMENT — MIFFLIN-ST. JEOR: SCORE: 1481.37

## 2019-04-08 NOTE — PROGRESS NOTES
[facility]  Outpatient Sleep Medicine Consultation  April 8, 2019      Name: Shantell Wagner MRN# [id number]   Age: 48 year old YOB: 1970     Date of Consultation: April 8, 2019  Consultation is requested by: Brigido Roman MD  No address on file  Primary care provider: Margaret Longo Ra  Home clinic: [facility]       Reason for Sleep Consult:     Shantell Wagner is a 48 year old female nightly snoring, gasping, choking, poor quality of sleep and excessive daytime sleepiness and tiredness.         Assessment and Plan:     Summary Sleep Diagnoses/Recommendations:    1. Sleep Disturbance/hypersomnolence:  High suspicion of sleep disordered breathing based on patient's symptoms (snoring, excessive daytime sleepiness and tiredness and waking up for gasping air), high BMI, neck circumference and oropharyngeal examination. Will schedule Home sleep study. We also discussed the pathophysiology of sleep disordered breathing and the importance of treating it if S/he should have it. Patient is advised not to drive if he/she feels drowsy or sleepy. Hand out was given to the patient.  Follow up after sleep study to discuss the result of sleep study and treatment options.    2. Obesity:  Counseled regarding weight loss through diet modification and increased physical activity. Patient was given instuctions of weight loss and advised to follow up her PCP for further weight loss interventions.    3.  Suspected restless leg syndrome in setting of neuropathy, obtain ferritin, iron panel and CBC. Instructions given. Continue Gabapentin.     4.  Poor sleep hygiene:   - We instructed patient to develop regular sleep-wake schedule and regular sleep habits including regular bedtime and wake time to strengthen circadian rhythm, to sleep as much as needed to feel refreshed, not to spend more time in bed than needed. Bedroom should be dark, cool and quiet.  - We also asked patient  to avoid napping during the day, forcing you to sleep, taking problems to bed, strenuous activity just before bedtime, use of caffeine, alcohol or tobacco just before bedtime, reading, eating or watching TV in bed.  Good sleep hygiene and sleep-wake instructions were given.      Orders Placed This Encounter   Procedures     HST-Home Sleep Apnea Test     Ferritin     Iron and Iron Binding Capacity     CBC with platelets differential    Ambien 5 mg x1    Summary Counseling:  See instructions    Counseling included a comprehensive review of diagnostic and therapeutic strategies as well as risks of inadequate therapy.  Educational materials provided in instructions.    All questions were answered.  The patient indicates understanding of the above issues and agrees with the plan set forth.           History of Present Illness:     Shantell Wagner is a 48 year old female with history of hypertension, breast cancer s/p bilateral mastectomy, hyperlipidemia, nasal fracture, menopause state and neuropathy who presents to the [facility] in [place] with complains of sleep disturbance.    Patient complaints snoring, waking up gasping air as per , excessive daytime sleepiness and tiredness., acid reflux, night sweats, dry mouth and throat, frequent awakening at night for couple of years. Patient has restless legs related to neuropathy, but denies leg kicking or toss and turn. She sleeps her side. Her above symptoms worse when she sleeps her back. She has vivid dreams, and dreams seems look real at times. She denies grinding her teeth. She has vivid dreams and dreams looks real since Effexor therapy.    Please see below for sleep ROS details.    PREVIOUS IN- LAB or HOME SLEEP STUDIES:  None     SLEEP-WAKE SCHEDULE:     Shantell Wagner     -Describes themself as a morning person;      -ON WEEKDAYS and ON WEEKENDS, goes to sleep at 10:00 PM during the week; awakens  7:00 AM without an alarm; falls  asleep in 10 minutes; denies difficulty falling asleep.       -Awakens 2-3 times a night for 20-30 minutes before falling back to sleep; awakens to go to the bathroom, uncertain reasons and external stimuli.      -Total sleep time: 7-8 hours per night.    -Naps 0-1 times/days per week for 15-30 minutes, feels unrefreshed after naps; takes no inadvertant naps.       BEDTIME ACTIVITIES AND SHIFT WORK:    Shantell Wagner    -does use electronics in bed, watch TV in bed and read in bed and does not eat in bed and work in bed.     -does not do shift work.  He/she works day shifts.      Occupation: pharmacy technician      SCALES       SLEEP APNEA: Stopbang score: 4       INSOMNIA:  Insomnia severity score: 9       SLEEPINESS: [place] sleepiness scale (ESS):  12   Drowsy driving/near accidents: No          PHQ9: N/A    SLEEP COMPLAINTS:   Snoring- 7 days/week  Witness apnea: No  Gasping/Choking: Yes/No  Excessive daytime sleep: Yes  Toss/turn: No  Excessive tiredness/fatigue:  Yes  Morning headaches: No  Dry mouth/throat: Yes  Dyspnea: No  Coexisting Lung disease: No    Coexisting Heart disease: No    Does patient have a bed partner: Yes  Has bed partner been sleeping separately because of snoring:  No            RLS Screen: When you try to relax in the evening or sleep at  night, do you ever have unpleasant, restless feelings in your  legs that can be relieved by walking or movement? Yes    Periodic limb movement: No    Narcolepsy:       denies sudden urges of sleep attacks     denies cataplexy     denies sleep paralysis      denies hallucinations     Sleep Behaviors:     denies leg symptoms/movements     denies motor restlessness     denies night terrors     denies bruxism     denies automatic behaviors    Other subjective complaints:     denies anxiety or rumination      denies pain and discomfort at  night     denies waking up with heart pounding or racing     denies GERD/heartburn         Parasomnia:   NREM -  denies recurrent persistent confusional arousal, night eating, sleep walking or sleep terrors   REM  - denies dream enactment; no injuries. She has vivid dreams, not often. She wakes up screaming some times.    Safety: None             Medications:     Current Outpatient Medications   Medication Sig     Acetaminophen (TYLENOL PO) Take 500 mg by mouth     atorvastatin (LIPITOR) 20 MG tablet TAKE 1 TABLET (20 MG) BY MOUTH DAILY     calcium carb 1250 mg, 500 mg [place],/vitamin D 200 units (OSCAL WITH D) 500-200 MG-UNIT per tablet Take 1 tablet by mouth daily Reported on 4/13/2017     fluticasone (FLONASE) 50 MCG/ACT spray Spray 1-2 sprays into both nostrils daily     gabapentin (NEURONTIN) 300 MG capsule TAKE 1 CAPSULE (300 MG) BY MOUTH AT BEDTIME     glucosamine-chondroitin 500-400 MG CAPS Take 1 capsule by mouth daily     IBU- MG OR TABS 1 TABLET EVERY 4 TO 6 HOURS AS NEEDED     lisinopril (PRINIVIL/ZESTRIL) 20 MG tablet TAKE 1 TABLET (20 MG) BY MOUTH DAILY     omeprazole (PRILOSEC) 20 MG capsule Take 20 mg by mouth daily     order for DME Hair prosthesis for chemotherapy induced alopecia     tamoxifen (NOLVADEX) 20 MG tablet Take 1 tablet (20 mg) by mouth daily     venlafaxine (EFFEXOR) 37.5 MG tablet Take 1 tablet (37.5 mg) by mouth 2 times daily     No current facility-administered medications for this visit.         Medication that can affect sleep: Gabapentin and effexor    Allergies   Allergen Reactions     No Known Drug Allergies             Past Medical History:     Does not need 02 supplement at night     Past Medical History:   Diagnosis Date     Abnormal Pap smear, can't excl hi gd sq intraepithelial lesion (ASC-H) 02/20/13    [place]/ECC= NEGRITA 1. Repeat HPV screen and ECC 12 months from colp.     Hyperlipidemia      Neuropathy      Nose fracture 1983    Accident, needed surgery               Past Surgical History:    Yes previous upper airway surgery:  Nasal surgery     Past Surgical History:   Procedure  Laterality Date     CARPAL TUNNEL RELEASE RT/LT  3/2010     HC RECONSTR NOSE  1983    after accident     INSERT PORT VASCULAR ACCESS Left 4/22/2016    Procedure: INSERT PORT VASCULAR ACCESS;  Surgeon: Nereyda Flowers MD;  Location: [facility] OR     INSERT TISSUE EXPANDER BREAST BILATERAL Bilateral 10/17/2016    Procedure: INSERT TISSUE EXPANDER BREAST BILATERAL;  Surgeon: Jenna Vazquez MD;  Location: [facility] OR     LASIK BILATERAL       MASTECTOMY MODIFIED RADICAL Right 10/17/2016    Procedure: MASTECTOMY MODIFIED RADICAL;  Surgeon: Nereyda Flowers MD;  Location: [facility] OR     MASTECTOMY MODIFIED RADICAL, SENTINEL NODE, COMBINED Left 10/17/2016    Procedure: COMBINED MASTECTOMY MODIFIED RADICAL, SENTINEL NODE;  Surgeon: Nereyda Flowers MD;  Location: [facility] OR     PROCURE GRAFT FAT Bilateral 4/25/2018    Procedure: PROCURE GRAFT FAT;;  Surgeon: Jenna Vazquez MD;  Location: [facility]     RECONSTRUCT BREAST BILATERAL, IMPLANT PROSTHESIS BILATERAL, COMBINED Bilateral 9/13/2017    Procedure: COMBINED RECONSTRUCT BREAST BILATERAL, IMPLANT PROSTHESIS BILATERAL;  BILATERAL SECOND STAGE BREAST RECONSTRUCTION WITH IMPLANT.;  Surgeon: Jenna Vazquez MD;  Location: [facility]     REMOVE CATHETER VASCULAR ACCESS Left 10/17/2016    Procedure: REMOVE CATHETER VASCULAR ACCESS;  Surgeon: Neryeda Flowers MD;  Location: [facility]     REVISE RECONSTRUCTED BREAST BILATERAL Bilateral 4/25/2018    Procedure: REVISE RECONSTRUCTED BREAST BILATERAL;  REVISION BILATERAL BREAST RECONSTRUCTION AND  FAT GRAFTING FROM AXILLA TO BILATERAL BREASTS.;  Surgeon: Jenna Vazquez MD;  Location: [facility]            Social History:     Social History     Tobacco Use     Smoking status: Never Smoker     Smokeless tobacco: Never Used   Substance Use Topics     Alcohol use: Yes     Alcohol/week: 0.0 oz     Comment: rare         Chemical History:     Tobacco: No     Uses 1-2 sodas/day. Last caffeine intake is usually before supper    EtOH:  "Yes  Recreational Drugs: No    Psych Hx:   None    Current dangers to self or others: None           Family History:     Family History   Problem Relation Age of Onset     C.A.D. Father         bypass surg age 68     Coronary Artery Disease Father      Prostate Cancer Father      Breast Cancer Sister 38        breast ca     Diabetes Type 1 Mother      Diabetes Mother      Breast Cancer Maternal Grandmother 70        dx'ed age 80s        Sleep Family Hx:        RLS- No  JJ - father  Insomnia - No  Parasomnia - No         Review of Systems:     A complete 10 point review of systems was negative other than HPI or as commented below:   Patient denies chest pain, dyspnea with activity and or rest, wheezing, abdominal pain, n&v, fever, chills, dysuria, leg pain or swelling. Patient is also denies ear pain, sore throat, postnasal drip.  Patient has night sweats, running nose, dry and productive cough.    Shantell Wagner has gained 0-5 pounds in the last year.            Physical Examination:   /89   Pulse 112   Resp 20   Ht 1.626 m (5' 4\")   Wt 86.6 kg (191 lb)   BMI 32.79 kg/m       Neck Circumference: 40 cm   Constitutional: . Awake, alert, cooperative, in no apparent distress  Mood: euthymic; affect congruent with full range and intensity.  Attention/Concentration:  Normal   Eyes: Pupils round and reactive. No icterus.  ENT: Mallampati Class: IV.   Tonsillar Stage: 1  hidden by pillars  Clear nasal passages. Enlarged inferior turbinates. No deviated septum.  Oropharynx: No high arched palate. No pharyngeal erythema or exudates, elongated uvula. No lateral narrowing  Tongue:  relative macroglossia   Dentition: Good.  Dentures: None  Neck: Supple, no thyroid enlargement.   Cardiovascular: Regular S1 and S2, no murmurs or gallops.    Pulmonary:  Chest symmetric, lungs reveal decreased breath sounds at bases. No rales or wheezes.  Abdomen: Soft, obese, non tender.  Extremities:  No pedal " edema.  Muscle/joint: Strength and tone normal   Skin:  No rash or significant lesions examined areas of skin.   Neurologic: Alert, oriented x3, no focal neurological deficit.           Data: All pertinent previous laboratory data reviewed     No results found for: PH, PHARTERIAL, PO2, [id number], SAT, PCO2, HCO3, BASEEXCESS, JASON, BEB  Lab Results   Component Value Date    TSH 1.74 04/06/2018    TSH 1.77 02/09/2012     Lab Results   Component Value Date    GLC 92 01/17/2019    GLC 87 10/25/2018     Lab Results   Component Value Date    HGB 12.9 01/17/2019    HGB 13.5 10/25/2018     Lab Results   Component Value Date    BUN 20 01/17/2019    BUN 18 10/25/2018    CR 0.80 01/17/2019    CR 0.93 10/25/2018     Lab Results   Component Value Date    CO2 30 01/17/2019    CO2 29 10/25/2018     Lab Results   Component Value Date    IRLANDA 71 11/21/2016         Echocardiography: 4/20/16  The left ventricle is normal in size. There is normal left ventricular wall  thickness. Left ventricular systolic function is normal. The visual ejection  fraction is estimated at 55-60%. Average GPLS strain is low normal at -19.5%.  E by E prime ratio is less than 8, that likely suggests normal left  ventricular filling pressures. No regional wall motion abnormalities noted.  Normal left atrial size. Right atrial size is normal. There is no atrial  shunt seen.  Mitral Valve  There is trace mitral regurgitation.    Chest x-ray: 4/22/2016 1:31 PM  IMPRESSION: Tip of central line in SVC. Exam otherwise negative.    PFT: No        Copy to: Margaret Longo Ra  Copy to: Referred Self      Joni Casas MD 4/8/2019   [facility]  [address], [place] [address]   [phone] Clinic    Total time spent with patient: 38 minutes with this patient today in which 25 minutes was spent in counseling/coordination of care and going over planned testing and recommendations.

## 2019-04-08 NOTE — NURSING NOTE
"Chief Complaint   Patient presents with     Consult     Snores per family, Can fall asleep but can't stay asleep.  RLS   No SS or cpap       Initial /89   Pulse 112   Resp 20   Ht 1.626 m (5' 4\")   Wt 86.6 kg (191 lb)   BMI 32.79 kg/m   Estimated body mass index is 32.79 kg/m  as calculated from the following:    Height as of this encounter: 1.626 m (5' 4\").    Weight as of this encounter: 86.6 kg (191 lb).    Medication Reconciliation: complete    Neck circumference: 15.75 inches / 40 centimeters.    Ess 12    Nereyda Aleman LPN  "

## 2019-04-08 NOTE — PATIENT INSTRUCTIONS
"MY TREATMENT INFORMATION FOR SLEEP DISTURBANCE-  Shantell Wagner    DOCTOR : Joni Casas MD  SLEEP CENTER :  [place]    MY CONTACT NUMBER: [phone]        If I haven't had a sleep study yet, what can I expect?  A personal story from Evan  https://[email or web address].com/watch?v=AxPLmlRpnCs    Am I having a home sleep study?  Here is a video in case you get home and want to make sure you have done it correctly  https://[email or web address].com/watch?v=KZZ3Y3qQeu6&feature=youtu.be    Suspected sleep apnea: Sleep study ordered.    Follow up in sleep clinic 1-2 weeks after sleep study to discuss results of sleep study and treatment options.    Patient was advised not to drive if drowsy or sleepy.    Frequently asked questions:  1. What is Obstructive Sleep Apnea (JJ)? JJ is the most common type of sleep apnea. Apnea literally means, \"without breath.\" It is characterized by repetitive pauses in breathing, despite continued effort to breathe, and is usually associated with a reduction in blood oxygen saturation. Apneas can last 10 to over 60 seconds. It is caused by narrowing or collapse of the upper airway as muscles relax during sleep. Severity of sleep apnea is determined by frequency of breathing events and their effect on your sleep and oxygen levels determined during sleep testing.   2. What are the consequences of JJ? Symptoms include: daytime sleepiness- possibly increasing the risk of falling asleep while driving, unrefreshing/restless sleep, snoring, insomnia, waking frequently to urinate, waking with heartburn or reflux, reduced concentration and memory, and morning headaches. Other health consequences may include development of high blood pressure and other cardiovascular disease in persons who are susceptible. Untreated JJ  can contribute to heart disease, stroke and diabetes.   3. What are the treatment options? In most situations, sleep apnea is a lifelong disease that must be managed with daily " therapy. Medications are not effective for sleep apnea and surgery is generally not performed until other therapies have been tried. Therapy is usually tailored to the individual patient based on many factors including your wishes as well as severity of sleep apnea and severity of obesity. Continuous Positive Airway (CPAP) is the most reliable treatment. An oral device to hold your jaw forward is usually the next most reliable option. Other options include postioning devices (to keep you off your back), weight loss, and surgery including a tongue pacing device. There is more detail about some of these options below.    Central sleep apnea is a disorder in which your breathing repeatedly stops and starts during sleep.  Central sleep apnea occurs because your brain doesn't send proper signals to the muscles that control your breathing. This condition is different from obstructive sleep apnea, in which you can't breathe normally because of upper airway obstruction. Central sleep apnea is less common than obstructive sleep apnea.  Central sleep apnea may occur as a result of other conditions, such as heart failure and stroke. Sleeping at a high altitude and pain medications also may cause central sleep apnea.        1. CPAP-  WHAT DOES IT DO AND HOW CAN I LEARN TO WEAR IT?                               BEFORE I START, CAN I WATCH A MOVIE TO GET A PLAN ON HOW TO USE CPAP?  https://www.[employer].com/watch?h=x7U20tu786Q      Continuous positive airway pressure, or CPAP, is the most effective treatment for obstructive sleep apnea. It works by blowing room air, through a mask, to hold your throat open. A decision to use CPAP is a major step forward in the pursuit of a healthier life. The successful use of CPAP will help you breathe easier, sleep better and live healthier. You can choose CPAP equipment from any durable medical equipment provider that meets your needs.  Using CPAP can be a positive experience if you keep these  "key points in mind:  1. Commitment  CPAP is not a quick fix for your problem. It involves a long-term commitment to improve your sleep and your health.    2. Communication  Stay in close communication with both your sleep doctor and your CPAP supplier. Ask lots of questions and seek help when you need it.    3. Consistency  Use CPAP all night, every night and for every nap. You will receive the maximum health benefits from CPAP when you use it every time that you sleep. This will also make it easier for your body to adjust to the treatment.    4. Correction  The first machine and mask that you try may not be the best ones for you. Work with your sleep doctor and your CPAP supplier to make corrections to your equipment selection. Ask about trying a different type of machine or mask if you have ongoing problems. Make sure that your mask is a good fit and learn to use your equipment properly.    5. Challenge  Tell a family member or close friend to ask you each morning if you used your CPAP the previous night. Have someone to challenge you to give it your best effort.    6. Connection   Your adjustment to CPAP will be easier if you are able to connect with others who use the same treatment. Ask your sleep doctor if there is a support group in your area for people who have sleep apnea, or look for one on the Internet.  7. Comfort   Increase your level of comfort by using a saline spray, decongestant or heated humidifier if CPAP irritates your nose, mouth or throat. Use your unit's \"ramp\" setting to slowly get used to the air pressure level. There may be soft pads you can buy that will fit over your mask straps. Look on www.CPAP.com for accessories that can help make CPAP use more comfortable.  8. Cleaning   Clean your mask, tubing and headgear on a regular basis. Put this time in your schedule so that you don't forget to do it. Check and replace the filters for your CPAP unit and humidifier.    9. Completion   Although " you are never finished with CPAP therapy, you should reward yourself by celebrating the completion of your first month of treatment. Expect this first month to be your hardest period of adjustment. It will involve some trial and error as you find the machine, mask and pressure settings that are right for you.    10. Continuation  After your first month of treatment, continue to make a daily commitment to use your CPAP all night, every night and for every nap.    CPAP-Tips to starting with success:  Begin using your CPAP for short periods of time during the day while you watch TV or read.    Use CPAP every night and for every nap. Using it less often reduces the health benefits and makes it harder for your body to get used to it.    Make small adjustments to your mask, tubing, straps and headgear until you get the right fit. Tightening the mask may actually worsen the leak.  If it leaves significant marks on your face or irritates the bridge of your nose, it may not be the best mask for you.  Speak with the person who supplied the mask and consider trying other masks. Insurances will allow you to try different masks during the first month of starting CPAP.  Insurance also covers a new mask, hose and filter about every 6 months.    Use a saline nasal spray to ease mild nasal congestion. Neti-Pot or saline nasal rinses may also help. Nasal gel sprays can help reduce nasal dryness.  Biotene mouthwash can be helpful to protect your teeth if you experience frequent dry mouth.  Dry mouth may be a sign of air escaping out of your mouth or out of the mask in the case of a full face mask.  Speak with your provider if you expect that is the case.     Take a nasal decongestant to relieve more severe nasal or sinus congestion.  Do not use Afrin (oxymetazoline) nasal spray more than 3 days in a row.  Speak with your sleep doctor if your nasal congestion is chronic.    Use a heated humidifier that fits your CPAP model to enhance  your breathing comfort. Adjust the heat setting up if you get a dry nose or throat, down if you get condensation in the hose or mask.  Position the CPAP lower than you so that any condensation in the hose drains back into the machine rather than towards the mask.    Try a system that uses nasal pillows if traditional masks give you problems.    Clean your mask, tubing and headgear once a week. Make sure the equipment dries fully.    Regularly check and replace the filters for your CPAP unit and humidifier.    Work closely with your sleep provider and your CPAP supplier to make sure that you have the machine, mask and air pressure setting that works best for you. It is better to stop using it and call your provider to solve problems than to lay awake all night frustrated with the device.    BESIDES CPAP, WHAT OTHER THERAPIES ARE THERE?      Positioning Device  Positioning devices are generally used when sleep apnea is mild and only occurs on your back.This example shows a pillow that straps around the waist. It may be appropriate for those whose sleep study shows milder sleep apnea that occurs primarily when lying flat on one's back. Preliminary studies have shown benefit but effectiveness at home may need to be verified by a home sleep test. These devices are generally not covered by medical insurance.                      Oral Appliance  What is oral appliance therapy?  An oral appliance is a small acrylic device that fits over the upper and lower teeth or tongue (similar to an orthodontic retainer or a mouth guard). This device slightly advances the lower jaw or tongue, which moves the base of the tongue forward, opens the airway, improves breathing and can effectively treat snoring and obstructive sleep apnea sleep apnea. The appliance is fabricated and customized by a qualified dentist with experience in treating snoring and sleep apnea. Oral appliances are usually well tolerated and have relatively high  compliance by patients1, 2, 3.  When is an oral appliance indicated?  Oral appliance therapy is recommended as a first-line treatment for patients with primary snoring, mild sleep apnea, and for patients with moderate sleep apnea who prefer appliance therapy to use of CPAP4, 5. Severity of sleep apnea is determined by sleep testing and is based on the number of respiratory events per hour of sleep.   How successful is oral appliance therapy?  The success rate of oral appliance therapy in patients with mild sleep apnea is 75-80% while in patients with moderate sleep apnea it is 50-70%. The chance of success in patients with severe sleep apnea is 40-50%. The research also shows that oral appliances have a beneficial effect on the cardiovascular health of JJ patients at the same magnitude as CPAP therapy7.  Oral appliances should be a second-line treatment in cases of severe sleep apnea, but if not completely successful then a combination therapy utilizing CPAP plus oral appliance therapy may be effective. Oral appliances tend to be effective in a broad range of patients although studies show that the patients who have the highest success are females, younger patients, those with milder disease, and less severe obesity. 3, 6.   The chances of success are lower in patients who have more severe JJ, are older, and those who are morbidly obese.     Example of an oral appliance   Finding a dentist that practices dental sleep medicine  Specific training is available through the American Academy of Dental Sleep Medicine for dentists interested in working in the field of sleep. To find a dentist who is educated in the field of sleep and the use of oral appliances, near you, visit the Web site of the American Academy of Dental Sleep Medicine; also see   http://www.accpstorage.org/newOrganization/patients/oralAppliances.pdf  To search for a dentist certified in these practices:  Http://aadsm.org/FindADentist.aspx?1  1.  Dixon, et al. Objectively measured vs self-reported compliance during oral appliance therapy for sleep-disordered breathing. Chest 2013; 144(5): 7632-3532.  2. Lore, et al. Objective measurement of compliance during oral appliance therapy for sleep-disordered breathing. Thorax 2013; 68(1): 91-96.  3. Rosanna et al. Mandibular advancement devices in 620 men and women with JJ and snoring: tolerability and predictors of treatment success. Chest 2004; 125: 7425-5606.  4. Alon et al. Oral appliances for snoring and JJ: a review. Sleep 2006; 29: 244-262.  5. Gita et al. Oral appliance treatment for JJ: an update. J Clin Sleep Med 2014; 10(2): 215-227.  6. Macho et al. Predictors of OSAH treatment outcome. J Dent Res 2007; 86: 8704-4552.    Nasal Valves                 Nasal valves may not be effective if you have frequent nasal congestion or have difficulty breathing through your nose. They may be an option for mild apnea if other options are not well tolerated. The efficacy of these devices is generally less than CPAP or oral appliances.      Weight Loss:    Weight management is a personal decision.  If you are interested in exploring weight loss strategies, the following discussion covers the impact on weight loss on sleep apnea and the approaches that may be successful.    Weight loss decreases severity of sleep apnea in most people with obesity. For those with mild obesity who have developed snoring with weight gain, even 15-30 pound weight loss can improve and occasionally eliminate sleep apnea.  Structured and life-long dietary and health habits are necessary to lose weight and keep healthier weight levels.     Though there may be significant health benefits from weight loss, long-term weight loss is very difficult to achieve- studies show success with dietary management in less than 10% of people. In addition, substantial weight loss may require years of dietary control and may be  difficult if patients have severe obesity. In these cases, surgical management may be considered.  Finally, older individuals who have tolerated obesity without health complications may be less likely to benefit from weight loss strategies.    Your BMI is Body mass index is 32.79 kg/m .  Body mass index (BMI) is one way to tell whether you are at a healthy weight, overweight, or obese. It measures your weight in relation to your height.  A BMI of 18.5 to 24.9 is in the healthy range. A person with a BMI of 25 to 29.9 is considered overweight, and someone with a BMI of 30 or greater is considered obese. More than two-thirds of American adults are considered overweight or obese.  Being overweight or obese increases the risk for further weight gain. Excess weight may lead to heart disease and diabetes.  Creating and following plans for healthy eating and physical activity may help you improve your health.  Weight control is part of healthy lifestyle and includes exercise, emotional health, and healthy eating habits. Careful eating habits lifelong are the mainstay of weight control. Though there are significant health benefits from weight loss, long-term weight loss with diet alone may be very difficult to achieve- studies show long-term success with dietary management in less than 10% of people. Attaining a healthy weight may be especially difficult to achieve in those with severe obesity. In some cases, medications, devices and surgical management might be considered.  What can you do?  If you are overweight or obese and are interested in methods for weight loss, you should discuss this with your provider.     Consider reducing daily calorie intake by 500 calories.     Keep a food journal.     Avoiding skipping meals, consider cutting portions instead.    Diet combined with exercise helps maintain muscle while optimizing fat loss. Strength training is particularly important for building and maintaining muscle mass.  Exercise helps reduce stress, increase energy, and improves fitness. Increasing exercise without diet control, however, may not burn enough calories to loose weight.       Start walking three days a week 10-20 minutes at a time    Work towards walking thirty minutes five days a week     Eventually, increase the speed of your walking for 1-2 minutes at time    In addition, we recommend that you review healthy lifestyles and methods for weight loss available through the National Institutes of Health patient information sites:  http://win.niddk.nih.gov/publications/index.htm    And look into health and wellness programs that may be available through your health insurance provider, employer, local community center, or medhat club.    Weight management plan: Patient was referred to their PCP to discuss a diet and exercise plan.    Surgery:    Upper Airway Surgery for JJ  Surgery for JJ is a second-line treatment option in the management of sleep apnea.  Surgery should be considered for patients who are having a difficult time tolerating CPAP.    Surgery for JJ is directed at areas that are responsible for narrowing or complete obstruction of the airway during sleep.  There are a wide range of procedures available to enlarge and/or stabilize the airway to prevent blockage of breathing in the three major areas where it can occur: the palate, tongue, and nasal regions.  Successful surgical treatment depends on the accurate identification of the factors responsible for obstructive sleep apnea in each person.  A personalized approach is required because there is no single treatment that works well for everyone.  Because of anatomic variation, consultation with an examination by a sleep surgeon is a critical first step in determining what surgical options are best for each patient.  In some cases, examination during sedation may be recommended in order to guide the selection of procedures.  Patients will be counseled about  risks and benefits as well as the typical recovery course after surgery. Surgery is typically not a cure for a person s JJ.  However, surgery will often significantly improve one s JJ severity (termed  success rate ).  Even in the absence of a cure, surgery will decrease the cardiovascular risk associated with OSA7; improve overall quality of life8 (sleepiness, functionality, sleep quality, etc).          Palate Procedures:  Patients with JJ often have narrowing of their airway in the region of their tonsils and uvula.  The goals of palate procedures are to widen the airway in this region as well as to help the tissues resist collapse.  Modern palate procedure techniques focus on tissue conservation and soft tissue rearrangement, rather than tissue removal.  Often the uvula is preserved in this procedure. Residual sleep apnea is common in patient after pharyngoplasty with an average reduction in sleep apnea events of 33%2.      Tongue Procedures:  While patients are awake, the muscles that surround the throat are active and keep this region open for breathing. These muscles relax during sleep, allowing the tongue and other structures to collapse and block breathing.  There are several different tongue procedures available.  Selection of a tongue base procedure depends on characteristics seen on physical exam.  Generally, procedures are aimed at removing bulky tissues in this area or preventing the back of the tongue from falling back during sleep.  Success rates for tongue surgery range from 50-62%3.    Hypoglossal Nerve Stimulation:  Hypoglossal nerve stimulation has recently received approval from the [place] Food and Drug Administration for the treatment of obstructive sleep apnea.  This is based on research showing that the system was safe and effective in treating sleep apnea6.  Results showed that the median AHI score decreased 68%, from 29.3 to 9.0. This therapy uses an implant system that senses  breathing patterns and delivers mild stimulation to airway muscles, which keeps the airway open during sleep.  The system consists of three fully implanted components: a small generator (similar in size to a pacemaker), a breathing sensor, and a stimulation lead.  Using a small handheld remote, a patient turns the therapy on before bed and off upon awakening.    Candidates for this device must be greater than 22 years of age, have moderate to severe JJ (AHI between 20-65), BMI less than 32, have tried CPAP/oral appliance without success, and have appropriate upper airway anatomy (determined by a sleep endoscopy performed by Dr. Salazar).    Hypoglossal Nerve Stimulation Pathway:    The sleep surgeon s office will work with the patient through the insurance prior-authorization process (including communications and appeals).    Nasal Procedures:  Nasal obstruction can interfere with nasal breathing during the day and night.  Studies have shown that relief of nasal obstruction can improve the ability of some patients to tolerate positive airway pressure therapy for obstructive sleep apnea1.  Treatment options include medications such as nasal saline, topical corticosteroid and antihistamine sprays, and oral medications such as antihistamines or decongestants. Non-surgical treatments can include external nasal dilators for selected patients. If these are not successful by themselves, surgery can improve the nasal airway either alone or in combination with these other options.      Combination Procedures:  Combination of surgical procedures and other treatments may be recommended, particularly if patients have more than one area of narrowing or persistent positional disease.  The success rate of combination surgery ranges from 66-80%2,3.      1. Rito TAM. The Role of the Nose in Snoring and Obstructive Sleep Apnoea: An Update.  Eur Arch Otorhinolaryngol. 2011; 268: 1365-73.  2.  Mega BOYER; Abdelrahman RANGEL; Demarco ELLIS;  Pallanch JF; Mariam MB; Sukhdeep SG; Lauro JEFFREY. Surgical modifications of the upper airway for obstructive sleep apnea in adults: a systematic review and meta-analysis. SLEEP 2010;33(10):9464-1441. Tonja VILLANUEVA. Hypopharyngeal surgery in obstructive sleep apnea: an evidence-based medicine review.  Arch Otolaryngol Head Neck Surg. 2006 Feb;132(2):206-13.  3. Fermin YH1, Deepak Y, Zia RUDY. The efficacy of anatomically based multilevel surgery for obstructive sleep apnea. Otolaryngol Head Neck Surg. 2003 Oct;129(4):327-35.  4. Tonja VILLANUEVA, Goldberg A. Hypopharyngeal Surgery in Obstructive Sleep Apnea: An Evidence-Based Medicine Review. Arch Otolaryngol Head Neck Surg. 2006 Feb;132(2):206-13.  5. Mike RUANO et al. Upper-Airway Stimulation for Obstructive Sleep Apnea.  N Engl J Med. 2014 Jan 9;370(2):139-49.  6. Tu Y et al. Increased Incidence of Cardiovascular Disease in Middle-aged Men with Obstructive Sleep Apnea. Am J Respir Crit Care Med; 2002 166: 159-165  7. Juan EM et al. Studying Life Effects and Effectiveness of Palatopharyngoplasty (SLEEP) study: Subjective Outcomes of Isolated Uvulopalatopharyngoplasty. Otolaryngol Head Neck Surg. 2011; 144: 623-631.  8.   Your blood pressure was checked while you were in clinic today.  Please read the guidelines below about what these numbers mean and what you should do about them.  Your systolic blood pressure is the top number.  This is the pressure when the heart is pumping.  Your diastolic blood pressure is the bottom number.  This is the pressure in between beats.  If your systolic blood pressure is less than 120 and your diastolic blood pressure is less than 80, then your blood pressure is normal. There is nothing more that you need to do about it  If your systolic blood pressure is 120-139 or your diastolic blood pressure is 80-89, your blood pressure may be higher than it should be.  You should have your blood pressure re-checked within a year by a primary care  provider.  If your systolic blood pressure is 140 or greater or your diastolic blood pressure is 90 or greater, you may have high blood pressure.  High blood pressure is treatable, but if left untreated over time it can put you at risk for heart attack, stroke, or kidney failure.  You should have your blood pressure re-checked by a primary care provider within the next four weeks.        Good Sleep hygiene tips (American Academy of Sleep Medicine):  Maintain a regular sleep-wake routine    Go to bed at the same time. Wake up at the same time. Ideally, your schedule will remain the same (+/- 20 minutes) every night of the week.  Avoid naps if possible    Naps decrease the  Sleep Debt  that is so necessary for easy sleep onset.    Each of us needs a certain amount of sleep per 24-hour period. We need that amount, and we don t need more than that.    When we take naps, it decreases the amount of sleep that we need the next night - which may cause sleep fragmentation and difficulty initiating sleep, and may lead to insomnia.  Don t stay in bed awake for more than 15-20 minutes (Stimulus control)    If you find your mind racing, or worrying about not being able to sleep during the middle of the night, get out of bed, and sit in a chair in the dark. Do your mind racing in the chair until you are sleepy, then return to bed. No TV, or phone/Ipad, or computer or internet or eat during these periods! That will just stimulate you more than desired.    If this happens several times during the night, that is OK. Just maintain your regular wake time, and try to avoid naps.  Don t watch TV, phone, computer, video games or read in bed or eat in bed.    When you watch TV or read in bed or eat in bed, you associate the bed with wakefulness.    The bed is reserved for two things - sleep and hanky panky.  Drink caffeinated drinks with caution    The effects of caffeine may last for several hours after ingestion. Caffeine can fragment  sleep, and cause difficulty initiating sleep. If you drink caffeine, use it only before noon.    Remember that soda and tea contain caffeine as well.  Avoid inappropriate substances that interfere with sleep    Cigarettes, alcohol, and over-the-counter medications may cause fragmented sleep.  Exercise regularly    Exercise promotes continuous sleep.    Rigorous exercise (close to bedtime cause) circulates endorphins into the body which may cause difficulty initiating sleep.   Have a quiet, comfortable bedroom    Set your bedroom thermostat at a comfortable temperature. Generally, a little cooler is better than a little warmer.    Turn off the TV and other extraneous noise that may disrupt sleep. Background  white noise  like a fan is OK.    If your pets awaken you, keep them outside the bedroom.    Your bedroom should be dark. Turn off bright lights.    Have a comfortable mattress.  If you are a  clock watcher  at night, hide the clock.      Have a comfortable pre-bedtime routine    A warm bath, shower    Meditation, or quiet time    Wind-down 20 minutes prior of bedtime.    Restless Leg Syndrome:    Based on the information that you provided today you are likely to have restless leg syndrome that may be interfering with your sleep.  Treatment of restless leg syndrome usually depends on how much it is bothering you.  If it is a major problem then you might want to do something about it.    Here are some treatment options that you might want to consider:   Behavior Changes:   Reduce or eliminate caffeine and alcohol products   Increase the amount of stretching that you do, particularly before bedtime   Sometimes a leg message can be helpful   Some people find that a warm bath or shower in the evening is helpful    We recommend:  Ferritin, cbc and iron pane level ordered    Low ferritin (below 50-75 ng/ml) can cause motor restlessness. Your iron [ferritin] level is in a low range that may be causing restlessness.      If ferritin is low with anemia, we recommend to following up your primary care doctor to investigate the source of iron loss or bleeding, including the need for colonoscopy/upper endoscopy.          Your BMI is Body mass index is 32.79 kg/m .  Weight management is a personal decision.  If you are interested in exploring weight loss strategies, the following discussion covers the approaches that may be successful. Body mass index (BMI) is one way to tell whether you are at a healthy weight, overweight, or obese. It measures your weight in relation to your height.  A BMI of 18.5 to 24.9 is in the healthy range. A person with a BMI of 25 to 29.9 is considered overweight, and someone with a BMI of 30 or greater is considered obese. More than two-thirds of American adults are considered overweight or obese.  Being overweight or obese increases the risk for further weight gain. Excess weight may lead to heart disease and diabetes.  Creating and following plans for healthy eating and physical activity may help you improve your health.  Weight control is part of healthy lifestyle and includes exercise, emotional health, and healthy eating habits. Careful eating habits lifelong are the mainstay of weight control. Though there are significant health benefits from weight loss, long-term weight loss with diet alone may be very difficult to achieve- studies show long-term success with dietary management in less than 10% of people. Attaining a healthy weight may be especially difficult to achieve in those with severe obesity. In some cases, medications, devices and surgical management might be considered.  What can you do?  If you are overweight or obese and are interested in methods for weight loss, you should discuss this with your provider.     Consider reducing daily calorie intake by 500 calories.     Keep a food journal.     Avoiding skipping meals, consider cutting portions instead.    Diet combined with exercise helps  maintain muscle while optimizing fat loss. Strength training is particularly important for building and maintaining muscle mass. Exercise helps reduce stress, increase energy, and improves fitness. Increasing exercise without diet control, however, may not burn enough calories to loose weight.       Start walking three days a week 10-20 minutes at a time    Work towards walking thirty minutes five days a week     Eventually, increase the speed of your walking for 1-2 minutes at time    In addition, we recommend that you review healthy lifestyles and methods for weight loss available through the National Institutes of Health patient information sites:  http://win.niddk.nih.gov/publications/index.htm    And look into health and wellness programs that may be available through your health insurance provider, employer, local community center, or medhat club.    Weight management plan: Patient was referred to their PCP to discuss a diet and exercise plan.     Your blood pressure was checked while you were in clinic today.  Please read the guidelines below about what these numbers mean and what you should do about them.  Your systolic blood pressure is the top number.  This is the pressure when the heart is pumping.  Your diastolic blood pressure is the bottom number.  This is the pressure in between beats.  If your systolic blood pressure is less than 120 and your diastolic blood pressure is less than 80, then your blood pressure is normal. There is nothing more that you need to do about it  If your systolic blood pressure is 120-139 or your diastolic blood pressure is 80-89, your blood pressure may be higher than it should be.  You should have your blood pressure re-checked within a year by a primary care provider.  If your systolic blood pressure is 140 or greater or your diastolic blood pressure is 90 or greater, you may have high blood pressure.  High blood pressure is treatable, but if left untreated over time it can  put you at risk for heart attack, stroke, or kidney failure.  You should have your blood pressure re-checked by a primary care provider within the next four weeks.

## 2019-04-11 PROBLEM — T38.6X5A: Status: ACTIVE | Noted: 2019-04-11

## 2019-04-11 PROBLEM — H35.00: Status: ACTIVE | Noted: 2019-04-11

## 2019-04-16 ASSESSMENT — ENCOUNTER SYMPTOMS
SHORTNESS OF BREATH: 0
DIARRHEA: 0
NERVOUS/ANXIOUS: 0
PARESTHESIAS: 0
FREQUENCY: 0
HEMATOCHEZIA: 0
FEVER: 0
HEMATURIA: 0
MYALGIAS: 0
HEARTBURN: 0
SORE THROAT: 0
NAUSEA: 0
DIZZINESS: 0
COUGH: 0
ARTHRALGIAS: 0
ABDOMINAL PAIN: 0
EYE PAIN: 0
CONSTIPATION: 0
PALPITATIONS: 0
HEADACHES: 0
JOINT SWELLING: 0
DYSURIA: 0
CHILLS: 0
WEAKNESS: 0
BREAST MASS: 0

## 2019-04-19 ENCOUNTER — OFFICE VISIT (OUTPATIENT)
Dept: FAMILY MEDICINE | Facility: CLINIC | Age: 49
End: 2019-04-19
Payer: COMMERCIAL

## 2019-04-19 VITALS
SYSTOLIC BLOOD PRESSURE: 110 MMHG | DIASTOLIC BLOOD PRESSURE: 78 MMHG | OXYGEN SATURATION: 98 % | RESPIRATION RATE: 16 BRPM | BODY MASS INDEX: 33.39 KG/M2 | WEIGHT: 194.5 LBS | HEART RATE: 100 BPM | TEMPERATURE: 98.8 F

## 2019-04-19 DIAGNOSIS — F40.243 FEAR OF FLYING: ICD-10-CM

## 2019-04-19 DIAGNOSIS — Z00.00 ENCOUNTER FOR ROUTINE ADULT HEALTH EXAMINATION WITHOUT ABNORMAL FINDINGS: Primary | ICD-10-CM

## 2019-04-19 DIAGNOSIS — E78.5 HYPERLIPIDEMIA LDL GOAL <160: ICD-10-CM

## 2019-04-19 DIAGNOSIS — Z12.4 SCREENING FOR MALIGNANT NEOPLASM OF CERVIX: ICD-10-CM

## 2019-04-19 DIAGNOSIS — M79.605 PAIN IN BOTH LOWER EXTREMITIES: ICD-10-CM

## 2019-04-19 DIAGNOSIS — L30.9 DERMATITIS: ICD-10-CM

## 2019-04-19 DIAGNOSIS — I10 ESSENTIAL HYPERTENSION: ICD-10-CM

## 2019-04-19 DIAGNOSIS — M79.604 PAIN IN BOTH LOWER EXTREMITIES: ICD-10-CM

## 2019-04-19 LAB
ALBUMIN SERPL-MCNC: 3.8 G/DL (ref 3.4–5)
ALP SERPL-CCNC: 52 U/L (ref 40–150)
ALT SERPL W P-5'-P-CCNC: 30 U/L (ref 0–50)
ANION GAP SERPL CALCULATED.3IONS-SCNC: 6 MMOL/L (ref 3–14)
AST SERPL W P-5'-P-CCNC: 20 U/L (ref 0–45)
BILIRUB SERPL-MCNC: 0.7 MG/DL (ref 0.2–1.3)
BUN SERPL-MCNC: 20 MG/DL (ref 7–30)
CALCIUM SERPL-MCNC: 9.5 MG/DL (ref 8.5–10.1)
CHLORIDE SERPL-SCNC: 108 MMOL/L (ref 94–109)
CHOLEST SERPL-MCNC: 215 MG/DL
CO2 SERPL-SCNC: 26 MMOL/L (ref 20–32)
CREAT SERPL-MCNC: 0.94 MG/DL (ref 0.52–1.04)
GFR SERPL CREATININE-BSD FRML MDRD: 72 ML/MIN/{1.73_M2}
GLUCOSE SERPL-MCNC: 97 MG/DL (ref 70–99)
HDLC SERPL-MCNC: 49 MG/DL
LDLC SERPL CALC-MCNC: 128 MG/DL
NONHDLC SERPL-MCNC: 166 MG/DL
POTASSIUM SERPL-SCNC: 4.1 MMOL/L (ref 3.4–5.3)
PROT SERPL-MCNC: 7.7 G/DL (ref 6.8–8.8)
SODIUM SERPL-SCNC: 140 MMOL/L (ref 133–144)
TRIGL SERPL-MCNC: 190 MG/DL

## 2019-04-19 PROCEDURE — 36415 COLL VENOUS BLD VENIPUNCTURE: CPT | Performed by: NURSE PRACTITIONER

## 2019-04-19 PROCEDURE — 99396 PREV VISIT EST AGE 40-64: CPT | Performed by: NURSE PRACTITIONER

## 2019-04-19 PROCEDURE — 99213 OFFICE O/P EST LOW 20 MIN: CPT | Mod: 25 | Performed by: NURSE PRACTITIONER

## 2019-04-19 PROCEDURE — 80061 LIPID PANEL: CPT | Performed by: NURSE PRACTITIONER

## 2019-04-19 PROCEDURE — 80053 COMPREHEN METABOLIC PANEL: CPT | Performed by: NURSE PRACTITIONER

## 2019-04-19 PROCEDURE — G0145 SCR C/V CYTO,THINLAYER,RESCR: HCPCS | Performed by: NURSE PRACTITIONER

## 2019-04-19 PROCEDURE — 87624 HPV HI-RISK TYP POOLED RSLT: CPT | Performed by: NURSE PRACTITIONER

## 2019-04-19 RX ORDER — TRIAMCINOLONE ACETONIDE 1 MG/G
CREAM TOPICAL 2 TIMES DAILY
Qty: 30 G | Refills: 0 | Status: SHIPPED | OUTPATIENT
Start: 2019-04-19 | End: 2020-07-17

## 2019-04-19 RX ORDER — GABAPENTIN 300 MG/1
CAPSULE ORAL
Qty: 90 CAPSULE | Refills: 1 | Status: SHIPPED | OUTPATIENT
Start: 2019-04-19 | End: 2020-04-01

## 2019-04-19 RX ORDER — ALPRAZOLAM 0.25 MG
TABLET ORAL
Qty: 2 TABLET | Refills: 0 | Status: SHIPPED | OUTPATIENT
Start: 2019-04-19 | End: 2020-07-17

## 2019-04-19 RX ORDER — ATORVASTATIN CALCIUM 20 MG/1
20 TABLET, FILM COATED ORAL DAILY
Qty: 90 TABLET | Refills: 2 | Status: SHIPPED | OUTPATIENT
Start: 2019-04-19 | End: 2020-05-29

## 2019-04-19 RX ORDER — LISINOPRIL 20 MG/1
20 TABLET ORAL DAILY
Qty: 90 TABLET | Refills: 2 | Status: SHIPPED | OUTPATIENT
Start: 2019-04-19 | End: 2020-05-04

## 2019-04-19 ASSESSMENT — ENCOUNTER SYMPTOMS
PALPITATIONS: 0
EYE PAIN: 0
FREQUENCY: 0
JOINT SWELLING: 0
DIZZINESS: 0
HEADACHES: 0
NERVOUS/ANXIOUS: 0
ARTHRALGIAS: 0
MYALGIAS: 0
NAUSEA: 0
WEAKNESS: 0
ABDOMINAL PAIN: 0
COUGH: 0
CHILLS: 0
DYSURIA: 0
DIARRHEA: 0
HEMATURIA: 0
BREAST MASS: 0
FEVER: 0
CONSTIPATION: 0
HEARTBURN: 0
SORE THROAT: 0
HEMATOCHEZIA: 0
SHORTNESS OF BREATH: 0
PARESTHESIAS: 0

## 2019-04-19 NOTE — PROGRESS NOTES
SUBJECTIVE:   CC: Shantell Wagner is an 48 year old woman who presents for preventive health visit.     Healthy Habits:     Getting at least 3 servings of Calcium per day:  Yes    Bi-annual eye exam:  Yes    Dental care twice a year:  Yes    Sleep apnea or symptoms of sleep apnea:  Excessive snoring    Diet:  Regular (no restrictions)    Frequency of exercise:  1 day/week    Duration of exercise:  15-30 minutes    Taking medications regularly:  Yes    Medication side effects:  None    PHQ-2 Total Score: 1    Additional concerns today:  Yes (cream for upper lip, Patient woudl like to discuss medication for flying)    HTN  No chest pain, palpitations, sob.  Plans to join a gym shortly.  No side effects from medication.    LIPIDS  No side effects from statin.  Takes daily.    Refill of triamcinolone  Uses on upper lip as needed for dry patch.  Works well.  Has tried hydrocortisone cream without results.    Flying med  Trip to [place] in a few months.  Has a fear of flying.  Has used ativan in the past for chemo and tolerated well.    Breast cancer  Recent visit with oncology, no changes.  Next appt in May.        Today's PHQ-2 Score:   PHQ-2 ( 1999 Pfizer) 4/16/2019   Q1: Little interest or pleasure in doing things 1   Q2: Feeling down, depressed or hopeless 0   PHQ-2 Score 1   Q1: Little interest or pleasure in doing things Several days   Q2: Feeling down, depressed or hopeless Not at all   PHQ-2 Score 1       Abuse: Current or Past(Physical, Sexual or Emotional)- No  Do you feel safe in your environment? Yes    Social History     Tobacco Use     Smoking status: Never Smoker     Smokeless tobacco: Never Used   Substance Use Topics     Alcohol use: Yes     Alcohol/week: 0.0 oz     Comment: rare         Alcohol Use 4/16/2019   Prescreen: >3 drinks/day or >7 drinks/week? No   Prescreen: >3 drinks/day or >7 drinks/week? -       Reviewed orders with patient.  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     Past Surgical History:   Procedure Laterality Date     CARPAL TUNNEL RELEASE RT/LT  3/2010     HC RECONSTR NOSE  1983    after accident     INSERT PORT VASCULAR ACCESS Left 4/22/2016    Procedure: INSERT PORT VASCULAR ACCESS;  Surgeon: Nereyda Flowers MD;  Location: [facility]     INSERT TISSUE EXPANDER BREAST BILATERAL Bilateral 10/17/2016    Procedure: INSERT TISSUE EXPANDER BREAST BILATERAL;  Surgeon: Jenna Vazquez MD;  Location: [facility]     LASIK BILATERAL       MASTECTOMY MODIFIED RADICAL Right 10/17/2016    Procedure: MASTECTOMY MODIFIED RADICAL;  Surgeon: Nereyda Flowers MD;  Location: [facility]     MASTECTOMY MODIFIED RADICAL, SENTINEL NODE, COMBINED Left 10/17/2016    Procedure: COMBINED MASTECTOMY MODIFIED RADICAL, SENTINEL NODE;  Surgeon: Nereyda Flowers MD;  Location: [facility]     PROCURE GRAFT FAT Bilateral 4/25/2018    Procedure: PROCURE GRAFT FAT;;  Surgeon: Jenna Vazquez MD;  Location: [facility]     RECONSTRUCT BREAST BILATERAL, IMPLANT PROSTHESIS BILATERAL, COMBINED Bilateral 9/13/2017    Procedure: COMBINED RECONSTRUCT BREAST BILATERAL, IMPLANT PROSTHESIS BILATERAL;  BILATERAL SECOND STAGE BREAST RECONSTRUCTION WITH IMPLANT.;  Surgeon: Jenna Vazquez MD;  Location: [facility]     REMOVE CATHETER VASCULAR ACCESS Left 10/17/2016    Procedure: REMOVE CATHETER VASCULAR ACCESS;  Surgeon: Nereyda Flowers MD;  Location: [facility]     REVISE RECONSTRUCTED BREAST BILATERAL Bilateral 4/25/2018    Procedure: REVISE RECONSTRUCTED BREAST  BILATERAL;  REVISION BILATERAL BREAST RECONSTRUCTION AND  FAT GRAFTING FROM AXILLA TO BILATERAL BREASTS.;  Surgeon: Jenna Vazquez MD;  Location: [facility]       Social History     Tobacco Use     Smoking status: Never Smoker     Smokeless tobacco: Never Used   Substance Use Topics     Alcohol use: Yes     Alcohol/week: 0.0 oz     Comment: rare     Family History   Problem Relation Age of Onset     C.A.D. Father         bypass surg age 68     Coronary Artery Disease Father      Prostate Cancer Father      Breast Cancer Sister 38        breast ca     Diabetes Type 1 Mother      Diabetes Mother      Breast Cancer Maternal Grandmother 70        dx'ed age 80s               Pertinent mammograms are reviewed under the imaging tab.  History of abnormal Pap smear:   Last 3 Pap Results:   PAP (no units)   Date Value   03/06/2015 NIL   02/20/2014 NIL   02/20/2013 ASC-H (A)     PAP / HPV 3/6/2015 2/20/2014 2/20/2013   PAP NIL NIL ASC-H(A)     Reviewed and updated as needed this visit by clinical staff         Reviewed and updated as needed this visit by Provider            Review of Systems   Constitutional: Negative for chills and fever.   HENT: Negative for congestion, ear pain, hearing loss and sore throat.    Eyes: Negative for pain and visual disturbance.   Respiratory: Negative for cough and shortness of breath.    Cardiovascular: Negative for chest pain, palpitations and peripheral edema.   Gastrointestinal: Negative for abdominal pain, constipation, diarrhea, heartburn, hematochezia and nausea.   Breasts:  Negative for tenderness, breast mass and discharge.   Genitourinary: Negative for dysuria, frequency, genital sores, hematuria, pelvic pain, urgency, vaginal bleeding and vaginal discharge.   Musculoskeletal: Negative for arthralgias, joint swelling and myalgias.   Skin: Negative for rash.   Neurological: Negative for dizziness, weakness, headaches and paresthesias.   Psychiatric/Behavioral: Negative for mood  changes. The patient is not nervous/anxious.           OBJECTIVE:   There were no vitals taken for this visit.  Physical Exam  GENERAL: healthy, alert and no distress  EYES: Eyes grossly normal to inspection  HENT: ear canals and TM's normal, nose and mouth without ulcers or lesions  NECK: no adenopathy, no asymmetry, masses, or scars and thyroid normal to palpation  RESP: lungs clear to auscultation - no rales, rhonchi or wheezes  BREAST: No palpable masses chest wall.  CV: regular rate and rhythm, normal S1 S2, no S3 or S4, no murmur, click or rub, no peripheral edema and peripheral pulses strong  ABDOMEN: soft, nontender, no hepatosplenomegaly, no masses and bowel sounds normal   (female): normal female external genitalia, normal urethral meatus, vaginal mucosa pink, moist, well rugated, and normal cervix/adnexa/uterus without masses or discharge  NEURO: Normal strength and tone, mentation intact and speech normal  PSYCH: mentation appears normal, affect normal/bright    Diagnostic Test Results:  none     ASSESSMENT/PLAN:   1. Encounter for routine adult health examination without abnormal findings      2. Hyperlipidemia LDL goal <160  Asymptomatic.  Well controlled.  Continue with current plan.  - Comprehensive metabolic panel  - Lipid panel reflex to direct LDL Fasting  - atorvastatin (LIPITOR) 20 MG tablet; Take 1 tablet (20 mg) by mouth daily  Dispense: 90 tablet; Refill: 2    3. Essential hypertension  Asymptomatic.  Well controlled.  Continue with current plan.  - Comprehensive metabolic panel  - lisinopril (PRINIVIL/ZESTRIL) 20 MG tablet; Take 1 tablet (20 mg) by mouth daily  Dispense: 90 tablet; Refill: 2    4. Screening for malignant neoplasm of cervix  - Pap imaged thin layer screen with HPV - recommended age 30 - 65  - HPV High Risk Types DNA Cervical    5. Pain in both lower extremities  Has tried to go without med in the past, better with med.  Refilled.  - gabapentin (NEURONTIN) 300 MG capsule;  "TAKE 1 CAPSULE (300 MG) BY MOUTH AT BEDTIME  Dispense: 90 capsule; Refill: 1    6. Fear of flying  Reviewed r/b/se.  - ALPRAZolam (XANAX) 0.25 MG tablet; Take 1 tablet 30-60 minutes prior to flying.  Dispense: 2 tablet; Refill: 0    7. Dermatitis  - triamcinolone (KENALOG) 0.1 % external cream; Apply topically 2 times daily  Dispense: 30 g; Refill: 0    COUNSELING:  Reviewed preventive health counseling, as reflected in patient instructions    BP Readings from Last 1 Encounters:   04/08/19 123/89     Estimated body mass index is 32.79 kg/m  as calculated from the following:    Height as of 4/8/19: 1.626 m (5' 4\").    Weight as of 4/8/19: 86.6 kg (191 lb).      Weight management plan: Discussed healthy diet and exercise guidelines     reports that she has never smoked. She has never used smokeless tobacco.      Counseling Resources:  ATP IV Guidelines  Pooled Cohorts Equation Calculator  Breast Cancer Risk Calculator  FRAX Risk Assessment  ICSI Preventive Guidelines  Dietary Guidelines for Americans, 2010  USDA's MyPlate  ASA Prophylaxis  Lung CA Screening    NIKKI Santos [facility] [place]  "

## 2019-04-24 LAB
COPATH REPORT: NORMAL
PAP: NORMAL

## 2019-04-25 LAB
FINAL DIAGNOSIS: NORMAL
HPV HR 12 DNA CVX QL NAA+PROBE: NEGATIVE
HPV16 DNA SPEC QL NAA+PROBE: NEGATIVE
HPV18 DNA SPEC QL NAA+PROBE: NEGATIVE
SPECIMEN DESCRIPTION: NORMAL
SPECIMEN SOURCE CVX/VAG CYTO: NORMAL

## 2019-04-29 ENCOUNTER — OFFICE VISIT (OUTPATIENT)
Dept: SLEEP MEDICINE | Facility: CLINIC | Age: 49
End: 2019-04-29
Payer: COMMERCIAL

## 2019-04-29 DIAGNOSIS — G47.9 SLEEP DISTURBANCE: ICD-10-CM

## 2019-04-29 PROCEDURE — G0399 HOME SLEEP TEST/TYPE 3 PORTA: HCPCS | Performed by: INTERNAL MEDICINE

## 2019-04-30 ENCOUNTER — DOCUMENTATION ONLY (OUTPATIENT)
Dept: SLEEP MEDICINE | Facility: CLINIC | Age: 49
End: 2019-04-30
Payer: COMMERCIAL

## 2019-04-30 NOTE — PROCEDURES
"  [facility] Report  ===========================    Patient Information:  --------------------  Shantell Wagner  Patient ID:  [id number]   :  1970  Recording date:  2019       Indication of the sleep study: Shantell Wagner is a 48 year old female with history of hypertension, breast cancer s/p bilateral mastectomy, hyperlipidemia, nasal fracture, menopause state and neuropathy who was seen at the [facility] in [place] with complains of snoring, waking up gasping air as per , excessive daytime sleepiness and tiredness., acid reflux, night sweats, dry mouth and throat, frequent awakening at night for couple of years. Ht 1.626 m (5' 4\")   Wt 86.6 kg (191 lb)   BMI 32.79 kg/m        Recording Information:  ----------------------  This was a Type 3 unattended sleep study (measuring flow, effort, heart rate and pulse oximetry) performed at home. Please refer to EPIC procedure for detailed scoring report.   This study was considered adequate based on > 4 hours of quality oximetry and respiratory recording. As specified by the AASM Manual for the Scoring of Sleep and Associated events, version 2.3, Rule VIII.D 1B, 4% oxygen desaturation scoring for hypopneas is used as a standard of care on all home sleep apnea testing.  The severity of sleep disordered breathing can be underestimated during portable testing because the apnea/hypopnea index is calculated using total recording time rather than total sleep time.  Recording date:  2019   Recording duration:  599.9 minutes  Time in bed:  456.5 minutes  Estimated sleep efficiency was 98.6 %.   Time spent in supine position:  98.2 % of total bed time  The test quality was: adequate for interpretation  The test duration was: adequate for interpretation  Respiratory Analysis:  ---------------------  AHI: 3.4 /hour  AHI (supine):  3.5 /hour  AHI (non-supine):  0.0 /hour  ÁLVARO: 3.5 /hour (Number of oxygen desaturations " per hour)  Snore index: 7.4 (percentage of time spent snoring versus the total time spent in bed)  Central apnea index:  0.0 / hour    The sleep study demonstrated no sleep disordered breathing but intermittent snoring.    Oximetry Analysis:  ------------------  Baseline oxygen saturation during sleep was normal.   Lowest oxygen saturation:  87.0 %  Majority of the sleep time spent with oxygen saturation greater than 90%.   0.2% of the total recording time was spent with oxygen saturation less than 90%.   Time Spent oxygen saturation below 88% was 0 minutes.    Cardiac Analysis:  -----------------  Maximum pulse rate was 103.0 /minute and minimal pulse rate was 48.0/minute. Time spent above 100 bpm was 0.2 minutes and time spent below 40 bpm was 0.0 minutes.    Diagnosis:  ==========  Primary Snoring R06.83    Recommendations:   ================    1. The sleep study demonstrated no sleep disordered breathing but intermittent snoring. No further intervention is required at this time.    2. Recommend optimizing regular wake-sleep schedule and avoiding sleep deprivation.    Other Recommendations:  ======================  1. Start weight loss program if BMI > 25.    2. Avoid driving when drowsy    3. Follow up primary care doctor and/or referring physician as scheduled.      Electronically signed by:      Joni Casas MD  Sleep Medicine Physician  [facility], Sleep Medicine and Internal Medicine  [facility].

## 2019-05-02 DIAGNOSIS — C50.411 MALIGNANT NEOPLASM OF UPPER-OUTER QUADRANT OF RIGHT FEMALE BREAST, UNSPECIFIED ESTROGEN RECEPTOR STATUS (H): ICD-10-CM

## 2019-05-02 RX ORDER — VENLAFAXINE 37.5 MG/1
37.5 TABLET ORAL 2 TIMES DAILY
Qty: 180 TABLET | Refills: 1 | Status: SHIPPED | OUTPATIENT
Start: 2019-05-02 | End: 2019-11-19

## 2019-05-02 NOTE — TELEPHONE ENCOUNTER
Signed Prescriptions:                        Disp   Refills    venlafaxine (EFFEXOR) 37.5 MG tablet       180 ta*1        Sig: TAKE 1 TABLET (37.5 MG) BY MOUTH 2 TIMES DAILY  Authorizing Provider: MICH KNOTT     Rx has been approved by Dr Knott.   Dot Paul, RN, BSN, OCN

## 2019-05-02 NOTE — TELEPHONE ENCOUNTER
Pending Prescriptions:                       Disp   Refills    venlafaxine (EFFEXOR) 37.5 MG tablet [Pha*180 ta*1            Sig: TAKE 1 TABLET (37.5 MG) BY MOUTH 2 TIMES DAILY         Last Written Prescription Date:  11/9/2018  Last Fill Quantity: 180,   # refills: 1  Last Office Visit: 1/17/2019  Future Office visit:    Next 5 appointments (look out 90 days)    May 23, 2019 10:30 AM CDT  Return Visit with Tata Knott MD  [facility] ([facility]  [address]  [address]  [facility] [address]  [phone]           Routing refill request to provider for review/approval.  Dot Paul, RN, BSN, OCN

## 2019-05-23 ENCOUNTER — HOSPITAL ENCOUNTER (OUTPATIENT)
Facility: CLINIC | Age: 49
Setting detail: SPECIMEN
Discharge: HOME | End: 2019-05-23
Attending: INTERNAL MEDICINE | Admitting: INTERNAL MEDICINE
Payer: COMMERCIAL

## 2019-05-23 ENCOUNTER — ONCOLOGY VISIT (OUTPATIENT)
Dept: ONCOLOGY | Facility: CLINIC | Age: 49
End: 2019-05-23
Attending: INTERNAL MEDICINE
Payer: COMMERCIAL

## 2019-05-23 VITALS
DIASTOLIC BLOOD PRESSURE: 84 MMHG | HEIGHT: 64 IN | RESPIRATION RATE: 16 BRPM | TEMPERATURE: 98.7 F | OXYGEN SATURATION: 98 % | HEART RATE: 78 BPM | BODY MASS INDEX: 33.29 KG/M2 | WEIGHT: 195 LBS | SYSTOLIC BLOOD PRESSURE: 119 MMHG

## 2019-05-23 DIAGNOSIS — Z17.0 MALIGNANT NEOPLASM OF BREAST IN FEMALE, ESTROGEN RECEPTOR POSITIVE, UNSPECIFIED LATERALITY, UNSPECIFIED SITE OF BREAST (H): Primary | ICD-10-CM

## 2019-05-23 DIAGNOSIS — C50.919 MALIGNANT NEOPLASM OF BREAST IN FEMALE, ESTROGEN RECEPTOR POSITIVE, UNSPECIFIED LATERALITY, UNSPECIFIED SITE OF BREAST (H): Primary | ICD-10-CM

## 2019-05-23 LAB
ALBUMIN SERPL-MCNC: 4 G/DL (ref 3.4–5)
ALP SERPL-CCNC: 59 U/L (ref 40–150)
ALT SERPL W P-5'-P-CCNC: 33 U/L (ref 0–50)
ANION GAP SERPL CALCULATED.3IONS-SCNC: 5 MMOL/L (ref 3–14)
AST SERPL W P-5'-P-CCNC: 20 U/L (ref 0–45)
BILIRUB SERPL-MCNC: 0.6 MG/DL (ref 0.2–1.3)
BUN SERPL-MCNC: 14 MG/DL (ref 7–30)
CALCIUM SERPL-MCNC: 9.3 MG/DL (ref 8.5–10.1)
CHLORIDE SERPL-SCNC: 104 MMOL/L (ref 94–109)
CO2 SERPL-SCNC: 29 MMOL/L (ref 20–32)
CREAT SERPL-MCNC: 0.8 MG/DL (ref 0.52–1.04)
ERYTHROCYTE [DISTWIDTH] IN BLOOD BY AUTOMATED COUNT: 12.6 % (ref 10–15)
GFR SERPL CREATININE-BSD FRML MDRD: 87 ML/MIN/{1.73_M2}
GLUCOSE SERPL-MCNC: 94 MG/DL (ref 70–99)
HBA1C MFR BLD: 5.9 % (ref 0–5.6)
HCT VFR BLD AUTO: 41.7 % (ref 35–47)
HGB BLD-MCNC: 13.5 G/DL (ref 11.7–15.7)
MCH RBC QN AUTO: 29.3 PG (ref 26.5–33)
MCHC RBC AUTO-ENTMCNC: 32.4 G/DL (ref 31.5–36.5)
MCV RBC AUTO: 91 FL (ref 78–100)
PLATELET # BLD AUTO: 295 10E9/L (ref 150–450)
POTASSIUM SERPL-SCNC: 4.2 MMOL/L (ref 3.4–5.3)
PROT SERPL-MCNC: 8.3 G/DL (ref 6.8–8.8)
RBC # BLD AUTO: 4.6 10E12/L (ref 3.8–5.2)
SODIUM SERPL-SCNC: 138 MMOL/L (ref 133–144)
WBC # BLD AUTO: 5.1 10E9/L (ref 4–11)

## 2019-05-23 PROCEDURE — 80053 COMPREHEN METABOLIC PANEL: CPT | Performed by: INTERNAL MEDICINE

## 2019-05-23 PROCEDURE — G0463 HOSPITAL OUTPT CLINIC VISIT: HCPCS

## 2019-05-23 PROCEDURE — 85027 COMPLETE CBC AUTOMATED: CPT | Performed by: INTERNAL MEDICINE

## 2019-05-23 PROCEDURE — 83036 HEMOGLOBIN GLYCOSYLATED A1C: CPT | Performed by: INTERNAL MEDICINE

## 2019-05-23 PROCEDURE — 99213 OFFICE O/P EST LOW 20 MIN: CPT | Performed by: INTERNAL MEDICINE

## 2019-05-23 ASSESSMENT — MIFFLIN-ST. JEOR: SCORE: 1499.51

## 2019-05-23 ASSESSMENT — PAIN SCALES - GENERAL: PAINLEVEL: NO PAIN (0)

## 2019-05-23 NOTE — NURSING NOTE
"Oncology Rooming Note    May 23, 2019 10:56 AM   Shantell Wagner is a 48 year old female who presents for:    Chief Complaint   Patient presents with     Oncology Clinic Visit     Malignant neoplasm of upper-outer quadrant of right female     Initial Vitals: /84   Pulse 78   Temp 98.7  F (37.1  C) (Tympanic)   Resp 16   Ht 1.626 m (5' 4\")   Wt 88.5 kg (195 lb)   SpO2 98%   BMI 33.47 kg/m   Estimated body mass index is 33.47 kg/m  as calculated from the following:    Height as of this encounter: 1.626 m (5' 4\").    Weight as of this encounter: 88.5 kg (195 lb). Body surface area is 2 meters squared.  No Pain (0) Comment: Data Unavailable   No LMP recorded.  Allergies reviewed: Yes  Medications reviewed: Yes    Medications: Medication refills not needed today.  Pharmacy name entered into [employer]: [id number] - [place], [place] - [address]    Clinical concerns: Follow Up       Marika Rodriguez CMA              "

## 2019-05-23 NOTE — LETTER
5/23/2019         RE: Shantell Wagner  [address]  [facility] [address]        Dear Colleague,    Thank you for referring your patient, Shantell Wagner, to the [facility]. Please see a copy of my visit note below.    Visit Date:   05/23/2019      HISTORY OF PRESENT ILLNESS:  Shantell Wagner is 48 years old and comes in today for interim followup.  She has a history of right-sided breast cancer that was located in the upper outer quadrant of the right breast.  The tumor was a 2.2 cm tumor with a 2.7 cm axillary mass.  Initially, she was treated with neoadjuvant chemotherapy with Adriamycin and Cytoxan followed by Taxol.  Her tumor tested ER-positive, CA-negative, HER-2 receptor-negative.  She also carries a diagnosis of a CHEK2 mutation.  She is now status post chemotherapy, bilateral mastectomies, and she is on hormonal therapy with tamoxifen.  She also has participated in the [place] trial.  She was randomized to tamoxifen only.  She is feeling well today.  Her overall performance status is 0.  She has got no current complaints today.  She gets occasional leg cramps from the tamoxifen but overall, she feels that the side effects are kept to a minimum.      MEDICATIONS AND ALLERGIES:  Outlined in the nursing records.        The rest of her comprehensive review of systems is unremarkable.      PHYSICAL EXAMINATION:   GENERAL:  She is a well-appearing lady in no acute distress.  Performance status 0.   HEENT:  Oropharynx clear, mucous membranes moist.   NECK:  Has no masses or goiter.   LYMPH:  There is no cervical, supraclavicular or infraclavicular adenopathy.  No evidence of lymphedema.   CHEST:  Clear to auscultation and percussion bilaterally.   HEART:  S1, S2 normal.  No added sounds or murmurs.   BREASTS:  The patient is status post bilateral mastectomies.  Scars look good.  Right and left axilla are negative.  No palpable mass in the chest  wall.   SKIN:  Has no rashes or lesions.      DATA REVIEW:  White cell count 5.1, hemoglobin 13.5, platelets 295.  Liver function tests within normal limits.  Creatinine 0.8.      IMPRESSION:  A 48-year-old patient with a history of locally advanced right-sided breast cancer with a good response to neoadjuvant chemotherapy and currently on adjuvant tamoxifen.  She will get some routine blood work done today, and then she will see me back in clinic in 6 months.  I am happy with her progress.         MICH KNOTT MD             D: 2019   T: 2019   MT:       Name:     CADE GARCÍA   MRN:      [id number]        Account:      [id number]   :      1970           Visit Date:   2019      Document: [id number]       Again, thank you for allowing me to participate in the care of your patient.        Sincerely,        Mich Knott MD

## 2019-05-23 NOTE — PROGRESS NOTES
Visit Date:   05/23/2019      HISTORY OF PRESENT ILLNESS:  Shantell Wagner is 48 years old and comes in today for interim followup.  She has a history of right-sided breast cancer that was located in the upper outer quadrant of the right breast.  The tumor was a 2.2 cm tumor with a 2.7 cm axillary mass.  Initially, she was treated with neoadjuvant chemotherapy with Adriamycin and Cytoxan followed by Taxol.  Her tumor tested ER-positive, SC-negative, HER-2 receptor-negative.  She also carries a diagnosis of a CHEK2 mutation.  She is now status post chemotherapy, bilateral mastectomies, and she is on hormonal therapy with tamoxifen.  She also has participated in the [place] trial.  She was randomized to tamoxifen only.  She is feeling well today.  Her overall performance status is 0.  She has got no current complaints today.  She gets occasional leg cramps from the tamoxifen but overall, she feels that the side effects are kept to a minimum.      MEDICATIONS AND ALLERGIES:  Outlined in the nursing records.        The rest of her comprehensive review of systems is unremarkable.      PHYSICAL EXAMINATION:   GENERAL:  She is a well-appearing lady in no acute distress.  Performance status 0.   HEENT:  Oropharynx clear, mucous membranes moist.   NECK:  Has no masses or goiter.   LYMPH:  There is no cervical, supraclavicular or infraclavicular adenopathy.  No evidence of lymphedema.   CHEST:  Clear to auscultation and percussion bilaterally.   HEART:  S1, S2 normal.  No added sounds or murmurs.   BREASTS:  The patient is status post bilateral mastectomies.  Scars look good.  Right and left axilla are negative.  No palpable mass in the chest wall.   SKIN:  Has no rashes or lesions.      DATA REVIEW:  White cell count 5.1, hemoglobin 13.5, platelets 295.  Liver function tests within normal limits.  Creatinine 0.8.      IMPRESSION:  A 48-year-old patient with a history of locally advanced right-sided breast  cancer with a good response to neoadjuvant chemotherapy and currently on adjuvant tamoxifen.  She will get some routine blood work done today, and then she will see me back in clinic in 6 months.  I am happy with her progress.         MICH ORTIZ MD             D: 2019   T: 2019   MT:       Name:     CADE GARCÍA   MRN:      [id number]        Account:      [id number]   :      1970           Visit Date:   2019      Document: [id number]

## 2019-07-09 ENCOUNTER — OFFICE VISIT (OUTPATIENT)
Dept: FAMILY MEDICINE | Facility: CLINIC | Age: 49
End: 2019-07-09
Payer: COMMERCIAL

## 2019-07-09 VITALS
OXYGEN SATURATION: 97 % | HEIGHT: 64 IN | TEMPERATURE: 99.5 F | BODY MASS INDEX: 33.12 KG/M2 | WEIGHT: 194 LBS | HEART RATE: 104 BPM | DIASTOLIC BLOOD PRESSURE: 82 MMHG | SYSTOLIC BLOOD PRESSURE: 120 MMHG

## 2019-07-09 DIAGNOSIS — R05.9 COUGH: ICD-10-CM

## 2019-07-09 DIAGNOSIS — J01.90 ACUTE SINUSITIS, RECURRENCE NOT SPECIFIED, UNSPECIFIED LOCATION: ICD-10-CM

## 2019-07-09 DIAGNOSIS — J06.9 UPPER RESPIRATORY TRACT INFECTION, UNSPECIFIED TYPE: Primary | ICD-10-CM

## 2019-07-09 PROCEDURE — 99213 OFFICE O/P EST LOW 20 MIN: CPT | Performed by: INTERNAL MEDICINE

## 2019-07-09 RX ORDER — CODEINE PHOSPHATE/GUAIFENESIN 10-100MG/5
5 LIQUID (ML) ORAL 2 TIMES DAILY PRN
Qty: 180 ML | Refills: 1 | Status: SHIPPED | OUTPATIENT
Start: 2019-07-09 | End: 2020-07-17

## 2019-07-09 RX ORDER — PREDNISONE 20 MG/1
20 TABLET ORAL 2 TIMES DAILY
Qty: 14 TABLET | Refills: 1 | Status: SHIPPED | OUTPATIENT
Start: 2019-07-09 | End: 2019-07-16

## 2019-07-09 ASSESSMENT — MIFFLIN-ST. JEOR: SCORE: 1494.98

## 2019-07-09 NOTE — PROGRESS NOTES
Chief Complaint:       Shantell Wagner is a 48 year old female who presents to clinic today for the following health issues:      RESPIRATORY SYMPTOMS      Duration: over 1 week    Description  nasal congestion, sore throat, facial pain/pressure, cough, ear pain right, headache, fatigue/malaise, hoarse voice, myalgias and conjunctival irritation    Severity: moderate    Accompanying signs and symptoms: Difficulty swallowing last night - neck hurts.     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     Precipitating or alleviating factors: None    Therapies tried and outcome:  rest and fluids oral decongestant antihistamine guaifenesin - Sx still present.          Current Medications:     Current Outpatient Medications   Medication Sig Dispense Refill     Acetaminophen (TYLENOL PO) Take 500 mg by mouth       atorvastatin (LIPITOR) 20 MG tablet Take 1 tablet (20 mg) by mouth daily 90 tablet 2     calcium carb 1250 mg, 500 mg [place],/vitamin D 200 units (OSCAL WITH D) 500-200 MG-UNIT per tablet Take 1 tablet by mouth daily Reported on 4/13/2017       fluticasone (FLONASE) 50 MCG/ACT spray Spray 1-2 sprays into both nostrils daily 1 Bottle 11     gabapentin (NEURONTIN) 300 MG capsule TAKE 1 CAPSULE (300 MG) BY MOUTH AT BEDTIME 90 capsule 1     glucosamine-chondroitin 500-400 MG CAPS Take 1 capsule by mouth daily       IBU- MG OR TABS 1 TABLET EVERY 4 TO 6 HOURS AS NEEDED       lisinopril (PRINIVIL/ZESTRIL) 20 MG tablet Take 1 tablet (20 mg) by mouth daily 90 tablet 2     omeprazole (PRILOSEC) 20 MG capsule Take 20 mg by mouth daily       order for DME Hair prosthesis for chemotherapy induced alopecia 1 Units 0     tamoxifen (NOLVADEX) 20 MG tablet Take 1 tablet (20 mg) by mouth daily 90 tablet 3     triamcinolone (KENALOG) 0.1 % external cream Apply topically 2 times daily 30 g 0     venlafaxine (EFFEXOR) 37.5 MG tablet TAKE 1 TABLET (37.5 MG) BY MOUTH 2 TIMES  DAILY 180 tablet 1     ALPRAZolam (XANAX) 0.25 MG tablet Take 1 tablet 30-60 minutes prior to flying. (Patient not taking: Reported on 7/9/2019) 2 tablet 0         Allergies:      Allergies   Allergen Reactions     No Known Drug Allergies             Past Medical History:     Past Medical History:   Diagnosis Date     Abnormal Pap smear, can't excl hi gd sq intraepithelial lesion (ASC-H) 02/20/13    Hinkle/ECC= NEGRITA 1. Repeat HPV screen and ECC 12 months from colp.     Hyperlipidemia      Neuropathy      Nose fracture 1983    Accident, needed surgery         Past Surgical History:     Past Surgical History:   Procedure Laterality Date     CARPAL TUNNEL RELEASE RT/LT  3/2010     HC RECONSTR NOSE  1983    after accident     INSERT PORT VASCULAR ACCESS Left 4/22/2016    Procedure: INSERT PORT VASCULAR ACCESS;  Surgeon: Nereyda Flowers MD;  Location: [facility] OR     INSERT TISSUE EXPANDER BREAST BILATERAL Bilateral 10/17/2016    Procedure: INSERT TISSUE EXPANDER BREAST BILATERAL;  Surgeon: Jenna Vazquez MD;  Location: [facility] OR     LASIK BILATERAL       MASTECTOMY MODIFIED RADICAL Right 10/17/2016    Procedure: MASTECTOMY MODIFIED RADICAL;  Surgeon: Nereyda Flowers MD;  Location: [facility] OR     MASTECTOMY MODIFIED RADICAL, SENTINEL NODE, COMBINED Left 10/17/2016    Procedure: COMBINED MASTECTOMY MODIFIED RADICAL, SENTINEL NODE;  Surgeon: Nereyda Flowers MD;  Location: [facility] OR     PROCURE GRAFT FAT Bilateral 4/25/2018    Procedure: PROCURE GRAFT FAT;;  Surgeon: Jenna Vazquez MD;  Location: [facility]     RECONSTRUCT BREAST BILATERAL, IMPLANT PROSTHESIS BILATERAL, COMBINED Bilateral 9/13/2017    Procedure: COMBINED RECONSTRUCT BREAST BILATERAL, IMPLANT PROSTHESIS BILATERAL;  BILATERAL SECOND STAGE BREAST RECONSTRUCTION WITH IMPLANT.;  Surgeon: Jenna Vazquez MD;  Location: [facility]     REMOVE CATHETER VASCULAR ACCESS Left 10/17/2016    Procedure: REMOVE CATHETER VASCULAR ACCESS;  Surgeon: Nereyda Flowers MD;   Location: [facility] OR     REVISE RECONSTRUCTED BREAST BILATERAL Bilateral 4/25/2018    Procedure: REVISE RECONSTRUCTED BREAST BILATERAL;  REVISION BILATERAL BREAST RECONSTRUCTION AND  FAT GRAFTING FROM AXILLA TO BILATERAL BREASTS.;  Surgeon: Jenna Vazuqez MD;  Location: [facility]         Family Medical History:     Family History   Problem Relation Age of Onset     C.A.D. Father         bypass surg age 68     Coronary Artery Disease Father      Prostate Cancer Father      Breast Cancer Sister 38        breast ca     Diabetes Type 1 Mother      Diabetes Mother      Breast Cancer Maternal Grandmother 70        dx'ed age 80s         Social History:     Social History     Socioeconomic History     Marital status:      Spouse name: Not on file     Number of children: Not on file     Years of education: Not on file     Highest education level: Not on file   Occupational History     Not on file   Social Needs     Financial resource strain: Not on file     Food insecurity:     Worry: Not on file     Inability: Not on file     Transportation needs:     Medical: Not on file     Non-medical: Not on file   Tobacco Use     Smoking status: Never Smoker     Smokeless tobacco: Never Used   Substance and Sexual Activity     Alcohol use: Yes     Alcohol/week: 0.0 oz     Comment: rare     Drug use: No     Sexual activity: Yes     Partners: Male     Birth control/protection: Surgical   Lifestyle     Physical activity:     Days per week: Not on file     Minutes per session: Not on file     Stress: Not on file   Relationships     Social connections:     Talks on phone: Not on file     Gets together: Not on file     Attends [employer] service: Not on file     Active member of club or organization: Not on file     Attends meetings of clubs or organizations: Not on file     Relationship status: Not on file     Intimate partner violence:     Fear of current or ex partner: Not on file     Emotionally abused: Not on file     Physically  "abused: Not on file     Forced sexual activity: Not on file   Other Topics Concern     Parent/sibling w/ CABG, MI or angioplasty before 65F 55M? No   Social History Narrative     Not on file           Review of System:     Constitutional: Negative for fever or chills  Skin: Negative for rashes  Ears/Nose/Throat: positive for nasal congestion, sore throat  Respiratory: positive for cough  Cardiovascular: Negative for chest pain  Gastrointestinal: Negative for nausea, vomiting  Genitourinary: Negative for dysuria, hematuria  Musculoskeletal: Negative for myalgias  Neurologic: Negative for headaches  Psychiatric: Negative for depression, anxiety  Hematologic/Lymphatic/Immunologic: Negative  Endocrine: Negative  Behavioral: Negative for tobacco use       Physical Exam:   /82 (BP Location: Left arm, Cuff Size: Adult Regular)   Pulse 104   Temp 99.5  F (37.5  C) (Oral)   Ht 1.626 m (5' 4\")   Wt 88 kg (194 lb)   SpO2 97%   Breastfeeding? No   BMI 33.30 kg/m      GENERAL: alert and no distress  EYES: eyes grossly normal to inspection, and conjunctivae and sclerae normal  HENT: Normocephalic atraumatic. Nose and mouth without ulcers or lesions  NECK: supple  RESP: intermittent dry coughing spells present  CV: regular rate and rhythm, normal S1 S2  LYMPH: no peripheral edema   ABDOMEN: nondistended  MS: no gross musculoskeletal defects noted  SKIN: no suspicious lesions or rashes  NEURO: Alert & Oriented x 3.   PSYCH: mentation appears normal, affect normal        Diagnostic Test Results:     Diagnostic Test Results:  Results for orders placed or performed in visit on 05/23/19   Hemoglobin A1c   Result Value Ref Range    Hemoglobin A1C 5.9 (H) 0 - 5.6 %   Comprehensive metabolic panel   Result Value Ref Range    Sodium 138 133 - 144 mmol/L    Potassium 4.2 3.4 - 5.3 mmol/L    Chloride 104 94 - 109 mmol/L    Carbon Dioxide 29 20 - 32 mmol/L    Anion Gap 5 3 - 14 mmol/L    Glucose 94 70 - 99 mg/dL    Urea Nitrogen " 14 7 - 30 mg/dL    Creatinine 0.80 0.52 - 1.04 mg/dL    GFR Estimate 87 >60 mL/min/[1.73_m2]    GFR Estimate If Black >90 >60 mL/min/[1.73_m2]    Calcium 9.3 8.5 - 10.1 mg/dL    Bilirubin Total 0.6 0.2 - 1.3 mg/dL    Albumin 4.0 3.4 - 5.0 g/dL    Protein Total 8.3 6.8 - 8.8 g/dL    Alkaline Phosphatase 59 40 - 150 U/L    ALT 33 0 - 50 U/L    AST 20 0 - 45 U/L   CBC with platelets   Result Value Ref Range    WBC 5.1 4.0 - 11.0 10e9/L    RBC Count 4.60 3.8 - 5.2 10e12/L    Hemoglobin 13.5 11.7 - 15.7 g/dL    Hematocrit 41.7 35.0 - 47.0 %    MCV 91 78 - 100 fl    MCH 29.3 26.5 - 33.0 pg    MCHC 32.4 31.5 - 36.5 g/dL    RDW 12.6 10.0 - 15.0 %    Platelet Count 295 150 - 450 10e9/L       ASSESSMENT/PLAN:     (J01.90) Acute sinusitis, recurrence not specified, unspecified location  (R05) Cough  (J06.9) Upper respiratory tract infection, unspecified type  (primary encounter diagnosis)  Comment: acute sinusitis, URI with cough symptoms  Plan: predniSONE (DELTASONE) 20 MG tablet,         amoxicillin-clavulanate (AUGMENTIN) 875-125 MG         tablet, guaiFENesin-codeine (GUAIFENESIN AC)         100-10 MG/5ML syrup            Follow Up Plan:     Patient is instructed to return to Internal Medicine clinic for follow-up visit in 1 week.        Rosana Del Rio MD  Internal Medicine  [facility]

## 2019-11-05 ENCOUNTER — HEALTH MAINTENANCE LETTER (OUTPATIENT)
Age: 49
End: 2019-11-05

## 2019-11-18 DIAGNOSIS — C50.411 MALIGNANT NEOPLASM OF UPPER-OUTER QUADRANT OF RIGHT FEMALE BREAST, UNSPECIFIED ESTROGEN RECEPTOR STATUS (H): ICD-10-CM

## 2019-11-18 NOTE — TELEPHONE ENCOUNTER
.Pending Prescriptions:                       Disp   Refills    venlafaxine (EFFEXOR) 37.5 MG tablet      180 ta*1            Sig: Take 1 tablet (37.5 mg) by mouth 2 times daily    EFFEXOR   Last Written Prescription Date:  5/2/19  Last Fill Quantity: 180,   # refills: 1  Last Office Visit: 5/23/19  Future Office visit:    Next 5 appointments (look out 90 days)    Dec 05, 2019  1:30 PM CST  Return Visit with Tata Knott MD  [facility] ([facility]) [facility]  [address]  [facility] [address]  [phone]           Routing refill request to provider for review/approval.

## 2019-11-19 RX ORDER — VENLAFAXINE 37.5 MG/1
37.5 TABLET ORAL 2 TIMES DAILY
Qty: 180 TABLET | Refills: 1 | Status: SHIPPED | OUTPATIENT
Start: 2019-11-19 | End: 2020-02-19

## 2019-12-05 ENCOUNTER — HOSPITAL ENCOUNTER (OUTPATIENT)
Facility: CLINIC | Age: 49
Setting detail: SPECIMEN
Discharge: HOME | End: 2019-12-05
Attending: INTERNAL MEDICINE | Admitting: INTERNAL MEDICINE
Payer: COMMERCIAL

## 2019-12-05 ENCOUNTER — ONCOLOGY VISIT (OUTPATIENT)
Dept: ONCOLOGY | Facility: CLINIC | Age: 49
End: 2019-12-05
Attending: INTERNAL MEDICINE
Payer: COMMERCIAL

## 2019-12-05 VITALS
SYSTOLIC BLOOD PRESSURE: 127 MMHG | BODY MASS INDEX: 33.71 KG/M2 | WEIGHT: 196.4 LBS | OXYGEN SATURATION: 99 % | TEMPERATURE: 97.8 F | HEART RATE: 98 BPM | RESPIRATION RATE: 16 BRPM | DIASTOLIC BLOOD PRESSURE: 81 MMHG

## 2019-12-05 DIAGNOSIS — C50.411 MALIGNANT NEOPLASM OF UPPER-OUTER QUADRANT OF RIGHT FEMALE BREAST, UNSPECIFIED ESTROGEN RECEPTOR STATUS (H): Primary | ICD-10-CM

## 2019-12-05 LAB
ALBUMIN SERPL-MCNC: 3.9 G/DL (ref 3.4–5)
ALP SERPL-CCNC: 60 U/L (ref 40–150)
ALT SERPL W P-5'-P-CCNC: 57 U/L (ref 0–50)
ANION GAP SERPL CALCULATED.3IONS-SCNC: 4 MMOL/L (ref 3–14)
AST SERPL W P-5'-P-CCNC: 25 U/L (ref 0–45)
BILIRUB SERPL-MCNC: 0.6 MG/DL (ref 0.2–1.3)
BUN SERPL-MCNC: 22 MG/DL (ref 7–30)
CALCIUM SERPL-MCNC: 9.5 MG/DL (ref 8.5–10.1)
CANCER AG27-29 SERPL-ACNC: 25 U/ML (ref 0–39)
CHLORIDE SERPL-SCNC: 107 MMOL/L (ref 94–109)
CO2 SERPL-SCNC: 28 MMOL/L (ref 20–32)
CREAT SERPL-MCNC: 0.89 MG/DL (ref 0.52–1.04)
ERYTHROCYTE [DISTWIDTH] IN BLOOD BY AUTOMATED COUNT: 12.7 % (ref 10–15)
GFR SERPL CREATININE-BSD FRML MDRD: 76 ML/MIN/{1.73_M2}
GLUCOSE SERPL-MCNC: 87 MG/DL (ref 70–99)
HCT VFR BLD AUTO: 38.5 % (ref 35–47)
HGB BLD-MCNC: 12.1 G/DL (ref 11.7–15.7)
MCH RBC QN AUTO: 28.3 PG (ref 26.5–33)
MCHC RBC AUTO-ENTMCNC: 31.4 G/DL (ref 31.5–36.5)
MCV RBC AUTO: 90 FL (ref 78–100)
PLATELET # BLD AUTO: 291 10E9/L (ref 150–450)
POTASSIUM SERPL-SCNC: 4.2 MMOL/L (ref 3.4–5.3)
PROT SERPL-MCNC: 7.8 G/DL (ref 6.8–8.8)
RBC # BLD AUTO: 4.27 10E12/L (ref 3.8–5.2)
SODIUM SERPL-SCNC: 139 MMOL/L (ref 133–144)
WBC # BLD AUTO: 4.6 10E9/L (ref 4–11)

## 2019-12-05 PROCEDURE — G0463 HOSPITAL OUTPT CLINIC VISIT: HCPCS

## 2019-12-05 PROCEDURE — 85027 COMPLETE CBC AUTOMATED: CPT | Performed by: INTERNAL MEDICINE

## 2019-12-05 PROCEDURE — 80053 COMPREHEN METABOLIC PANEL: CPT | Performed by: INTERNAL MEDICINE

## 2019-12-05 PROCEDURE — 99214 OFFICE O/P EST MOD 30 MIN: CPT | Performed by: INTERNAL MEDICINE

## 2019-12-05 PROCEDURE — 86300 IMMUNOASSAY TUMOR CA 15-3: CPT | Performed by: INTERNAL MEDICINE

## 2019-12-05 PROCEDURE — 36415 COLL VENOUS BLD VENIPUNCTURE: CPT

## 2019-12-05 ASSESSMENT — PAIN SCALES - GENERAL: PAINLEVEL: NO PAIN (0)

## 2019-12-05 NOTE — NURSING NOTE
"Oncology Rooming Note    December 5, 2019 1:40 PM   Shantell Wagner is a 49 year old female who presents for:    Chief Complaint   Patient presents with     Oncology Clinic Visit     Malignant neoplasm of breast in female     Initial Vitals: /81   Pulse 98   Temp 97.8  F (36.6  C) (Tympanic)   Resp 16   Wt 89.1 kg (196 lb 6.4 oz)   SpO2 99%   BMI 33.71 kg/m   Estimated body mass index is 33.71 kg/m  as calculated from the following:    Height as of 7/9/19: 1.626 m (5' 4\").    Weight as of this encounter: 89.1 kg (196 lb 6.4 oz). Body surface area is 2.01 meters squared.  No Pain (0) Comment: Data Unavailable   No LMP recorded.  Allergies reviewed: Yes  Medications reviewed: Yes    Medications: Medication refills not needed today.  Pharmacy name entered into [facility] [facility]: [id number] - [place], [place] - [address]    Clinical concerns: Follow Up       Marika Rodriguez CMA              "

## 2019-12-05 NOTE — LETTER
12/5/2019         RE: Shantell Wagner  [address]  [place] [address]        Dear Colleague,    Thank you for referring your patient, Shantell Wagner, to the [facility] CANC[facility]INIC. Please see a copy of my visit note below.    Visit Date:   12/05/2019      HISTORY OF PRESENT ILLNESS:  Shantell Wagner is 49 years old, comes in today for interim followup.  She has a history of a locally advanced right-sided breast cancer with a 2.2 cm tumor in the upper outer quadrant of the right breast with a 2.7 cm axillary mass.  Initially she was treated neoadjuvantly with Adriamycin and Cytoxan followed by Taxol.  Her tumor was estrogen receptor positive, progesterone receptor negative, HER-2 receptor negative.  After chemotherapy, she had bilateral mastectomies and is now on hormonal therapy with tamoxifen.  She participated in the [place] trial and was randomized to tamoxifen only.      Shantell also carries a diagnosis of a CHEK2 mutation.  She is up-to-date on her colonoscopy and she will be getting colonoscopies every 5 years.  There is no history of colon cancer in the family.  She is doing well on the tamoxifen.  She did get a menstrual period in the last 6 months.  She gets occasional menstrual periods, but they are not regular.  She still gets occasional leg cramps from the tamoxifen and also hot flashes.  We have discussed the side effect profile today.      MEDICATIONS AND ALLERGIES:  Outlined in the nursing records.      REVIEW OF SYSTEMS:  A 14-point comprehensive review of systems is otherwise unremarkable.      PAIN ASSESSMENT:  The patient is in no pain today.        PHYSICAL EXAMINATION:      GENERAL:  Performance status is 0.  She is a well-appearing lady in no acute distress.   VITAL SIGNS:  Stable.   HEENT:  Oropharynx clear.  Mucous membranes moist.   NECK:  No masses or goiter.  There is no cervical, supraclavicular, infraclavicular adenopathy.   CHEST:  Clear to auscultation and  percussion bilaterally.   HEART:  Sounds 1 and 2 normal.  No added sounds, no murmurs.   BREASTS:  The patient is status post bilateral mastectomies.  The scars look good.  Right and left axilla are negative.  No palpable masses in the chest wall.   SKIN:  No rashes or lesions.   EXTREMITIES:  Without edema.      LABORATORY DATA:  White cell count 5.1, hemoglobin 13.5, platelets 295.  These labs are from 2019.  I have reordered labs again today.       IMPRESSION:  A 49-year-old patient with locally advanced right-sided breast cancer.  She is currently participating in the P[place] trial.  She is status post neoadjuvant chemotherapy and is currently on adjuvant tamoxifen.  Tamoxifen is causing some side effects, but she is able to tolerate it.  With regard to her CHEK2 mutation, she is up-to-date on her colonoscopies and she is status post bilateral mastectomies to decrease her risk of breast cancer.  Again, I will see her back in my clinic in 6 months.         MICH KNOTT MD             D: 2019   T: 2019   MT:       Name:     CADE GARCÍA   MRN:      [id number]        Account:      [id number]   :      1970           Visit Date:   2019      Document: [id number]       Again, thank you for allowing me to participate in the care of your patient.        Sincerely,        Mich Knott MD

## 2019-12-05 NOTE — PROGRESS NOTES
Visit Date:   12/05/2019      HISTORY OF PRESENT ILLNESS:  Shantell Wagner is 49 years old, comes in today for interim followup.  She has a history of a locally advanced right-sided breast cancer with a 2.2 cm tumor in the upper outer quadrant of the right breast with a 2.7 cm axillary mass.  Initially she was treated neoadjuvantly with Adriamycin and Cytoxan followed by Taxol.  Her tumor was estrogen receptor positive, progesterone receptor negative, HER-2 receptor negative.  After chemotherapy, she had bilateral mastectomies and is now on hormonal therapy with tamoxifen.  She participated in the P[place]AS trial and was randomized to tamoxifen only.      Shantell also carries a diagnosis of a CHEK2 mutation.  She is up-to-date on her colonoscopy and she will be getting colonoscopies every 5 years.  There is no history of colon cancer in the family.  She is doing well on the tamoxifen.  She did get a menstrual period in the last 6 months.  She gets occasional menstrual periods, but they are not regular.  She still gets occasional leg cramps from the tamoxifen and also hot flashes.  We have discussed the side effect profile today.      MEDICATIONS AND ALLERGIES:  Outlined in the nursing records.      REVIEW OF SYSTEMS:  A 14-point comprehensive review of systems is otherwise unremarkable.      PAIN ASSESSMENT:  The patient is in no pain today.        PHYSICAL EXAMINATION:      GENERAL:  Performance status is 0.  She is a well-appearing lady in no acute distress.   VITAL SIGNS:  Stable.   HEENT:  Oropharynx clear.  Mucous membranes moist.   NECK:  No masses or goiter.  There is no cervical, supraclavicular, infraclavicular adenopathy.   CHEST:  Clear to auscultation and percussion bilaterally.   HEART:  Sounds 1 and 2 normal.  No added sounds, no murmurs.   BREASTS:  The patient is status post bilateral mastectomies.  The scars look good.  Right and left axilla are negative.  No palpable masses in the chest wall.    SKIN:  No rashes or lesions.   EXTREMITIES:  Without edema.      LABORATORY DATA:  White cell count 5.1, hemoglobin 13.5, platelets 295.  These labs are from 2019.  I have reordered labs again today.       IMPRESSION:  A 49-year-old patient with locally advanced right-sided breast cancer.  She is currently participating in the PALLAS trial.  She is status post neoadjuvant chemotherapy and is currently on adjuvant tamoxifen.  Tamoxifen is causing some side effects, but she is able to tolerate it.  With regard to her CHEK2 mutation, she is up-to-date on her colonoscopies and she is status post bilateral mastectomies to decrease her risk of breast cancer.  Again, I will see her back in my clinic in 6 months.         MICH ORTIZ MD             D: 2019   T: 2019   MT:       Name:     CADE GARCÍA   MRN:      [id number]        Account:      [id number]   :      1970           Visit Date:   2019      Document: [id number]

## 2020-01-22 DIAGNOSIS — J34.89 RHINORRHEA: ICD-10-CM

## 2020-01-22 RX ORDER — FLUTICASONE PROPIONATE 50 MCG
1-2 SPRAY, SUSPENSION (ML) NASAL DAILY
Qty: 48 ML | Refills: 3 | Status: SHIPPED | OUTPATIENT
Start: 2020-01-22 | End: 2022-08-16

## 2020-02-13 DIAGNOSIS — C50.411 MALIGNANT NEOPLASM OF UPPER-OUTER QUADRANT OF RIGHT FEMALE BREAST, UNSPECIFIED ESTROGEN RECEPTOR STATUS (H): ICD-10-CM

## 2020-02-13 NOTE — TELEPHONE ENCOUNTER
Pending Prescriptions:                       Disp   Refills    venlafaxine (EFFEXOR) 37.5 MG tablet      180 ta*1            Sig: Take 1 tablet (37.5 mg) by mouth 2 times daily         Last Written Prescription Date:  11/19/2019  Last Fill Quantity: 180,   # refills: 1  Last Office Visit: 12/5/2019  Future Office visit:   Pt does not have follow-up appt scheduled yet; due in June 2020    Pharmacy states that refill request is sent when pt has no refills left on current Rx. This refill will be needed in May 2020.  Routing refill request to provider for review/approval.  Dot Paul, RN, BSN, OCN, CBCN

## 2020-02-19 RX ORDER — VENLAFAXINE 37.5 MG/1
37.5 TABLET ORAL 2 TIMES DAILY
Qty: 180 TABLET | Refills: 1 | Status: SHIPPED | OUTPATIENT
Start: 2020-02-19 | End: 2020-11-16

## 2020-02-19 NOTE — TELEPHONE ENCOUNTER
Signed Prescriptions:                        Disp   Refills    venlafaxine 37.5 MG PO tablet              180 ta*1        Sig: Take 1 tablet (37.5 mg) by mouth 2 times daily  Authorizing Provider: MICH KNOTT     Rx has been approved by Dr Knott.  Dot Paul, RN, BSN, OCN, CBCN

## 2020-02-28 ENCOUNTER — TRANSFERRED RECORDS (OUTPATIENT)
Dept: HEALTH INFORMATION MANAGEMENT | Facility: CLINIC | Age: 50
End: 2020-02-28

## 2020-03-10 DIAGNOSIS — C50.919 MALIGNANT NEOPLASM OF BREAST IN FEMALE, ESTROGEN RECEPTOR POSITIVE, UNSPECIFIED LATERALITY, UNSPECIFIED SITE OF BREAST (H): ICD-10-CM

## 2020-03-10 DIAGNOSIS — Z17.0 MALIGNANT NEOPLASM OF BREAST IN FEMALE, ESTROGEN RECEPTOR POSITIVE, UNSPECIFIED LATERALITY, UNSPECIFIED SITE OF BREAST (H): ICD-10-CM

## 2020-03-10 RX ORDER — TAMOXIFEN CITRATE 20 MG/1
20 TABLET ORAL DAILY
Qty: 90 TABLET | Refills: 3 | Status: SHIPPED | OUTPATIENT
Start: 2020-03-10 | End: 2021-01-05

## 2020-03-10 NOTE — TELEPHONE ENCOUNTER
Message     Recorded as Task   Date: 06/05/2017 12:24 PM, Created By: Lulu Arriola   Task Name: Miscellaneous   Assigned To: Jonh Simon   Regarding Patient: Lo Becerra, Status: Active   CommentOrren Lab - 05 Jun 2017 12:24 PM     TASK CREATED  Please call patient with the following instructions:    1  See if she has any symptoms of dizziness, lightheadedness, nausea, headache, vomiting, poor balance  2  If she has symptoms, she will need to go to the hospital    3  If no symptoms, have her start salt tablets 1 gm BID  4  She needs to stay on a fluid restriction of 50 oz in a day  5  Repeat BMP in 1 week  Called and spoke to pt regarding the above and she is refusing to follow our instructions  Dr Guido aware of the above  Active Problems    1  COPD (chronic obstructive pulmonary disease) (496) (J44 9)   2  Diabetes mellitus screening (V77 1) (Z13 1)   3  Hyperlipidemia (272 4) (E78 5)   4  Hyponatremia (276 1) (E87 1)   5  Hypothyroidism (244 9) (E03 9)   6  Mental impairment (319) (F79)   7  Pulmonary nodule (793 11) (R91 1)   8  Screening for cancer (V76 9) (Z12 9)   9  SIADH (syndrome of inappropriate ADH production) (253 6) (E22 2)   10  SOB (shortness of breath) (786 05) (R06 02)   11  Tonic-clonic epileptic seizures (345 10) (G40 409)    Current Meds   1  Depakote 500 MG Oral Tablet Delayed Release (Divalproex Sodium); Take 1 tablet   daily; Therapy: (Recorded:12Jan2017) to Recorded   2  Levothyroxine Sodium 137 MCG Oral Tablet; take 1 tablet every day; Therapy: 48Aul9677 to (Evaluate:66Tic7091)  Requested for: 19Apr2017; Last   Rx:19Apr2017 Ordered   3  RisperiDONE 2 MG Oral Tablet; TAKE 1 TABLET AT BEDTIME; Therapy: (Recorded:12Jan2017) to Recorded   4  Torsemide 20 MG Oral Tablet; Take 1 tablet daily; Therapy: 65WSY4975 to (Evaluate:91Ymq4332)  Requested for: 22Nov2016; Last   Rx:21Nov2016 Ordered    Allergies    1  Benzodiazepines   2  haloperidol   3  lithium   4  Pending Prescriptions:                       Disp   Refills    tamoxifen (NOLVADEX) 20 MG tablet         90 tab*3            Sig: Take 1 tablet (20 mg) by mouth daily      Last Written Prescription Date:  1/30/2019  Last Fill Quantity: 90, # refills: 3  Last Office Visit: 12/5/2019  Future Office visit: 7/1/2020 with Dr. Treviño.      Routing refill request to provider for review/approval.    Yaritza Hicks RN, BSN, OCN on 3/10/2020 at 8:50 AM     metoclopramide   5  PHENobarbital TABS   6  Thorazine TABS    7   Metal Compounds    Signatures   Electronically signed by : Mary Luna, ; Jun 5 2017  1:24PM EST                       (Author)

## 2020-03-12 ENCOUNTER — TRANSFERRED RECORDS (OUTPATIENT)
Dept: HEALTH INFORMATION MANAGEMENT | Facility: CLINIC | Age: 50
End: 2020-03-12

## 2020-03-30 DIAGNOSIS — M79.605 PAIN IN BOTH LOWER EXTREMITIES: ICD-10-CM

## 2020-03-30 DIAGNOSIS — M79.604 PAIN IN BOTH LOWER EXTREMITIES: ICD-10-CM

## 2020-03-30 NOTE — TELEPHONE ENCOUNTER
Routing refill request to provider for review/approval because:  Drug not on the [facility] refill protocol   Sagrario Madden RN

## 2020-04-01 RX ORDER — GABAPENTIN 300 MG/1
CAPSULE ORAL
Qty: 90 CAPSULE | Refills: 1 | Status: SHIPPED | OUTPATIENT
Start: 2020-04-01 | End: 2020-09-25

## 2020-05-29 DIAGNOSIS — E78.5 HYPERLIPIDEMIA LDL GOAL <160: ICD-10-CM

## 2020-05-29 RX ORDER — ATORVASTATIN CALCIUM 20 MG/1
TABLET, FILM COATED ORAL
Qty: 90 TABLET | Refills: 0 | Status: SHIPPED | OUTPATIENT
Start: 2020-05-29 | End: 2020-09-22

## 2020-05-29 NOTE — TELEPHONE ENCOUNTER
Next 5 appointments (look out 90 days)    Jul 02, 2020  2:00 PM CDT  Return Visit with Tata Knott MD  [facility] ([facility]) [address]  [address]  [facility] [address]  [phone]   Jul 10, 2020  8:00 AM CDT  PHYSICAL with NIKKI Santos Ra, CNP  [facility] [address] [facility] [address] [phone]        Medication is being filled for 1 time refill only due to:  Patient needs labs fasting. Patient needs to be seen because due for px..     Lila Heaton RN

## 2020-07-02 ENCOUNTER — VIRTUAL VISIT (OUTPATIENT)
Dept: ONCOLOGY | Facility: CLINIC | Age: 50
End: 2020-07-02
Attending: INTERNAL MEDICINE
Payer: COMMERCIAL

## 2020-07-02 DIAGNOSIS — C50.411 MALIGNANT NEOPLASM OF UPPER-OUTER QUADRANT OF RIGHT FEMALE BREAST, UNSPECIFIED ESTROGEN RECEPTOR STATUS (H): Primary | ICD-10-CM

## 2020-07-02 PROCEDURE — 99214 OFFICE O/P EST MOD 30 MIN: CPT | Mod: 95 | Performed by: INTERNAL MEDICINE

## 2020-07-02 PROCEDURE — 40001009 ZZH VIDEO/TELEPHONE VISIT; NO CHARGE

## 2020-07-02 NOTE — LETTER
"    7/2/2020         RE: Shantell Wagner  [address] [facility] [address]        Dear Colleague,    Thank you for referring your patient, Shantell Wagner, to the [facility]. Please see a copy of my visit note below.    Shantell Wagner is a 49 year old female who is being evaluated via a billable video visit.      The patient has been notified of following:     \"This video visit will be conducted via a call between you and your physician/provider. We have found that certain health care needs can be provided without the need for an in-person physical exam.  This service lets us provide the care you need with a video conversation.  If a prescription is necessary we can send it directly to your pharmacy.  If lab work is needed we can place an order for that and you can then stop by our lab to have the test done at a later time.    Video visits are billed at different rates depending on your insurance coverage.  Please reach out to your insurance provider with any questions.    If during the course of the call the physician/provider feels a video visit is not appropriate, you will not be charged for this service.\"    Patient has given verbal consent for Video visit? Yes  How would you like to obtain your AVS? [facility]  Patient would like the video invitation sent by: Layer   Text to cell phone: [phone]  Will anyone else be joining your video visit? No        Video-Visit Details    Type of service:  Video Visit    Video Start Time: Video End Time:     Originating Location (pt. Location): home    Distant Location (provider location):  [facility]     Platform used for Video Visit: [employer]              Visit Date:   07/02/2020      HISTORY OF PRESENT ILLNESS:  Shantell Wagner is a 49-year-old patient who is being evaluated today by a video visit due to a public health emergency with coronavirus.      PATIENT LOCATION:  Home.      PHYSICIAN LOCATION:  [facility]" [place].      VIDEO PLATFORM:  [employer].      COMPLEXITY OF CASE:  Moderate.      HISTORY OF PRESENTING COMPLAINT:  Shantell is a 49-year-old patient.  She carries a diagnosis of CHEK2 mutation, which predisposed her to be a high risk of breast cancer.  She did indeed present with a locally advanced right-sided breast cancer, which was a 2.2 cm tumor in the upper outer quadrant of the right breast and a 2.7 cm axillary lymph mass.  She was initially treated with Adriamycin and Cytoxan followed by Taxol.  Her tumor was estrogen receptor positive, progesterone receptor negative, HER-2 receptor negative.  After chemotherapy, she had bilateral mastectomies done and is now on hormonal therapy with adjuvant tamoxifen.  She participated in the PALLAS trial and was randomized to tamoxifen only.  She is doing well on tamoxifen.  She does get some hot flashes from it and also some other minor amount of menopausal symptoms, but overall she is willing to continue it.  Because of the CHEK2 mutation, she is also at slight increased risk of colon cancer, so she has been getting colonoscopies every 5 years.  She had one last year, so she is up-to-date on that.  She is still getting an occasional menstrual period, although they are irregular.  She has no major issues today to suggest recurrence of breast cancer.  She denies cough, shortness of breath, bone pain, any worrisome symptomatology.      MEDICATIONS AND ALLERGIES:  OUTLINED IN THE NURSING RECORDS.      REVIEW OF SYSTEMS:  A 14-point comprehensive review of systems is otherwise unremarkable.      PAIN ASSESSMENT:  The patient is in no pain today.      SOCIAL HISTORY:  The patient works full-time.  She is a nonsmoker.      PHYSICAL EXAMINATION:     GENERAL:  She is a well-appearing lady.   EYES:  Eyes have no redness or discharge.   LUNGS:  Breathing is not labored.  There is no cough.   MUSCULOSKELETAL:  She is moving all extremities.   NEUROLOGIC:  She is alert and oriented  x 3.  No tremors.   SKIN:  Has no rashes or lesions.   The rest of her comprehensive video exam has been deferred today due to video visit restriction from public health emergency.      DATA REVIEW:  She had lab work done in 2019 which was within normal limits and I have ordered routine lab work for her in the next upcoming months.      IMPRESSION:  This is a 49-year-old patient with a genetic mutation and a CHEK2 mutation.  She presented with a locally advanced right-sided breast cancer.  She did well with neoadjuvant treatment and is now on adjuvant tamoxifen.  She participated in the PALLAS trial.  From a screening standpoint, she has had bilateral mastectomies, which will decrease her risk of another breast cancer and she is also up-to-date on colonoscopy to keep ahead of her risk of colon cancer.  I will see her back in my clinic in 6 months.         MICH KNOTT MD             D: 2020   T: 2020   MT:       Name:     ACDE GARCÍA   MRN:      [id number]        Account:      [id number]   :      1970           Visit Date:   2020      Document: [id number]       Again, thank you for allowing me to participate in the care of your patient.        Sincerely,        Mich Knott MD

## 2020-07-02 NOTE — PROGRESS NOTES
"Shantell Wagner is a 49 year old female who is being evaluated via a billable video visit.      The patient has been notified of following:     \"This video visit will be conducted via a call between you and your physician/provider. We have found that certain health care needs can be provided without the need for an in-person physical exam.  This service lets us provide the care you need with a video conversation.  If a prescription is necessary we can send it directly to your pharmacy.  If lab work is needed we can place an order for that and you can then stop by our lab to have the test done at a later time.    Video visits are billed at different rates depending on your insurance coverage.  Please reach out to your insurance provider with any questions.    If during the course of the call the physician/provider feels a video visit is not appropriate, you will not be charged for this service.\"    Patient has given verbal consent for Video visit? Yes  How would you like to obtain your AVS? Patricia  Patient would like the video invitation sent by: CLARISA Hernandez to cell phone: [phone]  Will anyone else be joining your video visit? No        Video-Visit Details    Type of service:  Video Visit    Video Start Time: Video End Time:     Originating Location (pt. Location): home    Distant Location (provider location):  [facility]     Platform used for Video Visit: [employer]            "

## 2020-07-02 NOTE — LETTER
"    7/2/2020         RE: Shantell Wagner  [address] [address] [facility] [address]        Dear Colleague,    Thank you for referring your patient, Shantell Wagner, to the [facility]. Please see a copy of my visit note below.    Shantell Wagner is a 49 year old female who is being evaluated via a billable video visit.      The patient has been notified of following:     \"This video visit will be conducted via a call between you and your physician/provider. We have found that certain health care needs can be provided without the need for an in-person physical exam.  This service lets us provide the care you need with a video conversation.  If a prescription is necessary we can send it directly to your pharmacy.  If lab work is needed we can place an order for that and you can then stop by our lab to have the test done at a later time.    Video visits are billed at different rates depending on your insurance coverage.  Please reach out to your insurance provider with any questions.    If during the course of the call the physician/provider feels a video visit is not appropriate, you will not be charged for this service.\"    Patient has given verbal consent for Video visit? Yes  How would you like to obtain your AVS? [facility]  Patient would like the video invitation sent by: [employer]   Text to cell phone: [phone]  Will anyone else be joining your video visit? No        Video-Visit Details    Type of service:  Video Visit    Video Start Time: Video End Time:     Originating Location (pt. Location): home    Distant Location (provider location):  [facility]     Platform used for Video Visit: [employer]              Visit Date:   07/02/2020      HISTORY OF PRESENT ILLNESS:  Shantell Wagner is a 49-year-old patient who is being evaluated today by a video visit due to a public health emergency with coronavirus.      PATIENT LOCATION:  Home.      PHYSICIAN LOCATION:  [facility] [place].      VIDEO PLATFORM:  [employer].      COMPLEXITY OF CASE:  Moderate.      HISTORY OF PRESENTING COMPLAINT:  Shantell is a 49-year-old patient.  She carries a diagnosis of CHEK2 mutation, which predisposed her to be a high risk of breast cancer.  She did indeed present with a locally advanced right-sided breast cancer, which was a 2.2 cm tumor in the upper outer quadrant of the right breast and a 2.7 cm axillary lymph mass.  She was initially treated with Adriamycin and Cytoxan followed by Taxol.  Her tumor was estrogen receptor positive, progesterone receptor negative, HER-2 receptor negative.  After chemotherapy, she had bilateral mastectomies done and is now on hormonal therapy with adjuvant tamoxifen.  She participated in the PALLAS trial and was randomized to tamoxifen only.  She is doing well on tamoxifen.  She does get some hot flashes from it and also some other minor amount of menopausal symptoms, but overall she is willing to continue it.  Because of the CHEK2 mutation, she is also at slight increased risk of colon cancer, so she has been getting colonoscopies every 5 years.  She had one last year, so she is up-to-date on that.  She is still getting an occasional menstrual period, although they are irregular.  She has no major issues today to suggest recurrence of breast cancer.  She denies cough, shortness of breath, bone pain, any worrisome symptomatology.      MEDICATIONS AND ALLERGIES:  OUTLINED IN THE NURSING RECORDS.      REVIEW OF SYSTEMS:  A 14-point comprehensive review of systems is otherwise unremarkable.      PAIN ASSESSMENT:  The patient is in no pain today.      SOCIAL HISTORY:  The patient works full-time.  She is a nonsmoker.      PHYSICAL EXAMINATION:     GENERAL:  She is a well-appearing lady.   EYES:  Eyes have no redness or discharge.   LUNGS:  Breathing is not labored.  There is no cough.   MUSCULOSKELETAL:  She is moving all extremities.   NEUROLOGIC:  She is alert and oriented  x 3.  No tremors.   SKIN:  Has no rashes or lesions.   The rest of her comprehensive video exam has been deferred today due to video visit restriction from public health emergency.      DATA REVIEW:  She had lab work done in 2019 which was within normal limits and I have ordered routine lab work for her in the next upcoming months.      IMPRESSION:  This is a 49-year-old patient with a genetic mutation and a CHEK2 mutation.  She presented with a locally advanced right-sided breast cancer.  She did well with neoadjuvant treatment and is now on adjuvant tamoxifen.  She participated in the PALLAS trial.  From a screening standpoint, she has had bilateral mastectomies, which will decrease her risk of another breast cancer and she is also up-to-date on colonoscopy to keep ahead of her risk of colon cancer.  I will see her back in my clinic in 6 months.         MICH KNOTT MD             D: 2020   T: 2020   MT:       Name:     CADE GARCÍA   MRN:      [id number]        Account:      [id number]   :      1970           Visit Date:   2020      Document: [id number]       Again, thank you for allowing me to participate in the care of your patient.        Sincerely,        Mich Knott MD

## 2020-07-03 NOTE — PROGRESS NOTES
Visit Date:   07/02/2020      HISTORY OF PRESENT ILLNESS:  Shantell Wagner is a 49-year-old patient who is being evaluated today by a video visit due to a public health emergency with coronavirus.      PATIENT LOCATION:  Home.      PHYSICIAN LOCATION:  [facility].      VIDEO PLATFORM:  [employer].      COMPLEXITY OF CASE:  Moderate.      HISTORY OF PRESENTING COMPLAINT:  Shantell is a 49-year-old patient.  She carries a diagnosis of CHEK2 mutation, which predisposed her to be a high risk of breast cancer.  She did indeed present with a locally advanced right-sided breast cancer, which was a 2.2 cm tumor in the upper outer quadrant of the right breast and a 2.7 cm axillary lymph mass.  She was initially treated with Adriamycin and Cytoxan followed by Taxol.  Her tumor was estrogen receptor positive, progesterone receptor negative, HER-2 receptor negative.  After chemotherapy, she had bilateral mastectomies done and is now on hormonal therapy with adjuvant tamoxifen.  She participated in the PALLAS trial and was randomized to tamoxifen only.  She is doing well on tamoxifen.  She does get some hot flashes from it and also some other minor amount of menopausal symptoms, but overall she is willing to continue it.  Because of the CHEK2 mutation, she is also at slight increased risk of colon cancer, so she has been getting colonoscopies every 5 years.  She had one last year, so she is up-to-date on that.  She is still getting an occasional menstrual period, although they are irregular.  She has no major issues today to suggest recurrence of breast cancer.  She denies cough, shortness of breath, bone pain, any worrisome symptomatology.      MEDICATIONS AND ALLERGIES:  OUTLINED IN THE NURSING RECORDS.      REVIEW OF SYSTEMS:  A 14-point comprehensive review of systems is otherwise unremarkable.      PAIN ASSESSMENT:  The patient is in no pain today.      SOCIAL HISTORY:  The patient works full-time.  She is a  nonsmoker.      PHYSICAL EXAMINATION:     GENERAL:  She is a well-appearing lady.   EYES:  Eyes have no redness or discharge.   LUNGS:  Breathing is not labored.  There is no cough.   MUSCULOSKELETAL:  She is moving all extremities.   NEUROLOGIC:  She is alert and oriented x 3.  No tremors.   SKIN:  Has no rashes or lesions.   The rest of her comprehensive video exam has been deferred today due to video visit restriction from public health emergency.      DATA REVIEW:  She had lab work done in 2019 which was within normal limits and I have ordered routine lab work for her in the next upcoming months.      IMPRESSION:  This is a 49-year-old patient with a genetic mutation and a CHEK2 mutation.  She presented with a locally advanced right-sided breast cancer.  She did well with neoadjuvant treatment and is now on adjuvant tamoxifen.  She participated in the PALLAS trial.  From a screening standpoint, she has had bilateral mastectomies, which will decrease her risk of another breast cancer and she is also up-to-date on colonoscopy to keep ahead of her risk of colon cancer.  I will see her back in my clinic in 6 months.         MICH ORTIZ MD             D: 2020   T: 2020   MT:       Name:     CADE GARCÍA   MRN:      [id number]        Account:      [id number]   :      1970           Visit Date:   2020      Document: [id number]

## 2020-07-17 ENCOUNTER — E-VISIT (OUTPATIENT)
Dept: FAMILY MEDICINE | Facility: CLINIC | Age: 50
End: 2020-07-17
Payer: COMMERCIAL

## 2020-07-17 DIAGNOSIS — F40.243 FEAR OF FLYING: Primary | ICD-10-CM

## 2020-07-17 PROCEDURE — 99421 OL DIG E/M SVC 5-10 MIN: CPT | Performed by: NURSE PRACTITIONER

## 2020-07-17 RX ORDER — ALPRAZOLAM 0.25 MG
TABLET ORAL
Qty: 2 TABLET | Refills: 0 | Status: SHIPPED | OUTPATIENT
Start: 2020-07-17 | End: 2020-09-25

## 2020-07-17 NOTE — PATIENT INSTRUCTIONS
Thank you for choosing us for your care. I have placed an order for a prescription so that you can start treatment. View your full visit summary for details by clicking on the link below. Your pharmacist will able to address any questions you may have about the medication.     If you're not feeling better within 5-7 days, please schedule an appointment.  You can schedule an appointment right here in [facility], or call [phone]  If the visit is for the same symptoms as your e-visit, we'll refund the cost of your e-visit if seen within seven days.

## 2020-07-21 DIAGNOSIS — I10 ESSENTIAL HYPERTENSION: ICD-10-CM

## 2020-07-22 RX ORDER — LISINOPRIL 20 MG/1
TABLET ORAL
Qty: 90 TABLET | Refills: 0 | Status: SHIPPED | OUTPATIENT
Start: 2020-07-22 | End: 2020-09-25

## 2020-07-24 DIAGNOSIS — C50.411 MALIGNANT NEOPLASM OF UPPER-OUTER QUADRANT OF RIGHT FEMALE BREAST, UNSPECIFIED ESTROGEN RECEPTOR STATUS (H): ICD-10-CM

## 2020-07-24 LAB
CANCER AG27-29 SERPL-ACNC: 28 U/ML (ref 0–39)
ERYTHROCYTE [DISTWIDTH] IN BLOOD BY AUTOMATED COUNT: 12.9 % (ref 10–15)
HCT VFR BLD AUTO: 39 % (ref 35–47)
HGB BLD-MCNC: 12.6 G/DL (ref 11.7–15.7)
MCH RBC QN AUTO: 28.6 PG (ref 26.5–33)
MCHC RBC AUTO-ENTMCNC: 32.3 G/DL (ref 31.5–36.5)
MCV RBC AUTO: 88 FL (ref 78–100)
PLATELET # BLD AUTO: 313 10E9/L (ref 150–450)
RBC # BLD AUTO: 4.41 10E12/L (ref 3.8–5.2)
WBC # BLD AUTO: 6.8 10E9/L (ref 4–11)

## 2020-07-24 PROCEDURE — 36415 COLL VENOUS BLD VENIPUNCTURE: CPT | Performed by: INTERNAL MEDICINE

## 2020-07-24 PROCEDURE — 85027 COMPLETE CBC AUTOMATED: CPT | Performed by: INTERNAL MEDICINE

## 2020-07-24 PROCEDURE — 80053 COMPREHEN METABOLIC PANEL: CPT | Performed by: INTERNAL MEDICINE

## 2020-07-24 PROCEDURE — 86300 IMMUNOASSAY TUMOR CA 15-3: CPT | Performed by: INTERNAL MEDICINE

## 2020-07-25 LAB
ALBUMIN SERPL-MCNC: 3.7 G/DL (ref 3.4–5)
ALP SERPL-CCNC: 61 U/L (ref 40–150)
ALT SERPL W P-5'-P-CCNC: 32 U/L (ref 0–50)
ANION GAP SERPL CALCULATED.3IONS-SCNC: 5 MMOL/L (ref 3–14)
AST SERPL W P-5'-P-CCNC: 17 U/L (ref 0–45)
BILIRUB SERPL-MCNC: 0.4 MG/DL (ref 0.2–1.3)
BUN SERPL-MCNC: 19 MG/DL (ref 7–30)
CALCIUM SERPL-MCNC: 9.3 MG/DL (ref 8.5–10.1)
CHLORIDE SERPL-SCNC: 106 MMOL/L (ref 94–109)
CO2 SERPL-SCNC: 29 MMOL/L (ref 20–32)
CREAT SERPL-MCNC: 1.03 MG/DL (ref 0.52–1.04)
GFR SERPL CREATININE-BSD FRML MDRD: 63 ML/MIN/{1.73_M2}
GLUCOSE SERPL-MCNC: 115 MG/DL (ref 70–99)
POTASSIUM SERPL-SCNC: 4.7 MMOL/L (ref 3.4–5.3)
PROT SERPL-MCNC: 7.6 G/DL (ref 6.8–8.8)
SODIUM SERPL-SCNC: 140 MMOL/L (ref 133–144)

## 2020-08-24 NOTE — MR AVS SNAPSHOT
After Visit Summary   6/15/2017    Shantell Wagner    MRN: [id number]           Patient Information     Date Of Birth          1970        Visit Information        Provider Department      6/15/2017 4:00 PM Tata Knott MD [facility] Oncology [facility]      Today's Diagnoses     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)          Care Instructions    Research study requires that we see patient back in early September week of Sept 4th-8th with labs Hgb A1C, CBC with diff, and CMP plus study lab kit. Scheduled  Romana MENDOZA  AVS given to patient    Bone scan next available -Scheduled for 6/23/17 @ 9am for injection and 11am for scan. Yaritza LOVING MD 3 months with research labs-Scheduled. Yaritza DE LEÓN   AVS printed and given to Pt. Yaritza FULLER.           Follow-ups after your visit        Your next 10 appointments already scheduled     Jun 23, 2017  9:00 AM CDT   NM INJECTION with [facility] ([facility])    [address]  [facility] [id number] [phone]            Jun 23, 2017 11:00 AM CDT   NM BONE SCAN WHOLE BODY with [address] ([facility])    [address]  [facility] [id number] [phone]           Please bring a list of your medicines to the exam. (Include vitamins, minerals and over-the-counter drugs.) You should wear comfortable clothes. Leave your valuables at home. Please bring related prior results and films. Tell your doctor:   If you are breastfeeding or may be pregnant.   If you have had a barium test within the past few days. Barium may change the results of certain exams.   If you think you may need sedation (medicine to help you relax).  You may eat and drink as normal.  Drink plenty of fluids and empty bladder frequently between injection and scans.            Sep 06, 2017   History  Chief Complaint   Patient presents with    Dizziness     fell last week hit his head  has dizziness today       History provided by:  Patient  Dizziness   Quality:  Head spinning, lightheadedness and room spinning  Severity:  Moderate  Onset quality:  Gradual  Timing:  Constant  Progression:  Worsening  Chronicity:  New  Relieved by:  Nothing  Worsened by:  Nothing  Ineffective treatments:  None tried  Associated symptoms: no chest pain, no diarrhea, no headaches, no nausea, no shortness of breath and no weakness        Prior to Admission Medications   Prescriptions Last Dose Informant Patient Reported? Taking?    FLUoxetine (PROzac) 40 MG capsule   No No   Sig: Take 1 capsule (40 mg total) by mouth daily   OXcarbazepine (TRILEPTAL) 300 mg tablet   No No   Sig: Take 1 tablet (300 mg total) by mouth daily with breakfast   OXcarbazepine (TRILEPTAL) 600 mg tablet   No No   Sig: Take 1 tablet (600 mg total) by mouth every evening   amLODIPine (NORVASC) 10 mg tablet   No No   Sig: Take 1 tablet (10 mg total) by mouth daily At 9am   aspirin (ECOTRIN LOW STRENGTH) 81 mg EC tablet   No No   Sig: Take 1 tablet (81 mg total) by mouth daily   atorvastatin (LIPITOR) 40 mg tablet   No No   Sig: Take 2 tablets (80 mg total) by mouth daily with dinner   carvedilol (COREG) 6 25 mg tablet   No No   Sig: Take 1 tablet (6 25 mg total) by mouth 2 (two) times a day with meals   melatonin 3 mg   No No   Sig: Take 2 tablets (6 mg total) by mouth daily at bedtime   pantoprazole (PROTONIX) 20 mg tablet   No No   Sig: Take 1 tablet (20 mg total) by mouth daily   Patient taking differently: Take 40 mg by mouth 2 (two) times a day    rivaroxaban (XARELTO) 10 mg tablet   No No   Sig: Take 1 tablet (10 mg total) by mouth daily with breakfast      Facility-Administered Medications: None       Past Medical History:   Diagnosis Date    Adrenal adenoma     Anemia     Aspiration pneumonia (HCC)     Bipolar disorder (HCC)     Cervical "3:30 PM CDT   Return Visit with Tata Knott MD   [facility] ([facility])    [facility]  [address] [place]  [facility] [id number] [phone]              Future tests that were ordered for you today     Open Future Orders        Priority Expected Expires Ordered    NM Bone Scan Whole Body Routine 6/16/2017 6/15/2018 6/15/2017            Who to contact     If you have questions or need follow up information about today's clinic visit or your schedule please contact [facility] directly at [phone].  Normal or non-critical lab and imaging results will be communicated to you by MyChart, letter or phone within 4 business days after the clinic has received the results. If you do not hear from us within 7 days, please contact the clinic through [employer] or phone. If you have a critical or abnormal lab result, we will notify you by phone as soon as possible.  Submit refill requests through [employer] or call your pharmacy and they will forward the refill request to us. Please allow 3 business days for your refill to be completed.          Additional Information About Your Visit        [employer] Information     [employer] gives you secure access to your electronic health record. If you see a primary care provider, you can also send messages to your care team and make appointments. If you have questions, please call your primary care clinic.  If you do not have a primary care provider, please call [phone] and they will assist you.        Care EveryWhere ID     This is your Care EveryWhere ID. This could be used by other organizations to access your [place] medical records  [id number]        Your Vitals Were     Pulse Temperature Respirations Height Pulse Oximetry BMI (Body Mass Index)    79 98  F (36.7  C) (Tympanic) 16 1.626 m (5' 4\") 98% 32.61 kg/m2       Blood Pressure from Last 3 Encounters:   06/15/17 (!) " stenosis of spine     Coronary artery disease     mild non obstructive disease [facility] DVT (deep venous thrombosis) (HCC)     Erosive gastritis     GERD (gastroesophageal reflux disease)     Glaucoma     Hematemesis     Hepatitis C     History of pulmonary embolus (PE)     History of transfusion     Hyperlipidemia     Hypertension     [facility] infarction) [facility]     MI, old     Pulmonary embolism (HCC)     Right Lung-Per Patient    Pulmonary embolism ([facility] )     Rectal bleeding     Respiratory failure (HCC)     Seizures ([facility] )     Substance abuse ([facility] )        Past Surgical History:   Procedure Laterality Date    ANGIOPLASTY      self reported     CARDIAC CATHETERIZATION      COLONOSCOPY N/A 11/19/2018    Procedure: COLONOSCOPY;  Surgeon: Timo Quevedo MD;  Location: [address] [place] [address] GI LAB; Service: Gastroenterology    CORONARY ANGIOPLASTY WITH STENT PLACEMENT      EGD AND COLONOSCOPY N/A 11/28/2016    Procedure: EGD AND COLONOSCOPY;  Surgeon: Ahmed Galeazzi, MD;  Location:  GI LAB; Service:     ESOPHAGOGASTRODUODENOSCOPY N/A 1/24/2017    Procedure: ESOPHAGOGASTRODUODENOSCOPY (EGD); Surgeon: Jb Levin MD;  Location: [facility] GI LAB; Service:     ESOPHAGOGASTRODUODENOSCOPY N/A 6/28/2017    Procedure: ESOPHAGOGASTRODUODENOSCOPY (EGD) with bx x2;  Surgeon: Ahmed Galeazzi, MD;  Location: [facility] GI LAB; Service: Gastroenterology    ESOPHAGOGASTRODUODENOSCOPY N/A 10/3/2018    Procedure: ESOPHAGOGASTRODUODENOSCOPY (EGD); Surgeon: David Coles MD;  Location: [address] [place] [address] GI LAB;   Service: Gastroenterology    IVC FILTER INSERTION  02/2016    IVC FILTER INSERTION      VENA CAVA FILTER PLACEMENT      w/flurosc angiogr guidance / inferior        Family History   Problem Relation Age of Onset    Seizures Mother     Coronary artery disease Mother     Diabetes Mother     Heart attack Mother     Seizures Sister     Coronary artery disease Sister     Diabetes Father     Drug abuse Brother      I 158/96   05/17/17 125/87   04/13/17 128/87    Weight from Last 3 Encounters:   06/15/17 86.2 kg (190 lb)   05/17/17 84.8 kg (187 lb)   04/13/17 85 kg (187 lb 8 oz)              We Performed the Following     CBC with platelets and differential     Comprehensive metabolic panel (BMP + Alb, Alk Phos, ALT, AST, Total. Bili, TP)          Today's Medication Changes          These changes are accurate as of: 6/15/17  4:45 PM.  If you have any questions, ask your nurse or doctor.               These medicines have changed or have updated prescriptions.        Dose/Directions    tamoxifen 20 MG tablet   Commonly known as:  NOLVADEX   This may have changed:  Another medication with the same name was removed. Continue taking this medication, and follow the directions you see here.   Used for:  Malignant neoplasm of right female breast, unspecified site of breast (H)        Dose:  20 mg   Take 1 tablet (20 mg) by mouth daily   Quantity:  90 tablet   Refills:  3                Primary Care Provider Office Phone # Fax #    NIKKI Santos Ra [facility] [phone]       [facility] [address] [facility] [address]        Thank you!     Thank you for choosing [facility]  for your care. Our goal is always to provide you with excellent care. Hearing back from our patients is one way we can continue to improve our services. Please take a few minutes to complete the written survey that you may receive in the mail after your visit with us. Thank you!             Your Updated Medication List - Protect others around you: Learn how to safely use, store and throw away your medicines at www.disposemymeds.org.          This list is accurate as of: 6/15/17  4:45 PM.  Always use your most recent med list.                   Brand Name Dispense Instructions for use    calcium carb 1250 mg (500 mg Cachil DeHe)/vitamin D 200 units 500-200 MG-UNIT per tablet    OSCAL with D     Take 1 tablet by mouth  have reviewed and agree with the history as documented  E-Cigarette/Vaping    E-Cigarette Use Never User      E-Cigarette/Vaping Substances    Nicotine No     THC No     CBD No     Flavoring No     Other No     Unknown No      Social History     Tobacco Use    Smoking status: Current Every Day Smoker     Packs/day: 0 50     Years: 30 00     Pack years: 15 00     Types: Cigarettes    Smokeless tobacco: Never Used   Substance Use Topics    Alcohol use: Not Currently     Frequency: Never     Drinks per session: 1 or 2     Binge frequency: Never    Drug use: Yes     Types: Marijuana     Comment: K2       Review of Systems   Constitutional: Negative for chills and fever  HENT: Negative for rhinorrhea, sore throat and trouble swallowing  Eyes: Negative for pain  Respiratory: Negative for cough, shortness of breath, wheezing and stridor  Cardiovascular: Negative for chest pain and leg swelling  Gastrointestinal: Negative for abdominal pain, diarrhea and nausea  Endocrine: Negative for polyuria  Genitourinary: Negative for dysuria, flank pain and urgency  Musculoskeletal: Negative for joint swelling, myalgias and neck stiffness  Skin: Negative for rash  Allergic/Immunologic: Negative for immunocompromised state  Neurological: Positive for dizziness  Negative for syncope, weakness, numbness and headaches  Psychiatric/Behavioral: Negative for confusion and suicidal ideas  All other systems reviewed and are negative  Physical Exam  Physical Exam  Vitals signs and nursing note reviewed  Constitutional:       Appearance: He is well-developed  HENT:      Head: Normocephalic and atraumatic  Eyes:      Pupils: Pupils are equal, round, and reactive to light  Neck:      Musculoskeletal: Normal range of motion and neck supple  Cardiovascular:      Rate and Rhythm: Normal rate and regular rhythm  Heart sounds: No murmur  No friction rub     Pulmonary:      Effort: No daily Reported on 4/13/2017       * desonide 0.05 % cream    DESOWEN     Apply topically 2 times daily as needed       * desonide 0.05 % cream    DESOWEN    60 g    APPLY TOPICALLY 2 TIMES DAILY       fluticasone 50 MCG/ACT spray    FLONASE     Spray 1-2 sprays into both nostrils daily as needed for rhinitis or allergies Reported on 3/24/2017       gabapentin 300 MG capsule    NEURONTIN    90 capsule    Take 1 capsule (300 mg) by mouth At Bedtime       glucosamine-chondroitin 500-400 MG Caps per capsule      Take 1 capsule by mouth daily       HYDROcodone-acetaminophen 5-325 MG per tablet    NORCO    10 tablet    Take 1-2 tablets by mouth every 4 hours as needed for other (Moderate to Severe Pain)       IBU- MG Tabs      1 TABLET EVERY 4 TO 6 HOURS AS NEEDED       * ketoconazole 2 % cream    NIZORAL     Apply topically daily as needed for itching       * ketoconazole 2 % cream    NIZORAL    30 g    APPLY TOPICALLY DAILY       LORazepam 0.5 MG tablet    ATIVAN    30 tablet    Take 1 tablet (0.5 mg) by mouth every 4 hours as needed (Anxiety, Nausea/Vomiting or Sleep)       omeprazole 20 MG CR capsule    priLOSEC     Take 20 mg by mouth daily       order for DME     1 Units    Hair prosthesis for chemotherapy induced alopecia       simvastatin 20 MG tablet    ZOCOR    90 tablet    Take 1 tablet (20 mg) by mouth At Bedtime       tamoxifen 20 MG tablet    NOLVADEX    90 tablet    Take 1 tablet (20 mg) by mouth daily       TYLENOL PO      Take 500 mg by mouth       * Notice:  This list has 4 medication(s) that are the same as other medications prescribed for you. Read the directions carefully, and ask your doctor or other care provider to review them with you.       respiratory distress  Breath sounds: Normal breath sounds  No wheezing or rales  Abdominal:      General: Bowel sounds are normal  There is no distension  Palpations: Abdomen is soft  Tenderness: There is no abdominal tenderness  Musculoskeletal: Normal range of motion  General: No tenderness  Skin:     General: Skin is warm  Findings: No rash  Neurological:      Mental Status: He is alert and oriented to person, place, and time  Comments: GCS 15  AAOx4  No focal neuro deficits  CN II-XII intact  PERRL  EOMI  [place] No pronator drift   strength 5/5 bilaterally  B/L UE strength 5/5 throughout  Finger to nose normal  Cerebellar function normal  Ambulates without difficulty  B/L LE strength 5/5 throughout  Gross sensation to b/l upper and lower extremities intact  Vital Signs  ED Triage Vitals [08/24/20 1342]   Temperature Pulse Respirations Blood Pressure SpO2   98 6 °F (37 °C) 86 16 101/57 98 %      Temp Source Heart Rate Source Patient Position - Orthostatic VS BP Location FiO2 (%)   Tympanic Monitor Sitting Left arm --      Pain Score       --           Vitals:    08/24/20 1342   BP: 101/57   Pulse: 86   Patient Position - Orthostatic VS: Sitting         Visual Acuity      ED Medications  Medications - No data to display    Diagnostic Studies  Results Reviewed     None                 No orders to display              Procedures  Procedures         ED Course                                             MDM  Number of Diagnoses or Management Options  Concussion without loss of consciousness, initial encounter: new and requires workup  Dizziness: new and requires workup  Diagnosis management comments: This is a 80-year-old male presents emergency department noted symptoms of dizziness recent trauma to the head on Xarelto  The patient had a CT scan performed on the 22nd of this month at which was negative    There is no actively no evidence of trauma at that point time   The patient was discharged not symptoms with noted lightheadedness and intermittent headache patient not concussion suspect possible concussion as the cause patient's symptoms  Neurological exam is unremarkable here in the emergency department discussed with the patient about the diagnosis of concussion as well as the concern for possible worsening symptoms being on Xarelto  Treatment Tylenol for head headache  Plan will be for outpatient management follow-up given strict instructions when to return back to the emergency department  Pt re-examined and evaluated after testing and treatment  Spoke with the patient and feeling improved and sxs have resolved  Will discharge home with close f/u with pcp and instructed to return to the ED if sxs worsen or continue  Pt agrees with the plan for discharge and feels comfortable to go home with proper f/u  Advised to return for worsening or additional problems  Diagnostic tests were reviewed and questions answered  Diagnosis, care plan and treatment options were discussed  The patient understand instructions and will follow up as directed  Counseling: I had a detailed discussion with the patient and/or guardian regarding: the historical points, exam findings, and any diagnostic results supporting the discharge diagnosis, lab results, radiology results, discharge instructions reviewed with patient and/or family/caregiver and understanding was verbalized  Instructions given to return to the emergency department if symptoms worsen or persist, or if there are any questions or concerns that arise at home       All labs reviewed and utilized in the medical decision making process    All radiology studies independently viewed by me and interpreted by the radiologist            Amount and/or Complexity of Data Reviewed  Tests in the radiology section of CPT®: ordered and reviewed  Review and summarize past medical records: yes  Independent visualization of images, tracings, or specimens: yes          Disposition  Final diagnoses:   Dizziness   Concussion without loss of consciousness, initial encounter     Time reflects when diagnosis was documented in both MDM as applicable and the Disposition within this note     Time User Action Codes Description Comment    8/24/2020  2:05 PM Gayle Numbers Add [R42] Dizziness     8/24/2020  2:05 PM Gayle Numbers Add [S06 0X0A] Concussion without loss of consciousness, initial encounter       ED Disposition     ED Disposition Condition Date/Time Comment    Discharge Stable Mon Aug 24, 2020  2:05 PM Freddy Hackett discharge to home/self care              Follow-up Information     Follow up With Specialties Details Why Contact Info Additional [address] Family Medicine Schedule an appointment as soon as possible for a visit  If symptoms worsen [address], [address] [address]  [address], [address], [address] [place], [place], [address] [facility]          Discharge Medication List as of 8/24/2020  2:05 PM      CONTINUE these medications which have NOT CHANGED    Details   amLODIPine (NORVASC) 10 mg tablet Take 1 tablet (10 mg total) by mouth daily At 9am, Starting Fri 7/31/2020, Until Sun 8/30/2020, Print      aspirin (ECOTRIN LOW STRENGTH) 81 mg EC tablet Take 1 tablet (81 mg total) by mouth daily, Starting Fri 7/31/2020, Until Sun 8/30/2020, Print      atorvastatin (LIPITOR) 40 mg tablet Take 2 tablets (80 mg total) by mouth daily with dinner, Starting Fri 7/31/2020, Until Sun 8/30/2020, Print      carvedilol (COREG) 6 25 mg tablet Take 1 tablet (6 25 mg total) by mouth 2 (two) times a day with meals, Starting Fri 7/31/2020, Until Sun 8/30/2020, Print      FLUoxetine (PROzac) 40 MG capsule Take 1 capsule (40 mg total) by mouth daily, Starting Fri 7/31/2020, Until Sun 8/30/2020, Print      melatonin 3 mg Take 2 tablets (6 mg total) by mouth daily at bedtime, Starting Fri 7/31/2020, Until Sun 8/30/2020, Print      !! OXcarbazepine (TRILEPTAL) 300 mg tablet Take 1 tablet (300 mg total) by mouth daily with breakfast, Starting Fri 7/31/2020, Until Sun 8/30/2020, Print      !! OXcarbazepine (TRILEPTAL) 600 mg tablet Take 1 tablet (600 mg total) by mouth every evening, Starting Fri 7/31/2020, Until Sun 8/30/2020, Print      pantoprazole (PROTONIX) 20 mg tablet Take 1 tablet (20 mg total) by mouth daily, Starting Fri 7/31/2020, Until Sun 8/30/2020, Print      rivaroxaban (XARELTO) 10 mg tablet Take 1 tablet (10 mg total) by mouth daily with breakfast, Starting Fri 7/31/2020, Until Sun 8/30/2020, Print       !! - Potential duplicate medications found  Please discuss with provider  No discharge procedures on file      PDMP Review       Value Time User    PDMP Reviewed  Yes 8/13/2020 12:03 PM Stefani Ortega PA-C          ED Provider  Electronically Signed by           Elon Baumgarten, DO  08/24/20 [id number]

## 2020-09-18 DIAGNOSIS — E78.5 HYPERLIPIDEMIA LDL GOAL <160: ICD-10-CM

## 2020-09-18 NOTE — TELEPHONE ENCOUNTER
atorvastatin (LIPITOR) 20 MG tablet  Last Written Prescription Date:  5/29/20  Last Fill Quantity: 90,   # refills: 0  Last Office Visit: 4/19/20  Future Office visit:    Next 5 appointments (look out 90 days)    Sep 25, 2020  9:20 AM CDT  PHYSICAL with NIKKI Santos Ra, CNP  [facility] [address] [place] [id number] [phone]           Routing refill request to provider for review/approval because:  Failed LDL on file.     Eufemia Ruffin RN on 9/18/2020 at 1:18 PM

## 2020-09-22 RX ORDER — ATORVASTATIN CALCIUM 20 MG/1
TABLET, FILM COATED ORAL
Qty: 90 TABLET | Refills: 0 | Status: SHIPPED | OUTPATIENT
Start: 2020-09-22 | End: 2020-09-25

## 2020-09-24 NOTE — PATIENT INSTRUCTIONS

## 2020-09-24 NOTE — PROGRESS NOTES
SUBJECTIVE:   CC: Shantell Wagner is an 50 year old woman who presents for preventive health visit.       Patient has been advised of split billing requirements and indicates understanding: Yes  Healthy Habits:     Getting at least 3 servings of Calcium per day:  Yes    Bi-annual eye exam:  Yes    Dental care twice a year:  Yes    Sleep apnea or symptoms of sleep apnea:  None    Diet:  Other    Frequency of exercise:  1 day/week    Duration of exercise:  15-30 minutes    Taking medications regularly:  Yes    Medication side effects:  Significant flushing    PHQ-2 Total Score: 0    Additional concerns today:  Yes              Today's PHQ-2 Score:   PHQ-2 ( 1999 [employer]) 9/25/2020   Q1: Little interest or pleasure in doing things 0   Q2: Feeling down, depressed or hopeless 0   PHQ-2 Score 0   Q1: Little interest or pleasure in doing things Not at all   Q2: Feeling down, depressed or hopeless Not at all   PHQ-2 Score 0       Abuse: Current or Past (Physical, Sexual or Emotional) -No  Do you feel safe in your environment? Yes        Social History     Tobacco Use     Smoking status: Never Smoker     Smokeless tobacco: Never Used   Substance Use Topics     Alcohol use: Yes     Alcohol/week: 0.0 standard drinks     Comment: rare         Alcohol Use 9/25/2020   Prescreen: >3 drinks/day or >7 drinks/week? No   Prescreen: >3 drinks/day or >7 drinks/week? -     HPI:  Doing well.   No concerns.   Fasting.   Eating drinking okay.   No changes in bowel habits.   Complains of a persistent cough.   Has been ongoing for the last year.   Denies triggers.   Comes and goes. At times can be productive.   No chest pain, SOB or heart palpitations.   No URI symptoms, no fevers.  Does take omeprazole every other day for reflux with good control.  No weight change.    Has utilized cough syrup with some relief.  She is active and symptoms do not correlate with exertion.  She has been stable on lisinopril for htn.    Reviewed orders  "with patient.  Reviewed health maintenance and updated orders accordingly - Yes  Lab work is in process    Mammogram Screening: Patient over age 50, mutual decision to screen reflected in health maintenance.    Pertinent mammograms are reviewed under the imaging tab.  History of abnormal Pap smear: NO - age 30- 65 PAP every 3 years recommended  PAP / HPV Latest Ref Rng & Units 4/19/2019 3/6/2015 2/20/2014   PAP - NIL NIL NIL   HPV 16 DNA NEG:Negative Negative - -   HPV 18 DNA NEG:Negative Negative - -   OTHER HR HPV NEG:Negative Negative - -     Reviewed and updated as needed this visit by clinical staff  Tobacco  Allergies  Meds  Med Hx  Surg Hx  Fam Hx  Soc Hx        Reviewed and updated as needed this visit by Provider            Review of Systems   Breasts:  Negative for tenderness, breast mass and discharge.   Genitourinary: Negative for pelvic pain, vaginal bleeding and vaginal discharge.          OBJECTIVE:   /78   Pulse 94   Temp 98.4  F (36.9  C) (Oral)   Ht 1.626 m (5' 4\")   Wt 89.9 kg (198 lb 3.2 oz)   LMP  (LMP Unknown)   SpO2 97%   BMI 34.02 kg/m    Physical Exam  GENERAL: healthy, alert and no distress  EYES: Eyes grossly normal to inspection, PERRL and conjunctivae and sclerae normal  HENT: ear canals and TM's normal, nose and mouth without ulcers or lesions  NECK: no adenopathy, no asymmetry, masses, or scars and thyroid normal to palpation  RESP: lungs clear to auscultation - no rales, rhonchi or wheezes  CV: regular rate and rhythm, normal S1 S2, no S3 or S4, no murmur, click or rub, no peripheral edema and peripheral pulses strong  ABDOMEN: soft, nontender, no hepatosplenomegaly, no masses and bowel sounds normal  NEURO: Normal strength and tone, mentation intact and speech normal  PSYCH: mentation appears normal, affect normal/bright    Diagnostic Test Results:  Labs reviewed in Epic    ASSESSMENT/PLAN:   ASSESSMENT/PLAN:  (Z00.00) Routine general medical examination at a " "health care facility  (primary encounter diagnosis)    (I10) Essential hypertension  Comment: Asymptomatic, active.  Plan: losartan (COZAAR) 25 MG tablet, **Basic         metabolic panel FUTURE anytime        Change to losartan due to cough possibly r/t lisinopril.  Recheck bp and bmp in 4 weeks.    (E78.5) Hyperlipidemia LDL goal <160  Comment: Tolerates without problem.  Active.  Plan: atorvastatin (LIPITOR) 20 MG tablet        Refilled.  Labs today.    (R05) Cough  Comment: Discussed options and concerns.  Plan: **Basic metabolic panel FUTURE anytime, XR         Chest 2 Views        Change from lisinopril to losartan.  Increase frequency of omeprazole to daily for the next 4 weeks.  Add claritin.  Recheck in 4 weeks.  Consider pulmonology or ENT/allergy referral at that time or EGD.    (M79.604,  M79.605) Pain in both lower extremities  Comment: Stable.  Plan: gabapentin (NEURONTIN) 300 MG capsule            Patient has been advised of split billing requirements and indicates understanding: Yes  COUNSELING:  Reviewed preventive health counseling, as reflected in patient instructions    Estimated body mass index is 34.02 kg/m  as calculated from the following:    Height as of this encounter: 1.626 m (5' 4\").    Weight as of this encounter: 89.9 kg (198 lb 3.2 oz).    Weight management plan: Discussed healthy diet and exercise guidelines    She reports that she has never smoked. She has never used smokeless tobacco.      Counseling Resources:  ATP IV Guidelines  Pooled Cohorts Equation Calculator  Breast Cancer Risk Calculator  BRCA-Related Cancer Risk Assessment: FHS-7 Tool  FRAX Risk Assessment  ICSI Preventive Guidelines  Dietary Guidelines for Americans, 2010  USDA's MyPlate  ASA Prophylaxis  Lung CA Screening    NIKKI Santos Ra [facility] [place]  "

## 2020-09-25 ENCOUNTER — ANCILLARY PROCEDURE (OUTPATIENT)
Dept: GENERAL RADIOLOGY | Facility: CLINIC | Age: 50
End: 2020-09-25
Attending: NURSE PRACTITIONER
Payer: COMMERCIAL

## 2020-09-25 ENCOUNTER — OFFICE VISIT (OUTPATIENT)
Dept: FAMILY MEDICINE | Facility: CLINIC | Age: 50
End: 2020-09-25
Payer: COMMERCIAL

## 2020-09-25 VITALS
TEMPERATURE: 98.4 F | OXYGEN SATURATION: 97 % | HEART RATE: 94 BPM | SYSTOLIC BLOOD PRESSURE: 108 MMHG | HEIGHT: 64 IN | WEIGHT: 198.2 LBS | BODY MASS INDEX: 33.84 KG/M2 | DIASTOLIC BLOOD PRESSURE: 78 MMHG

## 2020-09-25 DIAGNOSIS — M79.605 PAIN IN BOTH LOWER EXTREMITIES: ICD-10-CM

## 2020-09-25 DIAGNOSIS — Z00.00 ROUTINE GENERAL MEDICAL EXAMINATION AT A HEALTH CARE FACILITY: Primary | ICD-10-CM

## 2020-09-25 DIAGNOSIS — E78.5 HYPERLIPIDEMIA LDL GOAL <160: ICD-10-CM

## 2020-09-25 DIAGNOSIS — R05.9 COUGH: ICD-10-CM

## 2020-09-25 DIAGNOSIS — I10 ESSENTIAL HYPERTENSION: ICD-10-CM

## 2020-09-25 DIAGNOSIS — M79.604 PAIN IN BOTH LOWER EXTREMITIES: ICD-10-CM

## 2020-09-25 LAB
ANION GAP SERPL CALCULATED.3IONS-SCNC: 7 MMOL/L (ref 3–14)
BUN SERPL-MCNC: 18 MG/DL (ref 7–30)
CALCIUM SERPL-MCNC: 9.8 MG/DL (ref 8.5–10.1)
CHLORIDE SERPL-SCNC: 106 MMOL/L (ref 94–109)
CO2 SERPL-SCNC: 24 MMOL/L (ref 20–32)
CREAT SERPL-MCNC: 0.86 MG/DL (ref 0.52–1.04)
GFR SERPL CREATININE-BSD FRML MDRD: 78 ML/MIN/{1.73_M2}
GLUCOSE SERPL-MCNC: 100 MG/DL (ref 70–99)
POTASSIUM SERPL-SCNC: 4 MMOL/L (ref 3.4–5.3)
SODIUM SERPL-SCNC: 137 MMOL/L (ref 133–144)

## 2020-09-25 PROCEDURE — 80048 BASIC METABOLIC PNL TOTAL CA: CPT | Performed by: NURSE PRACTITIONER

## 2020-09-25 PROCEDURE — 99214 OFFICE O/P EST MOD 30 MIN: CPT | Mod: 25 | Performed by: NURSE PRACTITIONER

## 2020-09-25 PROCEDURE — 36415 COLL VENOUS BLD VENIPUNCTURE: CPT | Performed by: NURSE PRACTITIONER

## 2020-09-25 PROCEDURE — 80061 LIPID PANEL: CPT | Performed by: NURSE PRACTITIONER

## 2020-09-25 PROCEDURE — 99396 PREV VISIT EST AGE 40-64: CPT | Performed by: NURSE PRACTITIONER

## 2020-09-25 PROCEDURE — 71046 X-RAY EXAM CHEST 2 VIEWS: CPT | Mod: FY

## 2020-09-25 RX ORDER — ATORVASTATIN CALCIUM 20 MG/1
20 TABLET, FILM COATED ORAL DAILY
Qty: 90 TABLET | Refills: 3 | Status: SHIPPED | OUTPATIENT
Start: 2020-09-25 | End: 2021-12-08

## 2020-09-25 RX ORDER — GABAPENTIN 300 MG/1
CAPSULE ORAL
Qty: 90 CAPSULE | Refills: 1 | Status: SHIPPED | OUTPATIENT
Start: 2020-09-25 | End: 2021-04-05

## 2020-09-25 RX ORDER — LOSARTAN POTASSIUM 25 MG/1
25 TABLET ORAL DAILY
Qty: 90 TABLET | Refills: 0 | Status: SHIPPED | OUTPATIENT
Start: 2020-09-25 | End: 2020-12-18

## 2020-09-25 ASSESSMENT — ENCOUNTER SYMPTOMS: BREAST MASS: 0

## 2020-09-25 ASSESSMENT — MIFFLIN-ST. JEOR: SCORE: 1504.03

## 2020-09-26 DIAGNOSIS — E78.5 HYPERLIPIDEMIA LDL GOAL <160: Primary | ICD-10-CM

## 2020-09-28 LAB
CHOLEST SERPL-MCNC: 246 MG/DL
HDLC SERPL-MCNC: 54 MG/DL
LDLC SERPL CALC-MCNC: 145 MG/DL
NONHDLC SERPL-MCNC: 192 MG/DL
TRIGL SERPL-MCNC: 236 MG/DL

## 2020-11-16 DIAGNOSIS — C50.411 MALIGNANT NEOPLASM OF UPPER-OUTER QUADRANT OF RIGHT FEMALE BREAST, UNSPECIFIED ESTROGEN RECEPTOR STATUS (H): ICD-10-CM

## 2020-11-16 RX ORDER — VENLAFAXINE 37.5 MG/1
37.5 TABLET ORAL 2 TIMES DAILY
Qty: 180 TABLET | Refills: 1 | Status: SHIPPED | OUTPATIENT
Start: 2020-11-16 | End: 2021-05-11

## 2020-11-16 NOTE — TELEPHONE ENCOUNTER
Pending Prescriptions:                       Disp   Refills    venlafaxine (EFFEXOR) 37.5 MG tablet      180 ta*1            Sig: Take 1 tablet (37.5 mg) by mouth 2 times daily         Last Written Prescription Date:  2/19/2020  Last Fill Quantity: 180,   # refills: 1  Last Office Visit: 7/2/2020  Future Office visit:   1/5/2021    Routing refill request to provider for review/approval.  Dot Paul, RN, BSN, OCN, CBCN

## 2020-12-16 DIAGNOSIS — I10 ESSENTIAL HYPERTENSION: ICD-10-CM

## 2020-12-18 RX ORDER — LOSARTAN POTASSIUM 25 MG/1
TABLET ORAL
Qty: 90 TABLET | Refills: 0 | Status: SHIPPED | OUTPATIENT
Start: 2020-12-18 | End: 2021-03-02

## 2020-12-18 NOTE — TELEPHONE ENCOUNTER
Routing refill request to provider for review/approval because:  Patient due for BP ck and BMP.      Did you want visit also?    Please route to TC to schedule appropriately.    Lila Heaton RN

## 2021-01-05 ENCOUNTER — VIRTUAL VISIT (OUTPATIENT)
Dept: ONCOLOGY | Facility: CLINIC | Age: 51
End: 2021-01-05
Attending: INTERNAL MEDICINE
Payer: COMMERCIAL

## 2021-01-05 DIAGNOSIS — Z17.0 MALIGNANT NEOPLASM OF BREAST IN FEMALE, ESTROGEN RECEPTOR POSITIVE, UNSPECIFIED LATERALITY, UNSPECIFIED SITE OF BREAST (H): ICD-10-CM

## 2021-01-05 DIAGNOSIS — C50.411 MALIGNANT NEOPLASM OF UPPER-OUTER QUADRANT OF RIGHT FEMALE BREAST, UNSPECIFIED ESTROGEN RECEPTOR STATUS (H): Primary | ICD-10-CM

## 2021-01-05 DIAGNOSIS — C50.919 MALIGNANT NEOPLASM OF BREAST IN FEMALE, ESTROGEN RECEPTOR POSITIVE, UNSPECIFIED LATERALITY, UNSPECIFIED SITE OF BREAST (H): ICD-10-CM

## 2021-01-05 DIAGNOSIS — M54.50 ACUTE LEFT-SIDED LOW BACK PAIN WITHOUT SCIATICA: ICD-10-CM

## 2021-01-05 PROCEDURE — 999N001193 HC VIDEO/TELEPHONE VISIT; NO CHARGE

## 2021-01-05 PROCEDURE — 99214 OFFICE O/P EST MOD 30 MIN: CPT | Mod: GT | Performed by: INTERNAL MEDICINE

## 2021-01-05 RX ORDER — TAMOXIFEN CITRATE 20 MG/1
20 TABLET ORAL DAILY
Qty: 90 TABLET | Refills: 3 | Status: SHIPPED | OUTPATIENT
Start: 2021-01-05 | End: 2022-02-16

## 2021-01-05 ASSESSMENT — PAIN SCALES - GENERAL: PAINLEVEL: NO PAIN (1)

## 2021-01-05 NOTE — PROGRESS NOTES
Shanetll Wagner is a 50 year old female who is being evaluated via a billable video visit.      How would you like to obtain your AVS? MyChart  If the video visit is dropped, the invitation should be resent by: Text to cell phone: [phone]  Will anyone else be joining your video visit? No

## 2021-01-05 NOTE — LETTER
1/5/2021         RE: Shant[place]  [address] [facility] [address]        Dear Colleague,    Thank you for referring your patient, Shantell Wagner, to the [facility]. Please see a copy of my visit note below.    Shantell Wagner is a 50 year old female who is being evaluated via a billable video visit.      How would you like to obtain your AVS? MyChart  If the video visit is dropped, the invitation should be resent by: Text to cell phone: [phone]  Will anyone else be joining your video visit? No          Visit Date:   01/05/2021      HISTORY OF PRESENT ILLNESS:  Shantell Wagner is a 50-year-old patient who is being evaluated today by a video visit due to a public health emergency with coronavirus.      VIDEO PLATFORM:  [employer].      PATIENT LOCATION:  Home.      PHYSICIAN LOCATION:  [facility].      COMPLEXITY OF CASE:  Moderate.      HISTORY OF PRESENTING COMPLAINT:   1.  Right-sided high-risk breast cancer, lymph node positive, estrogen receptor positive, progesterone receptor negative, HER-2 receptor negative.   2.  Diagnosis of a CHEK2 mutation.   3.  New symptom of lower back pain.      HISTORY OF PRESENTING COMPLAINT:  Shantell is a 50-year-old patient who carries a diagnosis of a CHEK2 mutation.  She presented with a locally advanced right-sided breast cancer, which was a 2.2 cm tumor in the upper outer quadrant of the right breast, and a 2.7 cm axillary lymph node mass.  The patient was diagnosed in the spring of 2016.  She is now over 4 years out from her original diagnosis.  She has finished up on chemotherapy and continues on adjuvant tamoxifen.  Overall, she has been doing well on the tamoxifen with not much symptomatology except for some hot flashes and some mild fatigue intermittently.  Of concern today, she is complaining of some lower back pain that started a few months ago and has not gotten any better.  She has seen a  chiropractor, without much avail.  The lower back pain is mostly on the left side of her back.  Occasionally, she gets what seems like a sciatica type pain on her left buttock area, but sometimes it is just centered in the low part of her left back.  I have discussed this pain with her today and the nature of the pain.  I am concerned enough that I would like to get a bone scan to rule out any to rule out any cancer in this area.  The patient is in agreement with the plan.      PAST MEDICAL HISTORY:  Remarkable for history of right-sided breast cancer, history of a CHEK2 mutation, history of hypercholesterolemia.      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      FAMILY HISTORY:  The patient has 1 sister with breast cancer and another sister who is currently going for a breast biopsy in the next few weeks.  She also had a maternal grandmother with breast cancer, father with prostate cancer.      GENETIC TESTING:  The patient has a CHEK2 mutation.      COMPREHENSIVE REVIEW OF SYSTEMS:  The patient's only complaint today is that of back pain, which started a few months ago and has not gotten better.      MEDICATIONS AND ALLERGIES:  Outlined in the nursing records.      SOCIAL HISTORY:  The patient is .  She is a nonsmoker, drinks alcohol rarely.  She has got 4 children.      REVIEW OF SYSTEMS:  A 12-point comprehensive review of systems, apart from what is outlined above, is otherwise unremarkable.      PHYSICAL EXAMINATION:   GENERAL:  She appears well.  She is in no acute distress.   EYES:  Have no redness or discharge.   RESPIRATORY:  There is no cough or labored breathing.   SKIN:  Has no rashes or lesions.   MUSCULOSKELETAL:  She is moving all extremities.   NEUROLOGICAL:  She is alert and oriented x 3.   PSYCHIATRIC:  Normal.   The rest of her physical exam is deferred today due to video visit restrictions and a public health emergency.       DATA REVIEW:  The patient's last lab work was done on 2020 at her primary care physician's office.  She did have a full comprehensive panel in 2020, and that included a breast cancer marker which was normal at 28.  In July she had a white cell count of 6.8, hemoglobin 12.6, platelets 313.        PLAN:  I have discussed with her today coming back for more lab work to include a breast cancer marker as well as getting a bone scan to evaluate her left-sided back pain.  I have refilled her tamoxifen today.  We will plan on continuing her tamoxifen for 10 years.  The patient is in agreement with the above plan.      COMPLEXITY OF CASE TODAY:  Moderate.         MICH KNOTT MD             D: 2021   T: 2021   MT: DEBORA      Name:     CADE GARCÍA   MRN:      [id number]        Account:      [id number]   :      1970           Visit Date:   2021      Document: [id number]        Again, thank you for allowing me to participate in the care of your patient.        Sincerely,        Mich Knott MD

## 2021-01-05 NOTE — LETTER
1/5/2021         RE: Shantell Wagner  [address] [facility] [address] [address]        Dear Colleague,    Thank you for referring your patient, Shantell Wagner, to the [facility]. Please see a copy of my visit note below.    Shantell Wagner is a 50 year old female who is being evaluated via a billable video visit.      How would you like to obtain your AVS? MyChart  If the video visit is dropped, the invitation should be resent by: Text to cell phone: [phone]  Will anyone else be joining your video visit? No          Visit Date:   01/05/2021      HISTORY OF PRESENT ILLNESS:  Shantell Wagner is a 50-year-old patient who is being evaluated today by a video visit due to a public health emergency with coronavirus.      VIDEO PLATFORM:  [employer].      PATIENT LOCATION:  Home.      PHYSICIAN LOCATION:  [facility].      COMPLEXITY OF CASE:  Moderate.      HISTORY OF PRESENTING COMPLAINT:   1.  Right-sided high-risk breast cancer, lymph node positive, estrogen receptor positive, progesterone receptor negative, HER-2 receptor negative.   2.  Diagnosis of a CHEK2 mutation.   3.  New symptom of lower back pain.      HISTORY OF PRESENTING COMPLAINT:  Shantell is a 50-year-old patient who carries a diagnosis of a CHEK2 mutation.  She presented with a locally advanced right-sided breast cancer, which was a 2.2 cm tumor in the upper outer quadrant of the right breast, and a 2.7 cm axillary lymph node mass.  The patient was diagnosed in the spring of 2016.  She is now over 4 years out from her original diagnosis.  She has finished up on chemotherapy and continues on adjuvant tamoxifen.  Overall, she has been doing well on the tamoxifen with not much symptomatology except for some hot flashes and some mild fatigue intermittently.  Of concern today, she is complaining of some lower back pain that started a few months ago and has not gotten any better.  She has seen a  chiropractor, without much avail.  The lower back pain is mostly on the left side of her back.  Occasionally, she gets what seems like a sciatica type pain on her left buttock area, but sometimes it is just centered in the low part of her left back.  I have discussed this pain with her today and the nature of the pain.  I am concerned enough that I would like to get a bone scan to rule out any to rule out any cancer in this area.  The patient is in agreement with the plan.      PAST MEDICAL HISTORY:  Remarkable for history of right-sided breast cancer, history of a CHEK2 mutation, history of hypercholesterolemia.      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      FAMILY HISTORY:  The patient has 1 sister with breast cancer and another sister who is currently going for a breast biopsy in the next few weeks.  She also had a maternal grandmother with breast cancer, father with prostate cancer.      GENETIC TESTING:  The patient has a CHEK2 mutation.      COMPREHENSIVE REVIEW OF SYSTEMS:  The patient's only complaint today is that of back pain, which started a few months ago and has not gotten better.      MEDICATIONS AND ALLERGIES:  Outlined in the nursing records.      SOCIAL HISTORY:  The patient is .  She is a nonsmoker, drinks alcohol rarely.  She has got 4 children.      REVIEW OF SYSTEMS:  A 12-point comprehensive review of systems, apart from what is outlined above, is otherwise unremarkable.      PHYSICAL EXAMINATION:   GENERAL:  She appears well.  She is in no acute distress.   EYES:  Have no redness or discharge.   RESPIRATORY:  There is no cough or labored breathing.   SKIN:  Has no rashes or lesions.   MUSCULOSKELETAL:  She is moving all extremities.   NEUROLOGICAL:  She is alert and oriented x 3.   PSYCHIATRIC:  Normal.   The rest of her physical exam is deferred today due to video visit restrictions and a public health emergency.       DATA REVIEW:  The patient's last lab work was done on 2020 at her primary care physician's office.  She did have a full comprehensive panel in 2020, and that included a breast cancer marker which was normal at 28.  In July she had a white cell count of 6.8, hemoglobin 12.6, platelets 313.        PLAN:  I have discussed with her today coming back for more lab work to include a breast cancer marker as well as getting a bone scan to evaluate her left-sided back pain.  I have refilled her tamoxifen today.  We will plan on continuing her tamoxifen for 10 years.  The patient is in agreement with the above plan.      COMPLEXITY OF CASE TODAY:  Moderate.         MICH KNOTT MD             D: 2021   T: 2021   MT: DEBORA      Name:     CADE GARCÍA   MRN:      [id number]        Account:      [id number]   :      1970           Visit Date:   2021      Document: [id number]        Again, thank you for allowing me to participate in the care of your patient.        Sincerely,        Mich Knott MD

## 2021-01-06 NOTE — PROGRESS NOTES
Visit Date:   01/05/2021      HISTORY OF PRESENT ILLNESS:  Shantell Wagner is a 50-year-old patient who is being evaluated today by a video visit due to a public health emergency with coronavirus.      VIDEO PLATFORM:  [employer].      PATIENT LOCATION:  Home.      PHYSICIAN LOCATION:  [facility].      COMPLEXITY OF CASE:  Moderate.      HISTORY OF PRESENTING COMPLAINT:   1.  Right-sided high-risk breast cancer, lymph node positive, estrogen receptor positive, progesterone receptor negative, HER-2 receptor negative.   2.  Diagnosis of a CHEK2 mutation.   3.  New symptom of lower back pain.      HISTORY OF PRESENTING COMPLAINT:  Shantell is a 50-year-old patient who carries a diagnosis of a CHEK2 mutation.  She presented with a locally advanced right-sided breast cancer, which was a 2.2 cm tumor in the upper outer quadrant of the right breast, and a 2.7 cm axillary lymph node mass.  The patient was diagnosed in the spring of 2016.  She is now over 4 years out from her original diagnosis.  She has finished up on chemotherapy and continues on adjuvant tamoxifen.  Overall, she has been doing well on the tamoxifen with not much symptomatology except for some hot flashes and some mild fatigue intermittently.  Of concern today, she is complaining of some lower back pain that started a few months ago and has not gotten any better.  She has seen a chiropractor, without much avail.  The lower back pain is mostly on the left side of her back.  Occasionally, she gets what seems like a sciatica type pain on her left buttock area, but sometimes it is just centered in the low part of her left back.  I have discussed this pain with her today and the nature of the pain.  I am concerned enough that I would like to get a bone scan to rule out any to rule out any cancer in this area.  The patient is in agreement with the plan.      PAST MEDICAL HISTORY:  Remarkable for history of right-sided breast cancer, history of a CHEK2  mutation, history of hypercholesterolemia.      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      FAMILY HISTORY:  The patient has 1 sister with breast cancer and another sister who is currently going for a breast biopsy in the next few weeks.  She also had a maternal grandmother with breast cancer, father with prostate cancer.      GENETIC TESTING:  The patient has a CHEK2 mutation.      COMPREHENSIVE REVIEW OF SYSTEMS:  The patient's only complaint today is that of back pain, which started a few months ago and has not gotten better.      MEDICATIONS AND ALLERGIES:  Outlined in the nursing records.      SOCIAL HISTORY:  The patient is .  She is a nonsmoker, drinks alcohol rarely.  She has got 4 children.      REVIEW OF SYSTEMS:  A 12-point comprehensive review of systems, apart from what is outlined above, is otherwise unremarkable.      PHYSICAL EXAMINATION:   GENERAL:  She appears well.  She is in no acute distress.   EYES:  Have no redness or discharge.   RESPIRATORY:  There is no cough or labored breathing.   SKIN:  Has no rashes or lesions.   MUSCULOSKELETAL:  She is moving all extremities.   NEUROLOGICAL:  She is alert and oriented x 3.   PSYCHIATRIC:  Normal.   The rest of her physical exam is deferred today due to video visit restrictions and a public health emergency.      DATA REVIEW:  The patient's last lab work was done on 9/25/2020 at her primary care physician's office.  She did have a full comprehensive panel in 07/2020, and that included a breast cancer marker which was normal at 28.  In July she had a white cell count of 6.8, hemoglobin 12.6, platelets 313.        PLAN:  I have discussed with her today coming back for more lab work to include a breast cancer marker as well as getting a bone scan to evaluate her left-sided back pain.  I have refilled her tamoxifen today.  We will plan on continuing her tamoxifen for 10 years.  The  patient is in agreement with the above plan.      COMPLEXITY OF CASE TODAY:  Moderate.         MICH ORTIZ MD             D: 2021   T: 2021   MT: DEBORA      Name:     CADE GARCÍA   MRN:      [id number]        Account:      [id number]   :      1970           Visit Date:   2021      Document: [id number]

## 2021-01-13 ENCOUNTER — HOSPITAL ENCOUNTER (OUTPATIENT)
Dept: NUCLEAR MEDICINE | Facility: CLINIC | Age: 51
Setting detail: NUCLEAR MEDICINE
End: 2021-01-13
Attending: INTERNAL MEDICINE
Payer: COMMERCIAL

## 2021-01-13 ENCOUNTER — HOSPITAL ENCOUNTER (OUTPATIENT)
Facility: CLINIC | Age: 51
Setting detail: SPECIMEN
Discharge: HOME | End: 2021-01-13
Attending: INTERNAL MEDICINE | Admitting: INTERNAL MEDICINE
Payer: COMMERCIAL

## 2021-01-13 DIAGNOSIS — C50.411 MALIGNANT NEOPLASM OF UPPER-OUTER QUADRANT OF RIGHT FEMALE BREAST, UNSPECIFIED ESTROGEN RECEPTOR STATUS (H): ICD-10-CM

## 2021-01-13 DIAGNOSIS — M54.50 ACUTE LEFT-SIDED LOW BACK PAIN WITHOUT SCIATICA: ICD-10-CM

## 2021-01-13 LAB
ALBUMIN SERPL-MCNC: 3.7 G/DL (ref 3.4–5)
ALP SERPL-CCNC: 69 U/L (ref 40–150)
ALT SERPL W P-5'-P-CCNC: 63 U/L (ref 0–50)
ANION GAP SERPL CALCULATED.3IONS-SCNC: 2 MMOL/L (ref 3–14)
AST SERPL W P-5'-P-CCNC: 36 U/L (ref 0–45)
BILIRUB SERPL-MCNC: 0.6 MG/DL (ref 0.2–1.3)
BUN SERPL-MCNC: 16 MG/DL (ref 7–30)
CALCIUM SERPL-MCNC: 9.5 MG/DL (ref 8.5–10.1)
CANCER AG27-29 SERPL-ACNC: 27 U/ML (ref 0–39)
CHLORIDE SERPL-SCNC: 107 MMOL/L (ref 94–109)
CO2 SERPL-SCNC: 29 MMOL/L (ref 20–32)
CREAT SERPL-MCNC: 0.91 MG/DL (ref 0.52–1.04)
ERYTHROCYTE [DISTWIDTH] IN BLOOD BY AUTOMATED COUNT: 12.9 % (ref 10–15)
GFR SERPL CREATININE-BSD FRML MDRD: 73 ML/MIN/{1.73_M2}
GLUCOSE SERPL-MCNC: 105 MG/DL (ref 70–99)
HCT VFR BLD AUTO: 41.9 % (ref 35–47)
HGB BLD-MCNC: 13.7 G/DL (ref 11.7–15.7)
MCH RBC QN AUTO: 28.8 PG (ref 26.5–33)
MCHC RBC AUTO-ENTMCNC: 32.7 G/DL (ref 31.5–36.5)
MCV RBC AUTO: 88 FL (ref 78–100)
PLATELET # BLD AUTO: 333 10E9/L (ref 150–450)
POTASSIUM SERPL-SCNC: 4.1 MMOL/L (ref 3.4–5.3)
PROT SERPL-MCNC: 7.8 G/DL (ref 6.8–8.8)
RBC # BLD AUTO: 4.75 10E12/L (ref 3.8–5.2)
SODIUM SERPL-SCNC: 138 MMOL/L (ref 133–144)
WBC # BLD AUTO: 5.6 10E9/L (ref 4–11)

## 2021-01-13 PROCEDURE — 343N000001 HC RX 343: Performed by: INTERNAL MEDICINE

## 2021-01-13 PROCEDURE — 36415 COLL VENOUS BLD VENIPUNCTURE: CPT

## 2021-01-13 PROCEDURE — 78306 BONE IMAGING WHOLE BODY: CPT

## 2021-01-13 PROCEDURE — 85027 COMPLETE CBC AUTOMATED: CPT | Performed by: INTERNAL MEDICINE

## 2021-01-13 PROCEDURE — 80053 COMPREHEN METABOLIC PANEL: CPT | Performed by: INTERNAL MEDICINE

## 2021-01-13 PROCEDURE — 86300 IMMUNOASSAY TUMOR CA 15-3: CPT | Performed by: INTERNAL MEDICINE

## 2021-01-13 PROCEDURE — A9503 TC99M MEDRONATE: HCPCS | Performed by: INTERNAL MEDICINE

## 2021-01-13 RX ORDER — TC 99M MEDRONATE 20 MG/10ML
25 INJECTION, POWDER, LYOPHILIZED, FOR SOLUTION INTRAVENOUS ONCE
Status: COMPLETED | OUTPATIENT
Start: 2021-01-13 | End: 2021-01-13

## 2021-01-13 RX ADMIN — TC 99M MEDRONATE 25 MCI.: 20 INJECTION, POWDER, LYOPHILIZED, FOR SOLUTION INTRAVENOUS at 10:03

## 2021-01-13 NOTE — PROGRESS NOTES
Medical Assistant Note:  Shantell Wagner presents today for blood draw.    Patient seen by provider today: No.   present during visit today: Not Applicable.    Concerns: No Concerns.    Procedure:  Labs drawn    Post Assessment:  Labs drawn without difficulty: Yes.    Discharge Plan:  Departure Mode: Ambulatory.    Face to Face Time: 10 min.    Marika Rodriguez CMA

## 2021-02-08 ENCOUNTER — VIRTUAL VISIT (OUTPATIENT)
Dept: FAMILY MEDICINE | Facility: CLINIC | Age: 51
End: 2021-02-08
Payer: COMMERCIAL

## 2021-02-08 DIAGNOSIS — F40.243 FEAR OF FLYING: Primary | ICD-10-CM

## 2021-02-08 PROCEDURE — 99213 OFFICE O/P EST LOW 20 MIN: CPT | Mod: GT | Performed by: NURSE PRACTITIONER

## 2021-02-08 RX ORDER — ALPRAZOLAM 0.25 MG
TABLET ORAL
Qty: 6 TABLET | Refills: 0 | Status: SHIPPED | OUTPATIENT
Start: 2021-02-08 | End: 2022-08-16

## 2021-02-08 NOTE — PROGRESS NOTES
Shantell is a 50 year old who is being evaluated via a billable video visit.      Use [employer]- [phone]    How would you like to obtain your AVS? MyChart  If the video visit is dropped, the invitation should be resent by: Text to cell phone: [phone]  Will anyone else be joining your video visit? No    Video Start Time: 3:10pm  Assessment & Plan     Fear of flying  Tolerated well in the past.  Refilled.  - ALPRAZolam (XANAX) 0.25 MG tablet; Take 1 tablet 30 minutes prior to flying.        No follow-ups on file.    NIKKI Santos Ra, CNP  [facility] [place]    Subjective     Shantell is a 50 year old who presents to clinic today for the following health issues     HPI       Would like to request a medication for flying.   Flying to [place] for a vacation.  Interested in refill of xanax.  Utilized in the past with good results.  No issues.    Review of Systems   Constitutional, HEENT, cardiovascular, pulmonary, gi and gu systems are negative, except as otherwise noted.      Objective           Vitals:  No vitals were obtained today due to virtual visit.    Physical Exam   GENERAL: Healthy, alert and no distress  EYES: Eyes grossly normal to inspection.  No discharge or erythema, or obvious scleral/conjunctival abnormalities.  RESP: No audible wheeze, cough, or visible cyanosis.  No visible retractions or increased work of breathing.    SKIN: Visible skin clear. No significant rash, abnormal pigmentation or lesions.  NEURO: Cranial nerves grossly intact.  Mentation and speech appropriate for age.  PSYCH: Mentation appears normal, affect normal/bright, judgement and insight intact, normal speech and appearance well-groomed.          Video-Visit Details    Type of service:  Video Visit    Video End Time:3:15 PM    Originating Location (pt. Location): Home    Distant Location (provider location):  [facility]     Platform used for Video Visit: [employer]

## 2021-03-01 DIAGNOSIS — I10 ESSENTIAL HYPERTENSION: ICD-10-CM

## 2021-03-02 RX ORDER — LOSARTAN POTASSIUM 25 MG/1
TABLET ORAL
Qty: 90 TABLET | Refills: 1 | Status: SHIPPED | OUTPATIENT
Start: 2021-03-02 | End: 2021-09-03

## 2021-03-24 NOTE — PATIENT INSTRUCTIONS
Preventive Health Recommendations  Female Ages 40 to 49    Yearly exam:     See your health care provider every year in order to  1. Review health changes.   2. Discuss preventive care.    3. Review your medicines if your doctor prescribed any.      Get a Pap test every three years (unless you have an abnormal result and your provider advises testing more often).      If you get Pap tests with HPV test, you only need to test every 5 years, unless you have an abnormal result. You do not need a Pap test if your uterus was removed (hysterectomy) and you have not had cancer.      You should be tested each year for STDs (sexually transmitted diseases), if you're at risk.       Ask your doctor if you should have a mammogram.      Have a colonoscopy (test for colon cancer) if someone in your family has had colon cancer or polyps before age 50.       Have a cholesterol test every 5 years.       Have a diabetes test (fasting glucose) after age 45. If you are at risk for diabetes, you should have this test every 3 years.    Shots: Get a flu shot each year. Get a tetanus shot every 10 years.     Nutrition:     Eat at least 5 servings of fruits and vegetables each day.    Eat whole-grain bread, whole-wheat pasta and brown rice instead of white grains and rice.    Talk to your provider about Calcium and Vitamin D.     Lifestyle    Exercise at least 150 minutes a week (an average of 30 minutes a day, 5 days a week). This will help you control your weight and prevent disease.    Limit alcohol to one drink per day.    No smoking.     Wear sunscreen to prevent skin cancer.    See your dentist every six months for an exam and cleaning.   right

## 2021-04-02 DIAGNOSIS — M79.605 PAIN IN BOTH LOWER EXTREMITIES: ICD-10-CM

## 2021-04-02 DIAGNOSIS — M79.604 PAIN IN BOTH LOWER EXTREMITIES: ICD-10-CM

## 2021-04-05 RX ORDER — GABAPENTIN 300 MG/1
CAPSULE ORAL
Qty: 90 CAPSULE | Refills: 1 | Status: SHIPPED | OUTPATIENT
Start: 2021-04-05 | End: 2021-10-11

## 2021-04-20 ENCOUNTER — VIRTUAL VISIT (OUTPATIENT)
Dept: FAMILY MEDICINE | Facility: CLINIC | Age: 51
End: 2021-04-20
Payer: COMMERCIAL

## 2021-04-20 DIAGNOSIS — R14.0 ABDOMINAL BLOATING: Primary | ICD-10-CM

## 2021-04-20 DIAGNOSIS — R11.0 NAUSEA: ICD-10-CM

## 2021-04-20 PROCEDURE — 99214 OFFICE O/P EST MOD 30 MIN: CPT | Mod: GT | Performed by: NURSE PRACTITIONER

## 2021-04-20 NOTE — PROGRESS NOTES
"Shantell is a 50 year old who is being evaluated via a billable video visit.      How would you like to obtain your AVS? MyChart  If the video visit is dropped, the invitation should be resent by: Text to cell phone: [phone]  Will anyone else be joining your video visit? No    Video Start Time: 2:31 PM    Assessment & Plan     Abdominal bloating  Discussed initial eval with labs and US.  Consider CT.    Colonoscopy completed 2017, due in 2022.  - **CBC with platelets FUTURE anytime; Future  - **Comprehensive metabolic panel FUTURE anytime; Future  - **TSH with free T4 reflex FUTURE anytime; Future  - IgA; Future  - Tissue transglutaminase herbie IgA and IgG; Future  - Fecal colorectal cancer screen (FIT); Future  - Helicobacter pylori Antigen Stool; Future  - US Abdomen Complete; Future    Nausea  - **CBC with platelets FUTURE anytime; Future  - **Comprehensive metabolic panel FUTURE anytime; Future  - **TSH with free T4 reflex FUTURE anytime; Future  - IgA; Future  - Tissue transglutaminase herbie IgA and IgG; Future  - Fecal colorectal cancer screen (FIT); Future  - Helicobacter pylori Antigen Stool; Future  - US Abdomen Complete; Future       BMI:   Estimated body mass index is 34.02 kg/m  as calculated from the following:    Height as of 9/25/20: 1.626 m (5' 4\").    Weight as of 9/25/20: 89.9 kg (198 lb 3.2 oz).           No follow-ups on file.    NIKKI Santos Ra CNP  [facility]    Subjective   Shantell is a 50 year old who presents for the following health issues     HPI     Symptoms over the past few weeks.  Episodes of bloating followed by nausea and feeling generally unwell.  Symptoms resolve without treatment typically within 24 hours.  Not specifically related to intake.  No recent changes in diet.  No fevers or unexplained weight change.  No diarrhea or vomiting.  No black or bloody stools.  Colonoscopy 2017, due again 2022.  No abdominal pain.  No rashes.  Son has " Crohns.  No celiac hx in the family.    Review of Systems   Constitutional, HEENT, cardiovascular, pulmonary, gi and gu systems are negative, except as otherwise noted.      Objective           Vitals:  No vitals were obtained today due to virtual visit.    Physical Exam   GENERAL: Healthy, alert and no distress  EYES: Eyes grossly normal to inspection.  No discharge or erythema, or obvious scleral/conjunctival abnormalities.  RESP: No audible wheeze, cough, or visible cyanosis.  No visible retractions or increased work of breathing.    SKIN: Visible skin clear. No significant rash, abnormal pigmentation or lesions.  NEURO: Cranial nerves grossly intact.  Mentation and speech appropriate for age.  PSYCH: Mentation appears normal, affect normal/bright, judgement and insight intact, normal speech and appearance well-groomed.                Video-Visit Details    Type of service:  Video Visit    Video End Time:2:43 PM    Originating Location (pt. Location): Home    Distant Location (provider location):  [facility]     Platform used for Video Visit: Carl

## 2021-04-21 DIAGNOSIS — R14.0 ABDOMINAL BLOATING: ICD-10-CM

## 2021-04-21 DIAGNOSIS — R11.0 NAUSEA: ICD-10-CM

## 2021-04-21 LAB
ALBUMIN SERPL-MCNC: 3.8 G/DL (ref 3.4–5)
ALP SERPL-CCNC: 69 U/L (ref 40–150)
ALT SERPL W P-5'-P-CCNC: 47 U/L (ref 0–50)
ANION GAP SERPL CALCULATED.3IONS-SCNC: 4 MMOL/L (ref 3–14)
AST SERPL W P-5'-P-CCNC: 27 U/L (ref 0–45)
BILIRUB SERPL-MCNC: 0.4 MG/DL (ref 0.2–1.3)
BUN SERPL-MCNC: 20 MG/DL (ref 7–30)
CALCIUM SERPL-MCNC: 9.4 MG/DL (ref 8.5–10.1)
CHLORIDE SERPL-SCNC: 105 MMOL/L (ref 94–109)
CO2 SERPL-SCNC: 26 MMOL/L (ref 20–32)
CREAT SERPL-MCNC: 0.88 MG/DL (ref 0.52–1.04)
ERYTHROCYTE [DISTWIDTH] IN BLOOD BY AUTOMATED COUNT: 13.7 % (ref 10–15)
GFR SERPL CREATININE-BSD FRML MDRD: 76 ML/MIN/{1.73_M2}
GLUCOSE SERPL-MCNC: 103 MG/DL (ref 70–99)
HCT VFR BLD AUTO: 41.1 % (ref 35–47)
HGB BLD-MCNC: 13.3 G/DL (ref 11.7–15.7)
MCH RBC QN AUTO: 27.9 PG (ref 26.5–33)
MCHC RBC AUTO-ENTMCNC: 32.4 G/DL (ref 31.5–36.5)
MCV RBC AUTO: 86 FL (ref 78–100)
PLATELET # BLD AUTO: 318 10E9/L (ref 150–450)
POTASSIUM SERPL-SCNC: 4.1 MMOL/L (ref 3.4–5.3)
PROT SERPL-MCNC: 7.9 G/DL (ref 6.8–8.8)
RBC # BLD AUTO: 4.77 10E12/L (ref 3.8–5.2)
SODIUM SERPL-SCNC: 135 MMOL/L (ref 133–144)
TSH SERPL DL<=0.005 MIU/L-ACNC: 1.63 MU/L (ref 0.4–4)
WBC # BLD AUTO: 6 10E9/L (ref 4–11)

## 2021-04-21 PROCEDURE — 36415 COLL VENOUS BLD VENIPUNCTURE: CPT | Performed by: NURSE PRACTITIONER

## 2021-04-21 PROCEDURE — 82784 ASSAY IGA/IGD/IGG/IGM EACH: CPT | Performed by: NURSE PRACTITIONER

## 2021-04-21 PROCEDURE — 80053 COMPREHEN METABOLIC PANEL: CPT | Performed by: NURSE PRACTITIONER

## 2021-04-21 PROCEDURE — 84443 ASSAY THYROID STIM HORMONE: CPT | Performed by: NURSE PRACTITIONER

## 2021-04-21 PROCEDURE — 85027 COMPLETE CBC AUTOMATED: CPT | Performed by: NURSE PRACTITIONER

## 2021-04-21 PROCEDURE — 83516 IMMUNOASSAY NONANTIBODY: CPT | Performed by: NURSE PRACTITIONER

## 2021-04-22 DIAGNOSIS — R14.0 ABDOMINAL BLOATING: ICD-10-CM

## 2021-04-22 DIAGNOSIS — R11.0 NAUSEA: ICD-10-CM

## 2021-04-22 LAB
HEMOCCULT STL QL IA: NEGATIVE
IGA SERPL-MCNC: 165 MG/DL (ref 84–499)
TTG IGA SER-ACNC: 1 U/ML
TTG IGG SER-ACNC: 1 U/ML

## 2021-04-22 PROCEDURE — 82274 ASSAY TEST FOR BLOOD FECAL: CPT | Performed by: NURSE PRACTITIONER

## 2021-04-22 PROCEDURE — 87338 HPYLORI STOOL AG IA: CPT | Performed by: NURSE PRACTITIONER

## 2021-04-23 LAB — H PYLORI AG STL QL IA: NEGATIVE

## 2021-05-02 ENCOUNTER — MYC MEDICAL ADVICE (OUTPATIENT)
Dept: FAMILY MEDICINE | Facility: CLINIC | Age: 51
End: 2021-05-02

## 2021-05-10 DIAGNOSIS — C50.411 MALIGNANT NEOPLASM OF UPPER-OUTER QUADRANT OF RIGHT FEMALE BREAST, UNSPECIFIED ESTROGEN RECEPTOR STATUS (H): ICD-10-CM

## 2021-05-11 RX ORDER — VENLAFAXINE 37.5 MG/1
37.5 TABLET ORAL 2 TIMES DAILY
Qty: 180 TABLET | Refills: 1 | Status: SHIPPED | OUTPATIENT
Start: 2021-05-11 | End: 2021-11-16

## 2021-05-11 NOTE — TELEPHONE ENCOUNTER
Pending Prescriptions:                       Disp   Refills    venlafaxine (EFFEXOR) 37.5 MG tablet [Pha*180 ta*1            Sig: TAKE 1 TABLET (37.5 MG) BY MOUTH 2 TIMES DAILY          Last Written Prescription Date:  11/16/2020  Last Fill Quantity: 180,   # refills: 1  Last Office Visit: 1/5/2021  Future Office visit:    Next 5 appointments (look out 90 days)    Aug 04, 2021  3:00 PM  Return Visit with Tata Knott MD  [facility] [address]  [address]  [facility] [address]  [phone]           Routing refill request to provider for review/approval.  Dot Paul, RN, BSN, OCN, CBCN

## 2021-05-12 NOTE — TELEPHONE ENCOUNTER
Signed Prescriptions:                        Disp   Refills    venlafaxine (EFFEXOR) 37.5 MG tablet       180 ta*1        Sig: TAKE 1 TABLET (37.5 MG) BY MOUTH 2 TIMES DAILY  Authorizing Provider: MICH KNOTT    Rx has been approved by Dr. Knott.  Dto Paul, RN, BSN, OCN, CBCN

## 2021-07-20 DIAGNOSIS — C50.411 MALIGNANT NEOPLASM OF UPPER-OUTER QUADRANT OF RIGHT FEMALE BREAST, UNSPECIFIED ESTROGEN RECEPTOR STATUS (H): Primary | ICD-10-CM

## 2021-07-23 ENCOUNTER — TRANSFERRED RECORDS (OUTPATIENT)
Dept: HEALTH INFORMATION MANAGEMENT | Facility: CLINIC | Age: 51
End: 2021-07-23

## 2021-07-28 ENCOUNTER — HOSPITAL ENCOUNTER (OUTPATIENT)
Facility: CLINIC | Age: 51
Discharge: HOME | End: 2021-07-28
Attending: INTERNAL MEDICINE | Admitting: INTERNAL MEDICINE
Payer: COMMERCIAL

## 2021-07-28 ENCOUNTER — LAB (OUTPATIENT)
Dept: ONCOLOGY | Facility: CLINIC | Age: 51
End: 2021-07-28
Attending: INTERNAL MEDICINE
Payer: COMMERCIAL

## 2021-07-28 DIAGNOSIS — C50.411 MALIGNANT NEOPLASM OF UPPER-OUTER QUADRANT OF RIGHT FEMALE BREAST, UNSPECIFIED ESTROGEN RECEPTOR STATUS (H): ICD-10-CM

## 2021-07-28 LAB
ALBUMIN SERPL-MCNC: 3.8 G/DL (ref 3.4–5)
ALP SERPL-CCNC: 83 U/L (ref 40–150)
ALT SERPL W P-5'-P-CCNC: 50 U/L (ref 0–50)
ANION GAP SERPL CALCULATED.3IONS-SCNC: 4 MMOL/L (ref 3–14)
AST SERPL W P-5'-P-CCNC: 25 U/L (ref 0–45)
BILIRUB SERPL-MCNC: 0.4 MG/DL (ref 0.2–1.3)
BUN SERPL-MCNC: 14 MG/DL (ref 7–30)
CALCIUM SERPL-MCNC: 9.5 MG/DL (ref 8.5–10.1)
CANCER AG27-29 SERPL-ACNC: 30 U/ML (ref 0–39)
CHLORIDE BLD-SCNC: 107 MMOL/L (ref 94–109)
CO2 SERPL-SCNC: 28 MMOL/L (ref 20–32)
CREAT SERPL-MCNC: 0.93 MG/DL (ref 0.52–1.04)
ERYTHROCYTE [DISTWIDTH] IN BLOOD BY AUTOMATED COUNT: 13.3 % (ref 10–15)
GFR SERPL CREATININE-BSD FRML MDRD: 72 ML/MIN/1.73M2
GLUCOSE BLD-MCNC: 95 MG/DL (ref 70–99)
HCT VFR BLD AUTO: 39.4 % (ref 35–47)
HGB BLD-MCNC: 12.7 G/DL (ref 11.7–15.7)
MCH RBC QN AUTO: 28.2 PG (ref 26.5–33)
MCHC RBC AUTO-ENTMCNC: 32.2 G/DL (ref 31.5–36.5)
MCV RBC AUTO: 87 FL (ref 78–100)
PLATELET # BLD AUTO: 352 10E3/UL (ref 150–450)
POTASSIUM BLD-SCNC: 4.2 MMOL/L (ref 3.4–5.3)
PROT SERPL-MCNC: 7.6 G/DL (ref 6.8–8.8)
RBC # BLD AUTO: 4.51 10E6/UL (ref 3.8–5.2)
SODIUM SERPL-SCNC: 139 MMOL/L (ref 133–144)
WBC # BLD AUTO: 7.4 10E3/UL (ref 4–11)

## 2021-07-28 PROCEDURE — 80053 COMPREHEN METABOLIC PANEL: CPT | Performed by: INTERNAL MEDICINE

## 2021-07-28 PROCEDURE — 36415 COLL VENOUS BLD VENIPUNCTURE: CPT

## 2021-07-28 PROCEDURE — 85027 COMPLETE CBC AUTOMATED: CPT | Performed by: INTERNAL MEDICINE

## 2021-07-28 PROCEDURE — 86300 IMMUNOASSAY TUMOR CA 15-3: CPT | Performed by: INTERNAL MEDICINE

## 2021-07-28 NOTE — PROGRESS NOTES
Medical Assistant Note:  Shantell Wagner presents today for blood work.    Patient seen by provider today: No.   present during visit today: Not Applicable.    Concerns: No Concerns.    Procedure:  Labs drawn    Post Assessment:  Labs drawn without difficulty: Yes.    Discharge Plan:  Departure Mode: Ambulatory.    Face to Face Time: 10 min.    Marika Rodriguez CMA

## 2021-08-03 ENCOUNTER — TELEPHONE (OUTPATIENT)
Dept: ONCOLOGY | Facility: CLINIC | Age: 51
End: 2021-08-03

## 2021-08-04 ENCOUNTER — ONCOLOGY VISIT (OUTPATIENT)
Dept: ONCOLOGY | Facility: CLINIC | Age: 51
End: 2021-08-04
Attending: INTERNAL MEDICINE
Payer: COMMERCIAL

## 2021-08-04 VITALS
HEART RATE: 115 BPM | TEMPERATURE: 98.3 F | RESPIRATION RATE: 16 BRPM | DIASTOLIC BLOOD PRESSURE: 81 MMHG | OXYGEN SATURATION: 95 % | SYSTOLIC BLOOD PRESSURE: 117 MMHG | HEIGHT: 64 IN | BODY MASS INDEX: 36.54 KG/M2 | WEIGHT: 214 LBS

## 2021-08-04 DIAGNOSIS — C50.411 MALIGNANT NEOPLASM OF UPPER-OUTER QUADRANT OF RIGHT FEMALE BREAST, UNSPECIFIED ESTROGEN RECEPTOR STATUS (H): Primary | ICD-10-CM

## 2021-08-04 PROCEDURE — G0463 HOSPITAL OUTPT CLINIC VISIT: HCPCS

## 2021-08-04 PROCEDURE — 99214 OFFICE O/P EST MOD 30 MIN: CPT | Performed by: INTERNAL MEDICINE

## 2021-08-04 ASSESSMENT — MIFFLIN-ST. JEOR: SCORE: 1575.7

## 2021-08-04 ASSESSMENT — PAIN SCALES - GENERAL: PAINLEVEL: NO PAIN (0)

## 2021-08-04 NOTE — NURSING NOTE
"Oncology Rooming Note    August 4, 2021 3:15 PM   Shantell Wagner is a 50 year old female who presents for:    Chief Complaint   Patient presents with     Oncology Clinic Visit     Malignant neoplasm of upper-outer quadrant of right female breast     Initial Vitals: /81   Pulse 115   Temp 98.3  F (36.8  C) (Tympanic)   Resp 16   Ht 1.626 m (5' 4\")   Wt 97.1 kg (214 lb)   SpO2 95%   BMI 36.73 kg/m   Estimated body mass index is 36.73 kg/m  as calculated from the following:    Height as of this encounter: 1.626 m (5' 4\").    Weight as of this encounter: 97.1 kg (214 lb). Body surface area is 2.09 meters squared.  No Pain (0) Comment: Data Unavailable   No LMP recorded.  Allergies reviewed: Yes  Medications reviewed: Yes    Medications: Medication refills not needed today.  Pharmacy name entered into Her[employer]: [id number] - [place], [place] - [address]    Clinical concerns: follow up       Ching Sevilla CMA              "

## 2021-08-04 NOTE — PROGRESS NOTES
Visit Date: 08/04/2021    Shantell Wagner is a 50-year-old patient who is seeing me today for interim followup.    CHIEF COMPLAINT:    1.  History of right sided breast cancer, high risk at diagnosis.  Lymph node positive, estrogen receptor positive, progesterone receptor negative, HER2 receptor negative.  2.  Diagnosis of CHEK2 mutation.    HISTORY OF PRESENT ILLNESS:  Shantell is a 50-year-old patient with a diagnosis of a CHEK2 mutation.  She is just 5 years out now from a locally advanced right sided breast cancer, which was a 2.2 cm tumor in the upper outer quadrant of the right breast with a 2.7 cm axillary lymph node mass.  She finished up chemotherapy and is now on adjuvant tamoxifen.  She is planning to do tamoxifen for 10 years.  She had no menstrual period for the last couple of years until about 3 weeks ago when she had a menstrual period.  Because of that, I have recommended that we consider doing a full pelvic ultrasound on her to make sure she does not have endometrial hyperplasia or development of a polyp.  I have written her for that today.  Other than that, she is up to date on her lab work, which she had done on 07/28/2021.    COMPREHENSIVE REVIEW OF SYSTEMS:  She has had some problems with increased abdominal gas.  Other than that, she has nothing sinister from my end.  She does get intermittent low back pain.  We did do a bone scan and she just has some arthritis.    PAST MEDICAL HISTORY:    1.  Right-sided breast cancer.  2.  CHEK2 mutation.  3.  Hypercholesterolemia.    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    FAMILY HISTORY:  Positive for a sister with breast cancer, maternal grandmother with breast cancer, and father with prostate cancer.    GENETIC TESTING:  She has CHEK2 mutation.    MEDICATIONS AND ALLERGIES:  Outlined in the nursing records.    PAIN ASSESSMENT:  She is in no pain.    PHYSICAL EXAMINATION:    GENERAL:  She  is a well-appearing lady in no acute distress.  VITAL SIGNS:  Stable.  Weight is 214 pounds.  NECK:  Has no masses or goiter.  NODES:  There is no cervical, supraclavicular, infraclavicular adenopathy.  CHEST:  Clear to auscultation and percussion bilaterally.  HEART:  Sounds 1 2, normal, no added sounds, no murmurs.  BREASTS:  The patient is status post bilateral mastectomies.  The scars look good.  Right and left axillae are negative.  GASTROINTESTINAL:  Abdomen is soft and nontender.  No hepatosplenomegaly.  Bowel sounds are present.  EXTREMITIES:  Legs without tenderness or edema.  NEUROLOGIC:  The patient is alert and oriented x3.  SKIN:  Has no rashes or lesions.    DATA REVIEW:  I have reviewed her most recent labs.    ASSESSMENT AND PLAN:  Patient with a history of CHEK2 mutation and a locally advanced right sided breast cancer that was diagnosed in the spring of , estrogen receptor positive, progesterone receptor negative, HER2 receptor negative.  She will continue on adjuvant tamoxifen.  I have ordered today a pelvic ultrasound as she had a menstrual period 2 years after having no menstrual periods.  We will check that out and I will be in touch with her with the results.  All of the above has been discussed with her today.    Tata Knott MD        D: 2021   T: 2021   MT: JAN    Name:     CADE GARCÍA  MRN:      [id number]        Account:    [id number]   :      1970           Visit Date: 2021     Document: [id number]

## 2021-08-04 NOTE — LETTER
8/4/2021         RE: Shant[place] Wagner  [address] [facility] [address]        Dear Colleague,    Thank you for referring your patient, Shantell Wagner, to the [facility]. Please see a copy of my visit note below.    Visit Date: 08/04/2021    Shantell Wagner is a 50-year-old patient who is seeing me today for interim followup.    CHIEF COMPLAINT:    1.  History of right sided breast cancer, high risk at diagnosis.  Lymph node positive, estrogen receptor positive, progesterone receptor negative, HER2 receptor negative.  2.  Diagnosis of CHEK2 mutation.    HISTORY OF PRESENT ILLNESS:  Shantell is a 50-year-old patient with a diagnosis of a CHEK2 mutation.  She is just 5 years out now from a locally advanced right sided breast cancer, which was a 2.2 cm tumor in the upper outer quadrant of the right breast with a 2.7 cm axillary lymph node mass.  She finished up chemotherapy and is now on adjuvant tamoxifen.  She is planning to do tamoxifen for 10 years.  She had no menstrual period for the last couple of years until about 3 weeks ago when she had a menstrual period.  Because of that, I have recommended that we consider doing a full pelvic ultrasound on her to make sure she does not have endometrial hyperplasia or development of a polyp.  I have written her for that today.  Other than that, she is up to date on her lab work, which she had done on 07/28/2021.    COMPREHENSIVE REVIEW OF SYSTEMS:  She has had some problems with increased abdominal gas.  Other than that, she has nothing sinister from my end.  She does get intermittent low back pain.  We did do a bone scan and she just has some arthritis.    PAST MEDICAL HISTORY:    1.  Right-sided breast cancer.  2.  CHEK2 mutation.  3.  Hypercholesterolemia.    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    FAMILY HISTORY:  Positive for a sister with breast  cancer, maternal grandmother with breast cancer, and father with prostate cancer.    GENETIC TESTING:  She has CHEK2 mutation.    MEDICATIONS AND ALLERGIES:  Outlined in the nursing records.    PAIN ASSESSMENT:  She is in no pain.    PHYSICAL EXAMINATION:    GENERAL:  She is a well-appearing lady in no acute distress.  VITAL SIGNS:  Stable.  Weight is 214 pounds.  NECK:  Has no masses or goiter.  NODES:  There is no cervical, supraclavicular, infraclavicular adenopathy.  CHEST:  Clear to auscultation and percussion bilaterally.  HEART:  Sounds 1 2, normal, no added sounds, no murmurs.  BREASTS:  The patient is status post bilateral mastectomies.  The scars look good.  Right and left axillae are negative.  GASTROINTESTINAL:  Abdomen is soft and nontender.  No hepatosplenomegaly.  Bowel sounds are present.  EXTREMITIES:  Legs without tenderness or edema.  NEUROLOGIC:  The patient is alert and oriented x3.  SKIN:  Has no rashes or lesions.    DATA REVIEW:  I have reviewed her most recent labs.    ASSESSMENT AND PLAN:  Patient with a history of CHEK2 mutation and a locally advanced right sided breast cancer that was diagnosed in the spring of , estrogen receptor positive, progesterone receptor negative, HER2 receptor negative.  She will continue on adjuvant tamoxifen.  I have ordered today a pelvic ultrasound as she had a menstrual period 2 years after having no menstrual periods.  We will check that out and I will be in touch with her with the results.  All of the above has been discussed with her today.    Tata Knott MD        D: 2021   T: 2021   MT: JAN    Name:     CADE GARCÍA  MRN:      [id number]        Account:    [id number]   :      1970           Visit Date: 2021     Document: [id number]      Again, thank you for allowing me to participate in the care of your patient.        Sincerely,        Tata Knott MD

## 2021-08-09 ENCOUNTER — HOSPITAL ENCOUNTER (OUTPATIENT)
Dept: ULTRASOUND IMAGING | Facility: CLINIC | Age: 51
Discharge: HOME | End: 2021-08-09
Attending: INTERNAL MEDICINE | Admitting: INTERNAL MEDICINE
Payer: COMMERCIAL

## 2021-08-09 DIAGNOSIS — C50.411 MALIGNANT NEOPLASM OF UPPER-OUTER QUADRANT OF RIGHT FEMALE BREAST, UNSPECIFIED ESTROGEN RECEPTOR STATUS (H): ICD-10-CM

## 2021-08-09 PROCEDURE — 76830 TRANSVAGINAL US NON-OB: CPT

## 2021-09-02 DIAGNOSIS — I10 ESSENTIAL HYPERTENSION: ICD-10-CM

## 2021-09-03 RX ORDER — LOSARTAN POTASSIUM 25 MG/1
TABLET ORAL
Qty: 90 TABLET | Refills: 0 | Status: SHIPPED | OUTPATIENT
Start: 2021-09-03 | End: 2021-12-08

## 2021-09-03 NOTE — TELEPHONE ENCOUNTER
Prescription approved per [facility] Refill Protocol.  Michelle Marrufo RN

## 2021-09-19 ENCOUNTER — HEALTH MAINTENANCE LETTER (OUTPATIENT)
Age: 51
End: 2021-09-19

## 2021-09-30 ENCOUNTER — TRANSFERRED RECORDS (OUTPATIENT)
Dept: HEALTH INFORMATION MANAGEMENT | Facility: CLINIC | Age: 51
End: 2021-09-30

## 2021-10-08 DIAGNOSIS — M79.604 PAIN IN BOTH LOWER EXTREMITIES: ICD-10-CM

## 2021-10-08 DIAGNOSIS — M79.605 PAIN IN BOTH LOWER EXTREMITIES: ICD-10-CM

## 2021-10-08 NOTE — TELEPHONE ENCOUNTER
gabapentin (NEURONTIN) 300 MG capsule   Last Written Prescription Date:  4/5/21  Last Fill Quantity: 90,   # refills: 1  Last Office Visit: 5/18/21  Future Office visit:       Routing refill request to provider for review/approval because:  Drug not on the [facility], [facility] or [facility] refill protocol or controlled substance    Eufemia Ruffin RN on 10/8/2021 at 3:37 PM

## 2021-10-11 RX ORDER — GABAPENTIN 300 MG/1
CAPSULE ORAL
Qty: 90 CAPSULE | Refills: 0 | Status: SHIPPED | OUTPATIENT
Start: 2021-10-11 | End: 2022-01-10

## 2021-10-13 NOTE — PATIENT INSTRUCTIONS
Please schedule next visit for study on January 17th. Labs to be done prior (CBC with diff, CMP). Thank you!    Lauren Aase, RN [facility] Molly [facility]. [phone]     _____________________________________________________________    Add on labs today see orders      Siliq Pregnancy And Lactation Text: The risk during pregnancy and breastfeeding is uncertain with this medication.

## 2021-10-18 ENCOUNTER — TRANSFERRED RECORDS (OUTPATIENT)
Dept: HEALTH INFORMATION MANAGEMENT | Facility: CLINIC | Age: 51
End: 2021-10-18
Payer: COMMERCIAL

## 2021-11-12 DIAGNOSIS — C50.411 MALIGNANT NEOPLASM OF UPPER-OUTER QUADRANT OF RIGHT FEMALE BREAST, UNSPECIFIED ESTROGEN RECEPTOR STATUS (H): ICD-10-CM

## 2021-11-14 ENCOUNTER — HEALTH MAINTENANCE LETTER (OUTPATIENT)
Age: 51
End: 2021-11-14

## 2021-11-15 NOTE — TELEPHONE ENCOUNTER
Pending Prescriptions:                       Disp   Refills    venlafaxine (EFFEXOR) 37.5 MG tablet      180 ta*1            Sig: Take 1 tablet (37.5 mg) by mouth 2 times daily          Last Written Prescription Date:  5/11/21  Last Fill Quantity: 180,   # refills: 1  Last Office Visit: 8/4/21  Future Office visit: 2/9/22  Next 5 appointments (look out 90 days)    Feb 09, 2022  3:30 PM  Return Visit with Tata Knott MD  [facility] ([facility] ) [address]  [address]  [facility] [address]  [phone]           Routing refill request to provider for review/approval.    Deepti Mera RN on 11/15/2021 at 2:15 PM

## 2021-11-16 RX ORDER — VENLAFAXINE 37.5 MG/1
37.5 TABLET ORAL 2 TIMES DAILY
Qty: 180 TABLET | Refills: 1 | Status: SHIPPED | OUTPATIENT
Start: 2021-11-16 | End: 2022-05-24

## 2021-11-16 NOTE — TELEPHONE ENCOUNTER
Signed Prescriptions:                        Disp   Refills    venlafaxine (EFFEXOR) 37.5 MG tablet       180 ta*1        Sig: Take 1 tablet (37.5 mg) by mouth 2 times daily  Authorizing Provider: MICH ORTIZ RN on 11/16/2021 at 8:54 AM

## 2021-12-15 ENCOUNTER — HOSPITAL ENCOUNTER (OUTPATIENT)
Dept: ULTRASOUND IMAGING | Facility: CLINIC | Age: 51
Discharge: HOME | End: 2021-12-15
Attending: NURSE PRACTITIONER | Admitting: NURSE PRACTITIONER
Payer: COMMERCIAL

## 2021-12-15 DIAGNOSIS — R14.0 ABDOMINAL BLOATING: ICD-10-CM

## 2021-12-15 DIAGNOSIS — R11.0 NAUSEA: ICD-10-CM

## 2021-12-15 PROCEDURE — 76700 US EXAM ABDOM COMPLETE: CPT

## 2022-01-09 ENCOUNTER — MYC MEDICAL ADVICE (OUTPATIENT)
Dept: FAMILY MEDICINE | Facility: CLINIC | Age: 52
End: 2022-01-09

## 2022-01-09 DIAGNOSIS — M79.605 PAIN IN BOTH LOWER EXTREMITIES: ICD-10-CM

## 2022-01-09 DIAGNOSIS — M79.604 PAIN IN BOTH LOWER EXTREMITIES: ICD-10-CM

## 2022-01-10 NOTE — TELEPHONE ENCOUNTER
Gabapentin  [facility] 10/11/21, disp 90 with no refills  Virtual visit 5/18/21    Routing refill request to provider for review/approval because:  Drug not on the [facility] refill protocol and due for an appt - has physical scheduled in March - do you want her to do some kind of vitual before due to controlled substance and last visit 5/18/21?

## 2022-01-11 RX ORDER — GABAPENTIN 300 MG/1
CAPSULE ORAL
Qty: 90 CAPSULE | Refills: 0 | Status: SHIPPED | OUTPATIENT
Start: 2022-01-11 | End: 2022-03-03

## 2022-02-16 ENCOUNTER — LAB (OUTPATIENT)
Dept: ONCOLOGY | Facility: CLINIC | Age: 52
End: 2022-02-16
Attending: INTERNAL MEDICINE
Payer: COMMERCIAL

## 2022-02-16 VITALS
WEIGHT: 216.8 LBS | HEIGHT: 64 IN | OXYGEN SATURATION: 97 % | TEMPERATURE: 97.1 F | SYSTOLIC BLOOD PRESSURE: 140 MMHG | BODY MASS INDEX: 37.01 KG/M2 | DIASTOLIC BLOOD PRESSURE: 89 MMHG | HEART RATE: 103 BPM | RESPIRATION RATE: 16 BRPM

## 2022-02-16 DIAGNOSIS — Z17.0 MALIGNANT NEOPLASM OF BREAST IN FEMALE, ESTROGEN RECEPTOR POSITIVE, UNSPECIFIED LATERALITY, UNSPECIFIED SITE OF BREAST (H): ICD-10-CM

## 2022-02-16 DIAGNOSIS — C50.919 MALIGNANT NEOPLASM OF BREAST IN FEMALE, ESTROGEN RECEPTOR POSITIVE, UNSPECIFIED LATERALITY, UNSPECIFIED SITE OF BREAST (H): ICD-10-CM

## 2022-02-16 DIAGNOSIS — C50.411 MALIGNANT NEOPLASM OF UPPER-OUTER QUADRANT OF RIGHT FEMALE BREAST, UNSPECIFIED ESTROGEN RECEPTOR STATUS (H): ICD-10-CM

## 2022-02-16 DIAGNOSIS — C50.411 MALIGNANT NEOPLASM OF UPPER-OUTER QUADRANT OF RIGHT FEMALE BREAST, UNSPECIFIED ESTROGEN RECEPTOR STATUS (H): Primary | ICD-10-CM

## 2022-02-16 LAB
ALBUMIN SERPL-MCNC: 4 G/DL (ref 3.4–5)
ALP SERPL-CCNC: 106 U/L (ref 40–150)
ALT SERPL W P-5'-P-CCNC: 79 U/L (ref 0–50)
ANION GAP SERPL CALCULATED.3IONS-SCNC: 8 MMOL/L (ref 3–14)
AST SERPL W P-5'-P-CCNC: 39 U/L (ref 0–45)
BILIRUB SERPL-MCNC: 0.5 MG/DL (ref 0.2–1.3)
BUN SERPL-MCNC: 15 MG/DL (ref 7–30)
CALCIUM SERPL-MCNC: 9.8 MG/DL (ref 8.5–10.1)
CANCER AG27-29 SERPL-ACNC: 24 U/ML (ref 0–39)
CHLORIDE BLD-SCNC: 105 MMOL/L (ref 94–109)
CO2 SERPL-SCNC: 25 MMOL/L (ref 20–32)
CREAT SERPL-MCNC: 0.93 MG/DL (ref 0.52–1.04)
ERYTHROCYTE [DISTWIDTH] IN BLOOD BY AUTOMATED COUNT: 13.7 % (ref 10–15)
GFR SERPL CREATININE-BSD FRML MDRD: 74 ML/MIN/1.73M2
GLUCOSE BLD-MCNC: 87 MG/DL (ref 70–99)
HCT VFR BLD AUTO: 43.3 % (ref 35–47)
HGB BLD-MCNC: 14.1 G/DL (ref 11.7–15.7)
MCH RBC QN AUTO: 28.2 PG (ref 26.5–33)
MCHC RBC AUTO-ENTMCNC: 32.6 G/DL (ref 31.5–36.5)
MCV RBC AUTO: 87 FL (ref 78–100)
PLATELET # BLD AUTO: 339 10E3/UL (ref 150–450)
POTASSIUM BLD-SCNC: 3.7 MMOL/L (ref 3.4–5.3)
PROT SERPL-MCNC: 8.2 G/DL (ref 6.8–8.8)
RBC # BLD AUTO: 5 10E6/UL (ref 3.8–5.2)
SODIUM SERPL-SCNC: 138 MMOL/L (ref 133–144)
WBC # BLD AUTO: 6.9 10E3/UL (ref 4–11)

## 2022-02-16 PROCEDURE — 80053 COMPREHEN METABOLIC PANEL: CPT | Performed by: INTERNAL MEDICINE

## 2022-02-16 PROCEDURE — 85014 HEMATOCRIT: CPT | Performed by: INTERNAL MEDICINE

## 2022-02-16 PROCEDURE — G0463 HOSPITAL OUTPT CLINIC VISIT: HCPCS

## 2022-02-16 PROCEDURE — 86300 IMMUNOASSAY TUMOR CA 15-3: CPT | Performed by: INTERNAL MEDICINE

## 2022-02-16 PROCEDURE — 99214 OFFICE O/P EST MOD 30 MIN: CPT | Performed by: INTERNAL MEDICINE

## 2022-02-16 PROCEDURE — 36415 COLL VENOUS BLD VENIPUNCTURE: CPT

## 2022-02-16 RX ORDER — TAMOXIFEN CITRATE 20 MG/1
20 TABLET ORAL DAILY
Qty: 90 TABLET | Refills: 3 | Status: SHIPPED | OUTPATIENT
Start: 2022-02-16 | End: 2023-01-26

## 2022-02-16 ASSESSMENT — PAIN SCALES - GENERAL: PAINLEVEL: NO PAIN (0)

## 2022-02-16 NOTE — PROGRESS NOTES
Medical Assistant Note:  Shantell Wagner presents today for blood draw.    Patient seen by provider today: Yes: Dr. Knott.   present during visit today: Not Applicable.    Concerns: No Concerns.    Procedure:  Labs drawn    Post Assessment:  Labs drawn without difficulty: Yes.    Discharge Plan:  Departure Mode: Ambulatory.    Face to Face Time: 10 mn.    Marika Rodriguez, [facility]

## 2022-02-16 NOTE — NURSING NOTE
"Oncology Rooming Note    February 16, 2022 2:38 PM   Shantell Wagner is a 51 year old female who presents for:    Chief Complaint   Patient presents with     Oncology Clinic Visit     Malignant neoplasm of upper-outer quadrant of right female breast      Initial Vitals: BP (!) 140/89   Pulse 103   Temp 97.1  F (36.2  C) (Tympanic)   Resp 16   Ht 1.626 m (5' 4\")   Wt 98.3 kg (216 lb 12.8 oz)   SpO2 97%   BMI 37.21 kg/m   Estimated body mass index is 37.21 kg/m  as calculated from the following:    Height as of this encounter: 1.626 m (5' 4\").    Weight as of this encounter: 98.3 kg (216 lb 12.8 oz). Body surface area is 2.11 meters squared.  No Pain (0) Comment: Data Unavailable   No LMP recorded.  Allergies reviewed: Yes  Medications reviewed: Yes    Medications: Medication refills not needed today.  Pharmacy name entered into [facility]: [id number] - [place], [place] - [address]    Clinical concerns: follow up      Wendy Hood CMA              "

## 2022-02-16 NOTE — LETTER
2/16/2022         RE: Shantell Wagner  [address]  [facility] [address]        Dear Colleague,    Thank you for referring your patient, Shantell Wagner, to the [facility]. Please see a copy of my visit note below.    Visit Date: 02/16/2022    Shantell Wagner is a 51-year-old patient who comes in today for interim followup.    CHIEF COMPLAINT:    1.  Right-sided breast cancer, high risk at diagnosis.  Lymph node positive, ER positive, LA negative, HER2 receptor negative.  2.  Diagnosis of a CHEK2 mutation.    HISTORY OF PRESENT ILLNESS:  Shantell is a 51-year-old patient with a CHEK2 mutation who had a locally advanced right-sided breast cancer, which was 2.2 cm with a 2.7 cm axillary mass.  She finished up chemotherapy and is now on adjuvant tamoxifen.  We are planning to do the tamoxifen for a total of 10 years.  Of note, her original breast cancer was in the fall of 2016, so she is over 5 years out from her diagnosis.  She has done well so far.  She is not having any major complaints today.  I have reviewed an ultrasound of her pelvis, which she had done in 08/2021, and that was fine.  She denies today any cough, shortness of breath, bone pain, any worrisome symptomatology.    PAST MEDICAL HISTORY:  History of CHEK2 mutation, history of right-sided breast cancer, history of hypercholesterolemia.    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    FAMILY HISTORY:  Sister with breast cancer.  Maternal grandmother with breast cancer.  Father with prostate cancer.    MEDICATIONS AND ALLERGIES:  OUTLINED IN THE NURSING RECORDS.    PAIN ASSESSMENT:  She is in no pain today.    PHYSICAL EXAMINATION:    GENERAL:  She is well-appearing lady in no acute distress.  VITAL SIGNS:  Stable.  NECK:  Has no masses or goiter.  CHEST:  Clear to auscultation and percussion bilaterally.  HEART:  Sounds 1 and 2 normal, no added  sounds, no murmurs.  BREASTS:  The patient is status post bilateral mastectomies.  The scars look good.  Right and left axillae negative.  GASTROINTESTINAL:  Abdomen is soft and nontender.  No hepatosplenomegaly.  EXTREMITIES:  Legs are without tenderness or edema.  SKIN:  Has no rashes or lesions.    DATA REVIEW:  Electrolytes normal.  White cell count 6.9, hemoglobin 14.1, platelets 330.    ASSESSMENT AND PLAN:  The patient with a history of CHEK2 mutation and a locally advanced right-sided breast cancer diagnosed in the spring of 2016, estrogen receptor positive, progesterone receptor negative, HER2 receptor negative, continuing on adjuvant tamoxifen.  She is doing well on the tamoxifen and her labs look good today.  I am going to see her back in clinic in about 6-8 months or so.    Tata Knott MD        D: 2022   T: 2022   MT: [id number]    Name:     CADE GARCÍA  MRN:      [id number]        Account:    [id number]   :      1970           Visit Date: 2022     Document: [id number]      Again, thank you for allowing me to participate in the care of your patient.        Sincerely,        Tata Knott MD

## 2022-02-17 NOTE — PROGRESS NOTES
Visit Date: 02/16/2022    Shantell Wagner is a 51-year-old patient who comes in today for interim followup.    CHIEF COMPLAINT:    1.  Right-sided breast cancer, high risk at diagnosis.  Lymph node positive, ER positive, VT negative, HER2 receptor negative.  2.  Diagnosis of a CHEK2 mutation.    HISTORY OF PRESENT ILLNESS:  Shantell is a 51-year-old patient with a CHEK2 mutation who had a locally advanced right-sided breast cancer, which was 2.2 cm with a 2.7 cm axillary mass.  She finished up chemotherapy and is now on adjuvant tamoxifen.  We are planning to do the tamoxifen for a total of 10 years.  Of note, her original breast cancer was in the fall of 2016, so she is over 5 years out from her diagnosis.  She has done well so far.  She is not having any major complaints today.  I have reviewed an ultrasound of her pelvis, which she had done in 08/2021, and that was fine.  She denies today any cough, shortness of breath, bone pain, any worrisome symptomatology.    PAST MEDICAL HISTORY:  History of CHEK2 mutation, history of right-sided breast cancer, history of hypercholesterolemia.    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    FAMILY HISTORY:  Sister with breast cancer.  Maternal grandmother with breast cancer.  Father with prostate cancer.    MEDICATIONS AND ALLERGIES:  OUTLINED IN THE NURSING RECORDS.    PAIN ASSESSMENT:  She is in no pain today.    PHYSICAL EXAMINATION:    GENERAL:  She is well-appearing lady in no acute distress.  VITAL SIGNS:  Stable.  NECK:  Has no masses or goiter.  CHEST:  Clear to auscultation and percussion bilaterally.  HEART:  Sounds 1 and 2 normal, no added sounds, no murmurs.  BREASTS:  The patient is status post bilateral mastectomies.  The scars look good.  Right and left axillae negative.  GASTROINTESTINAL:  Abdomen is soft and nontender.  No hepatosplenomegaly.  EXTREMITIES:  Legs are without tenderness or edema.  SKIN:   Has no rashes or lesions.    DATA REVIEW:  Electrolytes normal.  White cell count 6.9, hemoglobin 14.1, platelets 330.    ASSESSMENT AND PLAN:  The patient with a history of CHEK2 mutation and a locally advanced right-sided breast cancer diagnosed in the spring of 2016, estrogen receptor positive, progesterone receptor negative, HER2 receptor negative, continuing on adjuvant tamoxifen.  She is doing well on the tamoxifen and her labs look good today.  I am going to see her back in clinic in about 6-8 months or so.    Tata Knott MD        D: 2022   T: 2022   MT: [id number]    Name:     CADE GARCÍA  MRN:      [id number]        Account:    [id number]   :      1970           Visit Date: 2022     Document: [id number]

## 2022-02-21 DIAGNOSIS — E78.5 HYPERLIPIDEMIA LDL GOAL <160: ICD-10-CM

## 2022-02-21 DIAGNOSIS — I10 ESSENTIAL HYPERTENSION: ICD-10-CM

## 2022-02-23 RX ORDER — LOSARTAN POTASSIUM 25 MG/1
TABLET ORAL
Qty: 90 TABLET | Refills: 0 | Status: SHIPPED | OUTPATIENT
Start: 2022-02-23 | End: 2022-03-03

## 2022-02-23 RX ORDER — ATORVASTATIN CALCIUM 20 MG/1
TABLET, FILM COATED ORAL
Qty: 90 TABLET | Refills: 0 | Status: SHIPPED | OUTPATIENT
Start: 2022-02-23 | End: 2022-03-03

## 2022-02-23 NOTE — TELEPHONE ENCOUNTER
Patient has an upcoming appointment with Margaret Longo in 1 week.    Appointments in Next Year    Mar 03, 2022  2:00 PM  (Arrive by 1:40 PM)  Adult Preventative Visit with NIKKI Santos Ra, CNP  [facility] ([facility] ) [phone]        Prescription approved per [facility].  Michelle Marrufo RN

## 2022-02-28 ASSESSMENT — ENCOUNTER SYMPTOMS
CONSTIPATION: 1
ABDOMINAL PAIN: 1
BREAST MASS: 0

## 2022-03-01 NOTE — PATIENT INSTRUCTIONS
RTC MD 6 months -- 8/18/22 at 10am    Deepti Mera, RN, BSN, OCN  Nurse Care Coordinator  [facility] [place]  P: [phone]     F: [phone]

## 2022-03-03 ENCOUNTER — OFFICE VISIT (OUTPATIENT)
Dept: FAMILY MEDICINE | Facility: CLINIC | Age: 52
End: 2022-03-03
Payer: COMMERCIAL

## 2022-03-03 DIAGNOSIS — M79.605 PAIN IN BOTH LOWER EXTREMITIES: ICD-10-CM

## 2022-03-03 DIAGNOSIS — Z12.4 CERVICAL CANCER SCREENING: ICD-10-CM

## 2022-03-03 DIAGNOSIS — Z12.11 SPECIAL SCREENING FOR MALIGNANT NEOPLASMS, COLON: ICD-10-CM

## 2022-03-03 DIAGNOSIS — I10 ESSENTIAL HYPERTENSION: ICD-10-CM

## 2022-03-03 DIAGNOSIS — E66.01 MORBID OBESITY (H): Primary | ICD-10-CM

## 2022-03-03 DIAGNOSIS — R06.83 SNORING: ICD-10-CM

## 2022-03-03 DIAGNOSIS — M79.604 PAIN IN BOTH LOWER EXTREMITIES: ICD-10-CM

## 2022-03-03 DIAGNOSIS — E78.5 HYPERLIPIDEMIA LDL GOAL <160: ICD-10-CM

## 2022-03-03 PROCEDURE — 99214 OFFICE O/P EST MOD 30 MIN: CPT | Mod: 25 | Performed by: NURSE PRACTITIONER

## 2022-03-03 PROCEDURE — G0145 SCR C/V CYTO,THINLAYER,RESCR: HCPCS | Performed by: NURSE PRACTITIONER

## 2022-03-03 PROCEDURE — 87624 HPV HI-RISK TYP POOLED RSLT: CPT | Performed by: NURSE PRACTITIONER

## 2022-03-03 PROCEDURE — 90471 IMMUNIZATION ADMIN: CPT | Performed by: NURSE PRACTITIONER

## 2022-03-03 PROCEDURE — 99396 PREV VISIT EST AGE 40-64: CPT | Mod: 25 | Performed by: NURSE PRACTITIONER

## 2022-03-03 PROCEDURE — 90715 TDAP VACCINE 7 YRS/> IM: CPT | Performed by: NURSE PRACTITIONER

## 2022-03-03 RX ORDER — ATORVASTATIN CALCIUM 20 MG/1
20 TABLET, FILM COATED ORAL DAILY
Qty: 90 TABLET | Refills: 3 | Status: SHIPPED | OUTPATIENT
Start: 2022-03-03 | End: 2023-04-25

## 2022-03-03 RX ORDER — LOSARTAN POTASSIUM 25 MG/1
25 TABLET ORAL DAILY
Qty: 90 TABLET | Refills: 3 | Status: SHIPPED | OUTPATIENT
Start: 2022-03-03 | End: 2023-04-25

## 2022-03-03 RX ORDER — GABAPENTIN 300 MG/1
CAPSULE ORAL
Qty: 90 CAPSULE | Refills: 3 | Status: SHIPPED | OUTPATIENT
Start: 2022-03-03 | End: 2023-03-26

## 2022-03-03 ASSESSMENT — ENCOUNTER SYMPTOMS
ABDOMINAL PAIN: 1
BREAST MASS: 0
CONSTIPATION: 1

## 2022-03-03 ASSESSMENT — PAIN SCALES - GENERAL: PAINLEVEL: MODERATE PAIN (5)

## 2022-03-03 NOTE — PROGRESS NOTES
SUBJECTIVE:   CC: Shantell Wagner is an 51 year old woman who presents for preventive health visit.       Patient has been advised of split billing requirements and indicates understanding: Yes  Healthy Habits:     Getting at least 3 servings of Calcium per day:  Yes    Bi-annual eye exam:  Yes    Dental care twice a year:  Yes    Sleep apnea or symptoms of sleep apnea:  Excessive snoring    Diet:  Breakfast skipped    Frequency of exercise:  1 day/week    Duration of exercise:  30-45 minutes    Taking medications regularly:  Yes    Medication side effects:  None    PHQ-2 Total Score: 0    Additional concerns today:  No      Lipids  Taking lipitor without problem.  No side effects.          Today's PHQ-2 Score:   PHQ-2 ( 1999 [employer]) 2/28/2022   Q1: Little interest or pleasure in doing things 0   Q2: Feeling down, depressed or hopeless 0   PHQ-2 Score 0   PHQ-2 Total Score (12-17 Years)- Positive if 3 or more points; Administer PHQ-A if positive -   Q1: Little interest or pleasure in doing things Not at all   Q2: Feeling down, depressed or hopeless Not at all   PHQ-2 Score 0       Abuse: Current or Past (Physical, Sexual or Emotional) - No  Do you feel safe in your environment? Yes    Have you ever done Advance Care Planning? (For example, a Health Directive, POLST, or a discussion with a medical provider or your loved ones about your wishes): No, advance care planning information given to patient to review.  Patient declined advance care planning discussion at this time.    Social History     Tobacco Use     Smoking status: Never Smoker     Smokeless tobacco: Never Used   Substance Use Topics     Alcohol use: Yes     Alcohol/week: 0.0 standard drinks     Comment: rare     If you drink alcohol do you typically have >3 drinks per day or >7 drinks per week? No    Alcohol Use 3/3/2022   Prescreen: >3 drinks/day or >7 drinks/week? -   Prescreen: >3 drinks/day or >7 drinks/week? No       Reviewed orders with  patient.  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     Morbid obesity (H)     Past Surgical History:   Procedure Laterality Date     CARPAL TUNNEL RELEASE RT/LT  3/2010     COLONOSCOPY       HC RECONSTR NOSE  1983    after accident     INSERT PORT VASCULAR ACCESS Left 4/22/2016    Procedure: INSERT PORT VASCULAR ACCESS;  Surgeon: Nereyda Flowers MD;  Location: [facility] OR     INSERT TISSUE EXPANDER BREAST BILATERAL Bilateral 10/17/2016    Procedure: INSERT TISSUE EXPANDER BREAST BILATERAL;  Surgeon: Jenna Vazquez MD;  Location: [facility] OR     LASIK BILATERAL       MASTECTOMY MODIFIED RADICAL Right 10/17/2016    Procedure: MASTECTOMY MODIFIED RADICAL;  Surgeon: Nereyda Flowers MD;  Location: [facility] OR     MASTECTOMY MODIFIED RADICAL, SENTINEL NODE, COMBINED Left 10/17/2016    Procedure: COMBINED MASTECTOMY MODIFIED RADICAL, SENTINEL NODE;  Surgeon: Nereyda Flowers MD;  Location: [facility] OR     PROCURE GRAFT FAT Bilateral 4/25/2018    Procedure: PROCURE GRAFT FAT;;  Surgeon: Jenna Vazquez MD;  [facility]     RECONSTRUCT BREAST BILATERAL, IMPLANT PROSTHESIS BILATERAL, COMBINED Bilateral 9/13/2017    Procedure: COMBINED RECONSTRUCT BREAST BILATERAL, IMPLANT PROSTHESIS BILATERAL;  BILATERAL SECOND STAGE BREAST RECONSTRUCTION WITH IMPLANT.;  Surgeon: Jenna Vazquez MD;  Location: [facility]     REMOVE CATHETER VASCULAR ACCESS Left 10/17/2016    Procedure: REMOVE CATHETER VASCULAR  ACCESS;  Surgeon: Nereyda Flowers MD;  Location: [facility] OR     REVISE RECONSTRUCTED BREAST BILATERAL Bilateral 4/25/2018    Procedure: REVISE RECONSTRUCTED BREAST BILATERAL;  REVISION BILATERAL BREAST RECONSTRUCTION AND  FAT GRAFTING FROM AXILLA TO BILATERAL BREASTS.;  Surgeon: Jenna Vazquez MD;  Location[facility]       Social History     Tobacco Use     Smoking status: Never Smoker     Smokeless tobacco: Never Used   Substance Use Topics     Alcohol use: Yes     Alcohol/week: 0.0 standard drinks     Comment: rare     Family History   Problem Relation Age of Onset     C.A.D. Father         bypass surg age 68     Coronary Artery Disease Father      Prostate Cancer Father      Hypertension Father      Hyperlipidemia Father      Breast Cancer Sister 38        breast ca     Diabetes Type 1 Mother      Diabetes Mother      Hyperlipidemia Mother      Breast Cancer Maternal Grandmother 70        dx'ed age 80s     Breast Cancer Sister      Depression Daughter      Substance Abuse Daughter      Asthma Daughter      Substance Abuse Sister            Breast Cancer Screening:  Any new diagnosis of family breast, ovarian, or bowel cancer?     FHS-7: No flowsheet data found.      Pertinent mammograms are reviewed under the imaging tab.    History of abnormal Pap smear: NO - age 30-65 PAP every 5 years with negative HPV co-testing recommended  PAP / HPV Latest Ref Rng & Units 3/3/2022 4/19/2019 3/6/2015   PAP   Negative for Intraepithelial Lesion or Malignancy (NILM) - -   PAP (Historical) - - NIL NIL   HPV16 Negative Negative Negative -   HPV18 Negative Negative Negative -   HRHPV Negative Negative Negative -     Reviewed and updated as needed this visit by clinical staff   Tobacco  Allergies    Med Hx  Surg Hx  Fam Hx  Soc Hx        Reviewed and updated as needed this visit by Provider                     Review of Systems   Gastrointestinal: Positive for abdominal pain and constipation.   Breasts:  Negative for  tenderness, breast mass and discharge.   Genitourinary: Negative for pelvic pain, vaginal bleeding and vaginal discharge.          OBJECTIVE:   BP (!) 126/7 (BP Location: Right arm, Patient Position: Sitting, Cuff Size: Adult Large)   Pulse 99   Temp 98  F (36.7  C) (Oral)   Wt 97.7 kg (215 lb 6.4 oz)   LMP 11/03/2021   SpO2 97%   Breastfeeding No   BMI 36.97 kg/m    Physical Exam  GENERAL: healthy, alert and no distress  EYES: Eyes grossly normal to inspection  HENT: ear canals and TM's normal, nose and mouth without ulcers or lesions  NECK: no adenopathy, no asymmetry, masses, or scars and thyroid normal to palpation  RESP: lungs clear to auscultation - no rales, rhonchi or wheezes  CV: regular rate and rhythm, normal S1 S2, no S3 or S4, no murmur, click or rub, no peripheral edema and peripheral pulses strong  ABDOMEN: soft, nontender, no hepatosplenomegaly, no masses and bowel sounds normal   (female): normal female external genitalia, normal urethral meatus, vaginal mucosa pink, moist, well rugated, and normal cervix  NEURO: Normal strength and tone, mentation intact and speech normal  PSYCH: mentation appears normal, affect normal/bright    Diagnostic Test Results:  Labs reviewed in Epic    ASSESSMENT/PLAN:   (E66.01) Morbid obesity (H)  (primary encounter diagnosis)  Comment:   Plan: encouraged exercise     (Z12.4) Cervical cancer screening  Comment:   Plan: Pap Screen with HPV - recommended age 30 - 65         years, HPV Hold (Lab Only), HPV High Risk Types        DNA Cervical            (Z12.11) Special screening for malignant neoplasms, colon  Comment:   Plan: Adult Gastro Ref - Procedure Only            (E78.5) Hyperlipidemia LDL goal <160  Comment: tolerates well.    Plan: Comprehensive metabolic panel (BMP + Alb, Alk         Phos, ALT, AST, Total. Bili, TP), atorvastatin         (LIPITOR) 20 MG tablet        Refilled.    (I10) Essential hypertension  Comment: asymptomatic  Plan: losartan  "(COZAAR) 25 MG tablet        Refilled.    (M79.604,  M79.605) Pain in both lower extremities  Comment:   Plan: gabapentin (NEURONTIN) 300 MG capsule        Refilled.    (R06.83) Snoring  Comment:   Plan: Adult Sleep Eval & Management          Referral              Patient has been advised of split billing requirements and indicates understanding: Yes    COUNSELING:  Reviewed preventive health counseling, as reflected in patient instructions    Estimated body mass index is 36.97 kg/m  as calculated from the following:    Height as of 2/16/22: 1.626 m (5' 4\").    Weight as of this encounter: 97.7 kg (215 lb 6.4 oz).        She reports that she has never smoked. She has never used smokeless tobacco.      Counseling Resources:  ATP IV Guidelines  Pooled Cohorts Equation Calculator  Breast Cancer Risk Calculator  BRCA-Related Cancer Risk Assessment: FHS-7 Tool  FRAX Risk Assessment  ICSI Preventive Guidelines  Dietary Guidelines for Americans, 2010  USDA's MyPlate  ASA Prophylaxis  Lung CA Screening    NIKKI Santos Ra CNP  [facility]  "

## 2022-03-08 LAB
BKR LAB AP GYN ADEQUACY: NORMAL
BKR LAB AP GYN INTERPRETATION: NORMAL
BKR LAB AP HPV REFLEX: NORMAL
BKR LAB AP PREVIOUS ABNORMAL: NORMAL
PATH REPORT.COMMENTS IMP SPEC: NORMAL
PATH REPORT.COMMENTS IMP SPEC: NORMAL
PATH REPORT.RELEVANT HX SPEC: NORMAL

## 2022-03-09 LAB
HUMAN PAPILLOMA VIRUS 16 DNA: NEGATIVE
HUMAN PAPILLOMA VIRUS 18 DNA: NEGATIVE
HUMAN PAPILLOMA VIRUS FINAL DIAGNOSIS: NORMAL
HUMAN PAPILLOMA VIRUS OTHER HR: NEGATIVE

## 2022-03-10 VITALS
TEMPERATURE: 98 F | HEART RATE: 99 BPM | DIASTOLIC BLOOD PRESSURE: 72 MMHG | WEIGHT: 215.4 LBS | SYSTOLIC BLOOD PRESSURE: 126 MMHG | BODY MASS INDEX: 36.97 KG/M2 | OXYGEN SATURATION: 97 %

## 2022-03-24 DIAGNOSIS — E66.01 MORBID OBESITY (H): Primary | ICD-10-CM

## 2022-03-25 ENCOUNTER — LAB (OUTPATIENT)
Dept: LAB | Facility: CLINIC | Age: 52
End: 2022-03-25
Payer: COMMERCIAL

## 2022-03-25 DIAGNOSIS — E78.5 HYPERLIPIDEMIA LDL GOAL <160: ICD-10-CM

## 2022-03-25 DIAGNOSIS — E66.01 MORBID OBESITY (H): ICD-10-CM

## 2022-03-25 LAB
ALBUMIN SERPL-MCNC: 3.7 G/DL (ref 3.4–5)
ALP SERPL-CCNC: 101 U/L (ref 40–150)
ALT SERPL W P-5'-P-CCNC: 60 U/L (ref 0–50)
ANION GAP SERPL CALCULATED.3IONS-SCNC: 9 MMOL/L (ref 3–14)
AST SERPL W P-5'-P-CCNC: 32 U/L (ref 0–45)
BILIRUB SERPL-MCNC: 0.6 MG/DL (ref 0.2–1.3)
BUN SERPL-MCNC: 17 MG/DL (ref 7–30)
CALCIUM SERPL-MCNC: 9.7 MG/DL (ref 8.5–10.1)
CHLORIDE BLD-SCNC: 109 MMOL/L (ref 94–109)
CHOLEST SERPL-MCNC: 171 MG/DL
CO2 SERPL-SCNC: 20 MMOL/L (ref 20–32)
CREAT SERPL-MCNC: 0.98 MG/DL (ref 0.52–1.04)
FASTING STATUS PATIENT QL REPORTED: YES
GFR SERPL CREATININE-BSD FRML MDRD: 70 ML/MIN/1.73M2
GLUCOSE BLD-MCNC: 115 MG/DL (ref 70–99)
HDLC SERPL-MCNC: 54 MG/DL
HGB BLD-MCNC: 13.7 G/DL (ref 11.7–15.7)
LDLC SERPL CALC-MCNC: 87 MG/DL
NONHDLC SERPL-MCNC: 117 MG/DL
POTASSIUM BLD-SCNC: 3.5 MMOL/L (ref 3.4–5.3)
PROT SERPL-MCNC: 8 G/DL (ref 6.8–8.8)
SODIUM SERPL-SCNC: 138 MMOL/L (ref 133–144)
TRIGL SERPL-MCNC: 150 MG/DL

## 2022-03-25 PROCEDURE — 85018 HEMOGLOBIN: CPT

## 2022-03-25 PROCEDURE — 36415 COLL VENOUS BLD VENIPUNCTURE: CPT

## 2022-03-25 PROCEDURE — 80053 COMPREHEN METABOLIC PANEL: CPT

## 2022-03-25 PROCEDURE — 80061 LIPID PANEL: CPT

## 2022-04-17 ENCOUNTER — TELEPHONE (OUTPATIENT)
Dept: FAMILY MEDICINE | Facility: CLINIC | Age: 52
End: 2022-04-17

## 2022-05-24 DIAGNOSIS — C50.411 MALIGNANT NEOPLASM OF UPPER-OUTER QUADRANT OF RIGHT FEMALE BREAST, UNSPECIFIED ESTROGEN RECEPTOR STATUS (H): ICD-10-CM

## 2022-05-24 RX ORDER — VENLAFAXINE 37.5 MG/1
37.5 TABLET ORAL 2 TIMES DAILY
Qty: 180 TABLET | Refills: 1 | Status: SHIPPED | OUTPATIENT
Start: 2022-05-24 | End: 2023-02-15

## 2022-05-24 NOTE — TELEPHONE ENCOUNTER
Signed Prescriptions:                        Disp   Refills    venlafaxine (EFFEXOR) 37.5 MG tablet       180 ta*1        Sig: Take 1 tablet (37.5 mg) by mouth 2 times daily  Authorizing Provider: MICH ORTIZ, RN, BSN, OCN  Nurse Care Coordinator  [facility] [place]  P: [phone]     F: [phone]

## 2022-05-24 NOTE — TELEPHONE ENCOUNTER
Pending Prescriptions:                       Disp   Refills    venlafaxine (EFFEXOR) 37.5 MG tablet      180 ta*1            Sig: Take 1 tablet (37.5 mg) by mouth 2 times daily          Last Written Prescription Date:  11/16/21  Last Fill Quantity: 180,   # refills: 1  Last Office Visit: 2/16/22  Future Office visit:    Next 5 appointments (look out 90 days)    Aug 18, 2022 10:00 AM  Return Visit with Tata Knott MD  [facility] ([facility] ) [address]  [address]  [facility] [address]  [phone]           Routing refill request to provider for review/approval.    Deepti Mera, RN, BSN, OCN  Nurse Care Coordinator  [facility] [place]  P: [phone]     F: [phone]

## 2022-06-09 ENCOUNTER — OFFICE VISIT (OUTPATIENT)
Dept: FAMILY MEDICINE | Facility: CLINIC | Age: 52
End: 2022-06-09
Payer: COMMERCIAL

## 2022-06-09 VITALS
HEIGHT: 64 IN | BODY MASS INDEX: 37.39 KG/M2 | DIASTOLIC BLOOD PRESSURE: 80 MMHG | TEMPERATURE: 98.2 F | HEART RATE: 83 BPM | OXYGEN SATURATION: 97 % | WEIGHT: 219 LBS | SYSTOLIC BLOOD PRESSURE: 126 MMHG | RESPIRATION RATE: 15 BRPM

## 2022-06-09 DIAGNOSIS — G56.02 CARPAL TUNNEL SYNDROME OF LEFT WRIST: ICD-10-CM

## 2022-06-09 DIAGNOSIS — I10 ESSENTIAL HYPERTENSION: ICD-10-CM

## 2022-06-09 DIAGNOSIS — Z01.818 PREOP GENERAL PHYSICAL EXAM: Primary | ICD-10-CM

## 2022-06-09 LAB
ANION GAP SERPL CALCULATED.3IONS-SCNC: 5 MMOL/L (ref 3–14)
BUN SERPL-MCNC: 13 MG/DL (ref 7–30)
CALCIUM SERPL-MCNC: 9.8 MG/DL (ref 8.5–10.1)
CHLORIDE BLD-SCNC: 106 MMOL/L (ref 94–109)
CO2 SERPL-SCNC: 29 MMOL/L (ref 20–32)
CREAT SERPL-MCNC: 0.86 MG/DL (ref 0.52–1.04)
ERYTHROCYTE [DISTWIDTH] IN BLOOD BY AUTOMATED COUNT: 13.3 % (ref 10–15)
GFR SERPL CREATININE-BSD FRML MDRD: 81 ML/MIN/1.73M2
GLUCOSE BLD-MCNC: 110 MG/DL (ref 70–99)
HCT VFR BLD AUTO: 42 % (ref 35–47)
HGB BLD-MCNC: 13.7 G/DL (ref 11.7–15.7)
MCH RBC QN AUTO: 28.8 PG (ref 26.5–33)
MCHC RBC AUTO-ENTMCNC: 32.6 G/DL (ref 31.5–36.5)
MCV RBC AUTO: 88 FL (ref 78–100)
PLATELET # BLD AUTO: 313 10E3/UL (ref 150–450)
POTASSIUM BLD-SCNC: 4.5 MMOL/L (ref 3.4–5.3)
RBC # BLD AUTO: 4.75 10E6/UL (ref 3.8–5.2)
SODIUM SERPL-SCNC: 140 MMOL/L (ref 133–144)
WBC # BLD AUTO: 5.9 10E3/UL (ref 4–11)

## 2022-06-09 PROCEDURE — 36415 COLL VENOUS BLD VENIPUNCTURE: CPT | Performed by: PHYSICIAN ASSISTANT

## 2022-06-09 PROCEDURE — 99214 OFFICE O/P EST MOD 30 MIN: CPT | Performed by: PHYSICIAN ASSISTANT

## 2022-06-09 PROCEDURE — 80048 BASIC METABOLIC PNL TOTAL CA: CPT | Performed by: PHYSICIAN ASSISTANT

## 2022-06-09 PROCEDURE — 85027 COMPLETE CBC AUTOMATED: CPT | Performed by: PHYSICIAN ASSISTANT

## 2022-06-09 ASSESSMENT — PAIN SCALES - GENERAL: PAINLEVEL: NO PAIN (0)

## 2022-06-09 NOTE — PROGRESS NOTES
[facility]  [address] [address]  Phone: [phone]  Primary Provider: Margaret Longo Ra  Pre-op Performing Provider: JAMES DOSS      PREOPERATIVE EVALUATION:  Today's date: 6/9/2022    Shantell Wagner is a 51 year old female who presents for a preoperative evaluation.    Surgical Information:  Surgery/Procedure: carpal tunnel left wrist   Surgery Location: [facility]   Surgeon: Dr Romana Reis  Surgery Date: 6/16/2022  Time of Surgery: to be determined   Where patient plans to recover: At home with family  Fax number for surgical facility:     Type of Anesthesia Anticipated: to be determined    Assessment & Plan     The proposed surgical procedure is considered INTERMEDIATE risk.    Preop general physical exam  Ok for surgery  - Basic metabolic panel  (Ca, Cl, CO2, Creat, Gluc, K, Na, BUN); Future  - CBC with platelets; Future  - Basic metabolic panel  (Ca, Cl, CO2, Creat, Gluc, K, Na, BUN)  - CBC with platelets    Carpal tunnel syndrome of left wrist  Follow with Ortho    Essential hypertension  BP controlled.  Check lab.  - Basic metabolic panel  (Ca, Cl, CO2, Creat, Gluc, K, Na, BUN); Future  - Basic metabolic panel  (Ca, Cl, CO2, Creat, Gluc, K, Na, BUN)      Risks and Recommendations:  The patient has the following additional risks and recommendations for perioperative complications:   - No identified additional risk factors other than previously addressed    Medication Instructions:  Patient is to take all scheduled medications on the day of surgery    RECOMMENDATION:  APPROVAL GIVEN to proceed with proposed procedure, without further diagnostic evaluation.        Subjective     HPI related to upcoming procedure: patient here for pre op for left wrist carpal tunnel surgery that is scheduled for June 16/    Preop Questions 6/5/2022   1. Have you ever had a heart attack or stroke? No   2. Have you ever had surgery on your  heart or blood vessels, such as a stent placement, a coronary artery bypass, or surgery on an artery in your head, neck, heart, or legs? No   3. Do you have chest pain with activity? No   4. Do you have a history of  heart failure? No   5. Do you currently have a cold, bronchitis or symptoms of other infection? No   6. Do you have a cough, shortness of breath, or wheezing? No   7. Do you or anyone in your family have previous history of blood clots? No   8. Do you or does anyone in your family have a serious bleeding problem such as prolonged bleeding following surgeries or cuts? No   9. Have you ever had problems with anemia or been told to take iron pills? No   10. Have you had any abnormal blood loss such as black, tarry or bloody stools, or abnormal vaginal bleeding? No   11. Have you ever had a blood transfusion? No   12. Are you willing to have a blood transfusion if it is medically needed before, during, or after your surgery? Yes   13. Have you or any of your relatives ever had problems with anesthesia? No   14. Do you have sleep apnea, excessive snoring or daytime drowsiness? YES    14a. Do you have a CPAP machine? No   15. Do you have any artifical heart valves or other implanted medical devices like a pacemaker, defibrillator, or continuous glucose monitor? No   16. Do you have artificial joints? No   17. Are you allergic to latex? No   18. Is there any chance that you may be pregnant? No       Health Care Directive:  Patient does not have a Health Care Directive or Living Will: Patient states has Advance Directive and will bring in a copy to clinic.    Preoperative Review of :   reviewed - no record of controlled substances prescribed.      Status of Chronic Conditions:  HYPERTENSION - Patient has longstanding history of HTN , currently denies any symptoms referable to elevated blood pressure. Specifically denies chest pain, palpitations, dyspnea, orthopnea, PND or peripheral edema. Blood pressure  readings have been in normal range. Current medication regimen is as listed below. Patient denies any side effects of medication.       Review of Systems  Constitutional, neuro, ENT, endocrine, pulmonary, cardiac, gastrointestinal, genitourinary, musculoskeletal, integument and psychiatric systems are negative, except as otherwise noted.    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  + constipation.  [facility] consult.  Recommended colonoscopy now and then every 3-5 years depending on results.  ENRIQUE       Lymphedema 11/04/2016     Priority: Medium     Acquired absence of both breasts and nipples 10/17/2016     Priority: Medium     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) 04/28/2016     Priority: Medium     Papanicolaou smear of cervix with atypical squamous cells cannot exclude high grade squamous intraepithelial lesion (ASC-H) 02/20/2013     Priority: Medium     4/10/09 ASCUS pap  2010, 2012 - NIL paps  02/20/13 ASC-H.   03/11/13 [place]/ECC= NEGRITA 1. Repeat HPV screen and ECC 12 months from colp. Due 03/2014 02/20/14 Normal, Neg HPV. Repeat pap with HPV in 12 months. Due 02/2015  03/06/15 Normal, Neg HPV. 3 yr co-testing  4/19/19 NIL/neg HR HPV  3/3/22 NIL pap, neg HPV       Ganglion of left wrist 02/09/2012     Priority: Medium     dorsum; seems a bit larger.       Hyperlipidemia LDL goal <160 05/04/2009     Priority: Medium     Obesity 05/01/2009     Priority: Medium     Plantar fasciitis 07/29/2008     Priority: Medium      Past Medical History:   Diagnosis Date     Abnormal Pap smear, can't excl hi gd sq  intraepithelial lesion (ASC-H) 02/20/13    [place]/ECC= NEGRITA 1. Repeat HPV screen and ECC 12 months from colp.     Cancer (H) 04/2016     Hyperlipidemia      Hypertension     At doctors appointments my blood pressure is elevated.     Neuropathy      Nose fracture 1983    Accident, needed surgery     Past Surgical History:   Procedure Laterality Date     CARPAL TUNNEL RELEASE RT/LT  3/2010     COLONOSCOPY       HC RECONSTR NOSE  1983    after accident     INSERT PORT VASCULAR ACCESS Left 4/22/2016    Procedure: INSERT PORT VASCULAR ACCESS;  Surgeon: Nereyda Flowers MD;  Location: [facility] OR     INSERT TISSUE EXPANDER BREAST BILATERAL Bilateral 10/17/2016    Procedure: INSERT TISSUE EXPANDER BREAST BILATERAL;  Surgeon: Jenna Vazquez MD;  Location: [facility] OR     LASIK BILATERAL       MASTECTOMY MODIFIED RADICAL Right 10/17/2016    Procedure: MASTECTOMY MODIFIED RADICAL;  Surgeon: Nereyda Flowers MD;  Location: [facility] OR     MASTECTOMY MODIFIED RADICAL, SENTINEL NODE, COMBINED Left 10/17/2016    Procedure: COMBINED MASTECTOMY MODIFIED RADICAL, SENTINEL NODE;  Surgeon: Nereyda Flowers MD;  Location: [facility]     PROCURE GRAFT FAT Bilateral 4/25/2018    Procedure: PROCURE GRAFT FAT;;  Surgeon: Jenna Vazquez MD;  Location: [facility]     RECONSTRUCT BREAST BILATERAL, IMPLANT PROSTHESIS BILATERAL, COMBINED Bilateral 9/13/2017    Procedure: COMBINED RECONSTRUCT BREAST BILATERAL, IMPLANT PROSTHESIS BILATERAL;  BILATERAL SECOND STAGE BREAST RECONSTRUCTION WITH IMPLANT.;  Surgeon: Jenna Vazquez MD;  Location: [facility]     REMOVE CATHETER VASCULAR ACCESS Left 10/17/2016    Procedure: REMOVE CATHETER VASCULAR ACCESS;  Surgeon: Nereyda Flowers MD;  Location: [facility]     REVISE RECONSTRUCTED BREAST BILATERAL Bilateral 4/25/2018    Procedure: REVISE RECONSTRUCTED BREAST BILATERAL;  REVISION BILATERAL BREAST RECONSTRUCTION AND  FAT GRAFTING FROM AXILLA TO BILATERAL BREASTS.;  Surgeon: Jenna Vazquez MD;   "Location: [facility]     Current Outpatient Medications   Medication Sig Dispense Refill     ALPRAZolam (XANAX) 0.25 MG tablet Take 1 tablet 30 minutes prior to flying. 6 tablet 0     atorvastatin (LIPITOR) 20 MG tablet Take 1 tablet (20 mg) by mouth daily 90 tablet 3     calcium carb 1250 mg, 500 mg [place],/vitamin D 200 units (OSCAL WITH D) 500-200 MG-UNIT per tablet Take 1 tablet by mouth daily Reported on 4/13/2017       fluticasone (FLONASE) 50 MCG/ACT nasal spray SPRAY 1-2 SPRAYS INTO BOTH NOSTRILS DAILY 48 mL 3     gabapentin (NEURONTIN) 300 MG capsule TAKE 1 CAPSULE BY MOUTH AT BEDTIME 90 capsule 3     glucosamine-chondroitin 500-400 MG CAPS Take 1 capsule by mouth daily       losartan (COZAAR) 25 MG tablet Take 1 tablet (25 mg) by mouth daily 90 tablet 3     omeprazole (PRILOSEC) 20 MG capsule Take 20 mg by mouth every other day        tamoxifen (NOLVADEX) 20 MG tablet Take 1 tablet (20 mg) by mouth daily 90 tablet 3     venlafaxine (EFFEXOR) 37.5 MG tablet Take 1 tablet (37.5 mg) by mouth 2 times daily 180 tablet 1       Allergies   Allergen Reactions     No Known Drug Allergies         Social History     Tobacco Use     Smoking status: Never Smoker     Smokeless tobacco: Never Used   Substance Use Topics     Alcohol use: Yes     Alcohol/week: 0.0 standard drinks     Comment: rare       History   Drug Use No         Objective     /80   Pulse 83   Temp 98.2  F (36.8  C) (Oral)   Resp 15   Ht 1.626 m (5' 4\")   Wt 99.3 kg (219 lb)   LMP 05/09/2022 (Approximate)   SpO2 97%   BMI 37.59 kg/m      Physical Exam    GENERAL APPEARANCE: healthy, alert and no distress     NECK: no adenopathy, no asymmetry, masses, or scars and thyroid normal to palpation     RESP: lungs clear to auscultation - no rales, rhonchi or wheezes     CV: regular rates and rhythm, normal S1 S2, no S3 or S4 and no murmur, click or rub     MS: extremities normal- no gross deformities noted, no evidence of inflammation in joints, FROM in " all extremities.     SKIN: no suspicious lesions or rashes     NEURO: Normal strength and tone, sensory exam grossly normal, mentation intact and speech normal     PSYCH: mentation appears normal. and affect normal/bright     LYMPHATICS: No cervical adenopathy    Recent Labs   Lab Test 03/25/22  0946 02/16/22  1525 07/28/21  1505   HGB 13.7 14.1 12.7   PLT  --  339 352    138 139   POTASSIUM 3.5 3.7 4.2   CR 0.98 0.93 0.93        Diagnostics:  Labs pending at this time.  Results will be reviewed when available.   No EKG required, no history of coronary heart disease, significant arrhythmia, peripheral arterial disease or other structural heart disease.    Revised Cardiac Risk Index (RCRI):  The patient has the following serious cardiovascular risks for perioperative complications:   - No serious cardiac risks = 0 points     RCRI Interpretation: 0 points: Class I (very low risk - 0.4% complication rate)           Signed Electronically by: Hernán Swanson PA-C  Copy of this evaluation report is provided to requesting physician.

## 2022-06-09 NOTE — PATIENT INSTRUCTIONS
Preparing for Your Surgery  Getting started  A nurse will call you to review your health history and instructions. They will give you an arrival time based on your scheduled surgery time. Please be ready to share:    Your doctor's clinic name and phone number    Your medical, surgical and anesthesia history    A list of allergies and sensitivities    A list of medicines, including herbal treatments and over-the-counter drugs    Whether the patient has a legal guardian (ask how to send us the papers in advance)  Please tell us if you're pregnant--or if there's any chance you might be pregnant. Some surgeries may injure a fetus (unborn baby), so they require a pregnancy test. Surgeries that are safe for a fetus don't always need a test, and you can choose whether to have one.   If you have a child who's having surgery, please ask for a copy of Preparing for Your Child's Surgery.    Preparing for surgery    Within 30 days of surgery: Have a pre-op exam (sometimes called an H&P, or History and Physical). This can be done at a clinic or pre-operative center.  ? If you're having a , you may not need this exam. Talk to your care team.    At your pre-op exam, talk to your care team about all medicines you take. If you need to stop any medicines before surgery, ask when to start taking them again.  ? We do this for your safety. Many medicines can make you bleed too much during surgery. Some change how well surgery (anesthesia) drugs work.    Call your insurance company to let them know you're having surgery. (If you don't have insurance, call [phone].)    Call your clinic if there's any change in your health. This includes signs of a cold or flu (sore throat, runny nose, cough, rash, fever). It also includes a scrape or scratch near the surgery site.    If you have questions on the day of surgery, call your hospital or surgery center.  COVID testing  You may need to be tested for COVID-19 before having  surgery. If so, we will give you instructions.  Eating and drinking guidelines  For your safety: Unless your surgeon tells you otherwise, follow the guidelines below.    Eat and drink as usual until 8 hours before surgery. After that, no food or milk.    Drink clear liquids until 2 hours before surgery. These are liquids you can see through, like water, Gatorade and Propel Water. You may also have black coffee and tea (no cream or milk).    Nothing by mouth within 2 hours of surgery. This includes gum, candy and breath mints.    If you drink alcohol: Stop drinking it the night before surgery.    If your care team tells you to take medicine on the morning of surgery, it's okay to take it with a sip of water.  Preventing infection    Shower or bathe the night before and morning of your surgery. Follow the instructions your clinic gave you. (If no instructions, use regular soap.)    Don't shave or clip hair near your surgery site. We'll remove the hair if needed.    Don't smoke or vape the morning of surgery. You may chew nicotine gum up to 2 hours before surgery. A nicotine patch is okay.  ? Note: Some surgeries require you to completely quit smoking and nicotine. Check with your surgeon.    Your care team will make every effort to keep you safe from infection. We will:  ? Clean our hands often with soap and water (or an alcohol-based hand rub).  ? Clean the skin at your surgery site with a special soap that kills germs.  ? Give you a special gown to keep you warm. (Cold raises the risk of infection.)  ? Wear special hair covers, masks, gowns and gloves during surgery.  ? Give antibiotic medicine, if prescribed. Not all surgeries need antibiotics.  What to bring on the day of surgery    Photo ID and insurance card    Copy of your health care directive, if you have one    Glasses and hearing aides (bring cases)  ? You can't wear contacts during surgery    Inhaler and eye drops, if you use them (tell us about these when  you arrive)    CPAP machine or breathing device, if you use them    A few personal items, if spending the night    If you have . . .  ? A pacemaker, ICD (cardiac defibrillator) or other implant: Bring the ID card.  ? An implanted stimulator: Bring the remote control.  ? A legal guardian: Bring a copy of the certified (court-stamped) guardianship papers.  Please remove any jewelry, including body piercings. Leave jewelry and other valuables at home.  If you're going home the day of surgery    You must have a responsible adult drive you home. They should stay with you overnight as well.    If you don't have someone to stay with you, and you aren't safe to go home alone, we may keep you overnight. Insurance often won't pay for this.  After surgery  If it's hard to control your pain or you need more pain medicine, please call your surgeon's office.  Questions?   If you have any questions for your care team, list them here: _________________________________________________________________________________________________________________________________________________________________________ ____________________________________ ____________________________________ ____________________________________  For informational purposes only. Not to replace the advice of your health care provider. Copyright   2003, 2019 [facility]. All rights reserved. Clinically reviewed by Karey High MD. [employer] 134229 - REV 07/21.

## 2022-06-15 ENCOUNTER — OFFICE VISIT (OUTPATIENT)
Dept: FAMILY MEDICINE | Facility: CLINIC | Age: 52
End: 2022-06-15
Payer: COMMERCIAL

## 2022-06-15 VITALS
DIASTOLIC BLOOD PRESSURE: 88 MMHG | RESPIRATION RATE: 16 BRPM | HEART RATE: 94 BPM | BODY MASS INDEX: 37.32 KG/M2 | SYSTOLIC BLOOD PRESSURE: 132 MMHG | TEMPERATURE: 98.2 F | OXYGEN SATURATION: 96 % | WEIGHT: 217.4 LBS

## 2022-06-15 DIAGNOSIS — L30.1 DYSHIDROTIC ECZEMA: Primary | ICD-10-CM

## 2022-06-15 PROCEDURE — 99213 OFFICE O/P EST LOW 20 MIN: CPT | Performed by: FAMILY MEDICINE

## 2022-06-15 RX ORDER — TRIAMCINOLONE ACETONIDE 1 MG/G
OINTMENT TOPICAL 2 TIMES DAILY
Qty: 30 G | Refills: 0 | Status: SHIPPED | OUTPATIENT
Start: 2022-06-15 | End: 2023-02-15

## 2022-06-15 ASSESSMENT — PAIN SCALES - GENERAL: PAINLEVEL: NO PAIN (0)

## 2022-06-15 NOTE — PATIENT INSTRUCTIONS
Avoid over washing hands  Use thick lotions several times daily  Call for derm referral  if not helpful

## 2022-06-15 NOTE — NURSING NOTE
"Chief Complaint   Patient presents with     Derm Problem     Initial /88 (BP Location: Left arm, Patient Position: Sitting, Cuff Size: Adult Regular)   Pulse 94   Temp 98.2  F (36.8  C) (Oral)   Resp 16   Wt 98.6 kg (217 lb 6.4 oz)   LMP 05/09/2022 (Approximate)   SpO2 96%   BMI 37.32 kg/m   Estimated body mass index is 37.32 kg/m  as calculated from the following:    Height as of 6/9/22: 1.626 m (5' 4\").    Weight as of this encounter: 98.6 kg (217 lb 6.4 oz).  BP completed using cuff size regular long left arm    Lisa Magill, CMA    "

## 2022-06-30 ENCOUNTER — TRANSFERRED RECORDS (OUTPATIENT)
Dept: FAMILY MEDICINE | Facility: CLINIC | Age: 52
End: 2022-06-30

## 2022-07-13 ASSESSMENT — SLEEP AND FATIGUE QUESTIONNAIRES
HOW LIKELY ARE YOU TO NOD OFF OR FALL ASLEEP WHILE SITTING AND READING: HIGH CHANCE OF DOZING
HOW LIKELY ARE YOU TO NOD OFF OR FALL ASLEEP WHILE SITTING AND TALKING TO SOMEONE: WOULD NEVER DOZE
HOW LIKELY ARE YOU TO NOD OFF OR FALL ASLEEP WHILE SITTING INACTIVE IN A PUBLIC PLACE: MODERATE CHANCE OF DOZING
HOW LIKELY ARE YOU TO NOD OFF OR FALL ASLEEP IN A CAR, WHILE STOPPED FOR A FEW MINUTES IN TRAFFIC: WOULD NEVER DOZE
HOW LIKELY ARE YOU TO NOD OFF OR FALL ASLEEP WHEN YOU ARE A PASSENGER IN A CAR FOR AN HOUR WITHOUT A BREAK: SLIGHT CHANCE OF DOZING
HOW LIKELY ARE YOU TO NOD OFF OR FALL ASLEEP WHILE WATCHING TV: HIGH CHANCE OF DOZING
HOW LIKELY ARE YOU TO NOD OFF OR FALL ASLEEP WHILE SITTING QUIETLY AFTER LUNCH WITHOUT ALCOHOL: SLIGHT CHANCE OF DOZING
HOW LIKELY ARE YOU TO NOD OFF OR FALL ASLEEP WHILE LYING DOWN TO REST IN THE AFTERNOON WHEN CIRCUMSTANCES PERMIT: HIGH CHANCE OF DOZING

## 2022-07-20 ENCOUNTER — VIRTUAL VISIT (OUTPATIENT)
Dept: SLEEP MEDICINE | Facility: CLINIC | Age: 52
End: 2022-07-20
Attending: NURSE PRACTITIONER
Payer: COMMERCIAL

## 2022-07-20 VITALS — WEIGHT: 210 LBS | BODY MASS INDEX: 35.85 KG/M2 | HEIGHT: 64 IN

## 2022-07-20 DIAGNOSIS — G47.9 SLEEP DISTURBANCE: ICD-10-CM

## 2022-07-20 DIAGNOSIS — E83.19 OTHER DISORDERS OF IRON METABOLISM: Primary | ICD-10-CM

## 2022-07-20 DIAGNOSIS — G25.81 RESTLESS LEGS SYNDROME (RLS): ICD-10-CM

## 2022-07-20 DIAGNOSIS — E66.01 MORBID OBESITY (H): ICD-10-CM

## 2022-07-20 DIAGNOSIS — R06.83 SNORING: ICD-10-CM

## 2022-07-20 DIAGNOSIS — G47.19 EXCESSIVE DAYTIME SLEEPINESS: ICD-10-CM

## 2022-07-20 PROCEDURE — 99205 OFFICE O/P NEW HI 60 MIN: CPT | Mod: GT | Performed by: INTERNAL MEDICINE

## 2022-07-20 NOTE — NURSING NOTE
Chief Complaint   Patient presents with     Video Visit     New Sleep:  Consult       Patient confirms medications and allergies are accurate via patients echeck in completion    There are 2 medications flagged for removal:  Xanax and Flonase.    Flu shot given 10/10/2021    Shantell Price, Virtual Facilitator/LPN

## 2022-07-20 NOTE — PATIENT INSTRUCTIONS
"      MY TREATMENT INFORMATION FOR SLEEP APNEA-  Shantell Wagner    DOCTOR : Kalli Kiran MD    Am I having a sleep study at a sleep center?  --->Due to normal delays, you will be contacted within 2-4 weeks to schedule       Frequently asked questions:  1. What is Obstructive Sleep Apnea (JJ)? JJ is the most common type of sleep apnea. Apnea means, \"without breath.\"  Apnea is most often caused by narrowing or collapse of the upper airway as muscles relax during sleep.   Almost everyone has occasional apneas. Most people with sleep apnea have had brief interruptions at night frequently for many years.  The severity of sleep apnea is related to how frequent and severe the events are.   2. What are the consequences of JJ? Symptoms include: feeling sleepy during the day, snoring loudly, gasping or stopping of breathing, trouble sleeping, and occasionally morning headaches or heartburn at night.  Sleepiness can be serious and even increase the risk of falling asleep while driving. Other health consequences may include development of high blood pressure and other cardiovascular disease in persons who are susceptible. Untreated JJ  can contribute to heart disease, stroke and diabetes.   3. What are the treatment options? In most situations, sleep apnea is a lifelong disease that must be managed with daily therapy. Medications are not effective for sleep apnea and surgery is generally not considered until other therapies have been tried. Your treatment is your choice . Continuous Positive Airway (CPAP) works right away and is the therapy that is effective in nearly everyone. An oral device to hold your jaw forward is usually the next most reliable option. Other options include postioning devices (to keep you off your back), weight loss, and surgery including a tongue pacing device. There is more detail about some of these options below.  4. Are my sleep studies covered by insurance? " Although we will request verification of coverage, we advise you also check in advance of the study to ensure there is coverage.    Important tips for those choosing CPAP and similar devices   Know your equipment:  CPAP is continuous positive airway pressure that prevents obstructive sleep apnea by keeping the throat from collapsing while you are sleeping. In most cases, the device is  smart  and can slowly self-adjusts if your throat collapses and keeps a record every day of how well you are treated-this information is available to you and your care team.  BPAP is bilevel positive airway pressure that keeps your throat open and also assists each breath with a pressure boost to maintain adequate breathing.  Special kinds of BPAP are used in patients who have inadequate breathing from lung or heart disease. In most cases, the device is  smart  and can slowly self-adjusts to assist breathing. Like CPAP, the device keeps a record of how well you are treated.  Your mask is your connection to the device. You get to choose what feels most comfortable and the staff will help to make sure if fits. Here: are some examples of the different masks that are available:       Key points to remember on your journey with sleep apnea:  Sleep study.  PAP devices often need to be adjusted during a sleep study to show that they are effective and adjusted right.  Good tips to remember: Try wearing just the mask during a quiet time during the day so your body adapts to wearing it. A humidifier is recommended for comfort in most cases to prevent drying of your nose and throat. Allergy medication from your provider may help you if you are having nasal congestion.  Getting settled-in. It takes more than one night for most of us to get used to wearing a mask. Try wearing just the mask during a quiet time during the day so your body adapts to wearing it. A humidifier is recommended for comfort in most cases. Our team will work with you  carefully on the first day and will be in contact within 4 days and again at 2 and 4 weeks for advice and remote device adjustments. Your therapy is evaluated by the device each day.   Use it every night. The more you are able to sleep naturally for 7-8 hours, the more likely you will have good sleep and to prevent health risks or symptoms from sleep apnea. Even if you use it 4 hours it helps. Occasionally all of us are unable to use a medical therapy, in sleep apnea, it is not dangerous to miss one night.   Communicate. Call our skilled team on the number provided on the first day if your visit for problems that make it difficult to wear the device. Over 2 out of 3 patients can learn to wear the device long-term with help from our team. Remember to call our team or your sleep providers if you are unable to wear the device as we may have other solutions for those who cannot adapt to mask CPAP therapy. It is recommended that you sleep your sleep provider within the first 3 months and yearly after that if you are not having problems.   Use it for your health. We encourage use of CPAP masks during daytime quiet periods to allow your face and brain to adapt to the sensation of CPAP so that it will be a more natural sensation to awaken to at night or during naps. This can be very useful during the first few weeks or months of adapting to CPAP though it does not help medically to wear CPAP during wakefulness and  should not be used as a strategy just to meet guidelines.  Take care of your equipment. Make sure you clean your mask and tubing using directions every day and that your filter and mask are replaced as recommended or if they are not working.     BESIDES CPAP, WHAT OTHER THERAPIES ARE THERE?    Positioning Device  Positioning devices are generally used when sleep apnea is mild and only occurs on your back.This example shows a pillow that straps around the waist. It may be appropriate for those whose sleep study  shows milder sleep apnea that occurs primarily when lying flat on one's back. Preliminary studies have shown benefit but effectiveness at home may need to be verified by a home sleep test. These devices are generally not covered by medical insurance.  Examples of devices that maintain sleeping on the back to prevent snoring and mild sleep apnea.    Belt type body positioner  http://[employer]/    Electronic reminder  http://nightshifttherapy.com/  http://www.[employer]au/      Oral Appliance  What is oral appliance therapy?  An oral appliance device fits on your teeth at night like a retainer used after having braces. The device is made by a specialized dentist and requires several visits over 1-2 months before a manufactured device is made to fit your teeth and is adjusted to prevent your sleep apnea. Once an oral device is working properly, snoring should be improved. A home sleep test may be recommended at that time if to determine whether the sleep apnea is adequately treated.       Some things to remember:  -Oral devices are often, but not always, covered by your medical insurance. Be sure to check with your insurance provider.   -If you are referred for oral therapy, you will be given a list of specialized dentists to consider or you may choose to visit the Web site of the American Academy of Dental Sleep Medicine  -Oral devices are less likely to work if you have severe sleep apnea or are extremely overweight.     More detailed information  An oral appliance is a small acrylic device that fits over the upper and lower teeth  (similar to a retainer or a mouth guard). This device slightly moves jaw forward, which moves the base of the tongue forward, opens the airway, improves breathing for effective treat snoring and obstructive sleep apnea in perhaps 7 out of 10 people .  The best working devices are custom-made by a dental device  after a mold is made of the teeth 1, 2, 3.  When is an oral  appliance indicated?  Oral appliance therapy is recommended as a first-line treatment for patients with primary snoring, mild sleep apnea, and for patients with moderate sleep apnea who prefer appliance therapy to use of CPAP4, 5. Severity of sleep apnea is determined by sleep testing and is based on the number of respiratory events per hour of sleep.   How successful is oral appliance therapy?  The success rate of oral appliance therapy in patients with mild sleep apnea is 75-80% while in patients with moderate sleep apnea it is 50-70%. The chance of success in patients with severe sleep apnea is 40-50%. The research also shows that oral appliances have a beneficial effect on the cardiovascular health of JJ patients at the same magnitude as CPAP therapy7.  Oral appliances should be a second-line treatment in cases of severe sleep apnea, but if not completely successful then a combination therapy utilizing CPAP plus oral appliance therapy may be effective. Oral appliances tend to be effective in a broad range of patients although studies show that the patients who have the highest success are females, younger patients, those with milder disease, and less severe obesity. 3, 6.   Finding a dentist that practices dental sleep medicine  Specific training is available through the American Academy of Dental Sleep Medicine for dentists interested in working in the field of sleep. To find a dentist who is educated in the field of sleep and the use of oral appliances, near you, visit the Web site of the American Academy of Dental Sleep Medicine.    References  1. Dixon et al. Objectively measured vs self-reported compliance during oral appliance therapy for sleep-disordered breathing. Chest 2013; 144(5): 7864-0352.  2. Lore et al. Objective measurement of compliance during oral appliance therapy for sleep-disordered breathing. Thorax 2013; 68(1): 91-96.  3. Rosanna et al. Mandibular advancement devices in  620 men and women with JJ and snoring: tolerability and predictors of treatment success. Chest 2004; 125: 6091-3198.  4. Alon et al. Oral appliances for snoring and JJ: a review. Sleep 2006; 29: 244-262.  5. Gita et al. Oral appliance treatment for JJ: an update. J Clin Sleep Med 2014; 10(2): 215-227.  6. Macho et al. Predictors of OSAH treatment outcome. J Dent Res 2007; 86: 8938-9998.      Weight Loss:    Weight loss is a long-term strategy that may improve sleep apnea in some patients.    Weight management is a personal decision and the decision should be based on your interest and the potential benefits.  If you are interested in exploring weight loss strategies, the following discussion covers the impact on weight loss on sleep apnea and the approaches that may be successful.    Being overweight does not necessarily mean you will have health consequences.  Those who have BMI over 35 or over 27 with existing medical conditions carries greater risk.   Weight loss decreases severity of sleep apnea in most people with obesity. For those with mild obesity who have developed snoring with weight gain, even 15-30 pound weight loss can improve and occasionally eliminate sleep apnea.  Structured and life-long dietary and health habits are necessary to lose weight and keep healthier weight levels.     Though there may be significant health benefits from weight loss, long-term weight loss is very difficult to achieve- studies show success with dietary management in less than 10% of people. In addition, substantial weight loss may require years of dietary control and may be difficult if patients have severe obesity. In these cases, surgical management may be considered.  Finally, older individuals who have tolerated obesity without health complications may be less likely to benefit from weight loss strategies.      Surgery:    Surgery for obstructive sleep apnea is considered generally only when other  therapies fail to work. Surgery may be discussed with you if you are having a difficult time tolerating CPAP and or when there is an abnormal structure that requires surgical correction.  Nose and throat surgeries often enlarge the airway to prevent collapse.  Most of these surgeries create pain for 1-2 weeks and up to half of the most common surgeries are not effective throughout life.  You should carefully discuss the benefits and drawbacks to surgery with your sleep provider and surgeon to determine if it is the best solution for you.   More information  Surgery for JJ is directed at areas that are responsible for narrowing or complete obstruction of the airway during sleep.  There are a wide range of procedures available to enlarge and/or stabilize the airway to prevent blockage of breathing in the three major areas where it can occur: the palate, tongue, and nasal regions.  Successful surgical treatment depends on the accurate identification of the factors responsible for obstructive sleep apnea in each person.  A personalized approach is required because there is no single treatment that works well for everyone.  Because of anatomic variation, consultation with an examination by a sleep surgeon is a critical first step in determining what surgical options are best for each patient.  In some cases, examination during sedation may be recommended in order to guide the selection of procedures.  Patients will be counseled about risks and benefits as well as the typical recovery course after surgery. Surgery is typically not a cure for a person s JJ.  However, surgery will often significantly improve one s JJ severity (termed  success rate ).  Even in the absence of a cure, surgery will decrease the cardiovascular risk associated with OSA7; improve overall quality of life8 (sleepiness, functionality, sleep quality, etc).      Palate Procedures:  Patients with JJ often have narrowing of their airway in the region  of their tonsils and uvula.  The goals of palate procedures are to widen the airway in this region as well as to help the tissues resist collapse.  Modern palate procedure techniques focus on tissue conservation and soft tissue rearrangement, rather than tissue removal.  Often the uvula is preserved in this procedure. Residual sleep apnea is common in patient after pharyngoplasty with an average reduction in sleep apnea events of 33%2.      Tongue Procedures:  ExamWhile patients are awake, the muscles that surround the throat are active and keep this region open for breathing. These muscles relax during sleep, allowing the tongue and other structures to collapse and block breathing.  There are several different tongue procedures available.  Selection of a tongue base procedure depends on characteristics seen on physical exam.  Generally, procedures are aimed at removing bulky tissues in this area or preventing the back of the tongue from falling back during sleep.  Success rates for tongue surgery range from 50-62%3.    Hypoglossal Nerve Stimulation:  Hypoglossal nerve stimulation has recently received approval from the [place] Food and Drug Administration for the treatment of obstructive sleep apnea.  This is based on research showing that the system was safe and effective in treating sleep apnea6.  Results showed that the median AHI score decreased 68%, from 29.3 to 9.0. This therapy uses an implant system that senses breathing patterns and delivers mild stimulation to airway muscles, which keeps the airway open during sleep.  The system consists of three fully implanted components: a small generator (similar in size to a pacemaker), a breathing sensor, and a stimulation lead.  Using a small handheld remote, a patient turns the therapy on before bed and off upon awakening.    Candidates for this device must be greater than 22 years of age, have moderate to severe JJ (AHI between 20-65), BMI less than 32,  have tried CPAP/oral appliance without success, and have appropriate upper airway anatomy (determined by a sleep endoscopy performed by Dr. Salazar).    Hypoglossal Nerve Stimulation Pathway:    The sleep surgeon s office will work with the patient through the insurance prior-authorization process (including communications and appeals).    Nasal Procedures:  Nasal obstruction can interfere with nasal breathing during the day and night.  Studies have shown that relief of nasal obstruction can improve the ability of some patients to tolerate positive airway pressure therapy for obstructive sleep apnea1.  Treatment options include medications such as nasal saline, topical corticosteroid and antihistamine sprays, and oral medications such as antihistamines or decongestants. Non-surgical treatments can include external nasal dilators for selected patients. If these are not successful by themselves, surgery can improve the nasal airway either alone or in combination with these other options.      Combination Procedures:  Combination of surgical procedures and other treatments may be recommended, particularly if patients have more than one area of narrowing or persistent positional disease.  The success rate of combination surgery ranges from 66-80%2,3.    References  Rito TAM. The Role of the Nose in Snoring and Obstructive Sleep Apnoea: An Update.  Eur Arch Otorhinolaryngol. 2011; 268: 1365-73.   Capvalorie SM; Abdelrahman JA; Demarco JR; Pallanch JF; Mariam MB; Sukhdeep SG; Lauro JEFFREY. Surgical modifications of the upper airway for obstructive sleep apnea in adults: a systematic review and meta-analysis. SLEEP 2010;33(10):4039-3733. Tonja VILLANUEVA. Hypopharyngeal surgery in obstructive sleep apnea: an evidence-based medicine review.  Arch Otolaryngol Head Neck Surg. 2006 Feb;132(2):206-13.  Fermin YH1, Deepak Y, Zia RUDY. The efficacy of anatomically based multilevel surgery for obstructive sleep apnea. Otolaryngol Head Neck Surg. 2003  Oct;129(4):327-35.  Kezirian E, Goldberg A. Hypopharyngeal Surgery in Obstructive Sleep Apnea: An Evidence-Based Medicine Review. Arch Otolaryngol Head Neck Surg. 2006 Feb;132(2):206-13.  Mike RUANO et al. Upper-Airway Stimulation for Obstructive Sleep Apnea.  N Engl J Med. 2014 Jan 9;370(2):139-49.  Tu Y et al. Increased Incidence of Cardiovascular Disease in Middle-aged Men with Obstructive Sleep Apnea. Am J Respir Crit Care Med; 2002 166: 159-165  Juan EM et al. Studying Life Effects and Effectiveness of Palatopharyngoplasty (SLEEP) study: Subjective Outcomes of Isolated Uvulopalatopharyngoplasty. Otolaryngol Head Neck Surg. 2011; 144: 623-631.        WHAT IF I ONLY HAVE SNORING?    Mandibular advancement devices, lateral sleep positioning, long-term weight loss and treatment of nasal allergies have been shown to improve snoring.  Exercising tongue muscles with a game (https://www.ncbi.nlm.nih.gov/pubmed/93827570) or stimulating the tongue during the day with a device (https://doi.org/10.3390/lhk30854743) have improved snoring in some individuals.    Remember to Drive Safe... Drive Alive     Sleep health profoundly affects your health, mood, and your safety.  Thirty three percent of the population (one in three of us) is not getting enough sleep and many have a sleep disorder. Not getting enough sleep or having an untreated / undertreated sleep condition may make us sleepy without even knowing it. In fact, our driving could be dramatically impaired due to our sleep health. As your provider, here are some things I would like you to know about driving:     Here are some warning signs for impairment and dangerous drowsy driving:              -Having been awake more than 16 hours               -Looking tired               -Eyelid drooping              -Head nodding (it could be too late at this point)              -Driving for more than 30 minutes     Some things you could do to make the driving safer if you are  experiencing some drowsiness:              -Stop driving and rest              -Call for transportation              -Make sure your sleep disorder is adequately treated     Some things that have been shown NOT to work when experiencing drowsiness while driving:              -Turning on the radio              -Opening windows              -Eating any  distracting  /  entertaining  foods (e.g., sunflower seeds, candy, or any other)              -Talking on the phone      Your decision may not only impact your life, but also the life of others. Please, remember to drive safe for yourself and all of us.    Restless legs syndrome(RLS)  Please discontinue diphenhydramine or any over-the-counter sleep aids with antihistamine, since they can potentially worsen RLS.  Simple lifestyle changes can play an important role in alleviating symptoms of RLS. These steps may help reduce the extra activity in your legs:   Try baths and massages. Soaking in a warm bath and massaging your legs can relax your muscles.   Apply warm or cool packs. You may find that the use of heat or cold, or alternating use of the two, lessens the sensations in your limbs.   Try relaxation techniques, such as meditation or yoga. Stress can aggravate RLS. Learn to relax, especially before going to bed at night.   Establish good sleep hygiene. Fatigue tends to worsen symptoms of RLS, so it's important that you practice good sleep hygiene. Ideally, sleep hygiene involves having a cool, quiet and comfortable sleeping environment, going to bed at the same time, rising at the same time, and getting enough sleep to feel well rested. Some people with RLS find that going to bed later and rising later in the day helps in getting enough sleep.   Exercise. Getting moderate, regular exercise may relieve symptoms of RLS, but overdoing it at the gym or working out too late in the day may intensify symptoms.   Avoid caffeine. Sometimes cutting back on caffeine may help  restless legs. It's worth trying to avoid caffeine-containing products, including chocolate and caffeinated beverages, such as coffee, tea and soft drinks, for a few weeks to see if this helps.   Cut back on alcohol and tobacco. These substances also may aggravate or trigger symptoms of RLS. Test to see whether avoiding them helps.     Dream enactment behavior:  Sleep environmental safety:  Keeping padding by the side of the bed   Keeping lower mattress to the floor and/or use a ground-floor bedroom  Secure doors and windows (locks, alarms, barriers)  Remove sharp and dangerous objects from bedroom, including firearms  Closely monitor for any future episodes of dream enactment behaviors.     Please decrease the dose of melatonin to 3 mg, 1 hour before bed.

## 2022-07-20 NOTE — PROGRESS NOTES
Stephie is a 51 year old who is being evaluated via a billable video visit.      How would you like to obtain your AVS? MyChart  If the video visit is dropped, the invitation should be resent by: Text to cell phone: [phone]  Will anyone else be joining your video visit? No    Juanito Carter/LPN      Video-Visit Details    Video Start Time:  941am    Type of service:  Video Visit    Video End Time:1017am    Originating Location (pt. Location): Home    Distant Location (provider location):  [facility]     Platform used for Video Visit: [facility]     Outpatient Sleep Medicine Consultation:      Name: Shantell Wagner MRN# [id number]   Age: 51 year old YOB: 1970     Date of Consultation: July 20, 2022  Consultation is requested by: NIKKI Santos Ra CNP  [id number] [address] Margaret Longo  Primary care provider: Margaret Longo Ra       Reason for Sleep Consult:     Shantellyareli Wagner is sent by Margaret Longo for a sleep consultation regarding snoring, evaluation for possible sleep apnea    Patient s Reason for visit  Shantell Wagner main reason for visit: Snoring  Patient states problem(s) started: 5+ years  Shantell Wagner's goals for this visit: To see if there is a problem           Assessment and Plan:   Snoring, non restorative sleep, fatigue, EDS, in the setting of obesity, hypertension, hyperlipidemia, history of breast cancer. Possible Obstructive sleep apnea  STOP BANG score is 6/8. Patient likely has sleep apnea based on clinical history, body habitus, neck circumference, positive family history for JJ and history of hypertension.  HST/ Polysomnography reviewed.  Recommended in lab sleep study to evaluate for possible JJ. The study will include transcutaneous carbon dioxide monitoring due to suspicion for possible coexistent obesity related hypoventilation based on the body mass index  36.05 kg/m  and the recently checked serum carbon dioxide level greater than 27 mmol/L at 29 mmol/L.  Obstructive sleep apnea and consequences of untreated sleep apnea were reviewed.  Information provided regarding treatment options for JJ.    Restless legs syndrome, will check fasting serum ferritin level.  Will consider iron supplementation if less than 75ng/mL. Encouraged to try non-pharmacological treatments as well - hot bath/shower, stretching, heating pads, avoiding caffeine/alcohol/chocolate prior to bed.  Patient was instructed to discontinue diphenhydramine since the antihistamine can worsen RLS symptoms.  She was also instructed to avoid caffeine within 6 hours before bed.  She will continue the current dose of gabapentin 300 mg 1 hour before bed.  She was instructed to let me know if the symptoms were to increase in frequency or severity.    Infrequent episodes of dream enactment behavior: This could be due to possible untreated sleep apnea/medication(venlafaxine).  During the polysomnography we will obtain 4 limb EMG with RBD protocol to check for muscle activity during REM sleep.  She was instructed to closely monitor for any future episodes of dream enactment behavior and let me know if there is increase in the frequency.   We briefly discussed RBD.  We discussed safety of the sleep/bed environment to prevent trauma to self/partner.    Anxiety/depression: Patient reports symptoms of anxiety and depression. Recommended the patient to  follow-up with her  primary care provider for evaluation and management of anxiety and depression.    Obesity: We discussed weight management with healthy diet, and exercise.    Patient was strongly advised to avoid driving, operating any heavy machinery or other hazardous situations while drowsy or sleepy.  Patient was counseled on the importance of driving while alert, to pull over if drowsy, or nap before getting into the vehicle if sleepy.      Plan is to communicate  "results of sleep study via video visit in approximately 2 weeks after the sleep study is completed.    Summary Counseling:    Sleep Testing Reviewed  Obstructive Sleep Apnea Reviewed  Complications of Untreated Sleep Apnea Reviewed    Medical Decision-making:   Educational materials provided in instruction.    CC: Margaret Longo      The above note was dictated using voice recognition software. Although reviewed after completion, some word and grammatical error may remain . Please contact the author for any clarifications.    \"I spent a total of 65 minutes face to face with Shantell Wagner during today's video visit.  Most of this time was spent counseling the patient and  coordinating care regarding  JJ, HST/PSG, consequences of untreated sleep apnea , RLS , dream enactment behavior , weight management , going over report of previous home sleep study and chart review., including documentation and further activities as noted above.\"       MD TRACEE Hutton [address] [id number] [phone]  Dept: [phone]             History of Present Illness:     Past Sleep Evaluations:   Home sleep study report  Date of study: April 29, 2019  Weight at the time of sleep study: 191 pounds  AHI: 3.4 events per hour  Supine AHI: 3.5/h; ÁLVARO: 3.5/h lowest O2 saturation: 87% time spent with oxygen saturation below 88%: 0 minutes  Interpretation: Primary snoring, no evidence of clinically significant obstructive sleep apnea    Patient reported that she never got the  results from her previous sleep study.  She also reports weight gain since the home sleep study from 191 pounds at that time to her current weight of 210 pounds.    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  Wakes up due to Snorting self awake, Use the bathroom, Uncertain  She has trouble falling back asleep 2-3 times a week    It usually takes 10-15 min to get back to sleep  Patient is usually up at 5:45 am  Uses alarm: Yes    Weekends/Non-work Days/All Other Days:  Usually gets into bed at 10   Takes patient about 10-15 mins to fall asleep  Patient is usually up at 8  Uses alarm: No    She does not always feel rested upon awakening.  She reports occasional fatigue/EDS.    Sleep Need  Patient gets  7-8 sleep on average   Patient thinks she needs about 8-9 sleep    Shantell Wagner prefers to sleep in this position(s): Side   Patient states they do the following activities in bed: Watch TV    Naps  Patient takes a purposeful nap 0 times a week and naps are usually 1 hour in duration  She feels better after a nap: No  She dozes off unintentionally 3-4 days per week  She reports occasional drowsiness while driving long distances. She denies any close calls or motor vehicle accidents due to drowsiness while driving.    SLEEP DISRUPTIONS:    Breathing/Snoring  Patient snores:Yes  Other people complain about her snoring: Yes  Patient has been told she stops breathing in her sleep: No  Gasping/choking: Occasionally is she sleeps on back, but prefers sleep on her sides.  She has issues with the following: dry mouth , Heartburn or reflux at night, Getting up to urinate more than once    Movement:    She started noticing symptoms of restlessness in her legs after starting chemo.   She was prescribed gabapentin by her PCP for the management of RLS symptoms and has been on the medication for the past 4-5 years, tolerates the medication well without any side effects.  She takes 1 capsule of gabapentin 300 mg strength, 1 hour before bed time, with which the symptoms are mostly controlled  However recently she  noticed RLS symptoms more frequently and attributes it to  working more on the yard.  She reports RLS symptoms are sporadic  She describes her symptoms as achiness in her legs, with an urge to move.  Stretching and getting up and walking helps  It happens when she is resting:  Yes  It happens more at night:  Yes.  They do not occur when she goes to bed initially, but she notices the symptoms when she wakes up the first time which is often within 1/2-hour to 1 hour after she initially fell asleep.  Patient has been told she kicks her legs at night:  No  Bed sheets/ blankets on the side of her bed are sometimes disheveled.  She was a previous blood donor(more than 6 years ago)     Behaviors in Sleep:  Shantell Wagner has experienced the following behaviors while sleeping: Sleep-talking  She reports rare infrequent episodes of dream enactment behavior -such as screaming/flailing her arms-symptoms are very rare, 0-1/year  Her sensation of smell is fine. There are no reports of trauma associated to self or partner during these episodes.  She denies sleepwalking or sleep eating.  She denies cataplexy.    She has experienced sudden muscle weakness during the day: No        CAFFEINE AND OTHER SUBSTANCES:    Patient consumes caffeinated beverages per day:  1-2  Last caffeine use is usually: 5-6pm  List of any prescribed or over the counter stimulants that patient takes:    List of any prescribed or over the counter sleep medication patient takes: takes most nights 10 mg Melatonin and 50 mg diphenhydrimine  List of previous sleep medications that patient has tried:    Patient drinks alcohol to help them sleep: No  Patient drinks alcohol near bedtime: No    Family History:  Patient has a family member been diagnosed with a sleep disorder: Yes  Father had a bpap         SCALES:    EPWORTH SLEEPINESS SCALE      [place] Sleepiness Scale Huy Sanchez  [id number]<br>ESS - [place]/English - Final version - 21 Nov 07 - [employer]  "[place].) 7/13/2022   Sitting and reading High chance of dozing   Watching TV High chance of dozing   Sitting, inactive in a public place (e.g. a theatre or a meeting) Moderate chance of dozing   As a passenger in a car for an hour without a break Slight chance of dozing   Lying down to rest in the afternoon when circumstances permit High chance of dozing   Sitting and talking to someone Would never doze   Sitting quietly after a lunch without alcohol Slight chance of dozing   In a car, while stopped for a few minutes in traffic Would never doze   [place] Score (MC) 13   [place] Score (Sleep) 13         INSOMNIA SEVERITY INDEX (BERRY)      Insomnia Severity Index (BERRY) 7/13/2022   Difficulty falling asleep 1   Difficulty staying asleep 2   Problems waking up too early 2   How SATISFIED/DISSATISFIED are you with your CURRENT sleep pattern? 1   How NOTICEABLE to others do you think your sleep problem is in terms of impairing the quality of your life? 2   How WORRIED/DISTRESSED are you about your current sleep problem? 1   To what extent do you consider your sleep problem to INTERFERE with your daily functioning (e.g. daytime fatigue, mood, ability to function at work/daily chores, concentration, memory, mood, etc.) CURRENTLY? 2   BERRY Total Score 11       Guidelines for Scoring/Interpretation:  Total score categories:  0-7 = No clinically significant insomnia   8-14 = Subthreshold insomnia   15-21 = Clinical insomnia (moderate severity)  22-28 = Clinical insomnia (severe)  Used via courtesy of www.[employer].va.gov with permission from Jai Malik PhD., [facility]          GAD7    No flowsheet data found.      CAGE-AID    No flowsheet data found.    CAGE-AID reprinted with permission from the [place] Medical Journal, LUIZ Lares. and CHILO Moreno, \"Conjoint screening questionnaires for alcohol and drug abuse\" [place] Medical Journal 94: 135-140, 1995.      PATIENT HEALTH QUESTIONNAIRE-9 (PHQ - 9)    No " flowsheet data found.    Developed by Joaquín Brown, Gretchen Manzo, Ld Spencer and colleagues, with an educational fanny from [employer] No permission required to reproduce, translate, display or distribute.        Allergies:    Allergies   Allergen Reactions     No Known Drug Allergies        Medications:    Current Outpatient Medications   Medication Sig Dispense Refill     atorvastatin (LIPITOR) 20 MG tablet Take 1 tablet (20 mg) by mouth daily 90 tablet 3     calcium carb 1250 mg, 500 mg [place],/vitamin D 200 units (OSCAL WITH D) 500-200 MG-UNIT per tablet Take 1 tablet by mouth daily Reported on 4/13/2017       gabapentin (NEURONTIN) 300 MG capsule TAKE 1 CAPSULE BY MOUTH AT BEDTIME 90 capsule 3     glucosamine-chondroitin 500-400 MG CAPS Take 1 capsule by mouth daily       losartan (COZAAR) 25 MG tablet Take 1 tablet (25 mg) by mouth daily 90 tablet 3     omeprazole (PRILOSEC) 20 MG capsule Take 20 mg by mouth every other day        tamoxifen (NOLVADEX) 20 MG tablet Take 1 tablet (20 mg) by mouth daily 90 tablet 3     triamcinolone (KENALOG) 0.1 % external ointment Apply topically 2 times daily 30 g 0     venlafaxine (EFFEXOR) 37.5 MG tablet Take 1 tablet (37.5 mg) by mouth 2 times daily 180 tablet 1     ALPRAZolam (XANAX) 0.25 MG tablet Take 1 tablet 30 minutes prior to flying. 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  + constipation.  [facility] consult.  Recommended colonoscopy now  and then every 3-5 years depending on results.  ENRIQUE       Lymphedema 11/04/2016     Priority: Medium     Acquired absence of both breasts and nipples 10/17/2016     Priority: Medium     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) 04/28/2016     Priority: Medium     Papanicolaou smear of cervix with atypical squamous cells cannot exclude high grade squamous intraepithelial lesion (ASC-H) 02/20/2013     Priority: Medium     4/10/09 ASCUS pap  2010, 2012 - NIL paps  02/20/13 ASC-H.   03/11/13 [place]/ECC= NEGRITA 1. Repeat HPV screen and ECC 12 months from colp. Due 03/2014 02/20/14 Normal, Neg HPV. Repeat pap with HPV in 12 months. Due 02/2015  03/06/15 Normal, Neg HPV. 3 yr co-testing  4/19/19 NIL/neg HR HPV  3/3/22 NIL pap, neg HPV       Ganglion of left wrist 02/09/2012     Priority: Medium     dorsum; seems a bit larger.       Hyperlipidemia LDL goal <160 05/04/2009     Priority: Medium     Obesity 05/01/2009     Priority: Medium     Plantar fasciitis 07/29/2008     Priority: Medium        Past Medical/Surgical History:  Past Medical History:   Diagnosis Date     Abnormal Pap smear, can't excl hi gd sq intraepithelial lesion (ASC-H) 02/20/13    [place]/ECC= NEGRITA 1. Repeat HPV screen and ECC 12 months from colp.     Cancer (H) 04/2016     Hyperlipidemia      Hypertension     At doctors appointments my blood pressure is elevated.     Neuropathy      Nose fracture 1983    Accident, needed surgery     Past Surgical History:   Procedure Laterality Date     CARPAL TUNNEL RELEASE RT/LT  3/2010     COLONOSCOPY       HC RECONSTR NOSE  1983    after accident     INSERT PORT VASCULAR ACCESS Left 4/22/2016    Procedure: INSERT PORT VASCULAR ACCESS;  Surgeon: Nereyda Flowers MD;  Location: [facility] OR     INSERT TISSUE EXPANDER BREAST BILATERAL Bilateral 10/17/2016    Procedure: INSERT TISSUE EXPANDER BREAST BILATERAL;  Surgeon: Jenna Vazquez MD;  Location: [facility] OR     LASIK BILATERAL       MASTECTOMY  MODIFIED RADICAL Right 10/17/2016    Procedure: MASTECTOMY MODIFIED RADICAL;  Surgeon: Nereyda Flowers MD;  Location: [facility] OR     MASTECTOMY MODIFIED RADICAL, SENTINEL NODE, COMBINED Left 10/17/2016    Procedure: COMBINED MASTECTOMY MODIFIED RADICAL, SENTINEL NODE;  Surgeon: Nereyda Flowers MD;  Location: [facility]     PROCURE GRAFT FAT Bilateral 4/25/2018    Procedure: PROCURE GRAFT FAT;;  Surgeon: Jenna Vazquez MD;  Location: [facility]     RECONSTRUCT BREAST BILATERAL, IMPLANT PROSTHESIS BILATERAL, COMBINED Bilateral 9/13/2017    Procedure: COMBINED RECONSTRUCT BREAST BILATERAL, IMPLANT PROSTHESIS BILATERAL;  BILATERAL SECOND STAGE BREAST RECONSTRUCTION WITH IMPLANT.;  Surgeon: Jenna Vazquez MD;  Location: [facility]     REMOVE CATHETER VASCULAR ACCESS Left 10/17/2016    Procedure: REMOVE CATHETER VASCULAR ACCESS;  Surgeon: Nereyda Flowers MD;  Location: [facility]     REVISE RECONSTRUCTED BREAST BILATERAL Bilateral 4/25/2018    Procedure: REVISE RECONSTRUCTED BREAST BILATERAL;  REVISION BILATERAL BREAST RECONSTRUCTION AND  FAT GRAFTING FROM AXILLA TO BILATERAL BREASTS.;  Surgeon: Jenna Vazquez MD;  Location: [facility]       Social History:  Social History     Socioeconomic History     Marital status:      Spouse name: Not on file     Number of children: Not on file     Years of education: Not on file     Highest education level: Not on file   Occupational History     Not on file   Tobacco Use     Smoking status: Never Smoker     Smokeless tobacco: Never Used   Vaping Use     Vaping Use: Never used   Substance and Sexual Activity     Alcohol use: Yes     Alcohol/week: 0.0 standard drinks     Comment: rare     Drug use: No     Sexual activity: Yes     Partners: Male     Birth control/protection: Male Surgical   Other Topics Concern     Parent/sibling w/ CABG, MI or angioplasty before 65F 55M? No   Social History Narrative     Not on file     Social Determinants of Health     Financial Resource  "Strain: Not on file   Food Insecurity: Not on file   Transportation Needs: Not on file   Physical Activity: Not on file   Stress: Not on file   Social Connections: Not on file   Intimate Partner Violence: Not At Risk     Fear of Current or Ex-Partner: No     Emotionally Abused: No     Physically Abused: No     Sexually Abused: No   Housing Stability: Not on file       Family History:  Family History   Problem Relation Age of Onset     C.A.D. Father         bypass surg age 68     Coronary Artery Disease Father      Prostate Cancer Father      Hypertension Father      Hyperlipidemia Father      Breast Cancer Sister 38        breast ca     Diabetes Type 1 Mother      Diabetes Mother      Hyperlipidemia Mother      Breast Cancer Maternal Grandmother 70        dx'ed age 80s     Breast Cancer Sister      Depression Daughter      Substance Abuse Daughter      Asthma Daughter      Substance Abuse Sister        Review of Systems:  A complete review of systems reviewed by me is negative with the exeption of what has been mentioned in the history of present illness.  In the last TWO WEEKS have you experienced any of the following symptoms?  Fevers: No  Night Sweats: No  Weight Gain: Yes  Pain at Night: Yes  Double Vision: No  Changes in Vision: No  Difficulty Breathing through Nose: No  Sore Throat in Morning: Yes  Dry Mouth in the Morning: Yes  Shortness of Breath Lying Flat: No  Shortness of Breath With Activity: Yes  Awakening with Shortness of Breath: No  Increased Cough: No  Heart Racing at Night: No  Swelling in Feet or Legs: No  Diarrhea at Night: No  Heartburn at Night: Yes  Urinating More than Once at Night: Yes  Losing Control of Urine at Night: No  Joint Pains at Night: No  Headaches in Morning: No  Weakness in Arms or Legs: No  Depressed Mood: Yes  Anxiety: Yes        Physical Examination:  Vitals: Ht 1.626 m (5' 4\")   Wt 95.3 kg (210 lb)   BMI 36.05 kg/m    BMI= Body mass index is 36.05 kg/m .    General: No " apparent distress, appropriately groomed  Head: Normocephalic, atraumatic  Neck:Circumference: 16 inches(patient reported)  Chest: No cough, no audible wheezing, able to talk in full sentences  Skin: no rash  Psych: coherent speech, normal rate and volume, able to articulate logical thoughts, able   to abstract reason, no tangential thoughts, no hallucinations   or delusions   Her affect is normal  Neuro:  Mental status: Alert and  Oriented X 3  Speech: normal             Data: All pertinent previous laboratory data reviewed     Recent Labs   Lab Test 06/09/22  0918 03/25/22  0946    138   POTASSIUM 4.5 3.5   CHLORIDE 106 109   CO2 29 20   ANIONGAP 5 9   * 115*   BUN 13 17   CR 0.86 0.98   CASIE 9.8 9.7       Recent Labs   Lab Test 06/09/22 0918   WBC 5.9   RBC 4.75   HGB 13.7   HCT 42.0   MCV 88   MCH 28.8   MCHC 32.6   RDW 13.3          Recent Labs   Lab Test 03/25/22 0946   PROTTOTAL 8.0   ALBUMIN 3.7   BILITOTAL 0.6   ALKPHOS 101   AST 32   ALT 60*       TSH (mU/L)   Date Value   04/21/2021 1.63   04/06/2018 1.74       No results found for: UAMP, UBARB, BENZODIAZEUR, UCANN, UCOC, OPIT, UPCP    Iron Saturation Index   Date/Time Value Ref Range Status   11/21/2016 08:26 AM 13 (L) 15 - 46 % Final     Ferritin   Date/Time Value Ref Range Status   11/21/2016 08:26 AM 71 8 - 252 ng/mL Final       No results found for: PH, PHARTERIAL, PO2, [id number], SAT, PCO2, HCO3, BASEEXCESS, JASON, BEB    Echocardiology: No results found for this or any previous visit (from the past 4320 hour(s)).    Chest x-ray: No results found for this or any previous visit from the past 365 days.      Chest CT: No results found for this or any previous visit from the past 365 days.      PFT: Most Recent [facility] Pulmonary Function Testing     No results found for: [address]      Kalli Kiran MD 7/20/2022

## 2022-07-26 ENCOUNTER — LAB (OUTPATIENT)
Dept: LAB | Facility: CLINIC | Age: 52
End: 2022-07-26
Payer: COMMERCIAL

## 2022-07-26 DIAGNOSIS — E83.19 OTHER DISORDERS OF IRON METABOLISM: ICD-10-CM

## 2022-07-26 PROCEDURE — 36415 COLL VENOUS BLD VENIPUNCTURE: CPT

## 2022-07-26 PROCEDURE — 82728 ASSAY OF FERRITIN: CPT

## 2022-07-27 LAB — FERRITIN SERPL-MCNC: 38 NG/ML (ref 8–252)

## 2022-07-28 ENCOUNTER — TRANSFERRED RECORDS (OUTPATIENT)
Dept: FAMILY MEDICINE | Facility: CLINIC | Age: 52
End: 2022-07-28

## 2022-08-16 ENCOUNTER — LAB (OUTPATIENT)
Dept: ONCOLOGY | Facility: CLINIC | Age: 52
End: 2022-08-16
Attending: INTERNAL MEDICINE
Payer: COMMERCIAL

## 2022-08-16 VITALS
HEART RATE: 92 BPM | HEIGHT: 64 IN | OXYGEN SATURATION: 98 % | TEMPERATURE: 97 F | BODY MASS INDEX: 37.39 KG/M2 | WEIGHT: 219 LBS | DIASTOLIC BLOOD PRESSURE: 97 MMHG | RESPIRATION RATE: 14 BRPM | SYSTOLIC BLOOD PRESSURE: 140 MMHG

## 2022-08-16 DIAGNOSIS — C50.411 MALIGNANT NEOPLASM OF UPPER-OUTER QUADRANT OF RIGHT FEMALE BREAST, UNSPECIFIED ESTROGEN RECEPTOR STATUS (H): Primary | ICD-10-CM

## 2022-08-16 DIAGNOSIS — C50.411 MALIGNANT NEOPLASM OF UPPER-OUTER QUADRANT OF RIGHT FEMALE BREAST, UNSPECIFIED ESTROGEN RECEPTOR STATUS (H): ICD-10-CM

## 2022-08-16 LAB — CANCER AG15-3 SERPL-ACNC: <3 U/ML

## 2022-08-16 PROCEDURE — 36415 COLL VENOUS BLD VENIPUNCTURE: CPT

## 2022-08-16 PROCEDURE — G0463 HOSPITAL OUTPT CLINIC VISIT: HCPCS

## 2022-08-16 PROCEDURE — 86300 IMMUNOASSAY TUMOR CA 15-3: CPT | Performed by: INTERNAL MEDICINE

## 2022-08-16 PROCEDURE — 99213 OFFICE O/P EST LOW 20 MIN: CPT | Performed by: INTERNAL MEDICINE

## 2022-08-16 ASSESSMENT — PAIN SCALES - GENERAL: PAINLEVEL: NO PAIN (0)

## 2022-08-16 NOTE — PROGRESS NOTES
Visit Date: 08/16/2022    Bernard Wagner is a 51-year-old patient who comes in today for interim followup.    CHIEF COMPLAINT:     1.  Right-sided breast cancer, high risk at diagnosis, ER positive, NM negative, HER2 receptor negative.  2.  Diagnosis of a CHEK2 mutation.    HISTORY OF PRESENT ILLNESS:  Shantell is a 51-year-old patient with a CHEK2 mutation who presented with a locally advanced right-sided breast cancer.  The tumor itself was 2.2 cm, but she had a 2.7 cm axillary mass.  She finished up adjuvant chemotherapy and is now on adjuvant tamoxifen.  She is planning to do the tamoxifen for 10 years.  Her stop date on the tamoxifen would probably be in early 2017.  She was diagnosed in the fall of 2016.  She comes in today for interim followup.  She has no major complaints today.  She is feeling some lumpiness on the right side where she has her implant.  I am going to look at that today.  She denies cough, shortness of breath, bone pain, any other worrisome symptomatology from my standpoint.  She is due to schedule a colonoscopy in 11/2022.    PAST MEDICAL HISTORY:     1.  History of CHEK2 mutation.  2.  History of sided breast cancer.  3.  History of hypercholesterolemia.    REVIEW OF SYSTEMS:  A 12-point comprehensive review of systems is otherwise unremarkable.    FAMILY HISTORY:  Sister with breast cancer, maternal grandmother with breast cancer.  Father with prostate cancer.    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    MEDICATIONS AND ALLERGIES:  Outlined in the nursing records.    PHYSICAL EXAMINATION:    GENERAL:  She is a well-appearing lady in no acute distress.  VITAL SIGNS:  Stable.  NECK:  Has no masses or goiter.  CHEST:  Clear to auscultation and percussion bilaterally.  HEART:  Sounds 1 and 2 normal, no added sounds, no murmurs.  BREASTS:  The patient is status post bilateral mastectomy.  She has got silicone implants in  place.  She has got very mild lumpiness on the right side, but nothing suspicious.  Right and left axillae are negative.  Left chest wall was negative.  GASTROINTESTINAL:  Abdomen is soft and nontender.  No hepatosplenomegaly.  EXTREMITIES:  Legs without tenderness or edema.  SKIN:  Has no rashes or lesions.    DATA REVIEW:  The patient had lab work done in 2022 and also in 2022.  I have just drawn a breast cancer marker today.    ASSESSMENT AND PLAN:  The patient with a history of locally advanced sided breast cancer diagnosed in 2016. Tumor was estrogen receptor positive, progesterone receptor negative, HER2 receptor negative.  She is continuing on adjuvant tamoxifen.  She will continue the adjuvant tamoxifen for a full 10 years.  She is tolerating it well.  She is going to try and come down on her Effexor and I have encouraged her to do that.  She has some mild lumpiness on the right side where she has her silicone implant, but nothing suspicious.  I have reassured her today.    Tata Knott MD        D: 2022   T: 2022   MT: ROSA    Name:     CADE GARCÍA  MRN:      [id number]        Account:    [id number]   :      1970           Visit Date: 2022     Document: [id number]

## 2022-08-16 NOTE — LETTER
8/16/2022         RE: Shantell Wagner  [address]  [facility] [facility] [address]        Dear Colleague,    Thank you for referring your patient, Shantell Wagner, to the [facility]. Please see a copy of my visit note below.    Visit Date: 08/16/2022    Bernard Wagner is a 51-year-old patient who comes in today for interim followup.    CHIEF COMPLAINT:     1.  Right-sided breast cancer, high risk at diagnosis, ER positive, TN negative, HER2 receptor negative.  2.  Diagnosis of a CHEK2 mutation.    HISTORY OF PRESENT ILLNESS:  Shantell is a 51-year-old patient with a CHEK2 mutation who presented with a locally advanced right-sided breast cancer.  The tumor itself was 2.2 cm, but she had a 2.7 cm axillary mass.  She finished up adjuvant chemotherapy and is now on adjuvant tamoxifen.  She is planning to do the tamoxifen for 10 years.  Her stop date on the tamoxifen would probably be in early 2017.  She was diagnosed in the fall of 2016.  She comes in today for interim followup.  She has no major complaints today.  She is feeling some lumpiness on the right side where she has her implant.  I am going to look at that today.  She denies cough, shortness of breath, bone pain, any other worrisome symptomatology from my standpoint.  She is due to schedule a colonoscopy in 11/2022.    PAST MEDICAL HISTORY:     1.  History of CHEK2 mutation.  2.  History of sided breast cancer.  3.  History of hypercholesterolemia.    REVIEW OF SYSTEMS:  A 12-point comprehensive review of systems is otherwise unremarkable.    FAMILY HISTORY:  Sister with breast cancer, maternal grandmother with breast cancer.  Father with prostate cancer.    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    MEDICATIONS AND ALLERGIES:  Outlined in the nursing records.    PHYSICAL EXAMINATION:    GENERAL:  She is a well-appearing lady in no acute  distress.  VITAL SIGNS:  Stable.  NECK:  Has no masses or goiter.  CHEST:  Clear to auscultation and percussion bilaterally.  HEART:  Sounds 1 and 2 normal, no added sounds, no murmurs.  BREASTS:  The patient is status post bilateral mastectomy.  She has got silicone implants in place.  She has got very mild lumpiness on the right side, but nothing suspicious.  Right and left axillae are negative.  Left chest wall was negative.  GASTROINTESTINAL:  Abdomen is soft and nontender.  No hepatosplenomegaly.  EXTREMITIES:  Legs without tenderness or edema.  SKIN:  Has no rashes or lesions.    DATA REVIEW:  The patient had lab work done in 2022 and also in 2022.  I have just drawn a breast cancer marker today.    ASSESSMENT AND PLAN:  The patient with a history of locally advanced sided breast cancer diagnosed in 2016. Tumor was estrogen receptor positive, progesterone receptor negative, HER2 receptor negative.  She is continuing on adjuvant tamoxifen.  She will continue the adjuvant tamoxifen for a full 10 years.  She is tolerating it well.  She is going to try and come down on her Effexor and I have encouraged her to do that.  She has some mild lumpiness on the right side where she has her silicone implant, but nothing suspicious.  I have reassured her today.    Tata Knott MD        D: 2022   T: 2022   MT: ROSA    Name:     CADE GARCÍA  MRN:      [id number]        Account:    [id number]   :      1970           Visit Date: 2022     Document: [id number]      Again, thank you for allowing me to participate in the care of your patient.        Sincerely,        Tata Knott MD

## 2022-08-16 NOTE — PROGRESS NOTES
Medical Assistant Note:  Shantell Wagner presents today for blood draw.    Patient seen by provider today: Yes: Dr Knott.   present during visit today: Not Applicable.    Concerns: No Concerns.    Procedure:  Lab draw site: lt antecub, Needle type: butterfly, Gauge: 23.    Post Assessment:  Labs drawn without difficulty: Yes.    Discharge Plan:  Departure Mode: Ambulatory.    Face to Face Time: 10 mins.    Evelin Chun CMA

## 2022-08-16 NOTE — NURSING NOTE
"Oncology Rooming Note    August 16, 2022 8:34 AM   Shantell Wagner is a 51 year old female who presents for:    Chief Complaint   Patient presents with     Oncology Clinic Visit     Initial Vitals: BP (!) 150/101 (Cuff Size: Adult Large)   Pulse 92   Temp 97  F (36.1  C) (Tympanic)   Resp 14   Ht 1.626 m (5' 4\")   Wt 99.3 kg (219 lb)   SpO2 98%   BMI 37.59 kg/m   Estimated body mass index is 37.59 kg/m  as calculated from the following:    Height as of this encounter: 1.626 m (5' 4\").    Weight as of this encounter: 99.3 kg (219 lb). Body surface area is 2.12 meters squared.  No Pain (0) Comment: Data Unavailable   No LMP recorded.  Allergies reviewed: Yes  Medications reviewed: Yes    Medications: Medication refills not needed today.  Pharmacy name entered into [employer]: [id number] - [place], [place] - [address]    Clinical concerns: 6 month f/u       Evelin Chun CMA              "

## 2022-08-19 NOTE — PATIENT INSTRUCTIONS
Labs done 8/16/22  6 month follow with Dr. Knott scheduled 2/15/23 with labs afterwards  Avril Kingston, RN, BSN, BECK  RN Care Coordinator  [facility]  Ph: [phone]  F: [phone]

## 2022-08-21 ENCOUNTER — DOCUMENTATION ONLY (OUTPATIENT)
Dept: SLEEP MEDICINE | Facility: CLINIC | Age: 52
End: 2022-08-21

## 2022-08-21 ENCOUNTER — THERAPY VISIT (OUTPATIENT)
Dept: SLEEP MEDICINE | Facility: CLINIC | Age: 52
End: 2022-08-21
Attending: INTERNAL MEDICINE
Payer: COMMERCIAL

## 2022-08-21 DIAGNOSIS — G47.9 SLEEP DISTURBANCE: ICD-10-CM

## 2022-08-21 PROCEDURE — 95810 POLYSOM 6/> YRS 4/> PARAM: CPT | Performed by: INTERNAL MEDICINE

## 2022-08-22 NOTE — PATIENT INSTRUCTIONS
[place] [facility]    1. Your sleep study will be reviewed by a sleep physician within the next few days.     2. Please follow up in the sleep clinic as scheduled, or, make an appointment with your sleep provider to be seen within two weeks to discuss the results of the sleep study.    3. If you have any questions or problems with your treatment plan, please contact your sleep clinic provider at [phone] to further manage your condition.    4. Please review your attached medication list, and, at your follow-up appointment advise your sleep clinic provider about any changes.    5. Go to [email or web address] for more information about your sleep problems.    Mimi Aldana, KATHYGT  August 22, 2022

## 2022-09-03 NOTE — PROCEDURES
" SLEEP STUDY INTERPRETATION  DIAGNOSTIC POLYSOMNOGRAPHY REPORT      Patient: CADE GARCÍA  YOB: 1970  Study Date: 8/21/2022  MRN: [id number]  Referring Provider: NIKKI Longo CNP, Jenna Ra  Ordering Provider: MD Magno, Kalli Hyde    Indications for Polysomnography: The patient is a 51-year-old Female who is 5' 4\" and weighs 210.0 lbs. Her BMI is 36.3, Harrietta sleepiness scale 13 and neck circumference is 41 cm. Relevant medical history includes obesity, hypertension, hyperlipidemia, history of breast cancer. Patient reports snoring, non-restorative sleep, fatigue, EDS. A diagnostic polysomnogram was performed to evaluate for sleep apnea /RBD /hypoventilation/hypoxemia.    Polysomnogram Data: A full night polysomnogram recorded the standard physiologic parameters including EEG, EOG, EMG, ECG, nasal and oral airflow. Respiratory parameters of chest and abdominal movements were recorded with respiratory inductance plethysmography. Oxygen saturation was recorded by pulse oximetry. Hypopnea scoring rule used: 1B 4%.    Sleep Architecture: All sleep stages seen.  The total recording time of the polysomnogram was 539.7 minutes. The total sleep time was 461.5 minutes. Sleep latency was normal at 11.6 minutes with the use of a sleep aid (gabapentin 300 mg). REM latency was 189.0 minutes. Arousal index was increased at 24.7 arousals per hour (mostly due to respiratory events). Sleep efficiency was normal at 85.5%. Wake after sleep onset was 66.5 minutes. The patient spent 6.8% of total sleep time in Stage N1, 30.0% in Stage N2, 42.7% in Stage N3, and 20.5% in REM. Time in REM supine was - minutes.    Respiration: Moderate JJ, pronounced during supine sleep. Patient slept in supine position for only 27 minutes and REM sleep in supine position was not seen which may have underestimated the overall severity of the sleep-related breathing disorder. Sleep associated hypoventilation was " present, without sleep associated hypoxemia.   Events ? The polysomnogram revealed a presence of 5 obstructive, - central, and - mixed apneas resulting in an apnea index of 0.7 events per hour. There were 150 obstructive hypopneas and 10 central hypopneas resulting in an obstructive hypopnea index of 19.5 and central hypopnea index of 1.3 events per hour. The combined apnea/hypopnea index was 21.5 events per hour (central apnea/hypopnea index was 1.3 events per hour). The REM AHI was 20.3 events per hour. The supine AHI was 71.1 events per hour (supine sleep was seen for only 27 minutes). The RERA index was 8.6 events per hour.  The RDI was 30.0 events per hour.    Snoring - was reported as mild-moderate, mostly moderate in volume.    Respiratory rate and pattern - was notable for normal respiratory rate and pattern.    Sustained Sleep Associated Hypoventilation - Transcutaneous carbon dioxide monitoring was used, however significant hypoventilation was present with a maximum change from 40 to 57 mmHg and greater than 10 minutes at or greater than 55 mmHg.    Sleep Associated Hypoxemia - (Greater than 5 minutes O2 sat at or below 88%) was not present. Baseline oxygen saturation was 93.1%. Lowest oxygen saturation was 87.0%. Time spent less than or equal to 88% was 1.3 minutes. Time spent less than or equal to 89% was 4.4 minutes.    Movement Activity: There were no significant limb movements    Periodic Limb Activity - There were 53 PLMs during the entire study. The PLM index was 6.9 movements per hour. The PLM Arousal Index was 0.4 per hour.    REM EMG Activity - occasional transient bursts present in few REM epochs that did not meet AASM RBD criteria.    Nocturnal Behavior - Abnormal sleep related behaviors were not noted during/arising out of NREM / REM sleep.    Bruxism - Not apparent.    Cardiac Summary: sinus rhythm was noted with occasional sinus tachycardia.  The average pulse rate was 78.6 bpm. The minimum  pulse rate was 65.0 bpm while the maximum pulse rate was 104.0 bpm.  Occasional sinus tachycardia was noted.     Assessment:     All sleep stages were seen.    Moderate JJ was present, pronounced during supine sleep. Patient slept in supine position for only 27 minutes and REM sleep in supine position was not seen which may have underestimated the overall severity of the sleep-related breathing disorder. Sleep associated hypoventilation was present, without sleep associated hypoxemia.    There were no significant limb movements    Sinus rhythm was noted with occasional sinus tachycardia.    Recommendations:    Based on the presence of moderate obstructive sleep apnea and excessive daytime sleepiness, treatment could be empirically initiated with Auto titrating CPAP therapy with a range of 5-15 cmH2O. Recommend clinical follow up with sleep management team.    Alternate treatment option for JJ includes combination of positional therapy during sleep and dental appliance through referral to Sleep Dentistry.    If devices are not acceptable or effective, patient may benefit from evaluation of possible surgical options. If she is interested, would recommend referral to specialized ENT-Sleep provider.    Advice regarding the risks of drowsy driving.    Suggest optimizing sleep schedule and avoiding sleep deprivation.    Weight management (if BMI > 30).    Pharmacologic therapy should be used for management of restless legs syndrome only if present and clinically indicated and not based on the presence of periodic limb movements alone.    Diagnostic Codes:   Obstructive Sleep Apnea G47.33  Sleep Hypoxemia/Hypoventilation G47.36   Repetitive Intrusions Into Sleep F51.8    8/21/2022 [facility] Sleep Study (210.0 lbs) - AHI 21.5, RDI 30.0, Supine AHI 71.1, REM AHI 20.3, Low O2 87.0%, Time Spent ?88% 1.3 minutes / Time Spent ?89% 4.4 minutes.     _____________________________________   Electronically Signed By:  (Nika Kiran MD), 8/28/22

## 2022-09-10 DIAGNOSIS — G47.33 OSA (OBSTRUCTIVE SLEEP APNEA): Primary | ICD-10-CM

## 2022-09-10 DIAGNOSIS — E83.19 OTHER DISORDERS OF IRON METABOLISM: Primary | ICD-10-CM

## 2022-10-19 ENCOUNTER — VIRTUAL VISIT (OUTPATIENT)
Dept: SLEEP MEDICINE | Facility: CLINIC | Age: 52
End: 2022-10-19
Payer: COMMERCIAL

## 2022-10-19 VITALS — BODY MASS INDEX: 35.85 KG/M2 | HEIGHT: 64 IN | WEIGHT: 210 LBS

## 2022-10-19 DIAGNOSIS — G47.33 OBSTRUCTIVE SLEEP APNEA: Primary | ICD-10-CM

## 2022-10-19 PROCEDURE — 99213 OFFICE O/P EST LOW 20 MIN: CPT | Mod: GT | Performed by: INTERNAL MEDICINE

## 2022-10-19 ASSESSMENT — SLEEP AND FATIGUE QUESTIONNAIRES
HOW LIKELY ARE YOU TO NOD OFF OR FALL ASLEEP WHILE SITTING AND READING: HIGH CHANCE OF DOZING
HOW LIKELY ARE YOU TO NOD OFF OR FALL ASLEEP WHILE WATCHING TV: MODERATE CHANCE OF DOZING
HOW LIKELY ARE YOU TO NOD OFF OR FALL ASLEEP WHEN YOU ARE A PASSENGER IN A CAR FOR AN HOUR WITHOUT A BREAK: MODERATE CHANCE OF DOZING
HOW LIKELY ARE YOU TO NOD OFF OR FALL ASLEEP WHILE LYING DOWN TO REST IN THE AFTERNOON WHEN CIRCUMSTANCES PERMIT: HIGH CHANCE OF DOZING
HOW LIKELY ARE YOU TO NOD OFF OR FALL ASLEEP WHILE SITTING QUIETLY AFTER LUNCH WITHOUT ALCOHOL: MODERATE CHANCE OF DOZING
HOW LIKELY ARE YOU TO NOD OFF OR FALL ASLEEP WHILE SITTING INACTIVE IN A PUBLIC PLACE: SLIGHT CHANCE OF DOZING
HOW LIKELY ARE YOU TO NOD OFF OR FALL ASLEEP IN A CAR, WHILE STOPPED FOR A FEW MINUTES IN TRAFFIC: WOULD NEVER DOZE
HOW LIKELY ARE YOU TO NOD OFF OR FALL ASLEEP WHILE SITTING AND TALKING TO SOMEONE: WOULD NEVER DOZE

## 2022-10-19 NOTE — PROGRESS NOTES
Stephie is a 52 year old who is being evaluated via a billable video visit.      How would you like to obtain your AVS? MyChart  If the video visit is dropped, the invitation should be resent by: Text to cell phone: [phone]  Will anyone else be joining your video visit? No    Juanito Carter Facilitator/LPN    Video-Visit Details    Video Start Time: 2:27 PM    Type of service:  Video Visit    Video End Time:2:37 PM    Originating Location (pt. Location): Home    Distant Location (provider location):  Off-site    Platform used for Video Visit: clinovo     Additional 15 minutes on the date of service was spent performing the following:    -Preparing to see the patient (eg, review of tests)   -Obtaining and/or reviewing separately obtained history   -Ordering medications, tests, or procedures   -Documenting clinical information in the electronic or other health record     Thank you for the opportunity to participate in the care of Shantell Wagner.     She is a 52 year old  female patient who comes to the sleep medicine clinic for review of sleep study results. The study was completed on 08/21/2022  which showed that the patient had moderate obstructive sleep apnea with an apnea hypopnea index of 21.5 events per hour with the lowest O2 Sat of 87%.    Assessment and Plan:  In summary Shantell Wagner is a 52 year old year old female here for review of sleep study results.  1. Obstructive Sleep Apnea  We had an extensive conversation to review the results of her sleep study and to  her on the importance of treating sleep apnea. We discussed the options of treatment with oral appliance versus CPAP. Patient decided to proceed with CPAP. She will start using the device as soon as she receives it with the intention to use if for the entire night. We discussed some tips to increase PAP tolerance as well as the normal curve of adaptation. CPAP is going to provide improved respiratory function during  the night but it can cause some sleep disruption that tends to improve with continuous usage. She should return to the clinic in 7 weeks to review compliance and efficacy monitoring. Since the patient does not have any preference, we will use "[employer] as the durable medical equipment company.     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     Morbid obesity (H)     Carpal tunnel syndrome of left wrist       Current Outpatient Medications   Medication Sig Dispense Refill     atorvastatin (LIPITOR) 20 MG tablet Take 1 tablet (20 mg) by mouth daily 90 tablet 3     calcium carb 1250 mg, 500 mg [place],/vitamin D 200 units (OSCAL WITH D) 500-200 MG-UNIT per tablet Take 1 tablet by mouth daily Reported on 4/13/2017       gabapentin (NEURONTIN) 300 MG capsule TAKE 1 CAPSULE BY MOUTH AT BEDTIME 90 capsule 3     glucosamine-chondroitin 500-400 MG CAPS Take 1 capsule by mouth daily       losartan (COZAAR) 25 MG tablet Take 1 tablet (25 mg) by mouth daily 90 tablet 3     omeprazole (PRILOSEC) 20 MG capsule Take 20 mg by mouth every other day        tamoxifen (NOLVADEX) 20 MG tablet Take 1 tablet (20 mg) by mouth daily 90 tablet 3     triamcinolone (KENALOG) 0.1 % external ointment Apply topically 2 times daily 30 g 0     venlafaxine (EFFEXOR) 37.5 MG tablet Take 1 tablet (37.5 mg) by mouth 2 times daily 180 tablet 1       Allergies   Allergen Reactions     No Known Drug Allergies         Physical Exam:  GEN: NAD  Psych: normal mood, normal affect     Labs/Studies:  - We reviewed the results of the overnight study as described on the HPI.       Patient verbalized understanding of these issues, agrees with the plan and all questions were answered today. Patient was given an opportuntity to voice any other symptoms or concerns not listed above. Patient did not have any other symptoms or concerns.      Amanuel Ferrer DO  Board Certified in Internal Medicine and Sleep Medicine      (Note created with Dragon voice recognition and unintended spelling errors and word substitutions may occur)

## 2022-10-19 NOTE — NURSING NOTE
Chief Complaint   Patient presents with     Video Visit     2 week follow up sleep test       Patient confirms medications and allergies are accurate via patients echeck in completion, and or denies any changes since last reviewed/verified.     Flu shot given 9/15/2022    Shantell Price, Virtual Facilitator/LPN

## 2022-10-20 ENCOUNTER — DOCUMENTATION ONLY (OUTPATIENT)
Dept: SLEEP MEDICINE | Facility: CLINIC | Age: 52
End: 2022-10-20

## 2022-10-20 DIAGNOSIS — G47.33 OBSTRUCTIVE SLEEP APNEA (ADULT) (PEDIATRIC): Primary | ICD-10-CM

## 2022-10-20 NOTE — PROGRESS NOTES
Patient was offered choice of vendor and chose [facility].  Patient Shantell Wagner was set up at [place] on October 20, 2022. Patient received a Resmed Airsense 11 Pressures were set at 5-15 cm H2O.   Patient s ramp is 5 cm H2O for Auto and FLEX/EPR is EPR, 2.  Patient received a Resmed Mask name: [id number]  Nasal mask size Standard, heated tubing and heated humidifier.  Patient does not need to meet compliance. Patient has a follow up on TBD with Dr. Kiran.    Neida Marrufo

## 2022-10-24 ENCOUNTER — DOCUMENTATION ONLY (OUTPATIENT)
Dept: SLEEP MEDICINE | Facility: CLINIC | Age: 52
End: 2022-10-24

## 2022-10-24 DIAGNOSIS — G47.33 OSA ON CPAP: Primary | ICD-10-CM

## 2022-10-24 NOTE — PROGRESS NOTES
3 day Sleep therapy management telephone visit    Diagnostic AHI: 21.5  PSG    Confirmed with patient at time of call- N/A Patient is still interested in STM service       Message left for patient to return call    Order settings:  CPAP MIN CPAP MAX   5 cm H2O 15 cm H2O       Device settings:  CPAP MIN CPAP MAX EPR RESMED SOFT RESPONSE SETTING   5.0 cm  H20 15.0 cm  H20 TWO OFF       Compliance 100 %    Assessment: Nightly usage over four hours and Nighty usage under four hours      Action plan: Patient to have 14 day STM visit. Patient has a follow up visit scheduled:   no and not required by insurance    Replacement device: No  STM ordered by provider: Yes     Total time spent on accessing and  interpreting remote patient PAP therapy data  10 minutes    Total time spent counseling, coaching  and reviewing PAP therapy data with patient  1 minutes    [id number] no

## 2022-10-27 NOTE — PROGRESS NOTES
Patient returned call. She is not getting enough air with CPAP. Will submit pressure change 7 - 15 cm H2o.

## 2022-11-04 ENCOUNTER — DOCUMENTATION ONLY (OUTPATIENT)
Dept: SLEEP MEDICINE | Facility: CLINIC | Age: 52
End: 2022-11-04
Payer: COMMERCIAL

## 2022-11-04 NOTE — PROGRESS NOTES
14  DAY STM VISIT    Diagnostic AHI: 21.5  PSG    Message left for patient to return call.     Assessment: Pt not meeting objective benchmarks for compliance       Action plan: waiting for patient to return call.  and pt to have 30 day STM visit.      Device type: Auto-CPAP    PAP settings: CPAP min 5.0 cm  H20       CPAP max 15.0 cm  H20      95th% pressure 11.2 cm  H20        RESMED EPR level Setting: TWO    RESMED Soft response setting:  OFF    Mask type:  Nasal Mask    Objective measures: 14 day rolling measures      Compliance  50 %      Leak  7.2  lpm  last  upload      AHI 3   last  upload      Average number of minutes 496      Objective measure goal  Compliance   Goal >70%  Leak   Goal < 24 lpm  AHI  Goal < 5  Usage  Goal >240        Total time spent on accessing and interpreting remote patient PAP therapy data  10 minutes    Total time spent counseling, coaching  and reviewing PAP therapy data with patient  1 minutes    [id number]  [id number]  no (3 day STM)

## 2022-11-04 NOTE — NURSING NOTE
Left message for patient to call back and schedule a 7 week follow up with Dr. Ferrer regarding compliance with CPAP.

## 2022-11-04 NOTE — PROGRESS NOTES
14  DAY STM VISIT    Diagnostic AHI: 21.5  PSG    Subjective measures: patient feels like she not's getting enough air from her CPAP.  Turned patient ramp off and EPR to 1.  Patient will call Monday if this isn't working for.         Assessment: Pt not meeting objective benchmarks for compliance  Patient failing following subjective benchmarks: pressure issues    Action plan: waiting for patient to return call.  and pt to have 30 day STM visit.      Device type: Auto-CPAP    PAP settings: CPAP min 5.0 cm  H20       CPAP max 15.0 cm  H20      95th% pressure 11.2 cm  H20        RESMED EPR level Setting: TWO    RESMED Soft response setting:  OFF    Mask type:  Nasal Mask    Objective measures: 14 day rolling measures      Compliance  50 %      Leak  7.2  lpm  last  upload      AHI 3   last  upload      Average number of minutes 496      Objective measure goal  Compliance   Goal >70%  Leak   Goal < 24 lpm  AHI  Goal < 5  Usage  Goal >240        Total time spent on accessing and interpreting remote patient PAP therapy data  10 minutes    Total time spent counseling, coaching  and reviewing PAP therapy data with patient  3 minutes    [id number]  [id number]  no (3 day STM)

## 2022-11-08 ENCOUNTER — DOCUMENTATION ONLY (OUTPATIENT)
Dept: SLEEP MEDICINE | Facility: CLINIC | Age: 52
End: 2022-11-08
Payer: COMMERCIAL

## 2022-11-08 NOTE — PROGRESS NOTES
Patient called back and she stated she enough CPAP pressure when she inhales.  This is happening in the middle of night and then she removes her mask.  Turned patient ramp off.  She will call back if this doesn't help.

## 2022-11-08 NOTE — PROGRESS NOTES
STM Recheck:  Patient called and left message about her pressures called patient back and left message.

## 2022-11-14 ENCOUNTER — DOCUMENTATION ONLY (OUTPATIENT)
Dept: SLEEP MEDICINE | Facility: CLINIC | Age: 52
End: 2022-11-14
Payer: COMMERCIAL

## 2022-11-14 DIAGNOSIS — G47.33 OSA ON CPAP: Primary | ICD-10-CM

## 2022-11-14 NOTE — PROGRESS NOTES
STM Recheck:  Patient looking to start at a higher CPAP pressure.  Will put in for a pressure change 9-15 cm H20.

## 2022-12-02 ENCOUNTER — LAB (OUTPATIENT)
Dept: LAB | Facility: CLINIC | Age: 52
End: 2022-12-02
Payer: COMMERCIAL

## 2022-12-02 DIAGNOSIS — E83.19 OTHER DISORDERS OF IRON METABOLISM: ICD-10-CM

## 2022-12-02 PROCEDURE — 36415 COLL VENOUS BLD VENIPUNCTURE: CPT

## 2022-12-02 PROCEDURE — 82728 ASSAY OF FERRITIN: CPT

## 2022-12-03 LAB — FERRITIN SERPL-MCNC: 88 NG/ML (ref 11–328)

## 2022-12-06 ENCOUNTER — TRANSFERRED RECORDS (OUTPATIENT)
Dept: HEALTH INFORMATION MANAGEMENT | Facility: CLINIC | Age: 52
End: 2022-12-06

## 2022-12-22 ENCOUNTER — MYC MEDICAL ADVICE (OUTPATIENT)
Dept: FAMILY MEDICINE | Facility: CLINIC | Age: 52
End: 2022-12-22

## 2022-12-22 DIAGNOSIS — F40.243 FEAR OF FLYING: Primary | ICD-10-CM

## 2022-12-22 NOTE — TELEPHONE ENCOUNTER
Please see Elastera message in reference to alprazolam PRN for flying. Routed to PCP, Please review and advise.     Thank you,    Baldev Daugherty RN

## 2022-12-23 RX ORDER — ALPRAZOLAM 0.25 MG
TABLET ORAL
Qty: 2 TABLET | Refills: 0 | Status: SHIPPED | OUTPATIENT
Start: 2022-12-23 | End: 2023-03-08

## 2023-01-25 DIAGNOSIS — Z17.0 MALIGNANT NEOPLASM OF BREAST IN FEMALE, ESTROGEN RECEPTOR POSITIVE, UNSPECIFIED LATERALITY, UNSPECIFIED SITE OF BREAST (H): ICD-10-CM

## 2023-01-25 DIAGNOSIS — C50.919 MALIGNANT NEOPLASM OF BREAST IN FEMALE, ESTROGEN RECEPTOR POSITIVE, UNSPECIFIED LATERALITY, UNSPECIFIED SITE OF BREAST (H): ICD-10-CM

## 2023-01-25 NOTE — TELEPHONE ENCOUNTER
Pending Prescriptions:                       Disp   Refills    tamoxifen (NOLVADEX) 20 MG tablet         90 tab*3            Sig: Take 1 tablet (20 mg) by mouth daily          Last Written Prescription Date:  2/16/22  Last Fill Quantity: 90,   # refills: 3  Last Office Visit: 8/16/22  Future Office visit:    Next 5 appointments (look out 90 days)    Feb 15, 2023  3:00 PM  Return Visit with Tata Knott MD  [facility] [address]  [address]  [facility] [address]  [phone]   Mar 27, 2023  9:00 AM  (Arrive by 8:40 AM)  Adult Preventative Visit with NIKKI Santos Ra, CNP  [facility] [address] [facility] [address] [phone]           Routing refill request to provider for review/approval.    Deepti Mera, RN, BSN, OCN  Nurse Care Coordinator  [facility]  P: [phone]     F: [phone]

## 2023-01-26 RX ORDER — TAMOXIFEN CITRATE 20 MG/1
20 TABLET ORAL DAILY
Qty: 90 TABLET | Refills: 3 | Status: SHIPPED | OUTPATIENT
Start: 2023-01-26 | End: 2024-01-22

## 2023-01-26 NOTE — TELEPHONE ENCOUNTER
Signed Prescriptions:                        Disp   Refills    tamoxifen (NOLVADEX) 20 MG tablet          90 tab*3        Sig: Take 1 tablet (20 mg) by mouth daily  Authorizing Provider: TANG VERDUZCO, RN, BSN, OCN  Nurse Care Coordinator  [facility]  P: [phone]     F: [phone]

## 2023-02-02 ENCOUNTER — TRANSFERRED RECORDS (OUTPATIENT)
Dept: HEALTH INFORMATION MANAGEMENT | Facility: CLINIC | Age: 53
End: 2023-02-02

## 2023-02-15 ENCOUNTER — ONCOLOGY VISIT (OUTPATIENT)
Dept: ONCOLOGY | Facility: CLINIC | Age: 53
End: 2023-02-15
Attending: INTERNAL MEDICINE
Payer: COMMERCIAL

## 2023-02-15 VITALS
BODY MASS INDEX: 37.22 KG/M2 | RESPIRATION RATE: 16 BRPM | SYSTOLIC BLOOD PRESSURE: 138 MMHG | OXYGEN SATURATION: 98 % | HEART RATE: 98 BPM | WEIGHT: 218 LBS | DIASTOLIC BLOOD PRESSURE: 93 MMHG | HEIGHT: 64 IN | TEMPERATURE: 98 F

## 2023-02-15 DIAGNOSIS — C50.919 MALIGNANT NEOPLASM OF BREAST IN FEMALE, ESTROGEN RECEPTOR POSITIVE, UNSPECIFIED LATERALITY, UNSPECIFIED SITE OF BREAST (H): ICD-10-CM

## 2023-02-15 DIAGNOSIS — Z17.0 MALIGNANT NEOPLASM OF BREAST IN FEMALE, ESTROGEN RECEPTOR POSITIVE, UNSPECIFIED LATERALITY, UNSPECIFIED SITE OF BREAST (H): Primary | ICD-10-CM

## 2023-02-15 DIAGNOSIS — C50.919 MALIGNANT NEOPLASM OF BREAST IN FEMALE, ESTROGEN RECEPTOR POSITIVE, UNSPECIFIED LATERALITY, UNSPECIFIED SITE OF BREAST (H): Primary | ICD-10-CM

## 2023-02-15 DIAGNOSIS — Z17.0 MALIGNANT NEOPLASM OF BREAST IN FEMALE, ESTROGEN RECEPTOR POSITIVE, UNSPECIFIED LATERALITY, UNSPECIFIED SITE OF BREAST (H): ICD-10-CM

## 2023-02-15 LAB
ALBUMIN SERPL BCG-MCNC: 4.5 G/DL (ref 3.5–5.2)
ALP SERPL-CCNC: 94 U/L (ref 35–104)
ALT SERPL W P-5'-P-CCNC: 26 U/L (ref 10–35)
ANION GAP SERPL CALCULATED.3IONS-SCNC: 10 MMOL/L (ref 7–15)
AST SERPL W P-5'-P-CCNC: 22 U/L (ref 10–35)
BILIRUB SERPL-MCNC: 0.7 MG/DL
BUN SERPL-MCNC: 14.8 MG/DL (ref 6–20)
CALCIUM SERPL-MCNC: 9.6 MG/DL (ref 8.6–10)
CANCER AG15-3 SERPL-ACNC: 16 U/ML
CHLORIDE SERPL-SCNC: 102 MMOL/L (ref 98–107)
CREAT SERPL-MCNC: 0.71 MG/DL (ref 0.51–0.95)
DEPRECATED HCO3 PLAS-SCNC: 27 MMOL/L (ref 22–29)
ERYTHROCYTE [DISTWIDTH] IN BLOOD BY AUTOMATED COUNT: 13.2 % (ref 10–15)
GFR SERPL CREATININE-BSD FRML MDRD: >90 ML/MIN/1.73M2
GLUCOSE SERPL-MCNC: 109 MG/DL (ref 70–99)
HCT VFR BLD AUTO: 40.1 % (ref 35–47)
HGB BLD-MCNC: 13.4 G/DL (ref 11.7–15.7)
MCH RBC QN AUTO: 29.2 PG (ref 26.5–33)
MCHC RBC AUTO-ENTMCNC: 33.4 G/DL (ref 31.5–36.5)
MCV RBC AUTO: 87 FL (ref 78–100)
PLATELET # BLD AUTO: 291 10E3/UL (ref 150–450)
POTASSIUM SERPL-SCNC: 3.6 MMOL/L (ref 3.4–5.3)
PROT SERPL-MCNC: 7.5 G/DL (ref 6.4–8.3)
RBC # BLD AUTO: 4.59 10E6/UL (ref 3.8–5.2)
SODIUM SERPL-SCNC: 139 MMOL/L (ref 136–145)
WBC # BLD AUTO: 6.9 10E3/UL (ref 4–11)

## 2023-02-15 PROCEDURE — 99214 OFFICE O/P EST MOD 30 MIN: CPT | Performed by: INTERNAL MEDICINE

## 2023-02-15 PROCEDURE — 80053 COMPREHEN METABOLIC PANEL: CPT | Performed by: INTERNAL MEDICINE

## 2023-02-15 PROCEDURE — G0463 HOSPITAL OUTPT CLINIC VISIT: HCPCS | Performed by: INTERNAL MEDICINE

## 2023-02-15 PROCEDURE — 85027 COMPLETE CBC AUTOMATED: CPT | Performed by: INTERNAL MEDICINE

## 2023-02-15 PROCEDURE — 86300 IMMUNOASSAY TUMOR CA 15-3: CPT | Performed by: INTERNAL MEDICINE

## 2023-02-15 PROCEDURE — 36415 COLL VENOUS BLD VENIPUNCTURE: CPT

## 2023-02-15 ASSESSMENT — PAIN SCALES - GENERAL: PAINLEVEL: NO PAIN (0)

## 2023-02-15 NOTE — PROGRESS NOTES
Medical Assistant Note:  Shantell Wagner presents today for blood draw .    Patient seen by provider today: Yes: Dr. Knott.   present during visit today: Not Applicable.    Concerns: No Concerns.    Procedure:  Labs drawn    Post Assessment:  Labs drawn without difficulty: Yes.    Discharge Plan:  Departure Mode: Ambulatory.    Face to Face Time: 10 min.    Marika Rodriguez [facility]

## 2023-02-15 NOTE — LETTER
2/15/2023         RE: Shantell Wagner  [address]  [place] [address]        Dear Colleague,    Thank you for referring your patient, Shantell Wagner, to the [facility]. Please see a copy of my visit note below.    Visit Date: 02/15/2023    Shantell Wagner is a 52-year-old patient who is here today for interim followup.    CHIEF COMPLAINT:     1.  Right-sided breast cancer, high risk at diagnosis, ER positive, NH negative, HER-2 receptor negative.  2.  Diagnosis of CHEK2 mutation.    HISTORY OF PRESENT ILLNESS:  Shantell is 52-year-old patient with a CHEK2 mutation who presented with a locally advanced right-sided breast cancer.  She had a 2.2 cm tumor and a 2.7 cm axillary mass on the right side.  She finished up adjuvant chemotherapy and is continuing to do adjuvant tamoxifen and the plan is to do tamoxifen for a full 10 years.  Her diagnosis was in 2016.  Her stop date on the tamoxifen would be sometime in the early part of 2027.  Overall, she feels well today.  She denies cough, shortness of breath, bone pain, any worrisome symptomatology.  She is up to date on a colonoscopy, which was recently done.    PAST MEDICAL HISTORY:  History of hypercholesterolemia, history of right-sided breast cancer, history of CHEK2 mutation.    FAMILY HISTORY:  Sister with breast cancer, maternal grandmother with breast cancer.  Father with prostate cancer.    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    MEDICATIONS AND ALLERGIES:  Outlined in the nursing records.    PHYSICAL EXAMINATION:    GENERAL:  She is well-appearing lady in no acute distress.  VITAL SIGNS:  Stable.  NECK:  No masses or goiter.  CHEST:  Clear to auscultation and percussion bilaterally.  HEART:  Heart sounds 1, 2 normal, no added sounds, no murmurs.   BREASTS:  She is status post bilateral mastectomy.  Scars look good.  Right and left axillae are  negative.  GASTROINTESTINAL:  Abdomen is soft and nontender.  No hepatosplenomegaly.  EXTREMITIES:  Legs without tenderness or edema.  SKIN:  No rashes or lesions.    DATA REVIEW:  Liver function test within normal limits.  Creatinine 0.71.  White cell count 6.9, hemoglobin 15.4, platelets 291.    IMPRESSION:  This is a 52-year-old patient with a diagnosis of a locally advanced right-sided breast cancer.  She will continue on adjuvant tamoxifen.  She is doing well from my standpoint.  She has no major concerns today.  I will see her back in my clinic in 6 months.      Tata Knott MD        D: 2023   T: 2023   MT: PAKMT    Name:     CADE GARCÍA  MRN:      [id number]        Account:    [id number]   :      1970           Visit Date: 02/15/2023     Document: [id number]      Again, thank you for allowing me to participate in the care of your patient.        Sincerely,        Tata Knott MD

## 2023-02-15 NOTE — NURSING NOTE
"Oncology Rooming Note    February 15, 2023 2:55 PM   Shantell Wagner is a 52 year old female who presents for:    Chief Complaint   Patient presents with     Oncology Clinic Visit     Malignant neoplasm of upper-outer quadrant of right female breast, unspecified estrogen receptor status     Initial Vitals: Ht 1.626 m (5' 4\")   Wt 98.9 kg (218 lb)   BMI 37.42 kg/m   Estimated body mass index is 37.42 kg/m  as calculated from the following:    Height as of this encounter: 1.626 m (5' 4\").    Weight as of this encounter: 98.9 kg (218 lb). Body surface area is 2.11 meters squared.  No Pain (0) Comment: Data Unavailable   No LMP recorded.  Allergies reviewed: Yes  Medications reviewed: Yes    Medications: Medication refills not needed today.  Pharmacy name entered into [employer]: [id number] - [place], [place] - [address]    Clinical concerns: f/u       Evelin Chun, CHAPO              "

## 2023-02-16 NOTE — PROGRESS NOTES
Visit Date: 02/15/2023    Shantell Wagner is a 52-year-old patient who is here today for interim followup.    CHIEF COMPLAINT:     1.  Right-sided breast cancer, high risk at diagnosis, ER positive, MI negative, HER-2 receptor negative.  2.  Diagnosis of CHEK2 mutation.    HISTORY OF PRESENT ILLNESS:  Shantell is 52-year-old patient with a CHEK2 mutation who presented with a locally advanced right-sided breast cancer.  She had a 2.2 cm tumor and a 2.7 cm axillary mass on the right side.  She finished up adjuvant chemotherapy and is continuing to do adjuvant tamoxifen and the plan is to do tamoxifen for a full 10 years.  Her diagnosis was in 2016.  Her stop date on the tamoxifen would be sometime in the early part of 2027.  Overall, she feels well today.  She denies cough, shortness of breath, bone pain, any worrisome symptomatology.  She is up to date on a colonoscopy, which was recently done.    PAST MEDICAL HISTORY:  History of hypercholesterolemia, history of right-sided breast cancer, history of CHEK2 mutation.    FAMILY HISTORY:  Sister with breast cancer, maternal grandmother with breast cancer.  Father with prostate cancer.    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    MEDICATIONS AND ALLERGIES:  Outlined in the nursing records.    PHYSICAL EXAMINATION:    GENERAL:  She is well-appearing lady in no acute distress.  VITAL SIGNS:  Stable.  NECK:  No masses or goiter.  CHEST:  Clear to auscultation and percussion bilaterally.  HEART:  Heart sounds 1, 2 normal, no added sounds, no murmurs.   BREASTS:  She is status post bilateral mastectomy.  Scars look good.  Right and left axillae are negative.  GASTROINTESTINAL:  Abdomen is soft and nontender.  No hepatosplenomegaly.  EXTREMITIES:  Legs without tenderness or edema.  SKIN:  No rashes or lesions.    DATA REVIEW:  Liver function test within normal limits.  Creatinine 0.71.  White cell count 6.9, hemoglobin 15.4, platelets  291.    IMPRESSION:  This is a 52-year-old patient with a diagnosis of a locally advanced right-sided breast cancer.  She will continue on adjuvant tamoxifen.  She is doing well from my standpoint.  She has no major concerns today.  I will see her back in my clinic in 6 months.      Tata Knott MD        D: 2023   T: 2023   MT: PAKMT    Name:     CADE GARCÍA  MRN:      [id number]        Account:    [id number]   :      1970           Visit Date: 02/15/2023     Document: [id number]

## 2023-03-01 ASSESSMENT — SLEEP AND FATIGUE QUESTIONNAIRES
HOW LIKELY ARE YOU TO NOD OFF OR FALL ASLEEP WHEN YOU ARE A PASSENGER IN A CAR FOR AN HOUR WITHOUT A BREAK: HIGH CHANCE OF DOZING
HOW LIKELY ARE YOU TO NOD OFF OR FALL ASLEEP WHILE SITTING QUIETLY AFTER LUNCH WITHOUT ALCOHOL: SLIGHT CHANCE OF DOZING
HOW LIKELY ARE YOU TO NOD OFF OR FALL ASLEEP WHILE LYING DOWN TO REST IN THE AFTERNOON WHEN CIRCUMSTANCES PERMIT: HIGH CHANCE OF DOZING
HOW LIKELY ARE YOU TO NOD OFF OR FALL ASLEEP WHILE SITTING INACTIVE IN A PUBLIC PLACE: SLIGHT CHANCE OF DOZING
HOW LIKELY ARE YOU TO NOD OFF OR FALL ASLEEP WHILE WATCHING TV: MODERATE CHANCE OF DOZING
HOW LIKELY ARE YOU TO NOD OFF OR FALL ASLEEP WHILE SITTING AND TALKING TO SOMEONE: SLIGHT CHANCE OF DOZING
HOW LIKELY ARE YOU TO NOD OFF OR FALL ASLEEP WHILE SITTING AND READING: HIGH CHANCE OF DOZING
HOW LIKELY ARE YOU TO NOD OFF OR FALL ASLEEP IN A CAR, WHILE STOPPED FOR A FEW MINUTES IN TRAFFIC: WOULD NEVER DOZE

## 2023-03-08 ENCOUNTER — VIRTUAL VISIT (OUTPATIENT)
Dept: SLEEP MEDICINE | Facility: CLINIC | Age: 53
End: 2023-03-08
Payer: COMMERCIAL

## 2023-03-08 DIAGNOSIS — G47.33 OBSTRUCTIVE SLEEP APNEA: Primary | ICD-10-CM

## 2023-03-08 PROCEDURE — 99214 OFFICE O/P EST MOD 30 MIN: CPT | Mod: VID | Performed by: INTERNAL MEDICINE

## 2023-03-08 NOTE — PROGRESS NOTES
Video-Visit Details    Type of service:  Video Visit    Video Start Time (time video started): 3:48 PM    Video End Time (time video stopped): 3:55 PM    Originating Location (pt. Location): Home    Distant Location (provider location):  On-site    Mode of Communication:  Video Conference via [employer]    Additional 10 minutes on the date of service was spent performing the following:    -Preparing to see the patient  -Ordering medications, tests, or procedures   -Documenting clinical information in the electronic or other health record     Thank you for the opportunity to participate in the care of Shantell Wagner.     She is a 52 year old y/o female patient who comes to the sleep medicine clinic for follow up.  The patient was diagnosed with obstructive sleep apnea at our facility on 08/21/2022 (AHI = 21.5).  This is the patient's first clinical visit since getting started on CPAP.  While the patient states that her snoring has completely been eliminated, she has not noticed any dramatic improvement in her sleep quality or energy level.     Assessment and Plan:  In summary Shantell Wagner is a 52 year old year old female who is here for follow up.    1. Obstructive sleep apnea  I encouraged the patient to increase her hours of usage insofar as she can.  I will narrow her pressure range to 9-12 CWP.  Return to clinic every 2 years.  - COMPREHENSIVE DME     Compliance Download data for 30 Days:  Pressure setting:APAP 9-15 cwp  95% pressure:11.7 cwp  Residual AHI:0.7 events per hour  Leak:Minimal  Compliance:67%  Mask Tolerance:Good  Skin irritation:None  DME:[facility]    Lab reviewed: Discussed with patient.    Cpap Fu Template    Question 3/1/2023  8:54 PM CST - Filed by Patient   Do you use a CPAP Machine at home? Yes   Is your mask leaking? No   Do you notice snoring with mask on? No   Do you notice gasping arousals with mask on? No   Are you having significant oral or nasal dryness? Yes   Is  the pressure setting comfortable? Yes   What type of mask do you use? Nasal Pillow   What is your typical bedtime? 10   How long does it take you to go to sleep on PAP therapy? 10 or less minutes   What time do you typically get out of bed for the day? 5:45   How many hours on average per night are you using PAP therapy? 5-6   How many hours are you sleeping per night? 6   Do you feel well rested in the morning? 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 Repeat HPV screen and ECC 12 months from colp.     Cancer (H) 04/2016     Hyperlipidemia      Hypertension     At doctors appointments my blood pressure is elevated.     Neuropathy      Nose fracture 1983    Accident, needed surgery       Past Surgical History:   Procedure Laterality Date     CARPAL TUNNEL RELEASE RT/LT  3/2010     COLONOSCOPY       HC RECONSTR NOSE  1983    after accident     INSERT PORT VASCULAR ACCESS Left 4/22/2016    Procedure: INSERT PORT VASCULAR ACCESS;  Surgeon: Nereyda Flowers MD;  Location: [facility] OR     INSERT TISSUE EXPANDER BREAST BILATERAL Bilateral 10/17/2016    Procedure: INSERT TISSUE EXPANDER BREAST BILATERAL;  Surgeon: Jenna Vazquez MD;  Location:  OR     LASIK  BILATERAL       MASTECTOMY MODIFIED RADICAL Right 10/17/2016    Procedure: MASTECTOMY MODIFIED RADICAL;  Surgeon: Nereyda Flowers MD;  Location: [facility] OR     MASTECTOMY MODIFIED RADICAL, SENTINEL NODE, COMBINED Left 10/17/2016    Procedure: COMBINED MASTECTOMY MODIFIED RADICAL, SENTINEL NODE;  Surgeon: Nereyda Flowers MD;  Location: [facility] OR     PROCURE GRAFT FAT Bilateral 4/25/2018    Procedure: PROCURE GRAFT FAT;;  Surgeon: Jenna Vazquez MD;  Location: [facility]     RECONSTRUCT BREAST BILATERAL, IMPLANT PROSTHESIS BILATERAL, COMBINED Bilateral 9/13/2017    Procedure: COMBINED RECONSTRUCT BREAST BILATERAL, IMPLANT PROSTHESIS BILATERAL;  BILATERAL SECOND STAGE BREAST RECONSTRUCTION WITH IMPLANT.;  Surgeon: Jenna Vazquez MD;  Location: [facility]     REMOVE CATHETER VASCULAR ACCESS Left 10/17/2016    Procedure: REMOVE CATHETER VASCULAR ACCESS;  Surgeon: Nereyda Flowers MD;  Location: [facility] OR     REVISE RECONSTRUCTED BREAST BILATERAL Bilateral 4/25/2018    Procedure: REVISE RECONSTRUCTED BREAST BILATERAL;  REVISION BILATERAL BREAST RECONSTRUCTION AND  FAT GRAFTING FROM AXILLA TO BILATERAL BREASTS.;  Surgeon: Jenna Vazquez MD;  Location: [facility]       Current Outpatient Medications   Medication Sig Dispense Refill     atorvastatin (LIPITOR) 20 MG tablet Take 1 tablet (20 mg) by mouth daily 90 tablet 3     gabapentin (NEURONTIN) 300 MG capsule TAKE 1 CAPSULE BY MOUTH AT BEDTIME 90 capsule 3     glucosamine-chondroitin 500-400 MG CAPS Take 1 capsule by mouth daily       losartan (COZAAR) 25 MG tablet Take 1 tablet (25 mg) by mouth daily 90 tablet 3     omeprazole (PRILOSEC) 20 MG capsule Take 20 mg by mouth every other day        tamoxifen (NOLVADEX) 20 MG tablet Take 1 tablet (20 mg) by mouth daily 90 tablet 3       Allergies   Allergen Reactions     No Known Drug Allergies        Physical Exam:  GEN: NAD,  Psych: normal mood, normal affect    Labs/Studies:      No results found for: PH, PHARTERIAL,  PO2, [id number], SAT, PCO2, HCO3, BASEEXCESS, JASON, BEB  Lab Results   Component Value Date    TSH 1.63 04/21/2021    TSH 1.74 04/06/2018     Lab Results   Component Value Date     (H) 02/15/2023     (H) 06/09/2022     Lab Results   Component Value Date    HGB 13.4 02/15/2023    HGB 13.7 06/09/2022     Lab Results   Component Value Date    BUN 14.8 02/15/2023    BUN 13 06/09/2022    CR 0.71 02/15/2023    CR 0.86 06/09/2022     Lab Results   Component Value Date    AST 22 02/15/2023    AST 32 03/25/2022    ALT 26 02/15/2023    ALT 60 (H) 03/25/2022    ALKPHOS 94 02/15/2023    ALKPHOS 101 03/25/2022    BILITOTAL 0.7 02/15/2023    BILITOTAL 0.6 03/25/2022     No results found for: UAMP, UBARB, BENZODIAZEUR, UCANN, UCOC, OPIT, UPCP    Recent Labs   Lab Test 02/15/23  1533 06/09/22  0918    140   POTASSIUM 3.6 4.5   CHLORIDE 102 106   CO2 27 29   ANIONGAP 10 5   * 110*   BUN 14.8 13   CR 0.71 0.86   CASIE 9.6 9.8       Ferritin   Date Value Ref Range Status   12/02/2022 88 11 - 328 ng/mL Final   11/21/2016 71 8 - 252 ng/mL Final       I reviewed the efficacy and compliance report from her device. Data summarized on the HPI and the PAP compliance flow sheet.     Patient verbalized understanding of these issues, agrees with the plan and all questions were answered today. Patient was given an opportuntity to voice any other symptoms or concerns not listed above. Patient did not have any other symptoms or concerns.      Amanuel Ferrer DO  Board Certified in Internal Medicine and Sleep Medicine    (Note created with Dragon voice recognition and unintended spelling errors and word substitutions may occur)     Audio and visual devices were used for this virtual clinic visit with permission from patient.

## 2023-03-24 NOTE — PROGRESS NOTES
Pt is completing a home sleep test. Pt was instructed on how to put on the Noxturnal T3 device and associated equipment before going to bed and given the opportunity to practice putting it on before leaving the sleep center. Pt was reminded to bring the home sleep test kit back to the center tomorrow, at agreed upon time for download and reporting.   
This HSAT was performed using a Noxturnal T3 device which recorded snore, sound, movement activity, body position, nasal pressure, oronasal thermal airflow, pulse, oximetry and both chest and abdominal respiratory effort. HSAT data was restricted to the time patient states they were in bed.     HSAT was scored using 1B 4% hypopnea rule.     AHI: 3.4. Snoring was reported as loud.  Time with SpO2 below 89% was 0.0 minutes.   Overall signal quality was good     Pt will follow up with sleep provider to determine appropriate therapy.     Ordering Yessenia HERNANDEZ Abdullahi I, MD Charles O. BA, [facility], [facility] Clinical Specialist 04/30/2019  
Detail Level: Zone
Continue Regimen: -tretinoin 0.025 % topical cream QHS\\nSig: Apply a pea sized amount at bedtime to facial acne every other night working up to nightly as tolerated.\\n\\n-clindamycin 1 %-benzoyl peroxide 5 % topical gel \\nSig: Apply small amount to face QAM.
Modify Regimen: Targadox 50mg\\nSig: Take 2 pills once daily with full glass of water. Do not lie down until one hour after taking.

## 2023-04-16 ENCOUNTER — HEALTH MAINTENANCE LETTER (OUTPATIENT)
Age: 53
End: 2023-04-16

## 2023-05-21 ENCOUNTER — HOSPITAL ENCOUNTER (EMERGENCY)
Facility: CLINIC | Age: 53
Discharge: HOME | End: 2023-05-21
Attending: EMERGENCY MEDICINE | Admitting: EMERGENCY MEDICINE
Payer: COMMERCIAL

## 2023-05-21 VITALS
DIASTOLIC BLOOD PRESSURE: 107 MMHG | RESPIRATION RATE: 16 BRPM | TEMPERATURE: 96.6 F | OXYGEN SATURATION: 93 % | SYSTOLIC BLOOD PRESSURE: 175 MMHG | HEART RATE: 73 BPM

## 2023-05-21 DIAGNOSIS — R07.89 NON-CARDIAC CHEST PAIN: ICD-10-CM

## 2023-05-21 LAB
ANION GAP SERPL CALCULATED.3IONS-SCNC: 11 MMOL/L (ref 7–15)
BASOPHILS # BLD AUTO: 0.1 10E3/UL (ref 0–0.2)
BASOPHILS NFR BLD AUTO: 1 %
BUN SERPL-MCNC: 22.5 MG/DL (ref 6–20)
CALCIUM SERPL-MCNC: 9.9 MG/DL (ref 8.6–10)
CHLORIDE SERPL-SCNC: 102 MMOL/L (ref 98–107)
CREAT SERPL-MCNC: 0.8 MG/DL (ref 0.51–0.95)
DEPRECATED HCO3 PLAS-SCNC: 25 MMOL/L (ref 22–29)
EOSINOPHIL # BLD AUTO: 0.2 10E3/UL (ref 0–0.7)
EOSINOPHIL NFR BLD AUTO: 2 %
ERYTHROCYTE [DISTWIDTH] IN BLOOD BY AUTOMATED COUNT: 12 % (ref 10–15)
GFR SERPL CREATININE-BSD FRML MDRD: 88 ML/MIN/1.73M2
GLUCOSE SERPL-MCNC: 105 MG/DL (ref 70–99)
HCT VFR BLD AUTO: 40.2 % (ref 35–47)
HGB BLD-MCNC: 13.5 G/DL (ref 11.7–15.7)
HOLD SPECIMEN: NORMAL
IMM GRANULOCYTES # BLD: 0 10E3/UL
IMM GRANULOCYTES NFR BLD: 0 %
LYMPHOCYTES # BLD AUTO: 3.6 10E3/UL (ref 0.8–5.3)
LYMPHOCYTES NFR BLD AUTO: 44 %
MCH RBC QN AUTO: 29.5 PG (ref 26.5–33)
MCHC RBC AUTO-ENTMCNC: 33.6 G/DL (ref 31.5–36.5)
MCV RBC AUTO: 88 FL (ref 78–100)
MONOCYTES # BLD AUTO: 0.7 10E3/UL (ref 0–1.3)
MONOCYTES NFR BLD AUTO: 9 %
NEUTROPHILS # BLD AUTO: 3.5 10E3/UL (ref 1.6–8.3)
NEUTROPHILS NFR BLD AUTO: 44 %
NRBC # BLD AUTO: 0 10E3/UL
NRBC BLD AUTO-RTO: 0 /100
PLATELET # BLD AUTO: 301 10E3/UL (ref 150–450)
POTASSIUM SERPL-SCNC: 3.5 MMOL/L (ref 3.4–5.3)
RBC # BLD AUTO: 4.57 10E6/UL (ref 3.8–5.2)
SODIUM SERPL-SCNC: 138 MMOL/L (ref 136–145)
TROPONIN T SERPL HS-MCNC: <6 NG/L
TROPONIN T SERPL HS-MCNC: <6 NG/L
WBC # BLD AUTO: 7.9 10E3/UL (ref 4–11)

## 2023-05-21 PROCEDURE — 84484 ASSAY OF TROPONIN QUANT: CPT | Performed by: EMERGENCY MEDICINE

## 2023-05-21 PROCEDURE — 84484 ASSAY OF TROPONIN QUANT: CPT | Mod: 91 | Performed by: EMERGENCY MEDICINE

## 2023-05-21 PROCEDURE — 36415 COLL VENOUS BLD VENIPUNCTURE: CPT | Performed by: EMERGENCY MEDICINE

## 2023-05-21 PROCEDURE — 99284 EMERGENCY DEPT VISIT MOD MDM: CPT

## 2023-05-21 PROCEDURE — 93005 ELECTROCARDIOGRAM TRACING: CPT

## 2023-05-21 PROCEDURE — 85025 COMPLETE CBC W/AUTO DIFF WBC: CPT | Performed by: EMERGENCY MEDICINE

## 2023-05-21 PROCEDURE — 80048 BASIC METABOLIC PNL TOTAL CA: CPT | Performed by: EMERGENCY MEDICINE

## 2023-05-21 ASSESSMENT — ACTIVITIES OF DAILY LIVING (ADL)
ADLS_ACUITY_SCORE: 35
ADLS_ACUITY_SCORE: 35

## 2023-05-21 NOTE — ED PROVIDER NOTES
History     Chief Complaint:  Chest Pain     HPI   Shantell Wagner is a 52 year old female with a history of hypertension and hyperlipidemia who presents for evaluation of chest pain. Yesterday the patient felt nauseated throughout the day. This morning around 0130 she awoke with new aching pain primarily in her left upper chest. Due to concern for this she came into the ED for evaluation. She notes that she has been having this nausea intermittently for several weeks. She denies any fever, cough, shortness of breath, or vomiting. Her pain is not worse with deep breathing. She denies any history of heart disease.     Independent Historian:   None - Patient Only    Review of External Notes:   I reviewed Care Everywhere and updated Epic.      Medications:    Lipitor  Gabapentin  Glucosamine-chondroitin   Losartan  Omeprazole  Tamoxifen     Past Medical History:    Breast cancer  Hypertension  Hyperlipidemia  Neuropathy     Past Surgical History:    Carpal tunnel release  Colonoscopy  Insert port vascular access   Insert tissue expander breast bilateral  Lasik bilateral  Mastectomy modified radical right   Mastectomy modified radical, sentinel node, combined, left   Procure graft fat bilateral  Reconstruct breast bilateral, implant prosthesis bilateral, combined  Remove catheter vascular access  Revise reconstructed breast bilateral  Rhinoplasty     Physical Exam     Patient Vitals for the past 24 hrs:   BP Temp Temp src Pulse Resp SpO2   05/21/23 0242  175/107  96.6  F (35.9  C) Temporal 93 16 100 %      Physical Exam  Nursing note and vitals reviewed.  Constitutional: Cooperative.   HENT:   Mouth/Throat: Mucous membranes are normal.   Cardiovascular: Normal rate, regular rhythm and normal heart sounds.  No murmur.  Pulmonary/Chest: Effort normal and breath sounds normal. No respiratory distress. No wheezes. No rales.   Abdominal: Soft. Normal appearance. There is no tenderness.   Neurological: Alert. Oriented  x4  Skin: Skin is warm and dry. No rash noted.   Psychiatric: Normal mood and affect.      Emergency Department Course   ECG  ECG taken at 2:46:28, ECG read at 0323  Normal sinus rhythm.  Rate 86 bpm. AL interval 160 ms. QRS duration 76 ms. QT/QTc 374/447 ms. P-R-T axes 43 37 37.      Laboratory:  Labs Ordered and Resulted from Time of ED Arrival to Time of ED Departure   BASIC METABOLIC PANEL - Abnormal       Result Value    Sodium 138      Potassium 3.5      Chloride 102      Carbon Dioxide (CO2) 25      Anion Gap 11      Urea Nitrogen 22.5 (*)     Creatinine 0.80      Calcium 9.9      Glucose 105 (*)     GFR Estimate 88     TROPONIN T, HIGH SENSITIVITY - Normal    Troponin T, High Sensitivity <6     TROPONIN T, HIGH SENSITIVITY - Normal -delta 2-hour    Troponin T, High Sensitivity <6     CBC WITH PLATELETS AND DIFFERENTIAL    WBC Count 7.9      RBC Count 4.57      Hemoglobin 13.5      Hematocrit 40.2      MCV 88      MCH 29.5      MCHC 33.6      RDW 12.0      Platelet Count 301      % Neutrophils 44      % Lymphocytes 44      % Monocytes 9      % Eosinophils 2      % Basophils 1      % Immature Granulocytes 0      NRBCs per 100 WBC 0      Absolute Neutrophils 3.5      Absolute Lymphocytes 3.6      Absolute Monocytes 0.7      Absolute Eosinophils 0.2      Absolute Basophils 0.1      Absolute Immature Granulocytes 0.0      Absolute NRBCs 0.0            Assessments:  0325: The patient was seen and evaluated.     0527: I updated and reassessed the patient.     Independent Interpretation (X-rays, CTs, rhythm strip):  None    Consultations/Discussion of Management or Tests:  None        Social Determinants of Health affecting care:   None    Disposition:  The patient was discharged to home.     Impression & Plan      Medical Decision Making:  Shantell Wagner is a 52 year old female who presents with chest pain.  The work up in the Emergency Department is negative.  I considered a broad differential diagnosis in  this patient including life-threatening etiologies such as acute coronary syndrome, myocardial infarction, pulmonary embolism, acute aortic dissection, myocarditis, pericarditis, acute valvular insufficiency amongst others.  Other causes considered for this patient included pneumonia, pneumothorax, chest wall source, pericarditis, pleurisy, esophageal spasm, etc.  No serious etiology for the chest pain were detected today during this visit.  Close follow up with primary care is indicated should the pain continue, as further work up may be performed; this was made clear to the patient, who understands.      Diagnosis:    ICD-10-CM    1. Non-cardiac chest pain  R07.89            Scribe Disclosure:  I, Jai Carrero, am serving as a scribe at 3:28 AM on 5/21/2023 to document services personally performed by Skyler Shah MD based on my observations and the provider's statements to me.   5/21/2023   Skyler Shah MD Amdahl, John, MD  05/21/23 3610

## 2023-05-21 NOTE — ED TRIAGE NOTES
Patient reports chest pain that woke her up around 0130.  She reports it has been intermittent since.  History of breast cancer, neuropathy.  ABCs intact, A&Ox4.     Triage Assessment     Row Name 05/21/23 0239       Triage Assessment (Adult)    Airway WDL WDL       Respiratory WDL    Respiratory WDL WDL       Skin Circulation/Temperature WDL    Skin Circulation/Temperature WDL WDL       Cardiac WDL    Cardiac WDL X;chest pain       Peripheral/Neurovascular WDL    Peripheral Neurovascular WDL WDL       Cognitive/Neuro/Behavioral WDL    Cognitive/Neuro/Behavioral WDL WDL

## 2023-05-21 NOTE — ED NOTES
Patient resting in bed, denies chest pain at present.  Repeat Troponin drawn and sent, awaiting results and MD re-eval, vital signs stable.

## 2023-05-22 LAB
ATRIAL RATE - MUSE: 86 BPM
DIASTOLIC BLOOD PRESSURE - MUSE: NORMAL MMHG
INTERPRETATION ECG - MUSE: NORMAL
P AXIS - MUSE: 43 DEGREES
PR INTERVAL - MUSE: 160 MS
QRS DURATION - MUSE: 76 MS
QT - MUSE: 374 MS
QTC - MUSE: 447 MS
R AXIS - MUSE: 37 DEGREES
SYSTOLIC BLOOD PRESSURE - MUSE: NORMAL MMHG
T AXIS - MUSE: 37 DEGREES
VENTRICULAR RATE- MUSE: 86 BPM

## 2023-06-06 ASSESSMENT — ENCOUNTER SYMPTOMS
FEVER: 0
HEMATURIA: 0
ARTHRALGIAS: 0
NAUSEA: 1
PARESTHESIAS: 0
JOINT SWELLING: 0
DIZZINESS: 0
HEARTBURN: 0
HEADACHES: 0
EYE PAIN: 0
HEMATOCHEZIA: 0
DIARRHEA: 0
CHILLS: 0
SORE THROAT: 0
NERVOUS/ANXIOUS: 0
SHORTNESS OF BREATH: 0
COUGH: 0
CONSTIPATION: 0
WEAKNESS: 0
ABDOMINAL PAIN: 0
BREAST MASS: 0
DYSURIA: 0
PALPITATIONS: 0
FREQUENCY: 0
MYALGIAS: 1

## 2023-06-09 ENCOUNTER — OFFICE VISIT (OUTPATIENT)
Dept: FAMILY MEDICINE | Facility: CLINIC | Age: 53
End: 2023-06-09
Payer: COMMERCIAL

## 2023-06-09 VITALS
HEART RATE: 92 BPM | WEIGHT: 212.4 LBS | HEIGHT: 64 IN | TEMPERATURE: 98 F | DIASTOLIC BLOOD PRESSURE: 75 MMHG | OXYGEN SATURATION: 98 % | BODY MASS INDEX: 36.26 KG/M2 | RESPIRATION RATE: 14 BRPM | SYSTOLIC BLOOD PRESSURE: 121 MMHG

## 2023-06-09 DIAGNOSIS — M79.605 PAIN IN BOTH LOWER EXTREMITIES: ICD-10-CM

## 2023-06-09 DIAGNOSIS — M79.604 PAIN IN BOTH LOWER EXTREMITIES: ICD-10-CM

## 2023-06-09 DIAGNOSIS — E78.5 HYPERLIPIDEMIA LDL GOAL <160: ICD-10-CM

## 2023-06-09 DIAGNOSIS — I10 ESSENTIAL HYPERTENSION: ICD-10-CM

## 2023-06-09 DIAGNOSIS — E66.01 MORBID OBESITY (H): ICD-10-CM

## 2023-06-09 DIAGNOSIS — G25.81 RESTLESS LEG SYNDROME: Primary | ICD-10-CM

## 2023-06-09 DIAGNOSIS — Z00.00 ROUTINE GENERAL MEDICAL EXAMINATION AT A HEALTH CARE FACILITY: ICD-10-CM

## 2023-06-09 LAB
CHOLEST SERPL-MCNC: 193 MG/DL
FERRITIN SERPL-MCNC: 118 NG/ML (ref 11–328)
HDLC SERPL-MCNC: 51 MG/DL
LDLC SERPL CALC-MCNC: 110 MG/DL
NONHDLC SERPL-MCNC: 142 MG/DL
TRIGL SERPL-MCNC: 162 MG/DL

## 2023-06-09 PROCEDURE — 36415 COLL VENOUS BLD VENIPUNCTURE: CPT | Performed by: NURSE PRACTITIONER

## 2023-06-09 PROCEDURE — 99396 PREV VISIT EST AGE 40-64: CPT | Performed by: NURSE PRACTITIONER

## 2023-06-09 PROCEDURE — 82728 ASSAY OF FERRITIN: CPT | Performed by: NURSE PRACTITIONER

## 2023-06-09 PROCEDURE — 80061 LIPID PANEL: CPT | Performed by: NURSE PRACTITIONER

## 2023-06-09 RX ORDER — LOSARTAN POTASSIUM 25 MG/1
25 TABLET ORAL DAILY
Qty: 90 TABLET | Refills: 3 | Status: SHIPPED | OUTPATIENT
Start: 2023-06-09 | End: 2024-06-10

## 2023-06-09 RX ORDER — ATORVASTATIN CALCIUM 20 MG/1
20 TABLET, FILM COATED ORAL DAILY
Qty: 90 TABLET | Refills: 3 | Status: SHIPPED | OUTPATIENT
Start: 2023-06-09 | End: 2024-06-10

## 2023-06-09 RX ORDER — GABAPENTIN 300 MG/1
CAPSULE ORAL
Qty: 90 CAPSULE | Refills: 3 | Status: SHIPPED | OUTPATIENT
Start: 2023-06-09 | End: 2023-12-27

## 2023-06-09 ASSESSMENT — ENCOUNTER SYMPTOMS
DIZZINESS: 0
NAUSEA: 1
WEAKNESS: 0
HEADACHES: 0
HEMATOCHEZIA: 0
FEVER: 0
SHORTNESS OF BREATH: 0
PALPITATIONS: 0
JOINT SWELLING: 0
FREQUENCY: 0
DYSURIA: 0
ABDOMINAL PAIN: 0
CONSTIPATION: 0
EYE PAIN: 0
COUGH: 0
BREAST MASS: 0
MYALGIAS: 1
CHILLS: 0
SORE THROAT: 0
PARESTHESIAS: 0
NERVOUS/ANXIOUS: 0
DIARRHEA: 0
HEMATURIA: 0
ARTHRALGIAS: 0
HEARTBURN: 0

## 2023-06-09 ASSESSMENT — PAIN SCALES - GENERAL: PAINLEVEL: NO PAIN (0)

## 2023-06-09 NOTE — PROGRESS NOTES
SUBJECTIVE:   CC: Stephie is an 52 year old who presents for preventive health visit.       6/9/2023     8:45 AM   Additional Questions   Roomed by MR   Accompanied by HUMBERTO         6/9/2023     8:45 AM   Patient Reported Additional Medications   Patient reports taking the following new medications Na     Healthy Habits:     Getting at least 3 servings of Calcium per day:  Yes    Bi-annual eye exam:  Yes    Dental care twice a year:  Yes    Sleep apnea or symptoms of sleep apnea:  Sleep apnea    Diet:  Regular (no restrictions)    Frequency of exercise:  2-3 days/week    Duration of exercise:  15-30 minutes    Taking medications regularly:  Yes    Medication side effects:  None    PHQ-2 Total Score: 0    Additional concerns today:  Yes       PT IS FASTING.    HAD DOUBLE MASTECTOMY HAS BREAST EXAMS WITH ONCOLOGIST     No concerns.  No cardiac or respiratory symptoms.  Recent ED visit for chest pain, believes it was due to stress.  Working with sleep specialist.  Using cpap.  Taking medications as directed.  Also taking iron supplement for restless legs which has been helpful.            Today's PHQ-2 Score:       6/9/2023     8:44 AM   PHQ-2 ( 1999 [employer])   Q1: Little interest or pleasure in doing things 0   Q2: Feeling down, depressed or hopeless 0   PHQ-2 Score 0   Q1: Little interest or pleasure in doing things Not at all   Q2: Feeling down, depressed or hopeless Not at all   PHQ-2 Score 0           Social History     Tobacco Use     Smoking status: Never     Smokeless tobacco: Never   Vaping Use     Vaping status: Never Used   Substance Use Topics     Alcohol use: Yes     Comment: rare             6/6/2023    12:41 PM   Alcohol Use   Prescreen: >3 drinks/day or >7 drinks/week? No     Reviewed orders with patient.  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     Morbid obesity (H)     Carpal tunnel syndrome of left wrist     Past Surgical History:   Procedure Laterality Date     CARPAL TUNNEL RELEASE RT/LT  03/2010     COLONOSCOPY       INSERT PORT VASCULAR ACCESS Left 04/22/2016    Procedure: INSERT PORT VASCULAR ACCESS;  Surgeon: Nereyda Flowers MD;  Location: [facility]     INSERT TISSUE EXPANDER BREAST BILATERAL Bilateral 10/17/2016    Procedure: INSERT TISSUE EXPANDER BREAST BILATERAL;  Surgeon: Jenna Vazquez MD;  Location: [facility] OR     LASIK BILATERAL       MASTECTOMY MODIFIED RADICAL Right 10/17/2016    Procedure: MASTECTOMY MODIFIED RADICAL;  Surgeon: Nereyda Flowers MD;  Location: [facility]     MASTECTOMY MODIFIED RADICAL, SENTINEL NODE, COMBINED Left 10/17/2016    Procedure: COMBINED MASTECTOMY MODIFIED RADICAL, SENTINEL NODE;  Surgeon: Nereyda Flowers MD;  Location: [facility]     PROCURE GRAFT FAT Bilateral 04/25/2018    Procedure: PROCURE GRAFT FAT;;  Surgeon: Jenna Vazquez MD;  Location: [facility]     RECONSTRUCT BREAST BILATERAL, IMPLANT PROSTHESIS BILATERAL, COMBINED Bilateral 09/13/2017    Procedure: COMBINED RECONSTRUCT BREAST BILATERAL, IMPLANT PROSTHESIS BILATERAL;  BILATERAL SECOND STAGE BREAST RECONSTRUCTION WITH IMPLANT.;  Surgeon: Jenna Vazquez MD;  Location: [facility]     REMOVE CATHETER VASCULAR ACCESS Left 10/17/2016    Procedure: REMOVE CATHETER VASCULAR ACCESS;  Surgeon: Nereyda Flowers MD;  Location: [facility]     REVISE RECONSTRUCTED BREAST BILATERAL Bilateral 04/25/2018    Procedure: REVISE RECONSTRUCTED BREAST BILATERAL;  REVISION BILATERAL BREAST RECONSTRUCTION  AND  FAT GRAFTING FROM AXILLA TO BILATERAL BREASTS.;  Surgeon: Jenna Vazquez MD;  Location: [facility]     RHINOPLASTY  1983    Following trauma       Social History     Tobacco Use     Smoking status: Never     Smokeless tobacco: Never   Vaping Use     Vaping status: Never Used   Substance Use Topics     Alcohol use: Yes     Comment: rare     Family History   Problem Relation Age of Onset     C.A.D. Father         bypass surg age 68     Coronary Artery Disease Father      Prostate Cancer Father      Hypertension Father      Hyperlipidemia Father      Breast Cancer Sister 38        breast ca     Diabetes Type 1 Mother      Diabetes Mother      Hyperlipidemia Mother      Breast Cancer Maternal Grandmother         dx'ed age 80s     Breast Cancer Sister      Depression Daughter      Substance Abuse Daughter      Asthma Daughter      Substance Abuse Sister            Breast Cancer Screening:    FHS-7:       6/5/2022     6:02 PM 6/6/2023    12:43 PM   Breast CA Risk Assessment (FHS-7)   Did any of your first-degree relatives have breast or ovarian cancer? Yes Yes   Did any of your relatives have bilateral breast cancer? Unknown No   Did any man in your family have breast cancer? No No   Did any woman in your family have breast and ovarian cancer? Yes Yes   Did any woman in your family have breast cancer before age 50 y? Yes Yes   Do you have 2 or more relatives with breast and/or ovarian cancer? Yes No   Do you have 2 or more relatives with breast and/or bowel cancer? Yes No         Pertinent mammograms are reviewed under the imaging tab.    History of abnormal Pap smear: NO - age 30-65 PAP every 5 years with negative HPV co-testing recommended      Latest Ref Rng & Units 3/3/2022     1:57 PM 4/19/2019     8:46 AM 4/19/2019     8:40 AM   PAP / HPV   PAP  Negative for Intraepithelial Lesion or Malignancy (NILM)       PAP (Historical)   NIL      HPV 16 DNA Negative Negative    Negative     HPV 18 DNA Negative Negative  "   Negative     Other HR HPV Negative Negative    Negative       Reviewed and updated as needed this visit by clinical staff   Tobacco  Allergies  Meds     Fam Hx          Reviewed and updated as needed this visit by Provider                     Review of Systems   Constitutional: Negative for chills and fever.   HENT: Negative for congestion, ear pain, hearing loss and sore throat.    Eyes: Negative for pain and visual disturbance.   Respiratory: Negative for cough and shortness of breath.    Cardiovascular: Negative for chest pain, palpitations and peripheral edema.   Gastrointestinal: Positive for nausea. Negative for abdominal pain, constipation, diarrhea, heartburn and hematochezia.   Breasts:  Negative for tenderness, breast mass and discharge.   Genitourinary: Negative for dysuria, frequency, genital sores, hematuria, pelvic pain, urgency, vaginal bleeding and vaginal discharge.   Musculoskeletal: Positive for myalgias. Negative for arthralgias and joint swelling.   Skin: Negative for rash.   Neurological: Negative for dizziness, weakness, headaches and paresthesias.   Psychiatric/Behavioral: Negative for mood changes. The patient is not nervous/anxious.           OBJECTIVE:   /75 (BP Location: Right arm, Patient Position: Sitting, Cuff Size: Adult Large)   Pulse 92   Temp 98  F (36.7  C) (Oral)   Resp 14   Ht 1.626 m (5' 4\")   Wt 96.3 kg (212 lb 6.4 oz)   LMP  (LMP Unknown)   SpO2 98%   BMI 36.46 kg/m    Physical Exam  GENERAL: healthy, alert and no distress  EYES: Eyes grossly normal to inspection  HENT: ear canals and TM's normal, nose and mouth without ulcers or lesions  NECK: no adenopathy, no asymmetry, masses, or scars and thyroid normal to palpation  RESP: lungs clear to auscultation - no rales, rhonchi or wheezes  CV: regular rate and rhythm, normal S1 S2, no S3 or S4, no murmur, click or rub, no peripheral edema and peripheral pulses strong  ABDOMEN: soft, nontender, no " "hepatosplenomegaly, no masses and bowel sounds normal  NEURO: Normal strength and tone, mentation intact and speech normal  PSYCH: mentation appears normal, affect normal/bright        ASSESSMENT/PLAN:   (G25.81) Restless leg syndrome  (primary encounter diagnosis)  Comment: taking supplement.  Plan: Ferritin        Check ferritin.  Continue gabapentin.    (E78.5) Hyperlipidemia LDL goal <160  Comment: tolerates well.  Plan: Lipid panel reflex to direct LDL Non-fasting,         atorvastatin (LIPITOR) 20 MG tablet        Refilled.    (Z00.00) Routine general medical examination at a health care facility  Comment:   Plan:     (E66.01) Morbid obesity (H)  Comment: working on lifestyle changes.  Plan:     (M79.604,  M79.605) Pain in both lower extremities  Comment:   Plan: gabapentin (NEURONTIN) 300 MG capsule            (I10) Essential hypertension  Comment: asymptomatic.  Plan: losartan (COZAAR) 25 MG tablet        Refilled.            COUNSELING:  Reviewed preventive health counseling, as reflected in patient instructions      BMI:   Estimated body mass index is 36.46 kg/m  as calculated from the following:    Height as of this encounter: 1.626 m (5' 4\").    Weight as of this encounter: 96.3 kg (212 lb 6.4 oz).         She reports that she has never smoked. She has never used smokeless tobacco.      NIKKI Santos Ra CNP  [facility]  "

## 2023-07-13 ENCOUNTER — VIRTUAL VISIT (OUTPATIENT)
Dept: FAMILY MEDICINE | Facility: CLINIC | Age: 53
End: 2023-07-13
Payer: COMMERCIAL

## 2023-07-13 DIAGNOSIS — E66.01 MORBID OBESITY (H): Primary | ICD-10-CM

## 2023-07-13 PROCEDURE — 99213 OFFICE O/P EST LOW 20 MIN: CPT | Mod: VID | Performed by: NURSE PRACTITIONER

## 2023-07-13 NOTE — PROGRESS NOTES
"Stephie is a 52 year old who is being evaluated via a billable video visit.      How would you like to obtain your AVS? MyChart  If the video visit is dropped, the invitation should be resent by: Text to cell phone: [phone]  Will anyone else be joining your video visit? No        Assessment & Plan     Morbid obesity (H)  Discussed r/b/se.  Comorbid dx of DM II, dyslipidemia, HTN.  Medication will be used in conjunction with comprehensive lifestyle interventions, including diet and exercise.  Counseled on realistic expectations of weight loss with compliant treatment and diet and exercise.  Need to continue with diet and exercise.  This medication is able to be continued long term.  Discussed r/b/se.  No thyroid hx.  No pancreatitis hx.    - semaglutide (OZEMPIC) 2 MG/3ML pen; Inject 0.25 mg Subcutaneous every 7 days For 4 weeks, then increase to 0.5mg every 7 days for 4 weeks.             BMI:   Estimated body mass index is 36.46 kg/m  as calculated from the following:    Height as of 6/9/23: 1.626 m (5' 4\").    Weight as of 6/9/23: 96.3 kg (212 lb 6.4 oz).           Margaret Longo, NIKKI CNP  [facility]    Subjective   Stephie is a 52 year old, presenting for the following health issues:  weight concern        7/13/2023     1:42 PM   Additional Questions   Roomed by MR   Accompanied by HUMBERTO         7/13/2023     1:42 PM   Patient Reported Additional Medications   Patient reports taking the following new medications NA     History of Present Illness       Reason for visit:  Want to talk about starting Ozempic or Monjauro    She eats 0-1 servings of fruits and vegetables daily.She consumes 1 sweetened beverage(s) daily.She exercises with enough effort to increase her heart rate 30 to 60 minutes per day.    She is taking medications regularly.     Wants to start weight loss medication.  She feels she needs a jump start to her weight loss- her progress is not going anywhere         She has checked " with insurance and ozempic is covered.  No hx of thyroid or endocrine cancer or pancreatitis.  She is trying to lose weight without success.      Review of Systems   Constitutional, HEENT, cardiovascular, pulmonary, gi and gu systems are negative, except as otherwise noted.      Objective           Vitals:  No vitals were obtained today due to virtual visit.    Physical Exam   GENERAL: Healthy, alert and no distress  EYES: Eyes grossly normal to inspection.  No discharge or erythema, or obvious scleral/conjunctival abnormalities.  RESP: No audible wheeze, cough, or visible cyanosis.  No visible retractions or increased work of breathing.    SKIN: Visible skin clear. No significant rash, abnormal pigmentation or lesions.  NEURO: Cranial nerves grossly intact.  Mentation and speech appropriate for age.  PSYCH: Mentation appears normal, affect normal/bright, judgement and insight intact, normal speech and appearance well-groomed.                Video-Visit Details    Type of service:  Video Visit     Originating Location (pt. Location): Home  Distant Location (provider location):  On-site  Platform used for Video Visit: Nany

## 2023-08-11 ENCOUNTER — MYC MEDICAL ADVICE (OUTPATIENT)
Dept: FAMILY MEDICINE | Facility: CLINIC | Age: 53
End: 2023-08-11
Payer: COMMERCIAL

## 2023-08-11 NOTE — TELEPHONE ENCOUNTER
Routed to Margaret Longo, please see JAZMÍN and advise.    Marian Murrieta RN, BSN  [facility]

## 2023-08-15 DIAGNOSIS — E66.01 MORBID OBESITY (H): Primary | ICD-10-CM

## 2023-08-16 ENCOUNTER — TELEPHONE (OUTPATIENT)
Dept: FAMILY MEDICINE | Facility: CLINIC | Age: 53
End: 2023-08-16
Payer: COMMERCIAL

## 2023-08-18 NOTE — TELEPHONE ENCOUNTER
Please help schedule follow up - okay for virtual.  This will satisfy the 2 visit requirement.  She meets the BMI requirement.  Then the PA can get started.  ENRIQUE

## 2023-08-22 ENCOUNTER — ONCOLOGY VISIT (OUTPATIENT)
Dept: ONCOLOGY | Facility: CLINIC | Age: 53
End: 2023-08-22
Attending: INTERNAL MEDICINE
Payer: COMMERCIAL

## 2023-08-22 VITALS
WEIGHT: 211 LBS | BODY MASS INDEX: 36.02 KG/M2 | RESPIRATION RATE: 16 BRPM | OXYGEN SATURATION: 98 % | SYSTOLIC BLOOD PRESSURE: 136 MMHG | HEART RATE: 87 BPM | DIASTOLIC BLOOD PRESSURE: 93 MMHG | HEIGHT: 64 IN | TEMPERATURE: 97 F

## 2023-08-22 DIAGNOSIS — Z17.0 MALIGNANT NEOPLASM OF BREAST IN FEMALE, ESTROGEN RECEPTOR POSITIVE, UNSPECIFIED LATERALITY, UNSPECIFIED SITE OF BREAST (H): Primary | ICD-10-CM

## 2023-08-22 DIAGNOSIS — C50.919 MALIGNANT NEOPLASM OF BREAST IN FEMALE, ESTROGEN RECEPTOR POSITIVE, UNSPECIFIED LATERALITY, UNSPECIFIED SITE OF BREAST (H): Primary | ICD-10-CM

## 2023-08-22 PROCEDURE — G0463 HOSPITAL OUTPT CLINIC VISIT: HCPCS | Performed by: INTERNAL MEDICINE

## 2023-08-22 PROCEDURE — 99213 OFFICE O/P EST LOW 20 MIN: CPT | Performed by: INTERNAL MEDICINE

## 2023-08-22 ASSESSMENT — PAIN SCALES - GENERAL: PAINLEVEL: MILD PAIN (2)

## 2023-08-22 NOTE — LETTER
8/22/2023         RE: Shantell Wagner  [address]  [place] [address]        Dear Colleague,    Thank you for referring your patient, Shantell Wagner, to the [facility]. Please see a copy of my visit note below.    Shantell Wagner is a 52-year-old patient who is here today for interim followup.     CHIEF COMPLAINT:     1.  Right-sided breast cancer, high risk at diagnosis, ER positive, VT negative, HER-2 receptor negative. 10 oclock position    dx 2016    2.  Diagnosis of CHEK2 mutation.     HISTORY OF PRESENT ILLNESS:  Shantell is 52-year-old patient with a CHEK2 mutation who presented with a locally advanced right-sided breast cancer.  She had a 2.2 cm tumor and a 2.7 cm axillary mass on the right side.  She finished up adjuvant chemotherapy and is continuing to do adjuvant tamoxifen and the plan is to do tamoxifen for a full 10 years.  Her diagnosis was in 2016.  Her stop date on the tamoxifen would be sometime in the early part of 2027.  Overall, she feels well today.  She denies cough, shortness of breath, bone pain, any worrisome symptomatology.       PAST MEDICAL HISTORY:  History of hypercholesterolemia, history of right-sided breast cancer, history of CHEK2 mutation.     FAMILY HISTORY:  Sister with breast cancer, maternal grandmother with breast cancer.  Father with prostate cancer.     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     MEDICATIONS AND ALLERGIES:  Outlined in the nursing records.     PHYSICAL EXAMINATION:    GENERAL:  She is well-appearing lady in no acute distress.  VITAL SIGNS:  Stable.  NECK:  No masses or goiter.  CHEST:  Clear to auscultation and percussion bilaterally.  HEART:  Heart sounds 1, 2 normal, no added sounds, no murmurs.   BREASTS:  She is status post bilateral mastectomy.  Scars look good.  Right and left axillae are negative.  GASTROINTESTINAL:  Abdomen is soft and nontender.  No  hepatosplenomegaly.  EXTREMITIES:  Legs without tenderness or edema.  SKIN:  No rashes or lesions.      Data review    I have reviewed labs that were done in May and June 2023 and so have not repeated labs on her today.      Impression and plan    Shantell is a 52-year-old patient who has a diagnosis of right-sided breast cancer as well as a CHEK2 mutation, the details of which are as outlined above.  She continues on active treatment with adjuvant tamoxifen she is can continue that for a full 10 years.  I will see her back in my clinic in 6 months and she is in agreement with the plan.      Total time spent today 22 minutes.    Tata Knott MD     Again, thank you for allowing me to participate in the care of your patient.        Sincerely,        Tata Knott MD

## 2023-08-22 NOTE — PROGRESS NOTES
Shantell Wagner is a 52-year-old patient who is here today for interim followup.     CHIEF COMPLAINT:     1.  Right-sided breast cancer, high risk at diagnosis, ER positive, GA negative, HER-2 receptor negative. 10 oclock position    dx 2016    2.  Diagnosis of CHEK2 mutation.     HISTORY OF PRESENT ILLNESS:  Shantell is 52-year-old patient with a CHEK2 mutation who presented with a locally advanced right-sided breast cancer.  She had a 2.2 cm tumor and a 2.7 cm axillary mass on the right side.  She finished up adjuvant chemotherapy and is continuing to do adjuvant tamoxifen and the plan is to do tamoxifen for a full 10 years.  Her diagnosis was in 2016.  Her stop date on the tamoxifen would be sometime in the early part of 2027.  Overall, she feels well today.  She denies cough, shortness of breath, bone pain, any worrisome symptomatology.       PAST MEDICAL HISTORY:  History of hypercholesterolemia, history of right-sided breast cancer, history of CHEK2 mutation.     FAMILY HISTORY:  Sister with breast cancer, maternal grandmother with breast cancer.  Father with prostate cancer.     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     MEDICATIONS AND ALLERGIES:  Outlined in the nursing records.     PHYSICAL EXAMINATION:    GENERAL:  She is well-appearing lady in no acute distress.  VITAL SIGNS:  Stable.  NECK:  No masses or goiter.  CHEST:  Clear to auscultation and percussion bilaterally.  HEART:  Heart sounds 1, 2 normal, no added sounds, no murmurs.   BREASTS:  She is status post bilateral mastectomy.  Scars look good.  Right and left axillae are negative.  GASTROINTESTINAL:  Abdomen is soft and nontender.  No hepatosplenomegaly.  EXTREMITIES:  Legs without tenderness or edema.  SKIN:  No rashes or lesions.      Data review    I have reviewed labs that were done in May and June 2023 and so have not repeated labs on her today.      Impression and plan    Shantell is a 52-year-old patient  who has a diagnosis of right-sided breast cancer as well as a CHEK2 mutation, the details of which are as outlined above.  She continues on active treatment with adjuvant tamoxifen she is can continue that for a full 10 years.  I will see her back in my clinic in 6 months and she is in agreement with the plan.      Total time spent today 22 minutes.    Tata Knott MD

## 2023-08-28 ENCOUNTER — VIRTUAL VISIT (OUTPATIENT)
Dept: FAMILY MEDICINE | Facility: CLINIC | Age: 53
End: 2023-08-28
Payer: COMMERCIAL

## 2023-08-28 DIAGNOSIS — E66.01 MORBID OBESITY (H): Primary | ICD-10-CM

## 2023-08-28 PROCEDURE — 99213 OFFICE O/P EST LOW 20 MIN: CPT | Mod: VID | Performed by: NURSE PRACTITIONER

## 2023-08-28 NOTE — PROGRESS NOTES
"Stephie is a 52 year old who is being evaluated via a billable video visit.      How would you like to obtain your AVS? MyChart  If the video visit is dropped, the invitation should be resent by: Text to cell phone: [phone]  Will anyone else be joining your video visit? No          Assessment & Plan     Morbid obesity (H)  Continue with medication and increases as planned.  Will let me know when she is able to find wegovy and we will change prescriptions accordingly.  - semaglutide (OZEMPIC) 2 MG/3ML pen; Inject 0.5 mg Subcutaneous every 7 days  - Semaglutide, 1 MG/DOSE, (OZEMPIC) 4 MG/3ML pen; Inject 1 mg Subcutaneous every 7 days             BMI:   Estimated body mass index is 36.22 kg/m  as calculated from the following:    Height as of 8/22/23: 1.626 m (5' 4\").    Weight as of 8/22/23: 95.7 kg (211 lb).           NIKKI Santos Ra [facility]    Subjective   Stephie is a 52 year old, presenting for the following health issues:  No chief complaint on file.        8/28/2023     1:58 PM   Additional Questions   Roomed by CHAPO LANDAVERDE   Accompanied by self         8/28/2023     1:58 PM   Patient Reported Additional Medications   Patient reports taking the following new medications na       History of Present Illness       Reason for visit:  Continuing use of a med    She eats 2-3 servings of fruits and vegetables daily.She consumes 1 sweetened beverage(s) daily.She exercises with enough effort to increase her heart rate 10 to 19 minutes per day.  She exercises with enough effort to increase her heart rate 3 or less days per week.   She is taking medications regularly.     Doing well with ozempic.  She is able to get this covered through the end of October.  She can get wegovy now but has been unable to find it in stock.  She just took her 3rd dose of 0.5mg of ozempic.  She did notice a little bit of nausea, but it was tolerable.  She has also noticed a bit of appetite suppression.  She has " one more week at the 0.5mg dose, and then plans on increasing to 1mg.      Review of Systems   Constitutional, HEENT, cardiovascular, pulmonary, gi and gu systems are negative, except as otherwise noted.      Objective           Vitals:  No vitals were obtained today due to virtual visit.    Physical Exam   GENERAL: Healthy, alert and no distress  EYES: Eyes grossly normal to inspection.  No discharge or erythema, or obvious scleral/conjunctival abnormalities.  RESP: No audible wheeze, cough, or visible cyanosis.  No visible retractions or increased work of breathing.    SKIN: Visible skin clear. No significant rash, abnormal pigmentation or lesions.  NEURO: Cranial nerves grossly intact.  Mentation and speech appropriate for age.  PSYCH: Mentation appears normal, affect normal/bright, judgement and insight intact, normal speech and appearance well-groomed.          Video-Visit Details    Type of service:  Video Visit     Originating Location (pt. Location): Home    Distant Location (provider location):  On-site  Platform used for Video Visit: Yotpo

## 2023-10-10 ENCOUNTER — MYC MEDICAL ADVICE (OUTPATIENT)
Dept: FAMILY MEDICINE | Facility: CLINIC | Age: 53
End: 2023-10-10
Payer: COMMERCIAL

## 2023-10-10 DIAGNOSIS — E66.01 MORBID OBESITY (H): Primary | ICD-10-CM

## 2023-10-23 PROCEDURE — 96361 HYDRATE IV INFUSION ADD-ON: CPT

## 2023-10-23 PROCEDURE — 96375 TX/PRO/DX INJ NEW DRUG ADDON: CPT

## 2023-10-23 PROCEDURE — 36415 COLL VENOUS BLD VENIPUNCTURE: CPT | Performed by: EMERGENCY MEDICINE

## 2023-10-23 PROCEDURE — 99285 EMERGENCY DEPT VISIT HI MDM: CPT | Mod: 25

## 2023-10-23 PROCEDURE — 80048 BASIC METABOLIC PNL TOTAL CA: CPT | Performed by: EMERGENCY MEDICINE

## 2023-10-23 PROCEDURE — 96374 THER/PROPH/DIAG INJ IV PUSH: CPT

## 2023-10-23 PROCEDURE — 85025 COMPLETE CBC W/AUTO DIFF WBC: CPT | Performed by: EMERGENCY MEDICINE

## 2023-10-24 ENCOUNTER — APPOINTMENT (OUTPATIENT)
Dept: CT IMAGING | Facility: CLINIC | Age: 53
End: 2023-10-24
Attending: EMERGENCY MEDICINE
Payer: COMMERCIAL

## 2023-10-24 ENCOUNTER — HOSPITAL ENCOUNTER (EMERGENCY)
Facility: CLINIC | Age: 53
Discharge: HOME | End: 2023-10-24
Attending: EMERGENCY MEDICINE | Admitting: EMERGENCY MEDICINE
Payer: COMMERCIAL

## 2023-10-24 VITALS
SYSTOLIC BLOOD PRESSURE: 123 MMHG | OXYGEN SATURATION: 99 % | DIASTOLIC BLOOD PRESSURE: 85 MMHG | RESPIRATION RATE: 18 BRPM | HEART RATE: 83 BPM | TEMPERATURE: 97 F

## 2023-10-24 DIAGNOSIS — N20.0 STONE IN RENAL PELVIS: ICD-10-CM

## 2023-10-24 DIAGNOSIS — R11.2 NAUSEA AND VOMITING, UNSPECIFIED VOMITING TYPE: ICD-10-CM

## 2023-10-24 DIAGNOSIS — N13.2 HYDRONEPHROSIS WITH URINARY OBSTRUCTION DUE TO URETERAL CALCULUS: ICD-10-CM

## 2023-10-24 DIAGNOSIS — N23 RENAL COLIC ON LEFT SIDE: ICD-10-CM

## 2023-10-24 LAB
ALBUMIN UR-MCNC: 200 MG/DL
AMORPH CRY #/AREA URNS HPF: ABNORMAL /HPF
ANION GAP SERPL CALCULATED.3IONS-SCNC: 12 MMOL/L (ref 7–15)
APPEARANCE UR: ABNORMAL
BACTERIA #/AREA URNS HPF: ABNORMAL /HPF
BASOPHILS # BLD AUTO: 0.1 10E3/UL (ref 0–0.2)
BASOPHILS NFR BLD AUTO: 1 %
BILIRUB UR QL STRIP: NEGATIVE
BUN SERPL-MCNC: 19.5 MG/DL (ref 6–20)
CALCIUM SERPL-MCNC: 10.1 MG/DL (ref 8.6–10)
CHLORIDE SERPL-SCNC: 101 MMOL/L (ref 98–107)
COLOR UR AUTO: ABNORMAL
CREAT SERPL-MCNC: 0.97 MG/DL (ref 0.51–0.95)
DEPRECATED HCO3 PLAS-SCNC: 27 MMOL/L (ref 22–29)
EGFRCR SERPLBLD CKD-EPI 2021: 70 ML/MIN/1.73M2
EOSINOPHIL # BLD AUTO: 0.2 10E3/UL (ref 0–0.7)
EOSINOPHIL NFR BLD AUTO: 2 %
ERYTHROCYTE [DISTWIDTH] IN BLOOD BY AUTOMATED COUNT: 12.8 % (ref 10–15)
GLUCOSE SERPL-MCNC: 113 MG/DL (ref 70–99)
GLUCOSE UR STRIP-MCNC: NEGATIVE MG/DL
HCT VFR BLD AUTO: 41.2 % (ref 35–47)
HGB BLD-MCNC: 13.7 G/DL (ref 11.7–15.7)
HGB UR QL STRIP: ABNORMAL
HOLD SPECIMEN: NORMAL
HOLD SPECIMEN: NORMAL
IMM GRANULOCYTES # BLD: 0 10E3/UL
IMM GRANULOCYTES NFR BLD: 0 %
KETONES UR STRIP-MCNC: NEGATIVE MG/DL
LEUKOCYTE ESTERASE UR QL STRIP: ABNORMAL
LYMPHOCYTES # BLD AUTO: 2.4 10E3/UL (ref 0.8–5.3)
LYMPHOCYTES NFR BLD AUTO: 25 %
MCH RBC QN AUTO: 29.5 PG (ref 26.5–33)
MCHC RBC AUTO-ENTMCNC: 33.3 G/DL (ref 31.5–36.5)
MCV RBC AUTO: 89 FL (ref 78–100)
MONOCYTES # BLD AUTO: 0.8 10E3/UL (ref 0–1.3)
MONOCYTES NFR BLD AUTO: 8 %
MUCOUS THREADS #/AREA URNS LPF: PRESENT /LPF
NEUTROPHILS # BLD AUTO: 6.4 10E3/UL (ref 1.6–8.3)
NEUTROPHILS NFR BLD AUTO: 64 %
NITRATE UR QL: NEGATIVE
NRBC # BLD AUTO: 0 10E3/UL
NRBC BLD AUTO-RTO: 0 /100
PH UR STRIP: 6 [PH] (ref 5–7)
PLATELET # BLD AUTO: 327 10E3/UL (ref 150–450)
POTASSIUM SERPL-SCNC: 3.6 MMOL/L (ref 3.4–5.3)
RBC # BLD AUTO: 4.65 10E6/UL (ref 3.8–5.2)
RBC URINE: >182 /HPF
SODIUM SERPL-SCNC: 140 MMOL/L (ref 135–145)
SP GR UR STRIP: 1.03 (ref 1–1.03)
SQUAMOUS EPITHELIAL: 15 /HPF
UROBILINOGEN UR STRIP-MCNC: NORMAL MG/DL
WBC # BLD AUTO: 9.9 10E3/UL (ref 4–11)
WBC URINE: 18 /HPF
YEAST #/AREA URNS HPF: ABNORMAL /HPF

## 2023-10-24 PROCEDURE — 250N000013 HC RX MED GY IP 250 OP 250 PS 637: Performed by: EMERGENCY MEDICINE

## 2023-10-24 PROCEDURE — 81001 URINALYSIS AUTO W/SCOPE: CPT | Performed by: EMERGENCY MEDICINE

## 2023-10-24 PROCEDURE — 87086 URINE CULTURE/COLONY COUNT: CPT | Performed by: EMERGENCY MEDICINE

## 2023-10-24 PROCEDURE — 74176 CT ABD & PELVIS W/O CONTRAST: CPT

## 2023-10-24 PROCEDURE — 258N000003 HC RX IP 258 OP 636: Performed by: EMERGENCY MEDICINE

## 2023-10-24 PROCEDURE — 250N000011 HC RX IP 250 OP 636: Performed by: EMERGENCY MEDICINE

## 2023-10-24 RX ORDER — ONDANSETRON 4 MG/1
4 TABLET, ORALLY DISINTEGRATING ORAL EVERY 8 HOURS PRN
Qty: 10 TABLET | Refills: 0 | Status: SHIPPED | OUTPATIENT
Start: 2023-10-24 | End: 2023-10-27

## 2023-10-24 RX ORDER — OXYCODONE HYDROCHLORIDE 5 MG/1
10 TABLET ORAL ONCE
Status: COMPLETED | OUTPATIENT
Start: 2023-10-24 | End: 2023-10-24

## 2023-10-24 RX ORDER — TAMSULOSIN HYDROCHLORIDE 0.4 MG/1
0.4 CAPSULE ORAL ONCE
Status: COMPLETED | OUTPATIENT
Start: 2023-10-24 | End: 2023-10-24

## 2023-10-24 RX ORDER — MORPHINE SULFATE 4 MG/ML
4 INJECTION, SOLUTION INTRAMUSCULAR; INTRAVENOUS ONCE
Status: COMPLETED | OUTPATIENT
Start: 2023-10-24 | End: 2023-10-24

## 2023-10-24 RX ORDER — OXYCODONE HYDROCHLORIDE 5 MG/1
5-10 TABLET ORAL EVERY 6 HOURS PRN
Qty: 12 TABLET | Refills: 0 | Status: SHIPPED | OUTPATIENT
Start: 2023-10-24 | End: 2023-10-27

## 2023-10-24 RX ORDER — KETOROLAC TROMETHAMINE 15 MG/ML
15 INJECTION, SOLUTION INTRAMUSCULAR; INTRAVENOUS ONCE
Status: COMPLETED | OUTPATIENT
Start: 2023-10-24 | End: 2023-10-24

## 2023-10-24 RX ORDER — TAMSULOSIN HYDROCHLORIDE 0.4 MG/1
0.4 CAPSULE ORAL DAILY
Qty: 10 CAPSULE | Refills: 0 | Status: SHIPPED | OUTPATIENT
Start: 2023-10-24 | End: 2023-11-29

## 2023-10-24 RX ADMIN — TAMSULOSIN HYDROCHLORIDE 0.4 MG: 0.4 CAPSULE ORAL at 02:28

## 2023-10-24 RX ADMIN — SODIUM CHLORIDE 1000 ML: 9 INJECTION, SOLUTION INTRAVENOUS at 01:50

## 2023-10-24 RX ADMIN — MORPHINE SULFATE 4 MG: 4 INJECTION, SOLUTION INTRAMUSCULAR; INTRAVENOUS at 02:28

## 2023-10-24 RX ADMIN — OXYCODONE HYDROCHLORIDE 10 MG: 5 TABLET ORAL at 02:28

## 2023-10-24 RX ADMIN — KETOROLAC TROMETHAMINE 15 MG: 15 INJECTION, SOLUTION INTRAMUSCULAR; INTRAVENOUS at 01:49

## 2023-10-24 NOTE — ED PROVIDER NOTES
History     Chief Complaint:  Flank Pain       HPI   Shantell Wagner is a 53 year old female with a history of breast cancer on tamoxifen who presents emergency department with concerns for left flank pain radiating into the abdomen that started earlier in the day, seemed to wane, and then got worse after dinner tonight.  She notes she has been having a sensation that she has to urinate but only goes a small amount but denies hematuria dysuria.  She denies change in bowel movements.  She had an episode of vomiting while here in the emergency department waiting to be seen.  She denies fever or chills.  She denies change in bowel movements.  She denies a history of similar pain in the past.  She thinks she might of been told when she had a PET scan for her breast cancer that she had kidney stones but has never had any symptoms related to them.  She notes the pain has mildly improved over her stay although she would like something for pain control if possible.  Reports taking 400 mg of ibuprofen earlier in the day.      Independent Historian:   None - Patient Only    Review of External Notes:   Outpatient clinic notes reviewed.  Virtual visit from 8/28/2023 reviewed.  No information that contributes to today's evaluation.      Medications:    atorvastatin (LIPITOR) 20 MG tablet  gabapentin (NEURONTIN) 300 MG capsule  glucosamine-chondroitin 500-400 MG CAPS  losartan (COZAAR) 25 MG tablet  omeprazole (PRILOSEC) 20 MG capsule  semaglutide (OZEMPIC) 2 MG/3ML pen  semaglutide (OZEMPIC) 2 MG/3ML pen  Semaglutide, 1 MG/DOSE, (OZEMPIC) 4 MG/3ML pen  Semaglutide, 2 MG/DOSE, (OZEMPIC) 8 MG/3ML pen  Semaglutide-Weight Management (WEGOVY) 0.5 MG/0.5ML pen  tamoxifen (NOLVADEX) 20 MG tablet        Past Medical History:    Past Medical History:   Diagnosis Date    Abnormal Pap smear, (ASC-H) 02/20/2013    Breast cancer 04/2016    Hyperlipidemia     Hypertension     Neuropathy        Past Surgical History:    Past Surgical  History:   Procedure Laterality Date    CARPAL TUNNEL RELEASE RT/LT  03/2010    COLONOSCOPY      INSERT PORT VASCULAR ACCESS Left 04/22/2016    Procedure: INSERT PORT VASCULAR ACCESS;  Surgeon: Nereyda Flowers MD;  Location: [facility]    INSERT TISSUE EXPANDER BREAST BILATERAL Bilateral 10/17/2016    Procedure: INSERT TISSUE EXPANDER BREAST BILATERAL;  Surgeon: Jenna Vazquez MD;  Location: [facility]    LASIK BILATERAL      MASTECTOMY MODIFIED RADICAL Right 10/17/2016    Procedure: MASTECTOMY MODIFIED RADICAL;  Surgeon: Nereyda Flowers MD;  Location: [facility]    MASTECTOMY MODIFIED RADICAL, SENTINEL NODE, COMBINED Left 10/17/2016    Procedure: COMBINED MASTECTOMY MODIFIED RADICAL, SENTINEL NODE;  Surgeon: Nereyda Flowers MD;  Location: [facility]    PROCURE GRAFT FAT Bilateral 04/25/2018    Procedure: PROCURE GRAFT FAT;;  Surgeon: Jenna Vazquez MD;  Location: [facility]    RECONSTRUCT BREAST BILATERAL, IMPLANT PROSTHESIS BILATERAL, COMBINED Bilateral 09/13/2017    Procedure: COMBINED RECONSTRUCT BREAST BILATERAL, IMPLANT PROSTHESIS BILATERAL;  BILATERAL SECOND STAGE BREAST RECONSTRUCTION WITH IMPLANT.;  Surgeon: Jenna Vazquez MD;  Location: [facility]    REMOVE CATHETER VASCULAR ACCESS Left 10/17/2016    Procedure: REMOVE CATHETER VASCULAR ACCESS;  Surgeon: Nereyda Flowers MD;  Location: [facility]    REVISE RECONSTRUCTED BREAST BILATERAL Bilateral 04/25/2018    Procedure: REVISE RECONSTRUCTED BREAST BILATERAL;  REVISION BILATERAL BREAST RECONSTRUCTION AND  FAT GRAFTING FROM AXILLA TO BILATERAL BREASTS.;  Surgeon: Jenna Vazquez MD;  Location: [facility]    RHINOPLASTY  1983    Following trauma        Physical Exam   Patient Vitals for the past 24 hrs:   BP Temp Temp src Pulse Resp SpO2   10/23/23 2352 (!) 156/105 97  F (36.1  C) Temporal 92 18 99 %        Physical Exam  General: Adult female sitting upright  Eyes: PERRL, Conjunctive within normal limits  ENT: Moist mucous membranes, oropharynx clear.    CV: Normal S1S2, no murmur, rub or gallop. Regular rate and rhythm  Resp: Clear to auscultation bilaterally, no wheezes, rales or rhonchi. Normal respiratory effort.  GI: Abdomen is soft, nontender and nondistended. No palpable masses. No rebound or guarding.  Mild CVA tenderness on the left to percussion.  MSK: No edema. Nontender. Normal active range of motion.  Skin: Warm and dry. No rashes or lesions or ecchymoses on visible skin.  Neuro: Alert and oriented. Responds appropriately to all questions and commands. No focal findings appreciated. Normal muscle tone.  Psych: Normal mood and affect. Pleasant.     Emergency Department Course   Imaging:  CT Abdomen Pelvis w/o Contrast   Final Result   IMPRESSION:    1.  Obstructive left ureterovesicular junction calculi measuring 2.3 x 1.9 x 2 mm with mild/moderate left-sided hydroureteronephrosis   2.  Left renal pelvis calculi measuring 8.5 x 6 x 8.5 mm, with mild right-sided hydronephrosis and dilatation of the right renal pelvis, this may cause intermittent obstruction with the patient is upright, with the patient supine is layering dependently    and is nonobstructive   3.  nonobstructive right lower pole collecting system calculi measuring 5 mm         Read per radiology.      Laboratory:  Labs Ordered and Resulted from Time of ED Arrival to Time of ED Departure   ROUTINE UA WITH MICROSCOPIC REFLEX TO CULTURE - Abnormal       Result Value    Color Urine Orange (*)     Appearance Urine Slightly Cloudy (*)     Glucose Urine Negative      Bilirubin Urine Negative      Ketones Urine Negative      Specific Gravity Urine 1.033      Blood Urine Large (*)     pH Urine 6.0      Protein Albumin Urine 200 (*)     Urobilinogen Urine Normal      Nitrite Urine Negative      Leukocyte Esterase Urine Trace (*)     Bacteria Urine Few (*)     Budding Yeast Urine Few (*)     Mucus Urine Present (*)     Amorphous Crystals Urine Few (*)     RBC Urine >182 (*)     WBC Urine 18 (*)      Squamous Epithelials Urine 15 (*)    BASIC METABOLIC PANEL - Abnormal    Sodium 140      Potassium 3.6      Chloride 101      Carbon Dioxide (CO2) 27      Anion Gap 12      Urea Nitrogen 19.5      Creatinine 0.97 (*)     GFR Estimate 70      Calcium 10.1 (*)     Glucose 113 (*)    CBC WITH PLATELETS AND DIFFERENTIAL    WBC Count 9.9      RBC Count 4.65      Hemoglobin 13.7      Hematocrit 41.2      MCV 89      MCH 29.5      MCHC 33.3      RDW 12.8      Platelet Count 327      % Neutrophils 64      % Lymphocytes 25      % Monocytes 8      % Eosinophils 2      % Basophils 1      % Immature Granulocytes 0      NRBCs per 100 WBC 0      Absolute Neutrophils 6.4      Absolute Lymphocytes 2.4      Absolute Monocytes 0.8      Absolute Eosinophils 0.2      Absolute Basophils 0.1      Absolute Immature Granulocytes 0.0      Absolute NRBCs 0.0     URINE CULTURE        Emergency Department Course & Assessments:             Interventions:  Medications   morphine (PF) injection 4 mg (has no administration in time range)   oxyCODONE (ROXICODONE) tablet 10 mg (has no administration in time range)   tamsulosin (FLOMAX) capsule 0.4 mg (has no administration in time range)   sodium chloride 0.9% BOLUS 1,000 mL (0 mLs Intravenous Stopped 10/24/23 0220)   ketorolac (TORADOL) injection 15 mg (15 mg Intravenous $Given 10/24/23 0149)        Assessments:  Past medical records, nursing notes, and vitals reviewed.  I performed an exam of the patient and obtained history, as documented above.   I reassessed the patient.  She notes mild increase in discomfort, but ultimately is agreeable to plan for discharge home.  Discussion of symptomatic control at home is understood.    Independent Interpretation (X-rays, CTs, rhythm strip):  I reviewed the patient's CT scan.  There is evidence of a small distal ureteral stone on the left.  What appears to be a large pelvic stone on the right.    Consultations/Discussion of Management or Tests:  None      Social Determinants of Health affecting care:   None    Disposition:  The patient was discharged to home.     Impression & Plan        Medical Decision Making:  Shantell Wagner is a 53-year-old female who presented with unilateral flank and abdominal pain consistent with renal colic. CT confirms a ureteral stone. Pain is controlled with interventions in the Emergency Department. There is no fever or evidence of a urinary tract infection. The patient will be discharged with opioid analgesics and Ibuprofen for pain. Flomax will be prescribed daily to attempt to ease stone passage.   Zofran was prescribed for nausea.  I considered other etiologies for these symptoms including AAA and pyelonephritis but these are unlikely given the otherwise normal CT scan and urinalysis.  The patient is instructed to return if increasing pain not controlled with pain meds, vomiting, and fever. Strain urine to look for stone, if detected, submit to primary doctor or urologist for lab analysis. Instructions were given to follow up with urology within one week, sooner if pain continues, as retrieval of the stone may be required for refractory symptoms.  All questions were answered prior to discharge.       Diagnosis:    ICD-10-CM    1. Renal colic on left side  N23       2. Hydronephrosis with urinary obstruction due to ureteral calculus  N13.2       3. Stone in renal pelvis  N20.0       4. Nausea and vomiting, unspecified vomiting type  R11.2            Discharge Medications:  New Prescriptions    ONDANSETRON (ZOFRAN ODT) 4 MG ODT TAB    Take 1 tablet (4 mg) by mouth every 8 hours as needed for nausea or vomiting    OXYCODONE (ROXICODONE) 5 MG TABLET    Take 1-2 tablets (5-10 mg) by mouth every 6 hours as needed for moderate to severe pain    TAMSULOSIN (FLOMAX) 0.4 MG CAPSULE    Take 1 capsule (0.4 mg) by mouth daily Until stone passes or pain free        10/24/2023   Neida Haley MD Jonkman, Tracy Dianne,  MD  10/24/23 0221

## 2023-10-24 NOTE — ED TRIAGE NOTES
Pt presents to the ED with complaint of left sided abd pain radiating to left flank. Pain 8/10 starting earlier today.      Triage Assessment (Adult)       Row Name 10/23/23 7159          Triage Assessment    Airway WDL WDL        Respiratory WDL    Respiratory WDL WDL        Skin Circulation/Temperature WDL    Skin Circulation/Temperature WDL WDL        Cardiac WDL    Cardiac WDL WDL        Peripheral/Neurovascular WDL    Peripheral Neurovascular WDL WDL        Cognitive/Neuro/Behavioral WDL    Cognitive/Neuro/Behavioral WDL WDL

## 2023-10-25 LAB — BACTERIA UR CULT: NORMAL

## 2023-10-31 ENCOUNTER — VIRTUAL VISIT (OUTPATIENT)
Dept: FAMILY MEDICINE | Facility: CLINIC | Age: 53
End: 2023-10-31
Payer: COMMERCIAL

## 2023-10-31 DIAGNOSIS — R05.2 SUBACUTE COUGH: Primary | ICD-10-CM

## 2023-10-31 PROCEDURE — 99214 OFFICE O/P EST MOD 30 MIN: CPT | Mod: VID | Performed by: NURSE PRACTITIONER

## 2023-10-31 ASSESSMENT — ENCOUNTER SYMPTOMS: COUGH: 1

## 2023-10-31 NOTE — PROGRESS NOTES
"Stephie is a 53 year old who is being evaluated via a billable video visit.      How would you like to obtain your AVS? MyChart  If the video visit is dropped, the invitation should be resent by: Text to cell phone: [phone]  Will anyone else be joining your video visit? No          Assessment & Plan     Subacute cough  Tolerating symptoms well.  Discussed options.  She would like to wait a few more days as she is feeling a bit better.  If symptoms persist or change she will call the clinic.  If no change, but just persistence, I will call in an antibiotic for a possible bacterial bronchitis.  I also did consider office visit now given hx of cancer and concern for blood clot but with her symptoms resolving and really her overall mild symptoms, we will wait on this.  If worsening symptoms we will see her for a visit.  Pt agrees with plan and verbalized understanding.       BMI:   Estimated body mass index is 36.22 kg/m  as calculated from the following:    Height as of 8/22/23: 1.626 m (5' 4\").    Weight as of 8/22/23: 95.7 kg (211 lb).           NIKKI Santos Ra CNP  M [facility] [place]    Subjective   Stephie is a 53 year old, presenting for the following health issues:  Cough and Breathing Problem      10/31/2023     2:47 PM   Additional Questions   Roomed by MR   Accompanied by HUMBERTO         10/31/2023     2:47 PM   Patient Reported Additional Medications   Patient reports taking the following new medications NA       Cough    History of Present Illness       Reason for visit:  Cough that has been going on for almost 3 weeks. Trouble getting a deep breath at times.  Symptom onset:  3-4 weeks ago  Symptoms include:  See above  Symptom intensity:  Moderate  Symptom progression:  Staying the same  Had these symptoms before:  No    She eats 0-1 servings of fruits and vegetables daily.She consumes 1 sweetened beverage(s) daily.She exercises with enough effort to increase her heart rate 30 to 60 minutes " per day.  She exercises with enough effort to increase her heart rate 3 or less days per week.   She is taking medications regularly.       Cough x3 weeks.  Started with body aches one day, then cough started the next.  Intermittently dry and loose.  No SOB.  No chest pain, palpitations, LE edema.  No fever.  Normal appetite.  Minimal nasal congestion.  No wheezing.  She feels just recently that symptoms have started to improve.  Almost canceled appt.      Review of Systems   Respiratory:  Positive for cough.             Objective           Vitals:  No vitals were obtained today due to virtual visit.    Physical Exam   GENERAL: Healthy, alert and no distress  EYES: Eyes grossly normal to inspection.  No discharge or erythema, or obvious scleral/conjunctival abnormalities.  RESP: No audible wheeze, cough, or visible cyanosis.  No visible retractions or increased work of breathing.    SKIN: Visible skin clear. No significant rash, abnormal pigmentation or lesions.  NEURO: Cranial nerves grossly intact.  Mentation and speech appropriate for age.  PSYCH: Mentation appears normal, affect normal/bright, judgement and insight intact, normal speech and appearance well-groomed.                Video-Visit Details    Type of service:  Video Visit     Originating Location (pt. Location): Home    Distant Location (provider location):  Off-site  Platform used for Video Visit: Nany

## 2023-11-08 ENCOUNTER — VIRTUAL VISIT (OUTPATIENT)
Dept: UROLOGY | Facility: CLINIC | Age: 53
End: 2023-11-08
Payer: COMMERCIAL

## 2023-11-08 DIAGNOSIS — N20.0 KIDNEY STONE: Primary | ICD-10-CM

## 2023-11-08 PROCEDURE — 99204 OFFICE O/P NEW MOD 45 MIN: CPT | Mod: VID | Performed by: UROLOGY

## 2023-11-08 NOTE — PROGRESS NOTES
[facility]  Main Office: [address]  [address]  [place] [address]       CHIEF COMPLAINT:  Left ureteral stone, right kidney stone    HISTORY:   This is a 53-year-old woman who was in the emergency department last month with left-sided flank pain.  She was diagnosed with a small distal left ureteral stone.  She was sent home and she says that really since leaving the emergency department she has had no further pain.  She thinks she has likely passed the stone.  She did strain the urine intermittently but she never saw the stone.  She currently feels well with no symptoms.    She also had stones in the right kidney that are asymptomatic and remained asymptomatic.      PAST MEDICAL HISTORY:   Past Medical History:   Diagnosis Date    Abnormal Pap smear, (ASC-H) 02/20/2013    [facility]/ECC= [facility]GR[facility] 1. Repeat HPV screen and ECC 12 months from colp.    Breast cancer 04/2016    Hyperlipidemia     Hypertension     At doctors appointments my blood pressure is elevated.    Neuropathy        PAST SURGICAL HISTORY:   Past Surgical History:   Procedure Laterality Date    CARPAL TUNNEL RELEASE RT/LT  03/2010    COLONOSCOPY      INSERT PORT VASCULAR ACCESS Left 04/22/2016    Procedure: INSERT PORT VASCULAR ACCESS;  Surgeon: Nereyda Floewrs MD;  Location: [facility]    INSERT TISSUE EXPANDER BREAST BILATERAL Bilateral 10/17/2016    Procedure: INSERT TISSUE EXPANDER BREAST BILATERAL;  Surgeon: Jenna Vazquez MD;  Location: [facility]    LASIK BILATERAL      MASTECTOMY MODIFIED RADICAL Right 10/17/2016    Procedure: MASTECTOMY MODIFIED RADICAL;  Surgeon: Nereyda Flowers MD;  Location: [facility]    MASTECTOMY MODIFIED RADICAL, SENTINEL NODE, COMBINED Left 10/17/2016    Procedure: COMBINED MASTECTOMY MODIFIED RADICAL, SENTINEL NODE;  Surgeon: Nereyda Flowers MD;  Location: [facility]    PROCURE GRAFT FAT Bilateral 04/25/2018    Procedure: PROCURE GRAFT FAT;;  Surgeon: Jenna Vazquez MD;  Location: [facility]  BREAST BILATERAL, IMPLANT PROSTHESIS BILATERAL, COMBINED Bilateral 09/13/2017    Procedure: COMBINED RECONSTRUCT BREAST BILATERAL, IMPLANT PROSTHESIS BILATERAL;  BILATERAL SECOND STAGE BREAST RECONSTRUCTION WITH IMPLANT.;  Surgeon: Jenna Vazquez MD;  Location: [facility]    REMOVE CATHETER VASCULAR ACCESS Left 10/17/2016    Procedure: REMOVE CATHETER VASCULAR ACCESS;  Surgeon: Nereyda Flowers MD;  Location: [facility]    REVISE RECONSTRUCTED BREAST BILATERAL Bilateral 04/25/2018    Procedure: REVISE RECONSTRUCTED BREAST BILATERAL;  REVISION BILATERAL BREAST RECONSTRUCTION AND  FAT GRAFTING FROM AXILLA TO BILATERAL BREASTS.;  Surgeon: Jenna Vazquez MD;  Location: [facility]    RHINOPLASTY  1983    Following trauma       FAMILY HISTORY:   Family History   Problem Relation Age of Onset    C.A.D. Father         bypass surg age 68    Coronary Artery Disease Father     Prostate Cancer Father     Hypertension Father     Hyperlipidemia Father     Breast Cancer Sister 38        breast ca    Diabetes Type 1 Mother     Diabetes Mother     Hyperlipidemia Mother     Breast Cancer Maternal Grandmother 70        dx'ed age 80s    Breast Cancer Sister     Depression Daughter     Substance Abuse Daughter     Asthma Daughter     Substance Abuse Sister        SOCIAL HISTORY:   Social History     Tobacco Use    Smoking status: Never    Smokeless tobacco: Never   Substance Use Topics    Alcohol use: Yes     Comment: rare          Allergies   Allergen Reactions    No Known Drug Allergy          Current Outpatient Medications:     atorvastatin (LIPITOR) 20 MG tablet, Take 1 tablet (20 mg) by mouth daily, Disp: 90 tablet, Rfl: 3    gabapentin (NEURONTIN) 300 MG capsule, TAKE 1 CAPSULE BY MOUTH EVERYDAY AT BEDTIME, Disp: 90 capsule, Rfl: 3    glucosamine-chondroitin 500-400 MG CAPS, Take 1 capsule by mouth daily, Disp: , Rfl:     losartan (COZAAR) 25 MG tablet, Take 1 tablet (25 mg) by mouth daily, Disp: 90 tablet, Rfl: 3     omeprazole (PRILOSEC) 20 MG capsule, Take 20 mg by mouth every other day , Disp: , Rfl:     Semaglutide, 2 MG/DOSE, (OZEMPIC) 8 MG/3ML pen, Inject 2 mg Subcutaneous every 7 days, Disp: 9 mL, Rfl: 3    tamoxifen (NOLVADEX) 20 MG tablet, Take 1 tablet (20 mg) by mouth daily, Disp: 90 tablet, Rfl: 3    tamsulosin (FLOMAX) 0.4 MG capsule, Take 1 capsule (0.4 mg) by mouth daily Until stone passes or pain free, Disp: 10 capsule, Rfl: 0    Review Of Systems:  Skin: No rash, pruritis, or skin pigmentation  Eyes: No changes in vision  Ears/Nose/Throat: No changes in hearing, no nosebleeds  Respiratory: No shortness of breath, dyspnea on exertion, cough, or hemoptysis  Cardiovascular: No chest pain or palpitations  Gastrointestinal: No diarrhea or constipation. No abdominal pain. No hematochezia  Genitourinary: see HPI  Musculoskeletal: No pain or swelling of joints, normal range of motion  Neurologic: No weakness or tremors  Psychiatric: No recent changes in memory or mood  Hematologic/Lymphatic/Immunologic: No easy bruising or enlarged lymph nodes  Endocrine: No weight gain or loss      PHYSICAL EXAM:    General: Alert and oriented to time, place, and self. In NAD   HEENT: Head AT/NC, EOMI, CN Grossly intact   Lungs: no respiratory distress, or pursed lip breathing   Heart: No obvious jugular venous distension present   Musculoskeltal: Normal movements. Normal appearing musculature  Skin: no suspicious lesions or rashes   Neuro: Alert, oriented, speech and mentation normal; moving all 4 extremities equally.   Psych: affect and mood normal      PSA:     UA RESULTS:  Recent Labs   Lab Test 10/24/23  0045   COLOR Orange*   APPEARANCE Slightly Cloudy*   URINEGLC Negative   URINEBILI Negative   URINEKETONE Negative   SG 1.033   UBLD Large*   URINEPH 6.0   PROTEIN 200*   NITRITE Negative   LEUKEST Trace*   RBCU >182*   WBCU 18*       Bladder Scan:     Other Labs:      Imaging Studies: I personally reviewed her CT scan images.  At  the time there was a small distal left ureteral stone that was close to passing.  She has 2 large stones in the right kidney, 1 in the renal pelvis and 1 in the lower pole of the right kidney.  Lengthy skin to stone distance on both of these stones.      CLINICAL IMPRESSION:   Left ureteral stone  Right kidney stones    PLAN:   I counseled her that I feel her left ureteral stone has passed.  We should confirm this on follow-up imaging but it likely has passed.    We discussed treatment of her right-sided kidney stones.  The stones are large and would not be expected to pass spontaneously.  We discussed the options of observation versus treatment.  Given the size of the stones I recommended treatment.  We discussed cystoscopy with right-sided ureteroscopy, laser lithotripsy of stone basketing, right ureteral stent placement.  We discussed the risks of the procedure along with its expected recovery.  All of her questions were answered.  We also discussed the risks that she may require multiple procedures to remove her stone burden.  She wishes to proceed.    We will get her scheduled as an outpatient.  We will repeat a noncontrast CT scan when she arrives to the hospital just to make certain that the left-sided stone has passed.      Dylan Carrero MD     Virtual Visit Details    Type of service:  Video Visit     Originating Location (pt. Location): Home    Distant Location (provider location):  On-site  Platform used for Video Visit: Quantivo

## 2023-11-08 NOTE — LETTER
11/8/2023       RE: Shantell Wagner  [address]  [place] [address]     Dear Colleague,    Thank you for referring your patient, Shantell Wagner, to the [facility] [place] at [facility]. Please see a copy of my visit note below.    [facility]  Main Office: [address]  [address]  [place] [address]       CHIEF COMPLAINT:  Left ureteral stone, right kidney stone    HISTORY:   This is a 53-year-old woman who was in the emergency department last month with left-sided flank pain.  She was diagnosed with a small distal left ureteral stone.  She was sent home and she says that really since leaving the emergency department she has had no further pain.  She thinks she has likely passed the stone.  She did strain the urine intermittently but she never saw the stone.  She currently feels well with no symptoms.    She also had stones in the right kidney that are asymptomatic and remained asymptomatic.      PAST MEDICAL HISTORY:   Past Medical History:   Diagnosis Date    Abnormal Pap smear, (ASC-H) 02/20/2013    [place]/ECC= [facility] 1. Repeat HPV screen and ECC 12 months from colp.    Breast cancer 04/2016    Hyperlipidemia     Hypertension     At doctors appointments my blood pressure is elevated.    Neuropathy        PAST SURGICAL HISTORY:   Past Surgical History:   Procedure Laterality Date    CARPAL TUNNEL RELEASE RT/LT  03/2010    COLONOSCOPY      INSERT PORT VASCULAR ACCESS Left 04/22/2016    Procedure: INSERT PORT VASCULAR ACCESS;  Surgeon: Nereyda Flowers MD;  Location: [facility] OR    INSERT TISSUE EXPANDER BREAST BILATERAL Bilateral 10/17/2016    Procedure: INSERT TISSUE EXPANDER BREAST BILATERAL;  Surgeon: Jenna Vazquez MD;  Location: [facility] OR    LASIK BILATERAL      MASTECTOMY MODIFIED RADICAL Right 10/17/2016    Procedure: MASTECTOMY MODIFIED RADICAL;  Surgeon: Nereyda Flowers MD;  Location: [facility] OR    MASTECTOMY  MODIFIED RADICAL, SENTINEL NODE, COMBINED Left 10/17/2016    Procedure: COMBINED MASTECTOMY MODIFIED RADICAL, SENTINEL NODE;  Surgeon: Nereyda Flowers MD;  Location: [facility] OR    PROCURE GRAFT FAT Bilateral 04/25/2018    Procedure: PROCURE GRAFT FAT;;  Surgeon: Jenna Vazquez MD;  Location: [facility]    RECONSTRUCT BREAST BILATERAL, IMPLANT PROSTHESIS BILATERAL, COMBINED Bilateral 09/13/2017    Procedure: COMBINED RECONSTRUCT BREAST BILATERAL, IMPLANT PROSTHESIS BILATERAL;  BILATERAL SECOND STAGE BREAST RECONSTRUCTION WITH IMPLANT.;  Surgeon: Jenna Vazquez MD;  Location: [facility]    REMOVE CATHETER VASCULAR ACCESS Left 10/17/2016    Procedure: REMOVE CATHETER VASCULAR ACCESS;  Surgeon: Nereyda Flowers MD;  Location: [facility]    REVISE RECONSTRUCTED BREAST BILATERAL Bilateral 04/25/2018    Procedure: REVISE RECONSTRUCTED BREAST BILATERAL;  REVISION BILATERAL BREAST RECONSTRUCTION AND  FAT GRAFTING FROM AXILLA TO BILATERAL BREASTS.;  Surgeon: Jenna Vazquez MD;  Location: [facility]    RHINOPLASTY  1983    Following trauma       FAMILY HISTORY:   Family History   Problem Relation Age of Onset    C.A.D. Father         bypass surg age 68    Coronary Artery Disease Father     Prostate Cancer Father     Hypertension Father     Hyperlipidemia Father     Breast Cancer Sister 38        breast ca    Diabetes Type 1 Mother     Diabetes Mother     Hyperlipidemia Mother     Breast Cancer Maternal Grandmother 70        dx'ed age 80s    Breast Cancer Sister     Depression Daughter     Substance Abuse Daughter     Asthma Daughter     Substance Abuse Sister        SOCIAL HISTORY:   Social History     Tobacco Use    Smoking status: Never    Smokeless tobacco: Never   Substance Use Topics    Alcohol use: Yes     Comment: rare          Allergies   Allergen Reactions    No Known Drug Allergy          Current Outpatient Medications:     atorvastatin (LIPITOR) 20 MG tablet, Take 1 tablet (20 mg) by mouth daily, Disp: 90  tablet, Rfl: 3    gabapentin (NEURONTIN) 300 MG capsule, TAKE 1 CAPSULE BY MOUTH EVERYDAY AT BEDTIME, Disp: 90 capsule, Rfl: 3    glucosamine-chondroitin 500-400 MG CAPS, Take 1 capsule by mouth daily, Disp: , Rfl:     losartan (COZAAR) 25 MG tablet, Take 1 tablet (25 mg) by mouth daily, Disp: 90 tablet, Rfl: 3    omeprazole (PRILOSEC) 20 MG capsule, Take 20 mg by mouth every other day , Disp: , Rfl:     Semaglutide, 2 MG/DOSE, (OZEMPIC) 8 MG/3ML pen, Inject 2 mg Subcutaneous every 7 days, Disp: 9 mL, Rfl: 3    tamoxifen (NOLVADEX) 20 MG tablet, Take 1 tablet (20 mg) by mouth daily, Disp: 90 tablet, Rfl: 3    tamsulosin (FLOMAX) 0.4 MG capsule, Take 1 capsule (0.4 mg) by mouth daily Until stone passes or pain free, Disp: 10 capsule, Rfl: 0    Review Of Systems:  Skin: No rash, pruritis, or skin pigmentation  Eyes: No changes in vision  Ears/Nose/Throat: No changes in hearing, no nosebleeds  Respiratory: No shortness of breath, dyspnea on exertion, cough, or hemoptysis  Cardiovascular: No chest pain or palpitations  Gastrointestinal: No diarrhea or constipation. No abdominal pain. No hematochezia  Genitourinary: see HPI  Musculoskeletal: No pain or swelling of joints, normal range of motion  Neurologic: No weakness or tremors  Psychiatric: No recent changes in memory or mood  Hematologic/Lymphatic/Immunologic: No easy bruising or enlarged lymph nodes  Endocrine: No weight gain or loss      PHYSICAL EXAM:    General: Alert and oriented to time, place, and self. In NAD   HEENT: Head AT/NC, EOMI, CN Grossly intact   Lungs: no respiratory distress, or pursed lip breathing   Heart: No obvious jugular venous distension present   Musculoskeltal: Normal movements. Normal appearing musculature  Skin: no suspicious lesions or rashes   Neuro: Alert, oriented, speech and mentation normal; moving all 4 extremities equally.   Psych: affect and mood normal      PSA:     UA RESULTS:  Recent Labs   Lab Test 10/24/23  0045   COLOR  Orange*   APPEARANCE Slightly Cloudy*   URINEGLC Negative   URINEBILI Negative   URINEKETONE Negative   SG 1.033   UBLD Large*   URINEPH 6.0   PROTEIN 200*   NITRITE Negative   LEUKEST Trace*   RBCU >182*   WBCU 18*       Bladder Scan:     Other Labs:      Imaging Studies: I personally reviewed her CT scan images.  At the time there was a small distal left ureteral stone that was close to passing.  She has 2 large stones in the right kidney, 1 in the renal pelvis and 1 in the lower pole of the right kidney.  Lengthy skin to stone distance on both of these stones.      CLINICAL IMPRESSION:   Left ureteral stone  Right kidney stones    PLAN:   I counseled her that I feel her left ureteral stone has passed.  We should confirm this on follow-up imaging but it likely has passed.    We discussed treatment of her right-sided kidney stones.  The stones are large and would not be expected to pass spontaneously.  We discussed the options of observation versus treatment.  Given the size of the stones I recommended treatment.  We discussed cystoscopy with right-sided ureteroscopy, laser lithotripsy of stone basketing, right ureteral stent placement.  We discussed the risks of the procedure along with its expected recovery.  All of her questions were answered.  We also discussed the risks that she may require multiple procedures to remove her stone burden.  She wishes to proceed.    We will get her scheduled as an outpatient.  We will repeat a noncontrast CT scan when she arrives to the hospital just to make certain that the left-sided stone has passed.      Dylan Carrero MD     Virtual Visit Details    Type of service:  Video Visit     Originating Location (pt. Location): Home    Distant Location (provider location):  On-site  Platform used for Video Visit: DataSync

## 2023-11-08 NOTE — NURSING NOTE
Is the patient currently in the state of [place]? YES    Visit mode:VIDEO    If the visit is dropped, the patient can be reconnected by: VIDEO VISIT: Text to cell phone:   Telephone Information:   Mobile [phone]       Will anyone else be joining the visit? NO  (If patient encounters technical issues they should call [phone]    How would you like to obtain your AVS? MyChart    Are changes needed to the allergy or medication list? No    Reason for visit: RECHECK and Video Visit    Eileen SHANKS

## 2023-11-09 RX ORDER — CEFAZOLIN SODIUM 2 G/50ML
2 SOLUTION INTRAVENOUS
Status: CANCELLED | OUTPATIENT
Start: 2023-11-09

## 2023-11-09 RX ORDER — CEFAZOLIN SODIUM 2 G/50ML
2 SOLUTION INTRAVENOUS SEE ADMIN INSTRUCTIONS
Status: CANCELLED | OUTPATIENT
Start: 2023-11-09

## 2023-11-14 ENCOUNTER — TELEPHONE (OUTPATIENT)
Dept: UROLOGY | Facility: CLINIC | Age: 53
End: 2023-11-14
Payer: COMMERCIAL

## 2023-11-29 ENCOUNTER — OFFICE VISIT (OUTPATIENT)
Dept: FAMILY MEDICINE | Facility: CLINIC | Age: 53
End: 2023-11-29
Payer: COMMERCIAL

## 2023-11-29 VITALS
DIASTOLIC BLOOD PRESSURE: 72 MMHG | OXYGEN SATURATION: 97 % | HEART RATE: 94 BPM | HEIGHT: 63 IN | TEMPERATURE: 98.1 F | WEIGHT: 203 LBS | BODY MASS INDEX: 35.97 KG/M2 | SYSTOLIC BLOOD PRESSURE: 120 MMHG | RESPIRATION RATE: 15 BRPM

## 2023-11-29 DIAGNOSIS — I10 ESSENTIAL HYPERTENSION: ICD-10-CM

## 2023-11-29 DIAGNOSIS — Z01.818 PREOP GENERAL PHYSICAL EXAM: Primary | ICD-10-CM

## 2023-11-29 DIAGNOSIS — N20.0 KIDNEY STONE: ICD-10-CM

## 2023-11-29 LAB
ANION GAP SERPL CALCULATED.3IONS-SCNC: 10 MMOL/L (ref 7–15)
BUN SERPL-MCNC: 16.3 MG/DL (ref 6–20)
CALCIUM SERPL-MCNC: 9.3 MG/DL (ref 8.6–10)
CHLORIDE SERPL-SCNC: 103 MMOL/L (ref 98–107)
CREAT SERPL-MCNC: 0.86 MG/DL (ref 0.51–0.95)
DEPRECATED HCO3 PLAS-SCNC: 27 MMOL/L (ref 22–29)
EGFRCR SERPLBLD CKD-EPI 2021: 80 ML/MIN/1.73M2
GLUCOSE SERPL-MCNC: 94 MG/DL (ref 70–99)
POTASSIUM SERPL-SCNC: 3.8 MMOL/L (ref 3.4–5.3)
SODIUM SERPL-SCNC: 140 MMOL/L (ref 135–145)

## 2023-11-29 PROCEDURE — 80048 BASIC METABOLIC PNL TOTAL CA: CPT | Performed by: NURSE PRACTITIONER

## 2023-11-29 PROCEDURE — 99214 OFFICE O/P EST MOD 30 MIN: CPT | Performed by: NURSE PRACTITIONER

## 2023-11-29 PROCEDURE — 36415 COLL VENOUS BLD VENIPUNCTURE: CPT | Performed by: NURSE PRACTITIONER

## 2023-11-29 ASSESSMENT — PAIN SCALES - GENERAL: PAINLEVEL: NO PAIN (0)

## 2023-11-29 NOTE — H&P (VIEW-ONLY)
[facility]  [address] [address]  Phone: [phone]  Primary Provider: Margaret Longo Ra  Pre-op Performing Provider: CONY WHITESIDE      PREOPERATIVE EVALUATION:  Today's date: 11/29/2023    Stephie is a 53 year old, presenting for the following:  Pre-Op Exam        11/29/2023     8:40 AM   Additional Questions   Roomed by Chantale MCCABE LPN       Surgical Information:  Surgery/Procedure: Cystoscopy, right ureteroscopy with laser lithotripsy and stone basketing.  Right ureteral stent placement.   Surgery Location:  OR   Surgeon: Dylan Carrero MD   Surgery Date: 11/30/2023   Time of Surgery:  2:00 PM   Where patient plans to recover: At home with family  Fax number for surgical facility: Note does not need to be faxed, will be available electronically in Epic.    Assessment & Plan     The proposed surgical procedure is considered INTERMEDIATE risk.    Preop general physical exam  Okay for surgical procedure    Essential hypertension  Chronic, controlled.   - Basic metabolic panel  (Ca, Cl, CO2, Creat, Gluc, K, Na, BUN); Future  - Basic metabolic panel  (Ca, Cl, CO2, Creat, Gluc, K, Na, BUN)    Kidney stone      Possible Sleep Apnea: hx of JJ, uses CPAP       Risks and Recommendations:  The patient has the following additional risks and recommendations for perioperative complications:   - No identified additional risk factors other than previously addressed    Antiplatelet or Anticoagulation Medication Instructions:   - Patient is on no antiplatelet or anticoagulation medications.    Additional Medication Instructions:  Patient is to take all scheduled medications on the day of surgery EXCEPT for modifications listed below:   - ACE/ARB: HOLD on day of surgery (minimum 11 hours for general anesthesia).   - Statins: Continue taking on the day of surgery.    - pregabalin, gabapentin: Continue without modification.   - GLP-1 Injectable (exenitide,  liraglutide, semaglutide, dulaglutide, etc.): HOLD 7 days before surgery--last dose 11/18/2023      RECOMMENDATION:  APPROVAL GIVEN to proceed with proposed procedure, without further diagnostic evaluation.          Subjective       HPI related to upcoming procedure: R kidney stone         11/23/2023     8:50 AM   Preop Questions   1. Have you ever had a heart attack or stroke? No   2. Have you ever had surgery on your heart or blood vessels, such as a stent placement, a coronary artery bypass, or surgery on an artery in your head, neck, heart, or legs? No   3. Do you have chest pain with activity? No   4. Do you have a history of  heart failure? No   5. Do you currently have a cold, bronchitis or symptoms of other infection? No   6. Do you have a cough, shortness of breath, or wheezing? No   7. Do you or anyone in your family have previous history of blood clots? No   8. Do you or does anyone in your family have a serious bleeding problem such as prolonged bleeding following surgeries or cuts? No   9. Have you ever had problems with anemia or been told to take iron pills? No   10. Have you had any abnormal blood loss such as black, tarry or bloody stools, or abnormal vaginal bleeding? No   11. Have you ever had a blood transfusion? No   12. Are you willing to have a blood transfusion if it is medically needed before, during, or after your surgery? Yes   13. Have you or any of your relatives ever had problems with anesthesia? No   14. Do you have sleep apnea, excessive snoring or daytime drowsiness? YES - sleep apnea   14a. Do you have a CPAP machine? Yes   15. Do you have any artifical heart valves or other implanted medical devices like a pacemaker, defibrillator, or continuous glucose monitor? No   16. Do you have artificial joints? No   17. Are you allergic to latex? No   18. Is there any chance that you may be pregnant? No       Health Care Directive:  Patient does not have a Health Care Directive or Living  Will: Does not have a living will and declines information at this time     Preoperative Review of :   reviewed - controlled substances reflected in medication list.      Status of Chronic Conditions:  See problem list for active medical problems.  Problems all longstanding and stable, except as noted/documented.  See ROS for pertinent symptoms related to these conditions.    HYPERLIPIDEMIA - Patient has a long history of significant Hyperlipidemia requiring medication for treatment with recent good control. Patient reports no problems or side effects with the medication.   The 10-year ASCVD risk score (Riccardo DEAN, et al., 2019) is: 2%    Values used to calculate the score:      Age: 53 years      Sex: Female      Is Non- : No      Diabetic: No      Tobacco smoker: No      Systolic Blood Pressure: 120 mmHg      Is BP treated: Yes      HDL Cholesterol: 51 mg/dL      Total Cholesterol: 193 mg/dL      HYPERTENSION - Patient has longstanding history of HTN , currently denies any symptoms referable to elevated blood pressure. Specifically denies chest pain, palpitations, dyspnea, orthopnea, PND or peripheral edema. Blood pressure readings have been in normal range. Current medication regimen is as listed below. Patient denies any side effects of medication.   BP Readings from Last 6 Encounters:   11/29/23 120/72   10/24/23 123/85   08/22/23 (!) 136/93   06/09/23 121/75   05/21/23 (!) 175/107   02/15/23 (!) 138/93       Review of Systems  CONSTITUTIONAL: NEGATIVE for fever, chills, change in weight  INTEGUMENTARY/SKIN: NEGATIVE for worrisome rashes, moles or lesions  EYES: NEGATIVE for vision changes or irritation  ENT/MOUTH: NEGATIVE for ear, mouth and throat problems  RESP: NEGATIVE for significant cough or SOB  CV: NEGATIVE for chest pain, palpitations or peripheral edema  GI: NEGATIVE for nausea, abdominal pain, heartburn, or change in bowel habits  : NEGATIVE for frequency, dysuria, or  hematuria  MUSCULOSKELETAL: NEGATIVE for significant arthralgias or myalgia  NEURO: NEGATIVE for weakness, dizziness or paresthesias  ENDOCRINE: NEGATIVE for temperature intolerance, skin/hair changes  HEME: NEGATIVE for bleeding problems  PSYCHIATRIC: NEGATIVE for changes in mood or affect    Patient Active Problem List    Diagnosis Date Noted    Carpal tunnel syndrome of left wrist 06/09/2022     Priority: Medium    Morbid obesity (H) 03/03/2022     Priority: Medium    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  + constipation.  [place] [facility] consult.  Recommended colonoscopy now and then every 3-5 years depending on results.  ENRIQUE      Lymphedema 11/04/2016     Priority: Medium    Acquired absence of both breasts and nipples 10/17/2016     Priority: Medium    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) 04/28/2016     Priority: Medium    Papanicolaou smear of cervix with atypical squamous cells cannot exclude high grade squamous intraepithelial lesion (ASC-H) 02/20/2013     Priority: Medium     4/10/09 ASCUS pap  2010, 2012 - NIL paps  02/20/13 ASC-H.   03/11/13 [place]/ECC= [facility]GR[facility] 1. Repeat HPV screen and ECC 12 months from colp. Due 03/2014 02/20/14 Normal, Neg HPV. Repeat pap with HPV in 12 months. Due 02/2015  03/06/15 Normal, Neg HPV. 3 yr co-testing  4/19/19 NIL/neg HR HPV  3/3/22 NIL pap, neg HPV      Ganglion of left wrist 02/09/2012     Priority: Medium     dorsum; seems a bit larger.      Hyperlipidemia LDL goal <160 05/04/2009     Priority: Medium    Obesity 05/01/2009     Priority: Medium    Plantar fasciitis 07/29/2008     Priority: Medium      Past Medical History:   Diagnosis Date    Abnormal Pap smear, (ASC-H) 02/20/2013    [place]/ECC= [facility]GR[facility] 1. Repeat HPV screen and ECC 12 months from colp.     Breast cancer 04/2016    Hyperlipidemia     Hypertension     At doctors appointments my blood pressure is elevated.    Neuropathy      Past Surgical History:   Procedure Laterality Date    CARPAL TUNNEL RELEASE RT/LT  03/2010    COLONOSCOPY      INSERT PORT VASCULAR ACCESS Left 04/22/2016    Procedure: INSERT PORT VASCULAR ACCESS;  Surgeon: Nereyda Flowers MD;  [facility]: [facility]    INSERT TISSUE EXPANDER BREAST BILATERAL Bilateral 10/17/2016    Procedure: INSERT TISSUE EXPANDER BREAST BILATERAL;  Surgeon: Jenna Vazquez MD;  Location: [facility]    LASIK BILATERAL      MASTECTOMY MODIFIED RADICAL Right 10/17/2016    Procedure: MASTECTOMY MODIFIED RADICAL;  Surgeon: Nereyda Flowers MD;  [facility]: [facility]    MASTECTOMY MODIFIED RADICAL, SENTINEL NODE, COMBINED Left 10/17/2016    Procedure: COMBINED MASTECTOMY MODIFIED RADICAL, SENTINEL NODE;  Surgeon: Nereyda Flowers MD;  Location: [facility]    PROCURE GRAFT FAT Bilateral 04/25/2018    Procedure: PROCURE GRAFT FAT;;  Surgeon: Jenna Vazquez MD;  Location: [facility]    RECONSTRUCT BREAST BILATERAL, IMPLANT PROSTHESIS BILATERAL, COMBINED Bilateral 09/13/2017    Procedure: COMBINED RECONSTRUCT BREAST BILATERAL, IMPLANT PROSTHESIS BILATERAL;  BILATERAL SECOND STAGE BREAST RECONSTRUCTION WITH IMPLANT.;  Surgeon: Jenna Vazquez MD;  Location: [facility]    REMOVE CATHETER VASCULAR ACCESS Left 10/17/2016    Procedure: REMOVE CATHETER VASCULAR ACCESS;  Surgeon: Nereyda Flowers MD;  Location: [facility]    REVISE RECONSTRUCTED BREAST BILATERAL Bilateral 04/25/2018    Procedure: REVISE RECONSTRUCTED BREAST BILATERAL;  REVISION BILATERAL BREAST RECONSTRUCTION AND  FAT GRAFTING FROM AXILLA TO BILATERAL BREASTS.;  Surgeon: Jenna Vazquez MD;  Location: [facility]    RHINOPLASTY  1983    Following trauma     Current Outpatient Medications   Medication Sig Dispense Refill    atorvastatin (LIPITOR) 20 MG tablet Take 1 tablet (20 mg) by mouth daily 90 tablet 3     "gabapentin (NEURONTIN) 300 MG capsule TAKE 1 CAPSULE BY MOUTH EVERYDAY AT BEDTIME 90 capsule 3    glucosamine-chondroitin 500-400 MG CAPS Take 1 capsule by mouth daily      losartan (COZAAR) 25 MG tablet Take 1 tablet (25 mg) by mouth daily 90 tablet 3    omeprazole (PRILOSEC) 20 MG capsule Take 20 mg by mouth every other day       Semaglutide, 2 MG/DOSE, (OZEMPIC) 8 MG/3ML pen Inject 2 mg Subcutaneous every 7 days 9 mL 3    tamoxifen (NOLVADEX) 20 MG tablet Take 1 tablet (20 mg) by mouth daily 90 tablet 3       Allergies   Allergen Reactions    No Known Drug Allergy         Social History     Tobacco Use    Smoking status: Never    Smokeless tobacco: Never   Substance Use Topics    Alcohol use: Yes     Comment: rare       History   Drug Use No         Objective     /72   Pulse 94   Temp 98.1  F (36.7  C) (Oral)   Resp 15   Ht 1.6 m (5' 3\")   Wt 92.1 kg (203 lb)   SpO2 97%   BMI 35.96 kg/m      Physical Exam    GENERAL APPEARANCE: healthy, alert and no distress     EYES: EOMI, PERRL     HENT: ear canals and TM's normal and nose and mouth without ulcers or lesions     NECK: no adenopathy, no asymmetry, masses, or scars and thyroid normal to palpation     RESP: lungs clear to auscultation - no rales, rhonchi or wheezes     CV: regular rates and rhythm, normal S1 S2, no S3 or S4 and no murmur, click or rub     ABDOMEN:  soft, nontender, no HSM or masses and bowel sounds normal     MS: extremities normal- no gross deformities noted, no evidence of inflammation in joints, FROM in all extremities.     SKIN: no suspicious lesions or rashes     NEURO: Normal strength and tone, sensory exam grossly normal, mentation intact and speech normal     PSYCH: mentation appears normal. and affect normal/bright     LYMPHATICS: No cervical adenopathy    Recent Labs   Lab Test 10/23/23  2359 05/21/23  0303   HGB 13.7 13.5    301    138   POTASSIUM 3.6 3.5   CR 0.97* 0.80        Diagnostics:  Labs pending at " this time.  Results will be reviewed when available.   No EKG required, no history of coronary heart disease, significant arrhythmia, peripheral arterial disease or other structural heart disease.    Revised Cardiac Risk Index (RCRI):  The patient has the following serious cardiovascular risks for perioperative complications:   - No serious cardiac risks = 0 points     RCRI Interpretation: 0 points: Class I (very low risk - 0.4% complication rate)         Signed Electronically by: NIKKI Davison CNP  Copy of this evaluation report is provided to requesting physician.

## 2023-11-29 NOTE — OR NURSING
Note in special needs stated pt needs to have non-contrast CT scan abdomen pelvis when she arrives day of surgery.  DOUGLAS reached out to scheduling and Dr Carrero's office to get this scheduled and notify patient if she needs to come to hospital before 12pm for pre-op.

## 2023-11-29 NOTE — PROGRESS NOTES
[facility]  [address] [facility] [address]  Phone: [phone]  Primary Provider: Margaret Longo Ra  Pre-op Performing Provider: CONY WHITESIDE      PREOPERATIVE EVALUATION:  Today's date: 11/29/2023    Stephie is a 53 year old, presenting for the following:  Pre-Op Exam        11/29/2023     8:40 AM   Additional Questions   Roomed by Chantale MCCABE LPN       Surgical Information:  Surgery/Procedure: Cystoscopy, right ureteroscopy with laser lithotripsy and stone basketing.  Right ureteral stent placement.   Surgery Location:  OR   Surgeon: Dylan Carrero MD   Surgery Date: 11/30/2023   Time of Surgery:  2:00 PM   Where patient plans to recover: At home with family  Fax number for surgical facility: Note does not need to be faxed, will be available electronically in Epic.    Assessment & Plan     The proposed surgical procedure is considered INTERMEDIATE risk.    Preop general physical exam  Okay for surgical procedure    Essential hypertension  Chronic, controlled.   - Basic metabolic panel  (Ca, Cl, CO2, Creat, Gluc, K, Na, BUN); Future  - Basic metabolic panel  (Ca, Cl, CO2, Creat, Gluc, K, Na, BUN)    Kidney stone      Possible Sleep Apnea: hx of JJ, uses CPAP       Risks and Recommendations:  The patient has the following additional risks and recommendations for perioperative complications:   - No identified additional risk factors other than previously addressed    Antiplatelet or Anticoagulation Medication Instructions:   - Patient is on no antiplatelet or anticoagulation medications.    Additional Medication Instructions:  Patient is to take all scheduled medications on the day of surgery EXCEPT for modifications listed below:   - ACE/ARB: HOLD on day of surgery (minimum 11 hours for general anesthesia).   - Statins: Continue taking on the day of surgery.    - pregabalin, gabapentin: Continue without modification.   - GLP-1 Injectable (exenitide,  liraglutide, semaglutide, dulaglutide, etc.): HOLD 7 days before surgery--last dose 11/18/2023      RECOMMENDATION:  APPROVAL GIVEN to proceed with proposed procedure, without further diagnostic evaluation.          Subjective       HPI related to upcoming procedure: R kidney stone         11/23/2023     8:50 AM   Preop Questions   1. Have you ever had a heart attack or stroke? No   2. Have you ever had surgery on your heart or blood vessels, such as a stent placement, a coronary artery bypass, or surgery on an artery in your head, neck, heart, or legs? No   3. Do you have chest pain with activity? No   4. Do you have a history of  heart failure? No   5. Do you currently have a cold, bronchitis or symptoms of other infection? No   6. Do you have a cough, shortness of breath, or wheezing? No   7. Do you or anyone in your family have previous history of blood clots? No   8. Do you or does anyone in your family have a serious bleeding problem such as prolonged bleeding following surgeries or cuts? No   9. Have you ever had problems with anemia or been told to take iron pills? No   10. Have you had any abnormal blood loss such as black, tarry or bloody stools, or abnormal vaginal bleeding? No   11. Have you ever had a blood transfusion? No   12. Are you willing to have a blood transfusion if it is medically needed before, during, or after your surgery? Yes   13. Have you or any of your relatives ever had problems with anesthesia? No   14. Do you have sleep apnea, excessive snoring or daytime drowsiness? YES - sleep apnea   14a. Do you have a CPAP machine? Yes   15. Do you have any artifical heart valves or other implanted medical devices like a pacemaker, defibrillator, or continuous glucose monitor? No   16. Do you have artificial joints? No   17. Are you allergic to latex? No   18. Is there any chance that you may be pregnant? No       Health Care Directive:  Patient does not have a Health Care Directive or Living  Will: Does not have a living will and declines information at this time     Preoperative Review of :   reviewed - controlled substances reflected in medication list.      Status of Chronic Conditions:  See problem list for active medical problems.  Problems all longstanding and stable, except as noted/documented.  See ROS for pertinent symptoms related to these conditions.    HYPERLIPIDEMIA - Patient has a long history of significant Hyperlipidemia requiring medication for treatment with recent good control. Patient reports no problems or side effects with the medication.   The 10-year ASCVD risk score (Riccardo DEAN, et al., 2019) is: 2%    Values used to calculate the score:      Age: 53 years      Sex: Female      Is Non- : No      Diabetic: No      Tobacco smoker: No      Systolic Blood Pressure: 120 mmHg      Is BP treated: Yes      HDL Cholesterol: 51 mg/dL      Total Cholesterol: 193 mg/dL      HYPERTENSION - Patient has longstanding history of HTN , currently denies any symptoms referable to elevated blood pressure. Specifically denies chest pain, palpitations, dyspnea, orthopnea, PND or peripheral edema. Blood pressure readings have been in normal range. Current medication regimen is as listed below. Patient denies any side effects of medication.   BP Readings from Last 6 Encounters:   11/29/23 120/72   10/24/23 123/85   08/22/23 (!) 136/93   06/09/23 121/75   05/21/23 (!) 175/107   02/15/23 (!) 138/93       Review of Systems  CONSTITUTIONAL: NEGATIVE for fever, chills, change in weight  INTEGUMENTARY/SKIN: NEGATIVE for worrisome rashes, moles or lesions  EYES: NEGATIVE for vision changes or irritation  ENT/MOUTH: NEGATIVE for ear, mouth and throat problems  RESP: NEGATIVE for significant cough or SOB  CV: NEGATIVE for chest pain, palpitations or peripheral edema  GI: NEGATIVE for nausea, abdominal pain, heartburn, or change in bowel habits  : NEGATIVE for frequency, dysuria, or  hematuria  MUSCULOSKELETAL: NEGATIVE for significant arthralgias or myalgia  NEURO: NEGATIVE for weakness, dizziness or paresthesias  ENDOCRINE: NEGATIVE for temperature intolerance, skin/hair changes  HEME: NEGATIVE for bleeding problems  PSYCHIATRIC: NEGATIVE for changes in mood or affect    Patient Active Problem List    Diagnosis Date Noted    Carpal tunnel syndrome of left wrist 06/09/2022     Priority: Medium    Morbid obesity (H) 03/03/2022     Priority: Medium    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  + constipation.  [facility] consult.  Recommended colonoscopy now and then every 3-5 years depending on results.  ENRIQUE      Lymphedema 11/04/2016     Priority: Medium    Acquired absence of both breasts and nipples 10/17/2016     Priority: Medium    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) 04/28/2016     Priority: Medium    Papanicolaou smear of cervix with atypical squamous cells cannot exclude high grade squamous intraepithelial lesion (ASC-H) 02/20/2013     Priority: Medium     4/10/09 ASCUS pap  2010, 2012 - NIL paps  02/20/13 ASC-H.   03/11/13 [place]/ECC= NEGRITA 1. Repeat HPV screen and ECC 12 months from colp. Due 03/2014 02/20/14 Normal, Neg HPV. Repeat pap with HPV in 12 months. Due 02/2015  03/06/15 Normal, Neg HPV. 3 yr co-testing  4/19/19 NIL/neg HR HPV  3/3/22 NIL pap, neg HPV      Ganglion of left wrist 02/09/2012     Priority: Medium     dorsum; seems a bit larger.      Hyperlipidemia LDL goal <160 05/04/2009     Priority: Medium    Obesity 05/01/2009     Priority: Medium    Plantar fasciitis 07/29/2008     Priority: Medium      Past Medical History:   Diagnosis Date    Abnormal Pap smear, (ASC-H) 02/20/2013    [place]/ECC= NEGRITA 1. Repeat HPV screen and ECC 12 months from colp.     Breast cancer 04/2016    Hyperlipidemia     Hypertension     At doctors appointments my blood pressure is elevated.    Neuropathy      Past Surgical History:   Procedure Laterality Date    CARPAL TUNNEL RELEASE RT/LT  03/2010    COLONOSCOPY      INSERT PORT VASCULAR ACCESS Left 04/22/2016    Procedure: INSERT PORT VASCULAR ACCESS;  Surgeon: Nereyda Flowers MD;  Location: [facility] OR    INSERT TISSUE EXPANDER BREAST BILATERAL Bilateral 10/17/2016    Procedure: INSERT TISSUE EXPANDER BREAST BILATERAL;  Surgeon: Jenna Vazquez MD;  Location: [facility] OR    LASIK BILATERAL      MASTECTOMY MODIFIED RADICAL Right 10/17/2016    Procedure: MASTECTOMY MODIFIED RADICAL;  Surgeon: Nereyda Flowers MD;  Location: [facility] OR    MASTECTOMY MODIFIED RADICAL, SENTINEL NODE, COMBINED Left 10/17/2016    Procedure: COMBINED MASTECTOMY MODIFIED RADICAL, SENTINEL NODE;  Surgeon: Nereyda Flowers MD;  Location: [facility] OR    PROCURE GRAFT FAT Bilateral 04/25/2018    Procedure: PROCURE GRAFT FAT;;  Surgeon: Jenna Vazquez MD;  Location: [facility]    RECONSTRUCT BREAST BILATERAL, IMPLANT PROSTHESIS BILATERAL, COMBINED Bilateral 09/13/2017    Procedure: COMBINED RECONSTRUCT BREAST BILATERAL, IMPLANT PROSTHESIS BILATERAL;  BILATERAL SECOND STAGE BREAST RECONSTRUCTION WITH IMPLANT.;  Surgeon: Jenna Vazquez MD;  Location: [facility]    REMOVE CATHETER VASCULAR ACCESS Left 10/17/2016    Procedure: REMOVE CATHETER VASCULAR ACCESS;  Surgeon: Nereyda Flowers MD;  Location: [facility] OR    REVISE RECONSTRUCTED BREAST BILATERAL Bilateral 04/25/2018    Procedure: REVISE RECONSTRUCTED BREAST BILATERAL;  REVISION BILATERAL BREAST RECONSTRUCTION AND  FAT GRAFTING FROM AXILLA TO BILATERAL BREASTS.;  Surgeon: Jenna Vazquez MD;  Location: [facility]    RHINOPLASTY  1983    Following trauma     Current Outpatient Medications   Medication Sig Dispense Refill    atorvastatin (LIPITOR) 20 MG tablet Take 1 tablet (20 mg) by mouth daily 90 tablet 3     "gabapentin (NEURONTIN) 300 MG capsule TAKE 1 CAPSULE BY MOUTH EVERYDAY AT BEDTIME 90 capsule 3    glucosamine-chondroitin 500-400 MG CAPS Take 1 capsule by mouth daily      losartan (COZAAR) 25 MG tablet Take 1 tablet (25 mg) by mouth daily 90 tablet 3    omeprazole (PRILOSEC) 20 MG capsule Take 20 mg by mouth every other day       Semaglutide, 2 MG/DOSE, (OZEMPIC) 8 MG/3ML pen Inject 2 mg Subcutaneous every 7 days 9 mL 3    tamoxifen (NOLVADEX) 20 MG tablet Take 1 tablet (20 mg) by mouth daily 90 tablet 3       Allergies   Allergen Reactions    No Known Drug Allergy         Social History     Tobacco Use    Smoking status: Never    Smokeless tobacco: Never   Substance Use Topics    Alcohol use: Yes     Comment: rare       History   Drug Use No         Objective     /72   Pulse 94   Temp 98.1  F (36.7  C) (Oral)   Resp 15   Ht 1.6 m (5' 3\")   Wt 92.1 kg (203 lb)   SpO2 97%   BMI 35.96 kg/m      Physical Exam    GENERAL APPEARANCE: healthy, alert and no distress     EYES: EOMI, PERRL     HENT: ear canals and TM's normal and nose and mouth without ulcers or lesions     NECK: no adenopathy, no asymmetry, masses, or scars and thyroid normal to palpation     RESP: lungs clear to auscultation - no rales, rhonchi or wheezes     CV: regular rates and rhythm, normal S1 S2, no S3 or S4 and no murmur, click or rub     ABDOMEN:  soft, nontender, no HSM or masses and bowel sounds normal     MS: extremities normal- no gross deformities noted, no evidence of inflammation in joints, FROM in all extremities.     SKIN: no suspicious lesions or rashes     NEURO: Normal strength and tone, sensory exam grossly normal, mentation intact and speech normal     PSYCH: mentation appears normal. and affect normal/bright     LYMPHATICS: No cervical adenopathy    Recent Labs   Lab Test 10/23/23  2359 05/21/23  0303   HGB 13.7 13.5    301    138   POTASSIUM 3.6 3.5   CR 0.97* 0.80        Diagnostics:  Labs pending at " this time.  Results will be reviewed when available.   No EKG required, no history of coronary heart disease, significant arrhythmia, peripheral arterial disease or other structural heart disease.    Revised Cardiac Risk Index (RCRI):  The patient has the following serious cardiovascular risks for perioperative complications:   - No serious cardiac risks = 0 points     RCRI Interpretation: 0 points: Class I (very low risk - 0.4% complication rate)         Signed Electronically by: NIKKI Davison CNP  Copy of this evaluation report is provided to requesting physician.

## 2023-11-29 NOTE — PATIENT INSTRUCTIONS
Preparing for Your Surgery  Getting started  A nurse will call you to review your health history and instructions. They will give you an arrival time based on your scheduled surgery time. Please be ready to share:  Your doctor's clinic name and phone number  Your medical, surgical, and anesthesia history  A list of allergies and sensitivities  A list of medicines, including herbal treatments and over-the-counter drugs  Whether the patient has a legal guardian (ask how to send us the papers in advance)  Please tell us if you're pregnant--or if there's any chance you might be pregnant. Some surgeries may injure a fetus (unborn baby), so they require a pregnancy test. Surgeries that are safe for a fetus don't always need a test, and you can choose whether to have one.   If you have a child who's having surgery, please ask for a copy of Preparing for Your Child's Surgery.    Preparing for surgery  Within 10 to 30 days of surgery: Have a pre-op exam (sometimes called an H&P, or History and Physical). This can be done at a clinic or pre-operative center.  If you're having a , you may not need this exam. Talk to your care team.  At your pre-op exam, talk to your care team about all medicines you take. If you need to stop any medicines before surgery, ask when to start taking them again.  We do this for your safety. Many medicines can make you bleed too much during surgery. Some change how well surgery (anesthesia) drugs work.  Call your insurance company to let them know you're having surgery. (If you don't have insurance, call [phone].)  Call your clinic if there's any change in your health. This includes signs of a cold or flu (sore throat, runny nose, cough, rash, fever). It also includes a scrape or scratch near the surgery site.  If you have questions on the day of surgery, call your hospital or surgery center.  Eating and drinking guidelines  For your safety: Unless your surgeon tells you otherwise,  follow the guidelines below.  Eat and drink as usual until 8 hours before you arrive for surgery. After that, no food or milk.  Drink clear liquids until 2 hours before you arrive. These are liquids you can see through, like water, Gatorade, and Propel Water. They also include plain black coffee and tea (no cream or milk), candy, and breath mints. You can spit out gum when you arrive.  If you drink alcohol: Stop drinking it the night before surgery.  If your care team tells you to take medicine on the morning of surgery, it's okay to take it with a sip of water.  Preventing infection  Shower or bathe the night before and morning of your surgery. Follow the instructions your clinic gave you. (If no instructions, use regular soap.)  Don't shave or clip hair near your surgery site. We'll remove the hair if needed.  Don't smoke or vape the morning of surgery. You may chew nicotine gum up to 2 hours before surgery. A nicotine patch is okay.  Note: Some surgeries require you to completely quit smoking and nicotine. Check with your surgeon.  Your care team will make every effort to keep you safe from infection. We will:  Clean our hands often with soap and water (or an alcohol-based hand rub).  Clean the skin at your surgery site with a special soap that kills germs.  Give you a special gown to keep you warm. (Cold raises the risk of infection.)  Wear special hair covers, masks, gowns and gloves during surgery.  Give antibiotic medicine, if prescribed. Not all surgeries need antibiotics.  What to bring on the day of surgery  Photo ID and insurance card  Copy of your health care directive, if you have one  Glasses and hearing aids (bring cases)  You can't wear contacts during surgery  Inhaler and eye drops, if you use them (tell us about these when you arrive)  CPAP machine or breathing device, if you use them  A few personal items, if spending the night  If you have . . .  A pacemaker, ICD (cardiac defibrillator) or other  implant: Bring the ID card.  An implanted stimulator: Bring the remote control.  A legal guardian: Bring a copy of the certified (court-stamped) guardianship papers.  Please remove any jewelry, including body piercings. Leave jewelry and other valuables at home.  If you're going home the day of surgery  You must have a responsible adult drive you home. They should stay with you overnight as well.  If you don't have someone to stay with you, and you aren't safe to go home alone, we may keep you overnight. Insurance often won't pay for this.  After surgery  If it's hard to control your pain or you need more pain medicine, please call your surgeon's office.  Questions?   If you have any questions for your care team, list them here: _________________________________________________________________________________________________________________________________________________________________________ ____________________________________ ____________________________________ ____________________________________  For informational purposes only. Not to replace the advice of your health care provider. Copyright   2003, 2019 [employer]. All rights reserved. Clinically reviewed by Karey High MD. [employer] 394337 - REV 12/22.    How to Take Your Medication Before Surgery  - HOLD (do not take) losartan

## 2023-11-30 ENCOUNTER — ANESTHESIA (OUTPATIENT)
Dept: SURGERY | Facility: CLINIC | Age: 53
End: 2023-11-30
Payer: COMMERCIAL

## 2023-11-30 ENCOUNTER — ANESTHESIA EVENT (OUTPATIENT)
Dept: SURGERY | Facility: CLINIC | Age: 53
End: 2023-11-30
Payer: COMMERCIAL

## 2023-11-30 ENCOUNTER — HOSPITAL ENCOUNTER (OUTPATIENT)
Dept: CT IMAGING | Facility: CLINIC | Age: 53
Discharge: HOME | End: 2023-11-30
Attending: UROLOGY | Admitting: UROLOGY
Payer: COMMERCIAL

## 2023-11-30 ENCOUNTER — HOSPITAL ENCOUNTER (OUTPATIENT)
Facility: CLINIC | Age: 53
Discharge: HOME | End: 2023-11-30
Attending: UROLOGY | Admitting: UROLOGY
Payer: COMMERCIAL

## 2023-11-30 ENCOUNTER — APPOINTMENT (OUTPATIENT)
Dept: GENERAL RADIOLOGY | Facility: CLINIC | Age: 53
End: 2023-11-30
Attending: UROLOGY
Payer: COMMERCIAL

## 2023-11-30 VITALS
HEART RATE: 79 BPM | OXYGEN SATURATION: 97 % | TEMPERATURE: 97 F | BODY MASS INDEX: 35.68 KG/M2 | RESPIRATION RATE: 20 BRPM | HEIGHT: 63 IN | SYSTOLIC BLOOD PRESSURE: 144 MMHG | DIASTOLIC BLOOD PRESSURE: 91 MMHG | WEIGHT: 201.4 LBS

## 2023-11-30 DIAGNOSIS — N20.0 KIDNEY STONE: ICD-10-CM

## 2023-11-30 DIAGNOSIS — N20.0 KIDNEY STONE: Primary | ICD-10-CM

## 2023-11-30 PROCEDURE — 250N000025 HC SEVOFLURANE, PER MIN: Performed by: UROLOGY

## 2023-11-30 PROCEDURE — 999N000141 HC STATISTIC PRE-PROCEDURE NURSING ASSESSMENT: Performed by: UROLOGY

## 2023-11-30 PROCEDURE — 258N000001 HC RX 258: Performed by: UROLOGY

## 2023-11-30 PROCEDURE — 74176 CT ABD & PELVIS W/O CONTRAST: CPT

## 2023-11-30 PROCEDURE — C1769 GUIDE WIRE: HCPCS | Performed by: UROLOGY

## 2023-11-30 PROCEDURE — 360N000076 HC SURGERY LEVEL 3, PER MIN: Performed by: UROLOGY

## 2023-11-30 PROCEDURE — 250N000011 HC RX IP 250 OP 636: Mod: JZ | Performed by: NURSE ANESTHETIST, CERTIFIED REGISTERED

## 2023-11-30 PROCEDURE — 255N000002 HC RX 255 OP 636: Mod: JZ | Performed by: UROLOGY

## 2023-11-30 PROCEDURE — 258N000003 HC RX IP 258 OP 636: Performed by: NURSE ANESTHETIST, CERTIFIED REGISTERED

## 2023-11-30 PROCEDURE — 370N000017 HC ANESTHESIA TECHNICAL FEE, PER MIN: Performed by: UROLOGY

## 2023-11-30 PROCEDURE — C1894 INTRO/SHEATH, NON-LASER: HCPCS | Performed by: UROLOGY

## 2023-11-30 PROCEDURE — 710N000012 HC RECOVERY PHASE 2, PER MINUTE: Performed by: UROLOGY

## 2023-11-30 PROCEDURE — 710N000009 HC RECOVERY PHASE 1, LEVEL 1, PER MIN: Performed by: UROLOGY

## 2023-11-30 PROCEDURE — 999N000179 XR SURGERY CARM FLUORO LESS THAN 5 MIN W STILLS: Mod: TC

## 2023-11-30 PROCEDURE — C1758 CATHETER, URETERAL: HCPCS | Performed by: UROLOGY

## 2023-11-30 PROCEDURE — C2617 STENT, NON-COR, TEM W/O DEL: HCPCS | Performed by: UROLOGY

## 2023-11-30 PROCEDURE — 250N000011 HC RX IP 250 OP 636: Performed by: UROLOGY

## 2023-11-30 PROCEDURE — 272N000001 HC OR GENERAL SUPPLY STERILE: Performed by: UROLOGY

## 2023-11-30 PROCEDURE — 250N000009 HC RX 250: Performed by: NURSE ANESTHETIST, CERTIFIED REGISTERED

## 2023-11-30 PROCEDURE — 82365 CALCULUS SPECTROSCOPY: CPT | Performed by: UROLOGY

## 2023-11-30 DEVICE — URETERAL STENT
Type: IMPLANTABLE DEVICE | Site: ABDOMEN | Status: FUNCTIONAL
Brand: POLARIS™ ULTRA

## 2023-11-30 RX ORDER — FENTANYL CITRATE 50 UG/ML
INJECTION, SOLUTION INTRAMUSCULAR; INTRAVENOUS PRN
Status: DISCONTINUED | OUTPATIENT
Start: 2023-11-30 | End: 2023-11-30

## 2023-11-30 RX ORDER — ONDANSETRON 4 MG/1
4 TABLET, ORALLY DISINTEGRATING ORAL EVERY 30 MIN PRN
Status: DISCONTINUED | OUTPATIENT
Start: 2023-11-30 | End: 2023-11-30 | Stop reason: HOSPADM

## 2023-11-30 RX ORDER — CEFAZOLIN SODIUM/WATER 2 G/20 ML
2 SYRINGE (ML) INTRAVENOUS
Status: COMPLETED | OUTPATIENT
Start: 2023-11-30 | End: 2023-11-30

## 2023-11-30 RX ORDER — HYDROMORPHONE HCL IN WATER/PF 6 MG/30 ML
0.4 PATIENT CONTROLLED ANALGESIA SYRINGE INTRAVENOUS EVERY 5 MIN PRN
Status: DISCONTINUED | OUTPATIENT
Start: 2023-11-30 | End: 2023-11-30 | Stop reason: HOSPADM

## 2023-11-30 RX ORDER — ONDANSETRON 2 MG/ML
INJECTION INTRAMUSCULAR; INTRAVENOUS PRN
Status: DISCONTINUED | OUTPATIENT
Start: 2023-11-30 | End: 2023-11-30

## 2023-11-30 RX ORDER — FENTANYL CITRATE 50 UG/ML
25 INJECTION, SOLUTION INTRAMUSCULAR; INTRAVENOUS EVERY 5 MIN PRN
Status: DISCONTINUED | OUTPATIENT
Start: 2023-11-30 | End: 2023-11-30 | Stop reason: HOSPADM

## 2023-11-30 RX ORDER — HYDROMORPHONE HCL IN WATER/PF 6 MG/30 ML
0.2 PATIENT CONTROLLED ANALGESIA SYRINGE INTRAVENOUS EVERY 5 MIN PRN
Status: DISCONTINUED | OUTPATIENT
Start: 2023-11-30 | End: 2023-11-30 | Stop reason: HOSPADM

## 2023-11-30 RX ORDER — SODIUM CHLORIDE, SODIUM LACTATE, POTASSIUM CHLORIDE, CALCIUM CHLORIDE 600; 310; 30; 20 MG/100ML; MG/100ML; MG/100ML; MG/100ML
INJECTION, SOLUTION INTRAVENOUS CONTINUOUS PRN
Status: DISCONTINUED | OUTPATIENT
Start: 2023-11-30 | End: 2023-11-30

## 2023-11-30 RX ORDER — FENTANYL CITRATE 50 UG/ML
50 INJECTION, SOLUTION INTRAMUSCULAR; INTRAVENOUS EVERY 5 MIN PRN
Status: DISCONTINUED | OUTPATIENT
Start: 2023-11-30 | End: 2023-11-30 | Stop reason: HOSPADM

## 2023-11-30 RX ORDER — HYDRALAZINE HYDROCHLORIDE 20 MG/ML
2.5-5 INJECTION INTRAMUSCULAR; INTRAVENOUS EVERY 10 MIN PRN
Status: DISCONTINUED | OUTPATIENT
Start: 2023-11-30 | End: 2023-11-30 | Stop reason: HOSPADM

## 2023-11-30 RX ORDER — ONDANSETRON 2 MG/ML
4 INJECTION INTRAMUSCULAR; INTRAVENOUS EVERY 30 MIN PRN
Status: DISCONTINUED | OUTPATIENT
Start: 2023-11-30 | End: 2023-11-30 | Stop reason: HOSPADM

## 2023-11-30 RX ORDER — DEXAMETHASONE SODIUM PHOSPHATE 4 MG/ML
INJECTION, SOLUTION INTRA-ARTICULAR; INTRALESIONAL; INTRAMUSCULAR; INTRAVENOUS; SOFT TISSUE PRN
Status: DISCONTINUED | OUTPATIENT
Start: 2023-11-30 | End: 2023-11-30

## 2023-11-30 RX ORDER — IOPAMIDOL 612 MG/ML
INJECTION, SOLUTION INTRAVASCULAR PRN
Status: DISCONTINUED | OUTPATIENT
Start: 2023-11-30 | End: 2023-11-30 | Stop reason: HOSPADM

## 2023-11-30 RX ORDER — SODIUM CHLORIDE, SODIUM LACTATE, POTASSIUM CHLORIDE, CALCIUM CHLORIDE 600; 310; 30; 20 MG/100ML; MG/100ML; MG/100ML; MG/100ML
INJECTION, SOLUTION INTRAVENOUS CONTINUOUS
Status: DISCONTINUED | OUTPATIENT
Start: 2023-11-30 | End: 2023-11-30 | Stop reason: HOSPADM

## 2023-11-30 RX ORDER — LABETALOL HYDROCHLORIDE 5 MG/ML
10 INJECTION, SOLUTION INTRAVENOUS
Status: DISCONTINUED | OUTPATIENT
Start: 2023-11-30 | End: 2023-11-30 | Stop reason: HOSPADM

## 2023-11-30 RX ORDER — PROPOFOL 10 MG/ML
INJECTION, EMULSION INTRAVENOUS PRN
Status: DISCONTINUED | OUTPATIENT
Start: 2023-11-30 | End: 2023-11-30

## 2023-11-30 RX ORDER — LIDOCAINE HYDROCHLORIDE 20 MG/ML
INJECTION, SOLUTION INFILTRATION; PERINEURAL PRN
Status: DISCONTINUED | OUTPATIENT
Start: 2023-11-30 | End: 2023-11-30

## 2023-11-30 RX ORDER — PROPOFOL 10 MG/ML
INJECTION, EMULSION INTRAVENOUS CONTINUOUS PRN
Status: DISCONTINUED | OUTPATIENT
Start: 2023-11-30 | End: 2023-11-30

## 2023-11-30 RX ORDER — TAMSULOSIN HYDROCHLORIDE 0.4 MG/1
0.4 CAPSULE ORAL DAILY
Qty: 7 CAPSULE | Refills: 0 | Status: SHIPPED | OUTPATIENT
Start: 2023-11-30 | End: 2024-02-20

## 2023-11-30 RX ORDER — CEFAZOLIN SODIUM/WATER 2 G/20 ML
2 SYRINGE (ML) INTRAVENOUS SEE ADMIN INSTRUCTIONS
Status: DISCONTINUED | OUTPATIENT
Start: 2023-11-30 | End: 2023-11-30 | Stop reason: HOSPADM

## 2023-11-30 RX ADMIN — ONDANSETRON 4 MG: 2 INJECTION INTRAMUSCULAR; INTRAVENOUS at 13:32

## 2023-11-30 RX ADMIN — PHENYLEPHRINE HYDROCHLORIDE 150 MCG: 10 INJECTION INTRAVENOUS at 13:57

## 2023-11-30 RX ADMIN — PROPOFOL 50 MCG/KG/MIN: 10 INJECTION, EMULSION INTRAVENOUS at 13:22

## 2023-11-30 RX ADMIN — PHENYLEPHRINE HYDROCHLORIDE 100 MCG: 10 INJECTION INTRAVENOUS at 13:33

## 2023-11-30 RX ADMIN — PHENYLEPHRINE HYDROCHLORIDE 150 MCG: 10 INJECTION INTRAVENOUS at 13:50

## 2023-11-30 RX ADMIN — PROPOFOL 200 MG: 10 INJECTION, EMULSION INTRAVENOUS at 13:20

## 2023-11-30 RX ADMIN — Medication 2 G: at 13:12

## 2023-11-30 RX ADMIN — PHENYLEPHRINE HYDROCHLORIDE 150 MCG: 10 INJECTION INTRAVENOUS at 13:43

## 2023-11-30 RX ADMIN — DEXAMETHASONE SODIUM PHOSPHATE 4 MG: 4 INJECTION, SOLUTION INTRA-ARTICULAR; INTRALESIONAL; INTRAMUSCULAR; INTRAVENOUS; SOFT TISSUE at 13:32

## 2023-11-30 RX ADMIN — MIDAZOLAM 2 MG: 1 INJECTION INTRAMUSCULAR; INTRAVENOUS at 13:12

## 2023-11-30 RX ADMIN — LIDOCAINE HYDROCHLORIDE 100 MG: 20 INJECTION, SOLUTION INFILTRATION; PERINEURAL at 13:20

## 2023-11-30 RX ADMIN — PHENYLEPHRINE HYDROCHLORIDE 150 MCG: 10 INJECTION INTRAVENOUS at 14:00

## 2023-11-30 RX ADMIN — SODIUM CHLORIDE, POTASSIUM CHLORIDE, SODIUM LACTATE AND CALCIUM CHLORIDE: 600; 310; 30; 20 INJECTION, SOLUTION INTRAVENOUS at 13:12

## 2023-11-30 RX ADMIN — FENTANYL CITRATE 100 MCG: 50 INJECTION INTRAMUSCULAR; INTRAVENOUS at 13:20

## 2023-11-30 ASSESSMENT — LIFESTYLE VARIABLES: TOBACCO_USE: 0

## 2023-11-30 ASSESSMENT — ACTIVITIES OF DAILY LIVING (ADL)
ADLS_ACUITY_SCORE: 35
ADLS_ACUITY_SCORE: 35

## 2023-11-30 NOTE — OR NURSING
Pt states there is no possibility she could be pregnant. Declines pregnancy test. No period in 6-12 months.

## 2023-11-30 NOTE — ANESTHESIA POSTPROCEDURE EVALUATION
Patient: Shantell Wagner    Procedure: Procedure(s):  Cystoscopy, right ureteroscopy with laser lithotripsy and stone basketing.  Right ureteral stent placement.       Anesthesia Type:  General    Note:     Postop Pain Control: Uneventful            Sign Out: Well controlled pain   PONV: No   Neuro/Psych: Uneventful            Sign Out: Acceptable/Baseline neuro status   Airway/Respiratory: Uneventful            Sign Out: Acceptable/Baseline resp. status   CV/Hemodynamics: Uneventful            Sign Out: Acceptable CV status; No obvious hypovolemia; No obvious fluid overload   Other NRE: NONE   DID A NON-ROUTINE EVENT OCCUR? No           Last vitals:  Vitals Value Taken Time   /87 11/30/23 1430   Temp 36.3  C (97.4  F) 11/30/23 1422   Pulse 85 11/30/23 1431   Resp 16 11/30/23 1431   SpO2 93 % 11/30/23 1431   Vitals shown include unfiled device data.    Electronically Signed By: Gustavo Tapia MD  November 30, 2023  2:32 PM

## 2023-11-30 NOTE — OP NOTE
OPERATIVE REPORT    PREOPERATIVE DIAGNOSIS: Right kidney stone    POSTOPERATIVE DIAGNOSIS: Same    PROCEDURES PERFORMED: Cystoscopy, right retrograde pyelogram, interpretation of fluoroscopic images, right ureteroscopy with holmium laser lithotripsy and stone basketing, right ureteral stent placement.  22 modifier for lengthy case.    SURGEON: Dylan Carrero M.D.    ANESTHESIA: General    COMPLICATIONS: None.     SIGNIFICANT FINDINGS: Large stone burden leading to a lengthier case than normal.      BRIEF OPERATIVE INDICATIONS: Shantell Wagner is a(n) 53 year old female who presented with a distal left ureteral stone that she has now been able to pass.  She has an asymptomatic stone in her right kidney that she now presents for treatment.  After a discussion of all risks, benefits, and alternatives, the patient elected to proceed with definitive stone management. The patient understands the potential need for more than one procedure to eliminate all stone burden.      DESCRIPTION OF PROCEDURE:  After informed consent was obtained, the patient was transported to the operating room & placed supine on the table. After adequate anesthesia was induced, the patient was placed in lithotomy and prepped and draped in the usual sterile fashion. A timeout was taken to confirm correct patient, procedure and laterality. Pre-operative IV antibiotics were administered.     I introduced the 22 Irish rigid cystoscope through the urethra into the bladder and perform cystoscopy.  The bladder was normal throughout.  I cannulated the right ureteral orifice with a ureteral catheter and I performed a retrograde pyelogram.  There were no filling defects or dilatations present.  I then passed a Glidewire into the kidney and backloaded off the ureteral catheter along with the scope.  I clamped the Glidewire to the drape.  Alongside the Glidewire I passed the rigid ureteroscope through the urethra into the bladder and up the right  ureter.  The right ureter was free of any stones.  I passed a second Glidewire into the right kidney and backloaded off the scope.  Over this second working Glidewire I passed a 12/14 [place] ureteral access sheath to the level of the right UPJ.  I then passed the flex ureteroscope through the access sheath and performed complete renoscopy.  There were 2 large stones seen in the lower pole of the right kidney.  I then used a 200  m holmium laser fiber to  the stone into multiple fragments.  I then basketed out all the fragments.  Due to the large size of the stones the breaking up of the stones and the basketing took a good length of time.  The case ended up taking me 1 hour to complete stone extraction.  Once I extracted all stones I performed another retrograde pyelogram through the scope and performed a complete renoscopy to make certain all stones been removed.  I removed the ureteroscope.  I removed the access sheath.  Over the ring Glidewire passed a 5 x 24 double-J ureteral stent.  The wire was pulled back and a good curl can be seen in the renal pelvis with fluoroscopy.  I reinserted the cystoscope and a good curl was seen in the bladder with direct visualization.  The bladder was drained and the procedure was concluded    The patient tolerated the procedure well without complications.  She went to the postanesthetic care unit in good condition.  I will see her back in 1 week for stent removal in the office.

## 2023-11-30 NOTE — OR NURSING
Discharge instructions reviewed with Albert, , in presence of pt. All questions answered. No further concerns at this point. Teach back method used to ensure patient and family/friend understanding. Flomax sent home. IV removed. All belongings returned. Pt voided in PACU. Pt escorted out of hospital via wheelchair.

## 2023-11-30 NOTE — INTERVAL H&P NOTE
"I have reviewed the surgical (or preoperative) H&P that is linked to this encounter, and examined the patient. There are no significant changes    Clinical Conditions Present on Arrival:  Clinically Significant Risk Factors Present on Admission                  # Obesity: Estimated body mass index is 35.96 kg/m  as calculated from the following:    Height as of 11/29/23: 1.6 m (5' 3\").    Weight as of 11/29/23: 92.1 kg (203 lb).       "

## 2023-11-30 NOTE — ANESTHESIA PREPROCEDURE EVALUATION
Anesthesia Pre-Procedure Evaluation    Patient: Shantell Wagner   MRN: [id number] : 1970        Procedure : Procedure(s):  Cystoscopy, right ureteroscopy with laser lithotripsy and stone basketing.  Right ureteral stent placement.          Past Medical History:   Diagnosis Date    Abnormal Pap smear, (ASC-H) 2013    [place]/ECC= NEGRITA 1. Repeat HPV screen and ECC 12 months from colp.    Breast cancer 2016    Hyperlipidemia     Hypertension     At doctors appointments my blood pressure is elevated.    Neuropathy       Past Surgical History:   Procedure Laterality Date    CARPAL TUNNEL RELEASE RT/LT  2010    COLONOSCOPY      INSERT PORT VASCULAR ACCESS Left 2016    Procedure: INSERT PORT VASCULAR ACCESS;  Surgeon: Nereyda Flowers MD;  [facility]: [facility]    INSERT TISSUE EXPANDER BREAST BILATERAL Bilateral 10/17/2016    Procedure: INSERT TISSUE EXPANDER BREAST BILATERAL;  Surgeon: Jenna Vazquez MD;  [facility]: [facility]    LASIK BILATERAL      MASTECTOMY MODIFIED RADICAL Right 10/17/2016    Procedure: MASTECTOMY MODIFIED RADICAL;  Surgeon: Nereyda Flowers MD;  [facility]    MASTECTOMY MODIFIED RADICAL, SENTINEL NODE, COMBINED Left 10/17/2016    Procedure: COMBINED MASTECTOMY MODIFIED RADICAL, SENTINEL NODE;  Surgeon: Nereyda Flowers MD;  [facility]    PROCURE GRAFT FAT Bilateral 2018    Procedure: PROCURE GRAFT FAT;;  Surgeon: Jenna Vazquez MD;  Location: [facility]    RECONSTRUCT BREAST BILATERAL, IMPLANT PROSTHESIS BILATERAL, COMBINED Bilateral 2017    Procedure: COMBINED RECONSTRUCT BREAST BILATERAL, IMPLANT PROSTHESIS BILATERAL;  BILATERAL SECOND STAGE BREAST RECONSTRUCTION WITH IMPLANT.;  Surgeon: Jenna Vazquez MD;  Location: [facility]    REMOVE CATHETER VASCULAR ACCESS Left 10/17/2016    Procedure: REMOVE CATHETER VASCULAR ACCESS;  Surgeon: Nereyda Flowers MD;  [facility]    REVISE RECONSTRUCTED BREAST BILATERAL Bilateral 2018     Procedure: REVISE RECONSTRUCTED BREAST BILATERAL;  REVISION BILATERAL BREAST RECONSTRUCTION AND  FAT GRAFTING FROM AXILLA TO BILATERAL BREASTS.;  Surgeon: Jenna Vazquez MD;  Location: [facility]    RHINOPLASTY  1983    Following trauma      Allergies   Allergen Reactions    No Known Drug Allergy       Social History     Tobacco Use    Smoking status: Never    Smokeless tobacco: Never   Substance Use Topics    Alcohol use: Yes     Comment: rare      Wt Readings from Last 1 Encounters:   11/29/23 92.1 kg (203 lb)        Anesthesia Evaluation   Pt has had prior anesthetic.     No history of anesthetic complications       ROS/MED HX  ENT/Pulmonary:     (+) sleep apnea, uses CPAP,                                  (-) tobacco use   Neurologic:     (+)    peripheral neuropathy,                            Cardiovascular:     (+) Dyslipidemia hypertension- -   -  - -                                      METS/Exercise Tolerance:     Hematologic:       Musculoskeletal:       GI/Hepatic:    (-) GERD   Renal/Genitourinary:     (+)       Nephrolithiasis ,    (-) renal disease   Endo:     (+)               Obesity,       Psychiatric/Substance Use:       Infectious Disease:       Malignancy:   (+) Malignancy, History of Breast.    Other:            Physical Exam    Airway        Mallampati: II   TM distance: > 3 FB   Neck ROM: full   Mouth opening: > 3 cm    Respiratory Devices and Support         Dental       (+) Minor Abnormalities - some fillings, tiny chips      Cardiovascular   cardiovascular exam normal          Pulmonary   pulmonary exam normal                OUTSIDE LABS:  CBC:   Lab Results   Component Value Date    WBC 9.9 10/23/2023    WBC 7.9 05/21/2023    HGB 13.7 10/23/2023    HGB 13.5 05/21/2023    HCT 41.2 10/23/2023    HCT 40.2 05/21/2023     10/23/2023     05/21/2023     BMP:   Lab Results   Component Value Date     11/29/2023     10/23/2023    POTASSIUM 3.8 11/29/2023    POTASSIUM  "3.6 10/23/2023    CHLORIDE 103 11/29/2023    CHLORIDE 101 10/23/2023    CO2 27 11/29/2023    CO2 27 10/23/2023    BUN 16.3 11/29/2023    BUN 19.5 10/23/2023    CR 0.86 11/29/2023    CR 0.97 (H) 10/23/2023    GLC 94 11/29/2023     (H) 10/23/2023     COAGS: No results found for: \"PTT\", \"INR\", \"FIBR\"  POC:   Lab Results   Component Value Date    BGM 95 10/18/2016    HCG Negative 04/25/2018    HCGS Negative 03/24/2017     HEPATIC:   Lab Results   Component Value Date    ALBUMIN 4.5 02/15/2023    PROTTOTAL 7.5 02/15/2023    ALT 26 02/15/2023    AST 22 02/15/2023    ALKPHOS 94 02/15/2023    BILITOTAL 0.7 02/15/2023     OTHER:   Lab Results   Component Value Date    A1C 5.9 (H) 05/23/2019    CASIE 9.3 11/29/2023    TSH 1.63 04/21/2021       Anesthesia Plan    ASA Status:  2    NPO Status:  NPO Appropriate    Anesthesia Type: General.     - Airway: LMA   Induction: Intravenous, Propofol.   Maintenance: Balanced.        Consents    Anesthesia Plan(s) and associated risks, benefits, and realistic alternatives discussed. Questions answered and patient/representative(s) expressed understanding.     - Discussed:     - Discussed with:  Patient            Postoperative Care    Pain management: IV analgesics.   PONV prophylaxis: Ondansetron (or other 5HT-3), Background Propofol Infusion     Comments:               Gustavo Tapia MD    I have reviewed the pertinent notes and labs in the chart from the past 30 days and (re)examined the patient.  Any updates or changes from those notes are reflected in this note.              # Obesity: Estimated body mass index is 35.96 kg/m  as calculated from the following:    Height as of 11/29/23: 1.6 m (5' 3\").    Weight as of 11/29/23: 92.1 kg (203 lb).      "

## 2023-11-30 NOTE — DISCHARGE INSTRUCTIONS
Same Day Surgery Discharge Instructions for  Sedation and General Anesthesia     It's not unusual to feel dizzy, light-headed or faint for up to 24 hours after surgery or while taking pain medication.  If you have these symptoms: sit for a few minutes before standing and have someone assist you when you get up to walk or use the bathroom.    You should rest and relax for the next 24 hours. We recommend you make arrangements to have an adult stay with you for at least 24 hours after your discharge.  Avoid hazardous and strenuous activity.    DO NOT DRIVE any vehicle or operate mechanical equipment for 24 hours following the end of your surgery.  Even though you may feel normal, your reactions may be affected by the medication you have received.    Do not drink alcoholic beverages for 24 hours following surgery.     Slowly progress to your regular diet as you feel able. It's not unusual to feel nauseated and/or vomit after receiving anesthesia.  If you develop these symptoms, drink clear liquids (apple juice, ginger ale, broth, 7-up, etc. ) until you feel better.  If your nausea and vomiting persists for 24 hours, please notify your surgeon.      All narcotic pain medications, along with inactivity and anesthesia, can cause constipation. Drinking plenty of liquids and increasing fiber intake will help.    For any questions of a medical nature, call your surgeon.    Do not make important decisions for 24 hours.    If you had general anesthesia, you may have a sore throat for a couple of days related to the breathing tube used during surgery.  You may use Cepacol lozenges to help with this discomfort.  If it worsens or if you develop a fever, contact your surgeon.     If you feel your pain is not well managed with the pain medications prescribed by your surgeon, please contact your surgeon's office to let them know so they can address your concerns.     **Because you had anesthesia today and your history of sleep apnea,  it is extremely important that you use your CPAP machine for the next 24 hours while napping or sleeping.**      STENT INFORMATION SHEET  [facility]  NASIM Vanessa M.D., BELINDA Dejesus M.D.,   SARTHAK Carrero M.D., SADIQ Lemus M.D.    [phone]    During surgery, a stent may be place in the ureter.  The ureter is the tube that drains urine from the kidney to the bladder.  The stent is placed to dilate (open) the ureter so the stone fragments can pass easily through the ureter or to decrease ureteral swelling after surgery, or to relieve an obstruction.    The stent is made of rubber.  The upper end of the stent curls in the kidney while the lower end rests in the bladder.    While the stent is in place you may experience the following symptoms:  Blood and/or small blood clots in urine.  Bladder spasm (frequency and urgency of urination).  Discomfort or aching in the back or side where the stent is.  Burning or discomfort at the end of urine stream.    To decrease these symptoms you should:  Take pain medication as prescribed.  Drink plenty of fluids.  If you experience pain at the end of urination try not emptying your bladder completely.  If having discomfort in back or side, decrease activity.    Please call your physician or the physician on call if you experience:  Fever greater than 101 .  Severe pain not relieved by pain medication or rest.      **If you have questions or concerns about your procedure,   call Dr. Carrero at [phone]**

## 2023-11-30 NOTE — ANESTHESIA CARE TRANSFER NOTE
Patient: Shantell Wagner    Procedure: Procedure(s):  Cystoscopy, right ureteroscopy with laser lithotripsy and stone basketing.  Right ureteral stent placement.       Diagnosis: Kidney stone [N20.0]  Diagnosis Additional Information: No value filed.    Anesthesia Type:   General     Note:    Oropharynx: oropharynx clear of all foreign objects  Level of Consciousness: awake  Oxygen Supplementation: face mask  Level of Supplemental Oxygen (L/min / FiO2): 6  Independent Airway: airway patency satisfactory and stable  Dentition: dentition unchanged  Vital Signs Stable: post-procedure vital signs reviewed and stable  Report to RN Given: handoff report given  Patient transferred to: PACU    Handoff Report: Identifed the Patient, Identified the Reponsible Provider, Reviewed the pertinent medical history, Discussed the surgical course, Reviewed Intra-OP anesthesia mangement and issues during anesthesia, Set expectations for post-procedure period and Allowed opportunity for questions and acknowledgement of understanding      Vitals:  Vitals Value Taken Time   /85 11/30/23 1422   Temp     Pulse 90 11/30/23 1424   Resp 18 11/30/23 1424   SpO2 96 % 11/30/23 1424   Vitals shown include unfiled device data.    Electronically Signed By: NIKKI Jenkins CRNA  November 30, 2023  2:25 PM

## 2023-12-02 LAB
APPEARANCE STONE: NORMAL
COMPN STONE: NORMAL
SPECIMEN WT: 62 MG

## 2023-12-06 ENCOUNTER — HOSPITAL ENCOUNTER (OUTPATIENT)
Facility: CLINIC | Age: 53
Setting detail: OBSERVATION
Discharge: HOME | End: 2023-12-07
Attending: EMERGENCY MEDICINE | Admitting: INTERNAL MEDICINE
Payer: COMMERCIAL

## 2023-12-06 ENCOUNTER — OFFICE VISIT (OUTPATIENT)
Dept: UROLOGY | Facility: CLINIC | Age: 53
End: 2023-12-06
Payer: COMMERCIAL

## 2023-12-06 ENCOUNTER — APPOINTMENT (OUTPATIENT)
Dept: ULTRASOUND IMAGING | Facility: CLINIC | Age: 53
End: 2023-12-06
Attending: EMERGENCY MEDICINE
Payer: COMMERCIAL

## 2023-12-06 VITALS
SYSTOLIC BLOOD PRESSURE: 140 MMHG | BODY MASS INDEX: 35.61 KG/M2 | DIASTOLIC BLOOD PRESSURE: 88 MMHG | WEIGHT: 201 LBS | HEIGHT: 63 IN

## 2023-12-06 DIAGNOSIS — Z79.2 PROPHYLACTIC ANTIBIOTIC: ICD-10-CM

## 2023-12-06 DIAGNOSIS — N20.0 KIDNEY STONE: Primary | ICD-10-CM

## 2023-12-06 DIAGNOSIS — N20.0 NEPHROLITHIASIS: ICD-10-CM

## 2023-12-06 DIAGNOSIS — N23 RENAL COLIC: ICD-10-CM

## 2023-12-06 LAB
ALBUMIN UR-MCNC: 100 MG/DL
ANION GAP SERPL CALCULATED.3IONS-SCNC: 11 MMOL/L (ref 7–15)
APPEARANCE UR: ABNORMAL
BASOPHILS # BLD AUTO: 0 10E3/UL (ref 0–0.2)
BASOPHILS NFR BLD AUTO: 0 %
BILIRUB UR QL STRIP: NEGATIVE
BUN SERPL-MCNC: 14.3 MG/DL (ref 6–20)
CALCIUM SERPL-MCNC: 9.9 MG/DL (ref 8.6–10)
CHLORIDE SERPL-SCNC: 102 MMOL/L (ref 98–107)
COLOR UR AUTO: YELLOW
CREAT SERPL-MCNC: 1.06 MG/DL (ref 0.51–0.95)
DEPRECATED HCO3 PLAS-SCNC: 28 MMOL/L (ref 22–29)
EGFRCR SERPLBLD CKD-EPI 2021: 63 ML/MIN/1.73M2
EOSINOPHIL # BLD AUTO: 0.2 10E3/UL (ref 0–0.7)
EOSINOPHIL NFR BLD AUTO: 3 %
ERYTHROCYTE [DISTWIDTH] IN BLOOD BY AUTOMATED COUNT: 12.2 % (ref 10–15)
GLUCOSE SERPL-MCNC: 129 MG/DL (ref 70–99)
GLUCOSE UR STRIP-MCNC: NEGATIVE MG/DL
HCT VFR BLD AUTO: 41.5 % (ref 35–47)
HGB BLD-MCNC: 13.9 G/DL (ref 11.7–15.7)
HGB UR QL STRIP: ABNORMAL
HOLD SPECIMEN: NORMAL
HOLD SPECIMEN: NORMAL
IMM GRANULOCYTES # BLD: 0 10E3/UL
IMM GRANULOCYTES NFR BLD: 0 %
KETONES UR STRIP-MCNC: NEGATIVE MG/DL
LEUKOCYTE ESTERASE UR QL STRIP: ABNORMAL
LYMPHOCYTES # BLD AUTO: 2.1 10E3/UL (ref 0.8–5.3)
LYMPHOCYTES NFR BLD AUTO: 30 %
MCH RBC QN AUTO: 29.7 PG (ref 26.5–33)
MCHC RBC AUTO-ENTMCNC: 33.5 G/DL (ref 31.5–36.5)
MCV RBC AUTO: 89 FL (ref 78–100)
MONOCYTES # BLD AUTO: 0.5 10E3/UL (ref 0–1.3)
MONOCYTES NFR BLD AUTO: 7 %
MUCOUS THREADS #/AREA URNS LPF: PRESENT /LPF
NEUTROPHILS # BLD AUTO: 4.2 10E3/UL (ref 1.6–8.3)
NEUTROPHILS NFR BLD AUTO: 60 %
NITRATE UR QL: NEGATIVE
NRBC # BLD AUTO: 0 10E3/UL
NRBC BLD AUTO-RTO: 0 /100
PH UR STRIP: 6.5 [PH] (ref 5–7)
PLATELET # BLD AUTO: 289 10E3/UL (ref 150–450)
POTASSIUM SERPL-SCNC: 4.3 MMOL/L (ref 3.4–5.3)
RBC # BLD AUTO: 4.68 10E6/UL (ref 3.8–5.2)
RBC URINE: >182 /HPF
SODIUM SERPL-SCNC: 141 MMOL/L (ref 135–145)
SP GR UR STRIP: 1.02 (ref 1–1.03)
SQUAMOUS EPITHELIAL: 5 /HPF
UROBILINOGEN UR STRIP-MCNC: NORMAL MG/DL
WBC # BLD AUTO: 7.1 10E3/UL (ref 4–11)
WBC URINE: 25 /HPF

## 2023-12-06 PROCEDURE — 76770 US EXAM ABDO BACK WALL COMP: CPT

## 2023-12-06 PROCEDURE — 99285 EMERGENCY DEPT VISIT HI MDM: CPT | Mod: 25

## 2023-12-06 PROCEDURE — 52310 CYSTOSCOPY AND TREATMENT: CPT | Performed by: UROLOGY

## 2023-12-06 PROCEDURE — 96374 THER/PROPH/DIAG INJ IV PUSH: CPT

## 2023-12-06 PROCEDURE — 250N000013 HC RX MED GY IP 250 OP 250 PS 637: Performed by: EMERGENCY MEDICINE

## 2023-12-06 PROCEDURE — 99212 OFFICE O/P EST SF 10 MIN: CPT | Mod: 25 | Performed by: UROLOGY

## 2023-12-06 PROCEDURE — 85025 COMPLETE CBC W/AUTO DIFF WBC: CPT | Performed by: EMERGENCY MEDICINE

## 2023-12-06 PROCEDURE — 250N000011 HC RX IP 250 OP 636: Mod: JZ | Performed by: EMERGENCY MEDICINE

## 2023-12-06 PROCEDURE — 96375 TX/PRO/DX INJ NEW DRUG ADDON: CPT

## 2023-12-06 PROCEDURE — 81001 URINALYSIS AUTO W/SCOPE: CPT | Performed by: EMERGENCY MEDICINE

## 2023-12-06 PROCEDURE — 87086 URINE CULTURE/COLONY COUNT: CPT | Performed by: EMERGENCY MEDICINE

## 2023-12-06 PROCEDURE — 80048 BASIC METABOLIC PNL TOTAL CA: CPT | Performed by: EMERGENCY MEDICINE

## 2023-12-06 PROCEDURE — 36415 COLL VENOUS BLD VENIPUNCTURE: CPT | Performed by: EMERGENCY MEDICINE

## 2023-12-06 RX ORDER — ONDANSETRON 2 MG/ML
4 INJECTION INTRAMUSCULAR; INTRAVENOUS ONCE
Status: COMPLETED | OUTPATIENT
Start: 2023-12-06 | End: 2023-12-06

## 2023-12-06 RX ORDER — OXYCODONE HYDROCHLORIDE 5 MG/1
5 TABLET ORAL ONCE
Status: COMPLETED | OUTPATIENT
Start: 2023-12-06 | End: 2023-12-06

## 2023-12-06 RX ORDER — HYDROMORPHONE HYDROCHLORIDE 1 MG/ML
0.5 INJECTION, SOLUTION INTRAMUSCULAR; INTRAVENOUS; SUBCUTANEOUS EVERY 30 MIN PRN
Status: DISCONTINUED | OUTPATIENT
Start: 2023-12-06 | End: 2023-12-07

## 2023-12-06 RX ORDER — CIPROFLOXACIN 500 MG/1
500 TABLET, FILM COATED ORAL ONCE
Qty: 1 TABLET | Refills: 0 | Status: ON HOLD | OUTPATIENT
Start: 2023-12-06 | End: 2023-12-07

## 2023-12-06 RX ORDER — LIDOCAINE HYDROCHLORIDE 20 MG/ML
JELLY TOPICAL ONCE
Status: DISCONTINUED | OUTPATIENT
Start: 2023-12-06 | End: 2023-12-06

## 2023-12-06 RX ORDER — HYDROMORPHONE HYDROCHLORIDE 1 MG/ML
0.5 INJECTION, SOLUTION INTRAMUSCULAR; INTRAVENOUS; SUBCUTANEOUS ONCE
Status: COMPLETED | OUTPATIENT
Start: 2023-12-06 | End: 2023-12-06

## 2023-12-06 RX ORDER — KETOROLAC TROMETHAMINE 15 MG/ML
15 INJECTION, SOLUTION INTRAMUSCULAR; INTRAVENOUS ONCE
Status: COMPLETED | OUTPATIENT
Start: 2023-12-06 | End: 2023-12-06

## 2023-12-06 RX ADMIN — OXYCODONE HYDROCHLORIDE 5 MG: 5 TABLET ORAL at 22:30

## 2023-12-06 RX ADMIN — LIDOCAINE HYDROCHLORIDE: 20 JELLY TOPICAL at 10:22

## 2023-12-06 RX ADMIN — KETOROLAC TROMETHAMINE 15 MG: 15 INJECTION, SOLUTION INTRAMUSCULAR; INTRAVENOUS at 23:27

## 2023-12-06 RX ADMIN — ONDANSETRON 4 MG: 2 INJECTION INTRAMUSCULAR; INTRAVENOUS at 23:33

## 2023-12-06 RX ADMIN — HYDROMORPHONE HYDROCHLORIDE 0.5 MG: 1 INJECTION, SOLUTION INTRAMUSCULAR; INTRAVENOUS; SUBCUTANEOUS at 23:33

## 2023-12-06 ASSESSMENT — ACTIVITIES OF DAILY LIVING (ADL): ADLS_ACUITY_SCORE: 35

## 2023-12-06 ASSESSMENT — PAIN SCALES - GENERAL: PAINLEVEL: NO PAIN (0)

## 2023-12-06 NOTE — PROGRESS NOTES
Office Visit Note  [facility]  [phone]    Dec 6, 2023    [unfilled]    1970    UROLOGIC DIAGNOSES:    Kidney stones    CURRENT INTERVENTIONS:    S/P extraction    History:    Stephie returns to clinic today for postoperative stent removal.  She has felt well since her procedure.  Stones are composed of calcium oxalate.  Serum calcium levels are normal.      Imaging:      Labs:      MEDICATIONS:    Current Outpatient Medications:     atorvastatin (LIPITOR) 20 MG tablet, Take 1 tablet (20 mg) by mouth daily, Disp: 90 tablet, Rfl: 3    gabapentin (NEURONTIN) 300 MG capsule, TAKE 1 CAPSULE BY MOUTH EVERYDAY AT BEDTIME, Disp: 90 capsule, Rfl: 3    glucosamine-chondroitin 500-400 MG CAPS, Take 1 capsule by mouth daily, Disp: , Rfl:     losartan (COZAAR) 25 MG tablet, Take 1 tablet (25 mg) by mouth daily, Disp: 90 tablet, Rfl: 3    omeprazole (PRILOSEC) 20 MG capsule, Take 20 mg by mouth every other day , Disp: , Rfl:     Semaglutide, 2 MG/DOSE, (OZEMPIC) 8 MG/3ML pen, Inject 2 mg Subcutaneous every 7 days, Disp: 9 mL, Rfl: 3    tamoxifen (NOLVADEX) 20 MG tablet, Take 1 tablet (20 mg) by mouth daily, Disp: 90 tablet, Rfl: 3    tamsulosin (FLOMAX) 0.4 MG capsule, Take 1 capsule (0.4 mg) by mouth daily, Disp: 7 capsule, Rfl: 0  No current facility-administered medications for this visit.    ALLERGIES:     Allergies   Allergen Reactions    No Known Drug Allergy        REVIEW OF SYSTEMS: Ten point review of systems without change as outlined in HPI    SURGICAL HISTORY:    Past Surgical History:   Procedure Laterality Date    CARPAL TUNNEL RELEASE RT/LT  03/2010    COLONOSCOPY      INSERT PORT VASCULAR ACCESS Left 04/22/2016    Procedure: INSERT PORT VASCULAR ACCESS;  Surgeon: Nereyda Flowers MD;  Location: [facility]    INSERT TISSUE EXPANDER BREAST BILATERAL Bilateral 10/17/2016    Procedure: INSERT TISSUE EXPANDER BREAST BILATERAL;  Surgeon: Jenna Vazquez MD;  Location:  OR    LASER HOLMIUM  "LITHOTRIPSY URETER(S), INSERT STENT, COMBINED Right 11/30/2023    Procedure: Cystoscopy, right ureteroscopy with laser lithotripsy and stone basketing.  Right ureteral stent placement.;  Surgeon: Dylan Carrero MD;  Location:  OR    LASIK BILATERAL      MASTECTOMY MODIFIED RADICAL Right 10/17/2016    Procedure: MASTECTOMY MODIFIED RADICAL;  Surgeon: Nereyda Flowers MD;  [facility]: [facility]    MASTECTOMY MODIFIED RADICAL, SENTINEL NODE, COMBINED Left 10/17/2016    Procedure: COMBINED MASTECTOMY MODIFIED RADICAL, SENTINEL NODE;  Surgeon: Nereyda Flowers MD;  Location: [facility]    PROCURE GRAFT FAT Bilateral 04/25/2018    Procedure: PROCURE GRAFT FAT;;  Surgeon: Jenna Vazquez MD;  Location: [facility]    RECONSTRUCT BREAST BILATERAL, IMPLANT PROSTHESIS BILATERAL, COMBINED Bilateral 09/13/2017    Procedure: COMBINED RECONSTRUCT BREAST BILATERAL, IMPLANT PROSTHESIS BILATERAL;  BILATERAL SECOND STAGE BREAST RECONSTRUCTION WITH IMPLANT.;  Surgeon: Jenna Vazquez MD;  Location: [facility]    REMOVE CATHETER VASCULAR ACCESS Left 10/17/2016    Procedure: REMOVE CATHETER VASCULAR ACCESS;  Surgeon: Nereyda Flowers MD;  Location:  OR    REVISE RECONSTRUCTED BREAST BILATERAL Bilateral 04/25/2018    Procedure: REVISE RECONSTRUCTED BREAST BILATERAL;  REVISION BILATERAL BREAST RECONSTRUCTION AND  FAT GRAFTING FROM AXILLA TO BILATERAL BREASTS.;  Surgeon: Jenna Vazquez MD;  Location: [facility]    RHINOPLASTY  1983    Following trauma         PHYSICAL EXAM:    BP (!) 140/88   Ht 1.6 m (5' 3\")   Wt 91.2 kg (201 lb)   BMI 35.61 kg/m      Constitutional: Well developed. Conversant and in no acute distress  Eyes: Anicteric sclera, conjunctiva clear, normal extraocular movements  ENT: Normocephalic and atraumatic,   Skin: Warm and dry. No rashes or lesions  Cardiac: No peripheral edema  Back/Flank: Not done  CNS/PNS: Normal musculature and movements, moves all extremities normally  Respiratory: Normal " non-labored breathing  Abdomen: Soft nontender and nondistended  Peripheral Vascular: No peripheral edema  Mental Status/Psych: Alert and Oriented x 3. Normal mood and affect      External Genitalia: Not done  Bladder: Not done  Urethra: Not done   Vagina: Not done    Cystoscopy: I performed proximal cystoscopy and removed the stent without difficulty      Urinalysis:  UA RESULTS:  Recent Labs   Lab Test 10/24/23  0045   COLOR Orange*   APPEARANCE Slightly Cloudy*   URINEGLC Negative   URINEBILI Negative   URINEKETONE Negative   SG 1.033   UBLD Large*   URINEPH 6.0   PROTEIN 200*   NITRITE Negative   LEUKEST Trace*   RBCU >182*   WBCU 18*         IMPRESSION:    Doing well, stent removed    PLAN:    We discussed prevention methods for future stones.  I recommended that she see our clinic again in 1 year with a KUB to make certain she is not forming any new stones.      Dylan Carrero M.D.

## 2023-12-06 NOTE — LETTER
12/6/2023       RE: Shantell Wagner  [id number]  [place] [address]     Dear Colleague,    Thank you for referring your patient, Shantell Wagner, to the [facility] [place] at [facility]. Please see a copy of my visit note below.    Office Visit Note  [facility]  [phone]    Dec 6, 2023    [unfilled]    1970    UROLOGIC DIAGNOSES:    Kidney stones    CURRENT INTERVENTIONS:    S/P extraction    History:    Stephie returns to clinic today for postoperative stent removal.  She has felt well since her procedure.  Stones are composed of calcium oxalate.  Serum calcium levels are normal.      Imaging:      Labs:      MEDICATIONS:    Current Outpatient Medications:     atorvastatin (LIPITOR) 20 MG tablet, Take 1 tablet (20 mg) by mouth daily, Disp: 90 tablet, Rfl: 3    gabapentin (NEURONTIN) 300 MG capsule, TAKE 1 CAPSULE BY MOUTH EVERYDAY AT BEDTIME, Disp: 90 capsule, Rfl: 3    glucosamine-chondroitin 500-400 MG CAPS, Take 1 capsule by mouth daily, Disp: , Rfl:     losartan (COZAAR) 25 MG tablet, Take 1 tablet (25 mg) by mouth daily, Disp: 90 tablet, Rfl: 3    omeprazole (PRILOSEC) 20 MG capsule, Take 20 mg by mouth every other day , Disp: , Rfl:     Semaglutide, 2 MG/DOSE, (OZEMPIC) 8 MG/3ML pen, Inject 2 mg Subcutaneous every 7 days, Disp: 9 mL, Rfl: 3    tamoxifen (NOLVADEX) 20 MG tablet, Take 1 tablet (20 mg) by mouth daily, Disp: 90 tablet, Rfl: 3    tamsulosin (FLOMAX) 0.4 MG capsule, Take 1 capsule (0.4 mg) by mouth daily, Disp: 7 capsule, Rfl: 0  No current facility-administered medications for this visit.    ALLERGIES:     Allergies   Allergen Reactions    No Known Drug Allergy        REVIEW OF SYSTEMS: Ten point review of systems without change as outlined in HPI    SURGICAL HISTORY:    Past Surgical History:   Procedure Laterality Date    CARPAL TUNNEL RELEASE RT/LT  03/2010    COLONOSCOPY      INSERT  "PORT VASCULAR ACCESS Left 04/22/2016    Procedure: INSERT PORT VASCULAR ACCESS;  Surgeon: Nereyda Flowers MD;  [facility]    INSERT TISSUE EXPANDER BREAST BILATERAL Bilateral 10/17/2016    Procedure: INSERT TISSUE EXPANDER BREAST BILATERAL;  Surgeon: Jenna Vazquez MD;  [facility]: [facility]    LASER HOLMIUM LITHOTRIPSY URETER(S), INSERT STENT, COMBINED Right 11/30/2023    Procedure: Cystoscopy, right ureteroscopy with laser lithotripsy and stone basketing.  Right ureteral stent placement.;  Surgeon: Dylan Carrero MD;  Location:  OR    LASIK BILATERAL      MASTECTOMY MODIFIED RADICAL Right 10/17/2016    Procedure: MASTECTOMY MODIFIED RADICAL;  Surgeon: Nereyda Flowers MD;  [facility]    MASTECTOMY MODIFIED RADICAL, SENTINEL NODE, COMBINED Left 10/17/2016    Procedure: COMBINED MASTECTOMY MODIFIED RADICAL, SENTINEL NODE;  Surgeon: Nereyda Flowers MD;  Location:  OR    PROCURE GRAFT FAT Bilateral 04/25/2018    Procedure: PROCURE GRAFT FAT;;  Surgeon: Jenna Vazquez MD;  Location: [facility]    RECONSTRUCT BREAST BILATERAL, IMPLANT PROSTHESIS BILATERAL, COMBINED Bilateral 09/13/2017    Procedure: COMBINED RECONSTRUCT BREAST BILATERAL, IMPLANT PROSTHESIS BILATERAL;  BILATERAL SECOND STAGE BREAST RECONSTRUCTION WITH IMPLANT.;  Surgeon: Jenna Vazquez MD;  Location: [facility]    REMOVE CATHETER VASCULAR ACCESS Left 10/17/2016    Procedure: REMOVE CATHETER VASCULAR ACCESS;  Surgeon: Nereyda Flowers MD;  Location:  OR    REVISE RECONSTRUCTED BREAST BILATERAL Bilateral 04/25/2018    Procedure: REVISE RECONSTRUCTED BREAST BILATERAL;  REVISION BILATERAL BREAST RECONSTRUCTION AND  FAT GRAFTING FROM AXILLA TO BILATERAL BREASTS.;  Surgeon: Jenna Vazquez MD;  Location: [facility]    RHINOPLASTY  1983    Following trauma         PHYSICAL EXAM:    BP (!) 140/88   Ht 1.6 m (5' 3\")   Wt 91.2 kg (201 lb)   BMI 35.61 kg/m      Constitutional: Well developed. Conversant and in no " acute distress  Eyes: Anicteric sclera, conjunctiva clear, normal extraocular movements  ENT: Normocephalic and atraumatic,   Skin: Warm and dry. No rashes or lesions  Cardiac: No peripheral edema  Back/Flank: Not done  CNS/PNS: Normal musculature and movements, moves all extremities normally  Respiratory: Normal non-labored breathing  Abdomen: Soft nontender and nondistended  Peripheral Vascular: No peripheral edema  Mental Status/Psych: Alert and Oriented x 3. Normal mood and affect      External Genitalia: Not done  Bladder: Not done  Urethra: Not done   Vagina: Not done    Cystoscopy: I performed proximal cystoscopy and removed the stent without difficulty      Urinalysis:  UA RESULTS:  Recent Labs   Lab Test 10/24/23  0045   COLOR Orange*   APPEARANCE Slightly Cloudy*   URINEGLC Negative   URINEBILI Negative   URINEKETONE Negative   SG 1.033   UBLD Large*   URINEPH 6.0   PROTEIN 200*   NITRITE Negative   LEUKEST Trace*   RBCU >182*   WBCU 18*         IMPRESSION:    Doing well, stent removed    PLAN:    We discussed prevention methods for future stones.  I recommended that she see our clinic again in 1 year with a KUB to make certain she is not forming any new stones.      Dylan Carrero M.D.

## 2023-12-06 NOTE — NURSING NOTE
Chief Complaint   Patient presents with    Kidney Stone Related     Stent removal     Prior to the start of the procedure and with procedural staff participation, I verbally confirmed the patient s identity using two indicators, relevant allergies, that the procedure was appropriate and matched the consent or emergent situation, and that the correct equipment/implants were available. Immediately prior to starting the procedure I conducted the Time Out with the procedural staff and re-confirmed the patient s name, procedure, and site/side. I have wiped the patient off with the povidone-Iodine solution, draped them, and used Lidocaine hydrochloride jelly. (The Joint Commission universal protocol was followed.)  Yes    Sedation (Moderate or Deep): None    5mL 2% lidocaine hydrochloride Urojet instilled into urethra.    NDC# [address]-0  Lot #: [id number]  Expiration Date:  4/25    Angela Can EMT

## 2023-12-06 NOTE — PATIENT INSTRUCTIONS
"AFTER YOUR CYSTOSCOPY AND STENT REMOVAL  ?  ?  You have just completed a cystoscopy, or \"cysto\", which allowed your physician to learn more about your bladder (or to remove a stent placed after surgery). We suggest that you continue to avoid caffeine, fruit juice, and alcohol for the next 24 hours, however, you are encouraged to return to your normal activities.  ?  ?  A few things that are considered normal after your cystoscopy:  ?  * small amount of bleeding (or spotting) that clears within the next 24 hours  ?  * slight burning sensation with urination  ?  * sensation of needing to void (urinate) more frequently  ?  * the feeling of \"air\" in your urine  ?  * mild discomfort that is relieved with Tylenol    * bladder spasms  ?  ?  ?  Please contact our office promptly if you:  ?  * develop a fever above 101 degrees  ?  * are unable to urinate  ?  * develop bright red blood that does not stop  ?  * experience severe pain or swelling  ?  ?  ?  And of course, please contact our office with any concerns or questions [phone].  ?    "

## 2023-12-07 ENCOUNTER — APPOINTMENT (OUTPATIENT)
Dept: CT IMAGING | Facility: CLINIC | Age: 53
End: 2023-12-07
Attending: EMERGENCY MEDICINE
Payer: COMMERCIAL

## 2023-12-07 VITALS
SYSTOLIC BLOOD PRESSURE: 132 MMHG | HEART RATE: 95 BPM | BODY MASS INDEX: 36.04 KG/M2 | OXYGEN SATURATION: 92 % | HEIGHT: 63 IN | WEIGHT: 203.4 LBS | RESPIRATION RATE: 18 BRPM | DIASTOLIC BLOOD PRESSURE: 91 MMHG | TEMPERATURE: 98.5 F

## 2023-12-07 PROBLEM — N23 RENAL COLIC: Status: ACTIVE | Noted: 2023-12-07

## 2023-12-07 PROBLEM — N20.0 NEPHROLITHIASIS: Status: ACTIVE | Noted: 2023-12-07

## 2023-12-07 LAB
ANION GAP SERPL CALCULATED.3IONS-SCNC: 10 MMOL/L (ref 7–15)
BUN SERPL-MCNC: 15.1 MG/DL (ref 6–20)
CALCIUM SERPL-MCNC: 9.2 MG/DL (ref 8.6–10)
CHLORIDE SERPL-SCNC: 103 MMOL/L (ref 98–107)
CREAT SERPL-MCNC: 1.06 MG/DL (ref 0.51–0.95)
DEPRECATED HCO3 PLAS-SCNC: 28 MMOL/L (ref 22–29)
EGFRCR SERPLBLD CKD-EPI 2021: 63 ML/MIN/1.73M2
ERYTHROCYTE [DISTWIDTH] IN BLOOD BY AUTOMATED COUNT: 12.3 % (ref 10–15)
GLUCOSE SERPL-MCNC: 104 MG/DL (ref 70–99)
HCT VFR BLD AUTO: 37.8 % (ref 35–47)
HGB BLD-MCNC: 12.7 G/DL (ref 11.7–15.7)
MCH RBC QN AUTO: 29.7 PG (ref 26.5–33)
MCHC RBC AUTO-ENTMCNC: 33.6 G/DL (ref 31.5–36.5)
MCV RBC AUTO: 89 FL (ref 78–100)
PLATELET # BLD AUTO: 264 10E3/UL (ref 150–450)
POTASSIUM SERPL-SCNC: 3.9 MMOL/L (ref 3.4–5.3)
RBC # BLD AUTO: 4.27 10E6/UL (ref 3.8–5.2)
SODIUM SERPL-SCNC: 141 MMOL/L (ref 135–145)
WBC # BLD AUTO: 7.9 10E3/UL (ref 4–11)

## 2023-12-07 PROCEDURE — 85027 COMPLETE CBC AUTOMATED: CPT | Performed by: INTERNAL MEDICINE

## 2023-12-07 PROCEDURE — G0378 HOSPITAL OBSERVATION PER HR: HCPCS

## 2023-12-07 PROCEDURE — 99207 PR APP CREDIT; MD BILLING SHARED VISIT: CPT | Performed by: STUDENT IN AN ORGANIZED HEALTH CARE EDUCATION/TRAINING PROGRAM

## 2023-12-07 PROCEDURE — 82310 ASSAY OF CALCIUM: CPT | Performed by: INTERNAL MEDICINE

## 2023-12-07 PROCEDURE — 74176 CT ABD & PELVIS W/O CONTRAST: CPT

## 2023-12-07 PROCEDURE — 99222 1ST HOSP IP/OBS MODERATE 55: CPT | Mod: FS | Performed by: PHYSICIAN ASSISTANT

## 2023-12-07 PROCEDURE — 99234 HOSP IP/OBS SM DT SF/LOW 45: CPT | Performed by: INTERNAL MEDICINE

## 2023-12-07 PROCEDURE — 36415 COLL VENOUS BLD VENIPUNCTURE: CPT | Performed by: INTERNAL MEDICINE

## 2023-12-07 PROCEDURE — 250N000013 HC RX MED GY IP 250 OP 250 PS 637: Performed by: PHYSICIAN ASSISTANT

## 2023-12-07 PROCEDURE — 250N000013 HC RX MED GY IP 250 OP 250 PS 637: Performed by: INTERNAL MEDICINE

## 2023-12-07 RX ORDER — ONDANSETRON 2 MG/ML
4 INJECTION INTRAMUSCULAR; INTRAVENOUS EVERY 6 HOURS PRN
Status: DISCONTINUED | OUTPATIENT
Start: 2023-12-07 | End: 2023-12-07 | Stop reason: HOSPADM

## 2023-12-07 RX ORDER — NALOXONE HYDROCHLORIDE 0.4 MG/ML
0.2 INJECTION, SOLUTION INTRAMUSCULAR; INTRAVENOUS; SUBCUTANEOUS
Status: DISCONTINUED | OUTPATIENT
Start: 2023-12-07 | End: 2023-12-07 | Stop reason: HOSPADM

## 2023-12-07 RX ORDER — AMOXICILLIN 250 MG
1 CAPSULE ORAL 2 TIMES DAILY PRN
Status: DISCONTINUED | OUTPATIENT
Start: 2023-12-07 | End: 2023-12-07 | Stop reason: HOSPADM

## 2023-12-07 RX ORDER — TAMSULOSIN HYDROCHLORIDE 0.4 MG/1
0.4 CAPSULE ORAL DAILY
Status: DISCONTINUED | OUTPATIENT
Start: 2023-12-07 | End: 2023-12-07 | Stop reason: HOSPADM

## 2023-12-07 RX ORDER — ONDANSETRON 4 MG/1
4 TABLET, ORALLY DISINTEGRATING ORAL EVERY 6 HOURS PRN
Status: DISCONTINUED | OUTPATIENT
Start: 2023-12-07 | End: 2023-12-07 | Stop reason: HOSPADM

## 2023-12-07 RX ORDER — AMOXICILLIN 250 MG
2 CAPSULE ORAL 2 TIMES DAILY PRN
Status: DISCONTINUED | OUTPATIENT
Start: 2023-12-07 | End: 2023-12-07 | Stop reason: HOSPADM

## 2023-12-07 RX ORDER — NALOXONE HYDROCHLORIDE 0.4 MG/ML
0.4 INJECTION, SOLUTION INTRAMUSCULAR; INTRAVENOUS; SUBCUTANEOUS
Status: DISCONTINUED | OUTPATIENT
Start: 2023-12-07 | End: 2023-12-07 | Stop reason: HOSPADM

## 2023-12-07 RX ORDER — ACETAMINOPHEN 325 MG/1
1000 TABLET ORAL EVERY 8 HOURS PRN
Qty: 90 TABLET | Refills: 0 | Status: SHIPPED | OUTPATIENT
Start: 2023-12-07

## 2023-12-07 RX ORDER — OXYBUTYNIN CHLORIDE 5 MG/1
5 TABLET ORAL 3 TIMES DAILY PRN
Qty: 30 TABLET | Refills: 0 | Status: SHIPPED | OUTPATIENT
Start: 2023-12-07 | End: 2024-04-24

## 2023-12-07 RX ORDER — OXYBUTYNIN CHLORIDE 5 MG/1
5 TABLET ORAL 3 TIMES DAILY PRN
Status: DISCONTINUED | OUTPATIENT
Start: 2023-12-07 | End: 2023-12-07 | Stop reason: HOSPADM

## 2023-12-07 RX ORDER — POLYETHYLENE GLYCOL 3350 17 G/17G
17 POWDER, FOR SOLUTION ORAL DAILY
Status: DISCONTINUED | OUTPATIENT
Start: 2023-12-07 | End: 2023-12-07 | Stop reason: HOSPADM

## 2023-12-07 RX ORDER — ACETAMINOPHEN 325 MG/1
650 TABLET ORAL EVERY 4 HOURS PRN
Status: DISCONTINUED | OUTPATIENT
Start: 2023-12-07 | End: 2023-12-07 | Stop reason: HOSPADM

## 2023-12-07 RX ORDER — OXYCODONE HYDROCHLORIDE 5 MG/1
5 TABLET ORAL EVERY 4 HOURS PRN
Status: DISCONTINUED | OUTPATIENT
Start: 2023-12-07 | End: 2023-12-07 | Stop reason: HOSPADM

## 2023-12-07 RX ORDER — MORPHINE SULFATE 4 MG/ML
1 INJECTION, SOLUTION INTRAMUSCULAR; INTRAVENOUS
Status: DISCONTINUED | OUTPATIENT
Start: 2023-12-07 | End: 2023-12-07 | Stop reason: HOSPADM

## 2023-12-07 RX ORDER — MORPHINE SULFATE 4 MG/ML
2 INJECTION, SOLUTION INTRAMUSCULAR; INTRAVENOUS
Status: DISCONTINUED | OUTPATIENT
Start: 2023-12-07 | End: 2023-12-07 | Stop reason: HOSPADM

## 2023-12-07 RX ORDER — ACETAMINOPHEN 650 MG/1
650 SUPPOSITORY RECTAL EVERY 4 HOURS PRN
Status: DISCONTINUED | OUTPATIENT
Start: 2023-12-07 | End: 2023-12-07 | Stop reason: HOSPADM

## 2023-12-07 RX ADMIN — TAMSULOSIN HYDROCHLORIDE 0.4 MG: 0.4 CAPSULE ORAL at 09:05

## 2023-12-07 RX ADMIN — POLYETHYLENE GLYCOL 3350 17 G: 17 POWDER, FOR SOLUTION ORAL at 08:30

## 2023-12-07 ASSESSMENT — ACTIVITIES OF DAILY LIVING (ADL)
ADLS_ACUITY_SCORE: 35
ADLS_ACUITY_SCORE: 20

## 2023-12-07 NOTE — PLAN OF CARE
"Care from [id number]  Inpatient Progress Note    /76 (BP Location: Right arm)   Pulse 109   Temp 98.3  F (36.8  C) (Oral)   Resp 20   Ht 1.6 m (5' 3\")   Wt 92.3 kg (203 lb 6.4 oz)   SpO2 91%   BMI 36.03 kg/m      A&O x4. VSS, RA. Up ad mara; steady gait. Denies pain or discomfort at this time. NPO for urology. Sleeping b/t cares and able to make needs known.                       "

## 2023-12-07 NOTE — ED TRIAGE NOTES
Pt arrives via home due to pain after kidney stent removed today, this morning. Pain begins in lower right side and radiates to the back. Pain returned lasted for four hours, went away and now the pain is back. Ibuprofen taken at 2030

## 2023-12-07 NOTE — PLAN OF CARE
Goal Outcome Evaluation:      Plan of Care Reviewed With: patient, spouse         Pt A/Ox4, VSS on RA. IV removed. Discharge packet reviewed. Meds received. Belongings returned.  at bedside to transport home.

## 2023-12-07 NOTE — PLAN OF CARE
Goal Outcome Evaluation:       PRIMARY DIAGNOSIS: ACUTE ABD PAIN  OUTPATIENT/OBSERVATION GOALS TO BE MET BEFORE DISCHARGE:  1. Pain Status: Pain free.    2. Return to near baseline physical activity: Yes    3. Cleared for discharge by consultants (if involved): No    Discharge Planner Nurse   Safe discharge environment identified: Yes  Barriers to discharge: Yes       Entered by: Paloma Polanco RN 12/07/2023 8:47 AM   Pt A/Ox4, VSS on RA. NPO since midnight. No abdominal pain/nausea this morning. Up independent in room. IV SL.  Please review provider order for any additional goals.   Nurse to notify provider when observation goals have been met and patient is ready for discharge.

## 2023-12-07 NOTE — PHARMACY-ADMISSION MEDICATION HISTORY
Pharmacist Admission Medication History    Admission medication history is complete. The information provided in this note is only as accurate as the sources available at the time of the update.    Information Source(s): Patient via in-person    Pertinent Information: none    Changes made to PTA medication list:  Added: None  Deleted: None  Changed: None    Medication Affordability:  Not including over the counter (OTC) medications, was there a time in the past 3 months when you did not take your medications as prescribed because of cost?: No    Allergies reviewed with patient and updates made in EHR: no    Medication History Completed By: Omar Wylie RPH 12/7/2023 10:11 AM    Prior to Admission medications    Medication Sig Last Dose Taking? Auth Provider Long Term End Date   atorvastatin (LIPITOR) 20 MG tablet Take 1 tablet (20 mg) by mouth daily 12/6/2023 at am Yes Margaret Longo Ra, APRN CNP Yes    gabapentin (NEURONTIN) 300 MG capsule TAKE 1 CAPSULE BY MOUTH EVERYDAY AT BEDTIME 12/5/2023 at hs Yes Margaret Longo Ra, APRN CNP Yes    losartan (COZAAR) 25 MG tablet Take 1 tablet (25 mg) by mouth daily 12/6/2023 at am Yes Margaret Longo Ra, APRN CNP Yes    omeprazole (PRILOSEC) 20 MG capsule Take 20 mg by mouth every other day  12/6/2023 at am Yes Unknown, Entered By History     tamoxifen (NOLVADEX) 20 MG tablet Take 1 tablet (20 mg) by mouth daily 12/6/2023 at am Yes Anamika Anna, PAGilmarC Yes    tamsulosin (FLOMAX) 0.4 MG capsule Take 1 capsule (0.4 mg) by mouth daily 12/6/2023 at am Yes Dylan Carrero MD No 11/29/24   glucosamine-chondroitin 500-400 MG CAPS Take 1 capsule by mouth daily   Unknown, Entered By History     Semaglutide, 2 MG/DOSE, (OZEMPIC) 8 MG/3ML pen Inject 2 mg Subcutaneous every 7 days 12/3/2023  Margaret Longo Ra, APRN CNP

## 2023-12-07 NOTE — CONSULTS
[facility] Consultation by [facility]    Shantell Wagner MRN# [id number]   Age: 53 year old YOB: 1970     Date of Admission:  12/6/2023    Reason for consult: Severe flank pain with hydro after lithotripsy        Requesting PA/MD: Dr. Sanders       Level of consult: Consult, follow and place orders           Assessment and Plan:   Assessment:   Flank pain like due to ureteral spasm  Nephrolithiasis-current imaging likely just layering dust from recent procedure  History of breast cancer  HTN  Dyslipidemia      Plan:   -Okay for a diet from a urology standpoint.  -Will continue to monitor symptoms for now.  Discussed with patient that stony material within the kidney is likely just after her recent laser lithotripsy.  There is evidence of inflammation within the ureter and kidney.  This is likely secondary to recent procedure and ureteral stent removal.  -Would recommend continued symptom management.  The only treatment would be consideration of replacement in biliary stent, which we and the patient both would like to avoid.  -Have added on Flomax 0.4 mg once daily and oxybutynin 5 mg 3 times daily as needed for possible discomfort.  -Additional, pain and nausea management per primary service.  -If patient is doing well later today, would be reasonable to discharge.  Otherwise, if recurrence of pain, could consider monitoring until tomorrow.    Jackie Fernandes PA-C   [facility]  [phone]               Chief Complaint:   Abdominal pain     History is obtained from the patient and EMR.         History of Present Illness:   This patient is a 53 year old female who presented to the ER last night due to severe right flank pain.  She was seen in clinic yesterday morning by Dr. Carrero and had her indwelling right ureteral stent removed.  She had undergone right-sided ureteroscopy with Dr. Carrero with laser lithotripsy on 11/30/2023.  Renal [place] showed all stone burden was removed.   Stone composition was 60% calcium oxalate monohydrate and 40% % calcium oxalate dihydrate.  She had also recently passed a stone in October of this year on the left side.    She did note over the last several days she been having some severe pain on the right side.  After a few hours it would resolve on its own.  Yesterday she had a reoccurrence of this pain after stent removal and it lasted for a few hours.  It was noted to be likely ureteral spasm.  However, the pain returned last evening and intervention such as oxycodone did not help.  She was also having nausea with this.  She denied dysuria, fevers, chills, vomiting, shortness of breath, or chest pain.     Patient currently feels well.  She was admitted for pain control.  Mild NAHOMI creatinine 1.06 EGFR 63.  Baseline is 0.7-0.8.  CT imaging was obtained which shows evidence of stone material within the right kidney.  I discussed this with Dr. Carrero was thought to be layering stone debris.  He recommended avoiding indwelling ureteral stent.  Urinalysis was abnormal, but is more consistent with recent ureteral stent as opposed to infection.  WBC 7.9 (7.1).  Hemoglobin 12.7.  Patient is afebrile.  She did have some initial tachycardia.  Currently resting comfortably in bed.    Urine culture is in process.           Past Medical History:     Past Medical History:   Diagnosis Date    Abnormal Pap smear, (ASC-H) 02/20/2013    [place]/ECC= NEGRITA 1. Repeat HPV screen and ECC 12 months from colp.    Breast cancer 04/2016    Hyperlipidemia     Hypertension     At doctors appointments my blood pressure is elevated.    Neuropathy              Past Surgical History:     Past Surgical History:   Procedure Laterality Date    CARPAL TUNNEL RELEASE RT/LT  03/2010    COLONOSCOPY      INSERT PORT VASCULAR ACCESS Left 04/22/2016    Procedure: INSERT PORT VASCULAR ACCESS;  Surgeon: Nereyda Flowers MD;  Location: [facility] OR    INSERT TISSUE EXPANDER BREAST BILATERAL Bilateral 10/17/2016     Procedure: INSERT TISSUE EXPANDER BREAST BILATERAL;  Surgeon: Jenna Vazquez MD;  Location: [facility] OR    LASER HOLMIUM LITHOTRIPSY URETER(S), INSERT STENT, COMBINED Right 11/30/2023    Procedure: Cystoscopy, right ureteroscopy with laser lithotripsy and stone basketing.  Right ureteral stent placement.;  Surgeon: Dylan Carrero MD;  Location:  OR    LASIK BILATERAL      MASTECTOMY MODIFIED RADICAL Right 10/17/2016    Procedure: MASTECTOMY MODIFIED RADICAL;  Surgeon: Nereyda Flowers MD;  Location: [facility] OR    MASTECTOMY MODIFIED RADICAL, SENTINEL NODE, COMBINED Left 10/17/2016    Procedure: COMBINED MASTECTOMY MODIFIED RADICAL, SENTINEL NODE;  Surgeon: Nereyda Flowers MD;  Location: [facility] OR    PROCURE GRAFT FAT Bilateral 04/25/2018    Procedure: PROCURE GRAFT FAT;;  Surgeon: Jenna Vazquez MD;  Location: [facility]    RECONSTRUCT BREAST BILATERAL, IMPLANT PROSTHESIS BILATERAL, COMBINED Bilateral 09/13/2017    Procedure: COMBINED RECONSTRUCT BREAST BILATERAL, IMPLANT PROSTHESIS BILATERAL;  BILATERAL SECOND STAGE BREAST RECONSTRUCTION WITH IMPLANT.;  Surgeon: Jenna Vazquez MD;  Location: [facility]    REMOVE CATHETER VASCULAR ACCESS Left 10/17/2016    Procedure: REMOVE CATHETER VASCULAR ACCESS;  Surgeon: Nereyda Flowers MD;  Location:  OR    REVISE RECONSTRUCTED BREAST BILATERAL Bilateral 04/25/2018    Procedure: REVISE RECONSTRUCTED BREAST BILATERAL;  REVISION BILATERAL BREAST RECONSTRUCTION AND  FAT GRAFTING FROM AXILLA TO BILATERAL BREASTS.;  Surgeon: Jenna Vazquez MD;  Location: [facility]    RHINOPLASTY  1983    Following trauma             Social History:     Social History     Tobacco Use    Smoking status: Never    Smokeless tobacco: Never   Substance Use Topics    Alcohol use: Yes     Comment: rare             Family History:     Family History   Problem Relation Age of Onset    C.A.D. Father         bypass surg age 68    Coronary Artery Disease Father     Prostate Cancer  "Father     Hypertension Father     Hyperlipidemia Father     Breast Cancer Sister 38        breast ca    Diabetes Type 1 Mother     Diabetes Mother     Hyperlipidemia Mother     Breast Cancer Maternal Grandmother 70        dx'ed age 80s    Breast Cancer Sister     Depression Daughter     Substance Abuse Daughter     Asthma Daughter     Substance Abuse Sister      Family history reviewed.             Allergies:     Allergies   Allergen Reactions    No Known Drug Allergy              Medications:     Current Facility-Administered Medications   Medication    acetaminophen (TYLENOL) tablet 650 mg    Or    acetaminophen (TYLENOL) Suppository 650 mg    morphine (PF) injection 1 mg    morphine (PF) injection 2 mg    naloxone (NARCAN) injection 0.2 mg    Or    naloxone (NARCAN) injection 0.4 mg    Or    naloxone (NARCAN) injection 0.2 mg    Or    naloxone (NARCAN) injection 0.4 mg    ondansetron (ZOFRAN ODT) ODT tab 4 mg    Or    ondansetron (ZOFRAN) injection 4 mg    oxyBUTYnin (DITROPAN) tablet 5 mg    oxyCODONE (ROXICODONE) tablet 5 mg    oxyCODONE IR (ROXICODONE) half-tab 2.5 mg    polyethylene glycol (MIRALAX) Packet 17 g    senna-docusate (SENOKOT-S/PERICOLACE) 8.6-50 MG per tablet 1 tablet    Or    senna-docusate (SENOKOT-S/PERICOLACE) 8.6-50 MG per tablet 2 tablet    tamsulosin (FLOMAX) capsule 0.4 mg             Review of Systems:   A comprehensive 10-point review of systems was performed and found to be negative except as described in the HPI.     BP (!) 132/91 (BP Location: Left arm)   Pulse 95   Temp 98.5  F (36.9  C) (Oral)   Resp 18   Ht 1.6 m (5' 3\")   Wt 92.3 kg (203 lb 6.4 oz)   SpO2 92%   BMI 36.03 kg/m    PSYCH: NAD  EYES: EOMI  MOUTH: MMM  NECK: Supple, no notable adenopathy  RESP: Unlabored breathing  CARDIAC: No LE edema, regular radial pulse  SKIN: Warm, no rashes  ABD: soft, mild suprapubic tenderness  NEURO: AAO x3  URO: urinating on own         Data:     Lab Results   Component Value Date    " WBC 7.9 12/07/2023    HGB 12.7 12/07/2023    HCT 37.8 12/07/2023    MCV 89 12/07/2023     12/07/2023     Lab Results   Component Value Date    CR 1.06 (H) 12/07/2023    CR 1.06 (H) 12/06/2023     Recent Labs   Lab 12/06/23  2312   COLOR Yellow   APPEARANCE Slightly Cloudy*   URINEGLC Negative   URINEBILI Negative   URINEKETONE Negative   SG 1.019   URINEPH 6.5   PROTEIN 100*   NITRITE Negative   LEUKEST Moderate*   RBCU >182*   WBCU 25*     Urine culture: in process.    Abd/pelvis CT no contrast - Stone Protocol    Result Date: 12/7/2023  EXAM: CT ABDOMEN PELVIS W/O CONTRAST LOCATION: [facility] DATE: 12/7/2023 INDICATION: R f;ank pain, ureteral stent removed today COMPARISON: 01/30/2023 TECHNIQUE: CT scan of the abdomen and pelvis was performed without IV contrast. Multiplanar reformats were obtained. Dose reduction techniques were used. CONTRAST: None. FINDINGS: LOWER CHEST: Normal. HEPATOBILIARY: Normal. PANCREAS: Normal. SPLEEN: Normal. ADRENAL GLANDS: Normal. KIDNEYS/BLADDER: New modest right hydronephrosis/hydroureter. Moderate soft tissue stranding surrounding right kidney and ureter. Ureter is dilated down to the level the bladder with no obstructing stone evident near UVJ. Its possible there is edema at the ureteral orifice that results in the obstruction. There are 2-3 stone fragments within right lower pole, largest of these measures 8 x 5 mm. Faint 1 mm stone right upper pole. Stable left kidney, no left hydronephrosis. No bladder stones. Single tiny air bubble within bladder. BOWEL: No obstruction or inflammatory change. Appendix normal. LYMPH NODES: Normal. VASCULATURE: Unremarkable. PELVIC ORGANS: Normal. MUSCULOSKELETAL: Normal.     IMPRESSION: 1.  New modest right hydronephrosis/hydroureter with soft tissue stranding now surrounding right kidney and ureter. No obstructing distal right ureteral stone evident. 2.  Multiple stones are present within right kidney.     US  Renal Complete Non-Vascular    Result Date: 12/6/2023  EXAM: US RENAL COMPLETE NON-VASCULAR LOCATION: [facility] DATE: 12/6/2023 INDICATION: right flank abd pain, ureteral stent removed today COMPARISON: None. TECHNIQUE: Routine Bilateral Renal and Bladder Ultrasound. FINDINGS: RIGHT KIDNEY: 13.0 x 6.7 x 5.7 cm. Moderate right hydronephrosis. Two 9 mm calculi projecting over the mid and lower right kidney collecting system. LEFT KIDNEY: 11.3 x 3.7 x 3.9 cm. Normal without hydronephrosis or masses. BLADDER: Partially distended urinary bladder. No bladder abnormalities seen.     IMPRESSION: 1.  Moderate right hydronephrosis. Two 9 mm calculi projecting over the mid and lower right kidney collecting system. 2.  Left kidney unremarkable. 3.  Urinary bladder is segmentally visualized with no abnormalities demonstrated    CT Abdomen Pelvis w/o Contrast    Result Date: 12/1/2023  CT ABDOMEN AND PELVIS WITHOUT CONTRAST 11/30/2023 12:30 PM CLINICAL HISTORY: Kidney stones. TECHNIQUE: CT scan of the abdomen and pelvis was performed without IV contrast. Multiplanar reformats were obtained. Dose reduction techniques were used. CONTRAST: None. COMPARISON: CT abdomen and pelvis 1/24/2023. FINDINGS: LOWER CHEST: Unremarkable. HEPATOBILIARY: No significant mass or bile duct dilatation. No calcified gallstones. PANCREAS: No significant mass, duct dilatation, or inflammatory change. SPLEEN: Unremarkable. ADRENAL GLANDS: No significant nodules. KIDNEYS: Resolved left ureteral vesicular junction calculus and upstream collecting system dilatation. Previous right renal pelvic calculus (7 mm) has migrated retrograde into a right lower pole calyx, where there is an adjacent additional 9 mm nonobstructing calculus. Additional punctate right mid pole nonobstructing calculus. No right ureteral calculus. No collecting system dilatation. Urinary bladder is unremarkable. Probable tiny left superior pole  hemorrhagic/proteinaceous cyst. BOWEL: No obstruction or inflammatory change. VASCULATURE: Nonaneursymal abdominal aorta. LYMPH NODE AND PERITONEUM: No enlarged lymph node. No free fluid. MUSCULOSKELETAL: Bilateral breast prostheses. No aggressive osseous lesion. OTHER: None.     IMPRESSION: 1.  Resolved left ureterovesical junction calculus and hydronephrosis. 2.  Nonobstructing right renal calculi measuring up to 9 mm. Previously visualized right renal pelvis calculus has migrated retrograde, now within a right lower pole calyx. MAVERICK NAVARRO MD   SYSTEM ID:  [id number]    XR Surgery AMAYA Fluoro Less Than 5 Min w Stills    Result Date: 11/30/2023  This exam was marked as non-reportable because it will not be read by a radiologist or a [facility] non-radiologist provider.

## 2023-12-07 NOTE — ED NOTES
"[facility]  ED Nurse Handoff Report    ED Chief complaint: Abdominal Pain  . ED Diagnosis:   Final diagnoses:   Renal colic   Nephrolithiasis       Allergies:   Allergies   Allergen Reactions    No Known Drug Allergy        Code Status: Full Code    Activity level - Baseline/Home:  independent.  Activity Level - Current:   standby.   Lift room needed: No.   Bariatric: No   Needed: No   Isolation: No.   Infection: Not Applicable.     Respiratory status: Room air    Vital Signs (within 30 minutes):   Vitals:    12/06/23 2144   BP: (!) 155/110   Pulse: 101   Resp: 20   Temp: 97.8  F (36.6  C)   TempSrc: Oral   SpO2: 98%   Weight: 90.7 kg (200 lb)   Height: 1.6 m (5' 3\")       Cardiac Rhythm:  ,      Pain level:    Patient confused: No.   Patient Falls Risk: nonskid shoes/slippers when out of bed.   Elimination Status: Has voided     Patient Report - Initial Complaint: Abdominal pain.   Focused Assessment:   53-year-old female presenting with right flank pain.  Was in the urology office earlier today and had a ureteral stent removed.  Stent was on the right side.  A few hours after leaving the urology clinic she had acute right flank pain radiating towards the back associated with nausea.  No new fevers.  No dysuria.  Bowel movements been normal.  Was given ciprofloxacin at the time of her cystoscopy and stent removal      Abnormal Results:   Labs Ordered and Resulted from Time of ED Arrival to Time of ED Departure   BASIC METABOLIC PANEL - Abnormal       Result Value    Sodium 141      Potassium 4.3      Chloride 102      Carbon Dioxide (CO2) 28      Anion Gap 11      Urea Nitrogen 14.3      Creatinine 1.06 (*)     GFR Estimate 63      Calcium 9.9      Glucose 129 (*)    ROUTINE UA WITH MICROSCOPIC REFLEX TO CULTURE - Abnormal    Color Urine Yellow      Appearance Urine Slightly Cloudy (*)     Glucose Urine Negative      Bilirubin Urine Negative      Ketones Urine Negative      " Specific Gravity Urine 1.019      Blood Urine Large (*)     pH Urine 6.5      Protein Albumin Urine 100 (*)     Urobilinogen Urine Normal      Nitrite Urine Negative      Leukocyte Esterase Urine Moderate (*)     Mucus Urine Present (*)     RBC Urine >182 (*)     WBC Urine 25 (*)     Squamous Epithelials Urine 5 (*)    CBC WITH PLATELETS AND DIFFERENTIAL    WBC Count 7.1      RBC Count 4.68      Hemoglobin 13.9      Hematocrit 41.5      MCV 89      MCH 29.7      MCHC 33.5      RDW 12.2      Platelet Count 289      % Neutrophils 60      % Lymphocytes 30      % Monocytes 7      % Eosinophils 3      % Basophils 0      % Immature Granulocytes 0      NRBCs per 100 WBC 0      Absolute Neutrophils 4.2      Absolute Lymphocytes 2.1      Absolute Monocytes 0.5      Absolute Eosinophils 0.2      Absolute Basophils 0.0      Absolute Immature Granulocytes 0.0      Absolute NRBCs 0.0     URINE CULTURE        Abd/pelvis CT no contrast - Stone Protocol   Final Result   IMPRESSION:    1.  New modest right hydronephrosis/hydroureter with soft tissue stranding now surrounding right kidney and ureter. No obstructing distal right ureteral stone evident.   2.  Multiple stones are present within right kidney.         US Renal Complete Non-Vascular   Final Result   IMPRESSION:   1.  Moderate right hydronephrosis. Two 9 mm calculi projecting over the mid and lower right kidney collecting system.      2.  Left kidney unremarkable.      3.  Urinary bladder is segmentally visualized with no abnormalities demonstrated          Treatments provided: IV pain management, fluids, zofran  Family Comments: NA  OBS brochure/video discussed/provided to patient:  Yes  ED Medications:   Medications   HYDROmorphone (PF) (DILAUDID) injection 0.5 mg (has no administration in time range)   oxyCODONE (ROXICODONE) tablet 5 mg (5 mg Oral $Given 12/6/23 2230)   ketorolac (TORADOL) injection 15 mg (15 mg Intravenous $Given 12/6/23 1357)   HYDROmorphone (PF)  (DILAUDID) injection 0.5 mg (0.5 mg Intravenous $Given 12/6/23 2333)   ondansetron (ZOFRAN) injection 4 mg (4 mg Intravenous $Given 12/6/23 2333)       Drips infusing:  No  For the majority of the shift this patient was Green.   Interventions performed were CT, US, IV, labs.    Sepsis treatment initiated: No    Cares/treatment/interventions/medications to be completed following ED care: See orders    ED Nurse Name: Megan Collette, RN  1:20 AM     RECEIVING UNIT ED HANDOFF REVIEW    Above ED Nurse Handoff Report was reviewed: Yes  Reviewed by: Jacinta Le RN on December 7, 2023 at 1:46 AM

## 2023-12-07 NOTE — ED PROVIDER NOTES
"  History     Chief Complaint:  Abdominal Pain    The history is provided by the patient.      53-year-old female presenting with right flank pain.  Was in the urology office earlier today and had a ureteral stent removed.  Stent was on the right side.  A few hours after leaving the urology clinic she had acute right flank pain radiating towards the back associated with nausea.  No new fevers.  No dysuria.  Bowel movements been normal.  Was given ciprofloxacin at the time of her cystoscopy and stent removal    Independent Historian:   None - Patient Only    Review of External Notes:   Reviewed procedure note from November 30 when she had the stent placed within the office visit from earlier today with stent removal and ciprofloxacin given in the clinic.  Procedure note from November 30 notes a cystoscopy with right retrograde pyelogram and stent placement as well as ureteroscopy and laser lithotripsy with stone basketing.  They noted a large stone burden.    Medications:    Atorvastatin  Ciprofloxacin  Gabapentin  Losartan  Omeprazole  Tamoxifen  Tamsulosin    Past Medical History:    Breast cancer  Hyperlipidemia  Hypertension  Neuropathy  Lymphedema  Carpal tunnel    Past Surgical History:    Carpal tunnel release  Insert vascular port  Insert tissue expander breast bilateral  Laser holmium lithotripsy  Lasik bilateral  Mastectomy x2  Fat graft procedure  Breast reconstruction, implant  Remove catheter vascular access  Revise reconstructed breast  Rhinoplasty     Physical Exam   Patient Vitals for the past 24 hrs:   BP Temp Temp src Pulse Resp SpO2 Height Weight   12/06/23 2144 (!) 155/110 97.8  F (36.6  C) Oral 101 20 98 % 1.6 m (5' 3\") 90.7 kg (200 lb)      Physical Exam  Uncomfortable secondary to pain    No reproducible localizing abdominal tenderness palpation, no distention or rigidity.    CV: ppi, regular   Resp: speaking in full sentences without any resp distress  Skin: warm dry well perfused  Neuro: " Alert, no gross motor or sensory deficits,  gait stable    Emergency Department Course     Imaging:  Abd/pelvis CT no contrast - Stone Protocol   Final Result   IMPRESSION:    1.  New modest right hydronephrosis/hydroureter with soft tissue stranding now surrounding right kidney and ureter. No obstructing distal right ureteral stone evident.   2.  Multiple stones are present within right kidney.         US Renal Complete Non-Vascular   Final Result   IMPRESSION:   1.  Moderate right hydronephrosis. Two 9 mm calculi projecting over the mid and lower right kidney collecting system.      2.  Left kidney unremarkable.      3.  Urinary bladder is segmentally visualized with no abnormalities demonstrated         Read by radiologist.    Laboratory:  Labs Ordered and Resulted from Time of ED Arrival to Time of ED Departure   BASIC METABOLIC PANEL - Abnormal       Result Value    Sodium 141      Potassium 4.3      Chloride 102      Carbon Dioxide (CO2) 28      Anion Gap 11      Urea Nitrogen 14.3      Creatinine 1.06 (*)     GFR Estimate 63      Calcium 9.9      Glucose 129 (*)    ROUTINE UA WITH MICROSCOPIC REFLEX TO CULTURE - Abnormal    Color Urine Yellow      Appearance Urine Slightly Cloudy (*)     Glucose Urine Negative      Bilirubin Urine Negative      Ketones Urine Negative      Specific Gravity Urine 1.019      Blood Urine Large (*)     pH Urine 6.5      Protein Albumin Urine 100 (*)     Urobilinogen Urine Normal      Nitrite Urine Negative      Leukocyte Esterase Urine Moderate (*)     Mucus Urine Present (*)     RBC Urine >182 (*)     WBC Urine 25 (*)     Squamous Epithelials Urine 5 (*)    CBC WITH PLATELETS AND DIFFERENTIAL    WBC Count 7.1      RBC Count 4.68      Hemoglobin 13.9      Hematocrit 41.5      MCV 89      MCH 29.7      MCHC 33.5      RDW 12.2      Platelet Count 289      % Neutrophils 60      % Lymphocytes 30      % Monocytes 7      % Eosinophils 3      % Basophils 0      % Immature Granulocytes 0       NRBCs per 100 WBC 0      Absolute Neutrophils 4.2      Absolute Lymphocytes 2.1      Absolute Monocytes 0.5      Absolute Eosinophils 0.2      Absolute Basophils 0.0      Absolute Immature Granulocytes 0.0      Absolute NRBCs 0.0     URINE CULTURE      Emergency Department Course & Assessments:     Interventions:  Medications   HYDROmorphone (PF) (DILAUDID) injection 0.5 mg (has no administration in time range)   oxyCODONE (ROXICODONE) tablet 5 mg (5 mg Oral $Given 12/6/23 2230)   ketorolac (TORADOL) injection 15 mg (15 mg Intravenous $Given 12/6/23 2327)   HYDROmorphone (PF) (DILAUDID) injection 0.5 mg (0.5 mg Intravenous $Given 12/6/23 2333)   ondansetron (ZOFRAN) injection 4 mg (4 mg Intravenous $Given 12/6/23 2333)      Assessments:  2310 I obtained history and examined the patient as noted above.   0101 I discussed findings and hospitalization. with the patient. All questions answered.     Independent Interpretation (X-rays, CTs, rhythm strip):  CT scan showing right-sided ureteral dilation, no radiopaque foreign body seen over the course of the ureter.  Nephrolithiasis present on the right side.    Consultations/Discussion of Management or Tests:  0112 I spoke with Dr. Sanders, hospitalist, regarding the patient. She accepts admission    Social Determinants of Health affecting care:   None    Disposition:  The patient was admitted to observation under the care of Dr. Sanders.     Impression & Plan      Medical Decision Making:  Right flank pain in the setting of nephrolithiasis and ureteral stent removed earlier today.  CT scan showing new hydroureter however CT scan not showing a radiopaque obstructive process.  UA negative for infection, no evidence of acute kidney failure.  Will admit for observation for symptom control and urology to see in the morning.    Diagnosis:    ICD-10-CM    1. Renal colic  N23       2. Nephrolithiasis  N20.0          Scribe Disclosure:  Андрей GÓMEZ, am serving as a scribe at  11:16 PM on 12/6/2023 to document services personally performed by Darien Riojas MD based on my observations and the provider's statements to me.     12/6/2023   Darien Riojas MD Walker, Jerome Richard, MD  12/07/23 0243

## 2023-12-07 NOTE — H&P
History and Physical     Shantell Wagner MRN# [id number]   YOB: 1970 Age: 53 year old      Date of Admission:  12/6/2023    Primary care provider: Margaret Longo Ra          Assessment and Plan:     Summary of Stay: Shantell Wagner is a 53 year old female with a history of  htn/hlp, breast cancer currently on tamoxifen, peripheral neuropathy, kidney stones admitted on 12/6/2023 with flank pain     She underwent cysto with right ureteroscopy laser lithotripsy, stone basketing, and reight ureteral stent placement on 11/30/2023.  For the past 3 days she's had intermittent and sometimes severe right groin/flank pain.  She had spoken with the clinic and was told this was likely ureteral spasm and should improve.  Each time it did go away after a few hours.  Yesterday she had her stent removed.  She had significant pain within 30 minutes and was told again that this was likely spasm and should resolve, which it did a few hours later.  Unfortunately around 830 pm last night it recurred, she took some oxy which she had from prior episodes of kidney stones which did not help at all.  Her pain became quite severe and so presented here for evaluation.     She denies any dysuria/urgency or frequency.  No fevers/chills.  She had some nausea without vomiting with the pain     ER  VSS and she is afebrile  BMP bun/creat 14/1.06, BG is slightly up at 129  CBC wnl   UA with large blood/>182 rbcs    Renal US .  Moderate right hydronephrosis. Two 9 mm calculi projecting over the mid and lower right kidney collecting system.     Prompting     CT A/p without contrast New modest right hydronephrosis/hydroureter with soft tissue stranding now surrounding right kidney and ureter. No obstructing distal right ureteral stone evident. Multiple stones are present within right kidney.    I am asked to admit her for right hydro with pain after stent removal for pain control and urologic evaluation     Problem List:   Right  hydro   Cysto with stone retrieval, lithotripsy, and stent placement on 11/30.  Stent removed on 12/6  Now with new right hydro  ? If this is just swelling post stent removal   -keep NPO and requested urology input    Kidney stones  Ms Wagner was a bit surprised to learn that she still had kidney stones as she thought they were all removed on the 30th       Htn/hlp  Looks to be on losartan 25 mg every day and atorvastatin 20 mg every day   -resume when med rec complete    Hx of breast cancer  -resume tamoxifen when med rec complete    Peripheral neuropathy   Resumed gabapentin when med rec complete      DVT Prophylaxis: Pneumatic Compression Devices  Code Status: Full Code  Functional Status: independent  [place]: not needed  Access:   PIV              Time spent 65   minutes reviewing epic including notes/labs/prior hx, current medications.  In addition to interviewing and examining the patient, updated patient and family regarding plan of care          Chief Complaint:     Groin/flank pain        History of Present Illness:   Shantell Wagner is a 53 year old female with a history of  htn/hlp, breast cancer currently on tamoxifen, peripheral neuropathy, kidney stones admitted on 12/6/2023 with flank pain     She underwent cysto with right ureteroscopy laser lithotripsy, stone basketing, and reight ureteral stent placement on 11/30/2023.  For the past 3 days she's had intermittent and sometimes severe right groin/flank pain.  She had spoken with the clinic and was told this was likely ureteral spasm and should improve.  Each time it did go away after a few hours.  Yesterday she had her stent removed.  She had significant pain within 30 minutes and was told again that this was likely spasm and should resolve, which it did a few hours later.  Unfortunately around 830 pm last night it recurred, she took some oxy which she had from prior episodes of kidney stones which did not help at all.  Her pain became quite  severe and so presented here for evaluation.     She denies any dysuria/urgency or frequency.  No fevers/chills.  She had some nausea without vomiting with the pain     ER  VSS and she is afebrile  BMP bun/creat 14/1.06, BG is slightly up at 129  CBC wnl   UA with large blood/>182 rbcs    Renal US .  Moderate right hydronephrosis. Two 9 mm calculi projecting over the mid and lower right kidney collecting system.     Prompting     CT A/p without contrast New modest right hydronephrosis/hydroureter with soft tissue stranding now surrounding right kidney and ureter. No obstructing distal right ureteral stone evident. Multiple stones are present within right kidney.    I am asked to admit her for right hydro with pain after stent removal for pain control and urologic evaluation       The history is obtained in discussion with the ER provider JR Shine HERNANDEZ and the patient with good reliability       Epic and Care everywhere were extensively reviewed        Past Medical History:     Past Medical History:   Diagnosis Date    Abnormal Pap smear, ([facility]-H) 02/20/2013    [place]/ECC= [facility] 1. Repeat HPV screen and ECC 12 months from colp.    Breast cancer 04/2016    Hyperlipidemia     Hypertension     At doctors appointments my blood pressure is elevated.    Neuropathy              Past Surgical History:     Past Surgical History:   Procedure Laterality Date    CARPAL TUNNEL RELEASE RT/LT  03/2010    COLONOSCOPY      INSERT PORT VASCULAR ACCESS Left 04/22/2016    Procedure: INSERT PORT VASCULAR ACCESS;  Surgeon: Nereyda Flowers MD;  Location: [facility] OR    INSERT TISSUE EXPANDER BREAST BILATERAL Bilateral 10/17/2016    Procedure: INSERT TISSUE EXPANDER BREAST BILATERAL;  Surgeon: Jenna Vazquez MD;  Location: [facility] OR    LASER HOLMIUM LITHOTRIPSY URETER(S), INSERT STENT, COMBINED Right 11/30/2023    Procedure: Cystoscopy, right ureteroscopy with laser lithotripsy and stone basketing.  Right ureteral stent placement.;  Surgeon:  Dylan Carrero MD;  Location:  OR    LASIK BILATERAL      MASTECTOMY MODIFIED RADICAL Right 10/17/2016    Procedure: MASTECTOMY MODIFIED RADICAL;  Surgeon: Nereyda Flowers MD;  Location: [facility] OR    MASTECTOMY MODIFIED RADICAL, SENTINEL NODE, COMBINED Left 10/17/2016    Procedure: COMBINED MASTECTOMY MODIFIED RADICAL, SENTINEL NODE;  Surgeon: Nereyda Flowers MD;  Location: [facility] OR    PROCURE GRAFT FAT Bilateral 04/25/2018    Procedure: PROCURE GRAFT FAT;;  Surgeon: Jenna Vazquez MD;  Location: [facility]    RECONSTRUCT BREAST BILATERAL, IMPLANT PROSTHESIS BILATERAL, COMBINED Bilateral 09/13/2017    Procedure: COMBINED RECONSTRUCT BREAST BILATERAL, IMPLANT PROSTHESIS BILATERAL;  BILATERAL SECOND STAGE BREAST RECONSTRUCTION WITH IMPLANT.;  Surgeon: Jenna Vazquez MD;  Location: [facility]    REMOVE CATHETER VASCULAR ACCESS Left 10/17/2016    Procedure: REMOVE CATHETER VASCULAR ACCESS;  Surgeon: Nereyda Flowers MD;  Location:  OR    REVISE RECONSTRUCTED BREAST BILATERAL Bilateral 04/25/2018    Procedure: REVISE RECONSTRUCTED BREAST BILATERAL;  REVISION BILATERAL BREAST RECONSTRUCTION AND  FAT GRAFTING FROM AXILLA TO BILATERAL BREASTS.;  Surgeon: Jenna Vazquez MD;  Location: [facility]    RHINOPLASTY  1983    Following trauma             Social History:     Social History     Tobacco Use    Smoking status: Never    Smokeless tobacco: Never   Substance Use Topics    Alcohol use: Yes     Comment: rare             Family History:   I have reviewed this patient's family history         Allergies:     Allergies   Allergen Reactions    No Known Drug Allergy              Medications:     Await med rec          Review of Systems:     A Comprehensive greater than 10 system review of systems was carried out.  Pertinent positives and negatives are noted above.  Otherwise negative for contributory information.           Physical Exam:   Blood pressure 137/81, pulse 93, temperature 97.5  F (36.4  C),  "temperature source Oral, resp. rate 18, height 1.6 m (5' 3\"), weight 92.3 kg (203 lb 6.4 oz), SpO2 99%, not currently breastfeeding.  Exam:    General:  Pleasant nad looks stated age  HEENT:  Head nc/at sclera clear PERRL O/P:  Moist mucus membranes no posterior pharyngeal erythema or exudate.  Neck is supple  Lungs: cta b nl effort   CV:  RRR no m/r/g no le edema  Abd:  S/nt/nd no r/g  Neuro:  Cn 2-12 grossly intact and  [place]   Alert and oriented affect appropriate   Skin:  W/d no c/c               Data:     Results for orders placed or performed during the hospital encounter of 12/06/23   US Renal Complete Non-Vascular     Status: None    Narrative    EXAM: US RENAL COMPLETE NON-VASCULAR  LOCATION: [facility]  DATE: 12/6/2023    INDICATION: right flank abd pain, ureteral stent removed today  COMPARISON: None.  TECHNIQUE: Routine Bilateral Renal and Bladder Ultrasound.    FINDINGS:    RIGHT KIDNEY: 13.0 x 6.7 x 5.7 cm. Moderate right hydronephrosis. Two 9 mm calculi projecting over the mid and lower right kidney collecting system.     LEFT KIDNEY: 11.3 x 3.7 x 3.9 cm. Normal without hydronephrosis or masses.     BLADDER: Partially distended urinary bladder. No bladder abnormalities seen.      Impression    IMPRESSION:  1.  Moderate right hydronephrosis. Two 9 mm calculi projecting over the mid and lower right kidney collecting system.    2.  Left kidney unremarkable.    3.  Urinary bladder is segmentally visualized with no abnormalities demonstrated   Abd/pelvis CT no contrast - Stone Protocol     Status: None    Narrative    EXAM: CT ABDOMEN PELVIS W/O CONTRAST  LOCATION: [facility]  DATE: 12/7/2023    INDICATION: R f;ank pain, ureteral stent removed today  COMPARISON: 01/30/2023  TECHNIQUE: CT scan of the abdomen and pelvis was performed without IV contrast. Multiplanar reformats were obtained. Dose reduction techniques were used.  CONTRAST: None.    FINDINGS:   LOWER " CHEST: Normal.    HEPATOBILIARY: Normal.    PANCREAS: Normal.    SPLEEN: Normal.    ADRENAL GLANDS: Normal.    KIDNEYS/BLADDER: New modest right hydronephrosis/hydroureter. Moderate soft tissue stranding surrounding right kidney and ureter. Ureter is dilated down to the level the bladder with no obstructing stone evident near UVJ. Its possible there is edema at   the ureteral orifice that results in the obstruction. There are 2-3 stone fragments within right lower pole, largest of these measures 8 x 5 mm. Faint 1 mm stone right upper pole.    Stable left kidney, no left hydronephrosis. No bladder stones. Single tiny air bubble within bladder.    BOWEL: No obstruction or inflammatory change. Appendix normal.    LYMPH NODES: Normal.    VASCULATURE: Unremarkable.    PELVIC ORGANS: Normal.    MUSCULOSKELETAL: Normal.      Impression    IMPRESSION:   1.  New modest right hydronephrosis/hydroureter with soft tissue stranding now surrounding right kidney and ureter. No obstructing distal right ureteral stone evident.  2.  Multiple stones are present within right kidney.     Basic metabolic panel (BMP)     Status: Abnormal   Result Value Ref Range    Sodium 141 135 - 145 mmol/L    Potassium 4.3 3.4 - 5.3 mmol/L    Chloride 102 98 - 107 mmol/L    Carbon Dioxide (CO2) 28 22 - 29 mmol/L    Anion Gap 11 7 - 15 mmol/L    Urea Nitrogen 14.3 6.0 - 20.0 mg/dL    Creatinine 1.06 (H) 0.51 - 0.95 mg/dL    GFR Estimate 63 >60 mL/min/1.73m2    Calcium 9.9 8.6 - 10.0 mg/dL    Glucose 129 (H) 70 - 99 mg/dL   UA with Microscopic reflex to Culture     Status: Abnormal    Specimen: Urine, Midstream   Result Value Ref Range    Color Urine Yellow Colorless, Straw, Light Yellow, Yellow    Appearance Urine Slightly Cloudy (A) Clear    Glucose Urine Negative Negative mg/dL    Bilirubin Urine Negative Negative    Ketones Urine Negative Negative mg/dL    Specific Gravity Urine 1.019 1.003 - 1.035    Blood Urine Large (A) Negative    pH Urine 6.5  5.0 - 7.0    Protein Albumin Urine 100 (A) Negative mg/dL    Urobilinogen Urine Normal Normal, 2.0 mg/dL    Nitrite Urine Negative Negative    Leukocyte Esterase Urine Moderate (A) Negative    Mucus Urine Present (A) None Seen /LPF    RBC Urine >182 (H) <=2 /HPF    WBC Urine 25 (H) <=5 /HPF    Squamous Epithelials Urine 5 (H) <=1 /HPF    Narrative    Urine Culture ordered based on laboratory criteria   [place] Draw *Canceled*     Status: None ()    Narrative    The following orders were created for panel order [place] Draw.  Procedure                               Abnormality         Status                     ---------                               -----------         ------                       Please view results for these tests on the individual orders.   CBC with platelets and differential     Status: None   Result Value Ref Range    WBC Count 7.1 4.0 - 11.0 10e3/uL    RBC Count 4.68 3.80 - 5.20 10e6/uL    Hemoglobin 13.9 11.7 - 15.7 g/dL    Hematocrit 41.5 35.0 - 47.0 %    MCV 89 78 - 100 fL    MCH 29.7 26.5 - 33.0 pg    MCHC 33.5 31.5 - 36.5 g/dL    RDW 12.2 10.0 - 15.0 %    Platelet Count 289 150 - 450 10e3/uL    % Neutrophils 60 %    % Lymphocytes 30 %    % Monocytes 7 %    % Eosinophils 3 %    % Basophils 0 %    % Immature Granulocytes 0 %    NRBCs per 100 WBC 0 <1 /100    Absolute Neutrophils 4.2 1.6 - 8.3 10e3/uL    Absolute Lymphocytes 2.1 0.8 - 5.3 10e3/uL    Absolute Monocytes 0.5 0.0 - 1.3 10e3/uL    Absolute Eosinophils 0.2 0.0 - 0.7 10e3/uL    Absolute Basophils 0.0 0.0 - 0.2 10e3/uL    Absolute Immature Granulocytes 0.0 <=0.4 10e3/uL    Absolute NRBCs 0.0 10e3/uL   Extra Tube     Status: None    Narrative    The following orders were created for panel order Extra Tube.  Procedure                               Abnormality         Status                     ---------                               -----------         ------                     Extra Blue Top Tube[[id number]]                               Final result                 Please view results for these tests on the individual orders.   Extra Blue Top Tube     Status: None   Result Value Ref Range    Hold Specimen JIC    Extra Tube     Status: None    Narrative    The following orders were created for panel order Extra Tube.  Procedure                               Abnormality         Status                     ---------                               -----------         ------                     Extra Red Top Tube[[id number]]                               Final result                 Please view results for these tests on the individual orders.   Extra Red Top Tube     Status: None   Result Value Ref Range    Hold Specimen JI    CBC + differential     Status: None    Narrative    The following orders were created for panel order CBC + differential.  Procedure                               Abnormality         Status                     ---------                               -----------         ------                     CBC with platelets and d...[[id number]]                      Final result                 Please view results for these tests on the individual orders.

## 2023-12-07 NOTE — ED TRIAGE NOTES
Triage Assessment (Adult)       Row Name 12/06/23 8094          Triage Assessment    Airway WDL WDL        Respiratory WDL    Respiratory WDL WDL        Skin Circulation/Temperature WDL    Skin Circulation/Temperature WDL WDL        Cardiac WDL    Cardiac WDL WDL        Peripheral/Neurovascular WDL    Peripheral Neurovascular WDL WDL        Cognitive/Neuro/Behavioral WDL    Cognitive/Neuro/Behavioral WDL WDL

## 2023-12-08 LAB — BACTERIA UR CULT: ABNORMAL

## 2023-12-08 NOTE — DISCHARGE SUMMARY
"[facility]  Hospitalist Discharge Summary      Date of Admission:  12/6/2023  Date of Discharge:  12/7/2023  1:54 PM  Discharging Provider: Rohan Almanzar MD  Discharge Service: Hospitalist Service    Discharge Diagnoses   Renal Colic  Right Hydronephrosis  Nephrolithiasis  Recent Cystoscopy w/ Stone Removal, Lithotripsy, and Ureteral Stenting (11/30/2023)  Hypertension  Hyperlipidemia  Hx Breast Cancer  Peripheral Neuropathy    Clinically Significant Risk Factors     # Obesity: Estimated body mass index is 36.03 kg/m  as calculated from the following:    Height as of this encounter: 1.6 m (5' 3\").    Weight as of this encounter: 92.3 kg (203 lb 6.4 oz).       Follow-ups Needed After Discharge   Follow-up Appointments     Follow-up and recommended labs and tests       Follow up with primary care provider, Margaret Longo, within 7 days   for hospital follow- up.        Follow up with Dr. Carrero as previously discussed.          Unresulted Labs Ordered in the Past 30 Days of this Admission       Date and Time Order Name Status Description    12/6/2023 11:29 PM Urine Culture Preliminary         These results will be followed up by Urology    Discharge Disposition   Discharged to home  Condition at discharge: Stable    Hospital Course   Shantell Wagner is a 53 year old female with a history of  htn/hlp, breast cancer currently on tamoxifen, peripheral neuropathy, kidney stones admitted on 12/6/2023 with flank pain.     She underwent cysto with right ureteroscopy laser lithotripsy, stone basketing, and right ureteral stent placement on 11/30/2023. On 12/6/2023 she presented to the emergency department with three days of intermittent/severe right groin and flank pain. Right renal ultrasound with moderate hydronephrosis and a 2 x 9mm stones over the [id number] right renal collecting system. CT abdomen/ pelvis with right hydronephrosis/hydroureter, soft tissue standing, and multiple intrarenal " "nonobstructing stones. Patient was admitted for pain control and urology was consulted, who believe patient's hydronephrosis is related to recent procedure. They suspect pain is related to either passing of small stone fragment after lithotripsy, or ureteral spasms. Patient's pain resolved in the hospital and she was discharged home with plans for urologic follow up.    Consultations This Hospital Stay   UROLOGY IP CONSULT    Code Status   Prior    Time Spent on this Encounter   I, Rohan Almanzar MD, personally saw the patient today and spent greater than 30 minutes discharging this patient.       Rohan Almanzar MD  [facility]  [address] [place] [address]  Phone: [phone]  Fax: [phone]  ______________________________________________________________________    Physical Exam                     Height: 160 cm (5' 3\") Weight: 92.3 kg (203 lb 6.4 oz)  Estimated body mass index is 36.03 kg/m  as calculated from the following:    Height as of this encounter: 1.6 m (5' 3\").    Weight as of this encounter: 92.3 kg (203 lb 6.4 oz).    General: Very pleasant female resting comfortably in hospital bed.  Awake, alert, interactive. Sitting up in bed.  HEENT: Normocephalic, atraumatic.  PERRL, EOMI.  Conjunctiva clear, sclerae anicteric.  Mucous membranes moist.  Cardiac: Regular rate and rhythm without murmur, gallop, or rub.  No peripheral edema.  Respiratory: Normal work of breathing.  Clear to auscultation bilaterally without wheezing, rales, or rhonchi.  GI: Normal, active bowel sounds.  Abdomen soft, nontender, nondistended. No CVA tenderness.  : Deferred.  Musculoskeletal: Moving all extremities appropriately.  Skin: No rashes or abrasions on exposed skin.  Neurologic: Alert and oriented x4.  Cranial nerves II through XII grossly intact.  Psychologic: Appropriate mood and affect.         Primary Care Physician   Margaret Longo    Discharge Orders      Reason for your hospital " stay    You were admitted to the hospital for pain related to recent urologic procedure. You were treated with some pain medications and ditropan, which you can continue at home. If pain recurs, please call Dr. Holley's office to discuss whether you should present to emergency department or office.     Follow-up and recommended labs and tests     Follow up with primary care provider, Margaret Longo, within 7 days for hospital follow- up.        Follow up with Dr. Carrero as previously discussed.     Activity    Your activity upon discharge: activity as tolerated     Diet    Follow this diet upon discharge: Orders Placed This Encounter      Regular Diet Adult       Significant Results and Procedures   Most Recent 3 CBC's:  Recent Labs   Lab Test 12/07/23  0627 12/06/23  2209 10/23/23  2359   WBC 7.9 7.1 9.9   HGB 12.7 13.9 13.7   MCV 89 89 89    289 327     Most Recent 3 BMP's:  Recent Labs   Lab Test 12/07/23  0627 12/06/23 2209 11/29/23  0928    141 140   POTASSIUM 3.9 4.3 3.8   CHLORIDE 103 102 103   CO2 28 28 27   BUN 15.1 14.3 16.3   CR 1.06* 1.06* 0.86   ANIONGAP 10 11 10   CASIE 9.2 9.9 9.3   * 129* 94     Most Recent 2 LFT's:  Recent Labs   Lab Test 02/15/23  1533 03/25/22  0946   AST 22 32   ALT 26 60*   ALKPHOS 94 101   BILITOTAL 0.7 0.6   ,   Results for orders placed or performed during the hospital encounter of 12/06/23   US Renal Complete Non-Vascular    Narrative    EXAM: US RENAL COMPLETE NON-VASCULAR  LOCATION: [facility]  DATE: 12/6/2023    INDICATION: right flank abd pain, ureteral stent removed today  COMPARISON: None.  TECHNIQUE: Routine Bilateral Renal and Bladder Ultrasound.    FINDINGS:    RIGHT KIDNEY: 13.0 x 6.7 x 5.7 cm. Moderate right hydronephrosis. Two 9 mm calculi projecting over the mid and lower right kidney collecting system.     LEFT KIDNEY: 11.3 x 3.7 x 3.9 cm. Normal without hydronephrosis or masses.     BLADDER: Partially distended  urinary bladder. No bladder abnormalities seen.      Impression    IMPRESSION:  1.  Moderate right hydronephrosis. Two 9 mm calculi projecting over the mid and lower right kidney collecting system.    2.  Left kidney unremarkable.    3.  Urinary bladder is segmentally visualized with no abnormalities demonstrated   Abd/pelvis CT no contrast - Stone Protocol    Narrative    EXAM: CT ABDOMEN PELVIS W/O CONTRAST  LOCATION: [facility]  DATE: 12/7/2023    INDICATION: R f;ank pain, ureteral stent removed today  COMPARISON: 01/30/2023  TECHNIQUE: CT scan of the abdomen and pelvis was performed without IV contrast. Multiplanar reformats were obtained. Dose reduction techniques were used.  CONTRAST: None.    FINDINGS:   LOWER CHEST: Normal.    HEPATOBILIARY: Normal.    PANCREAS: Normal.    SPLEEN: Normal.    ADRENAL GLANDS: Normal.    KIDNEYS/BLADDER: New modest right hydronephrosis/hydroureter. Moderate soft tissue stranding surrounding right kidney and ureter. Ureter is dilated down to the level the bladder with no obstructing stone evident near UVJ. Its possible there is edema at   the ureteral orifice that results in the obstruction. There are 2-3 stone fragments within right lower pole, largest of these measures 8 x 5 mm. Faint 1 mm stone right upper pole.    Stable left kidney, no left hydronephrosis. No bladder stones. Single tiny air bubble within bladder.    BOWEL: No obstruction or inflammatory change. Appendix normal.    LYMPH NODES: Normal.    VASCULATURE: Unremarkable.    PELVIC ORGANS: Normal.    MUSCULOSKELETAL: Normal.      Impression    IMPRESSION:   1.  New modest right hydronephrosis/hydroureter with soft tissue stranding now surrounding right kidney and ureter. No obstructing distal right ureteral stone evident.  2.  Multiple stones are present within right kidney.         Discharge Medications   Discharge Medication List as of 12/7/2023 12:16 PM        START taking these  medications    Details   acetaminophen (TYLENOL) 325 MG tablet Take 3 tablets (975 mg) by mouth every 8 hours as needed for mild pain or other (and adjunct with moderate or severe pain or per patient request), Disp-90 tablet, R-0, E-Prescribe      oxyBUTYnin (DITROPAN) 5 MG tablet Take 1 tablet (5 mg) by mouth 3 times daily as needed (ureteral spasm), Disp-30 tablet, R-0, E-Prescribe           CONTINUE these medications which have NOT CHANGED    Details   atorvastatin (LIPITOR) 20 MG tablet Take 1 tablet (20 mg) by mouth daily, Disp-90 tablet, R-3, E-Prescribe      gabapentin (NEURONTIN) 300 MG capsule TAKE 1 CAPSULE BY MOUTH EVERYDAY AT BEDTIME, Disp-90 capsule, R-3, E-Prescribe      glucosamine-chondroitin 500-400 MG CAPS Take 1 capsule by mouth daily, Historical      losartan (COZAAR) 25 MG tablet Take 1 tablet (25 mg) by mouth daily, Disp-90 tablet, R-3, E-Prescribe      omeprazole (PRILOSEC) 20 MG capsule Take 20 mg by mouth every other day , Historical      Semaglutide, 2 MG/DOSE, (OZEMPIC) 8 MG/3ML pen Inject 2 mg Subcutaneous every 7 days, Disp-9 mL, R-3, E-Prescribe      tamoxifen (NOLVADEX) 20 MG tablet Take 1 tablet (20 mg) by mouth daily, Disp-90 tablet, R-3, E-Prescribe      tamsulosin (FLOMAX) 0.4 MG capsule Take 1 capsule (0.4 mg) by mouth daily, Disp-7 capsule, R-0, E-Prescribe           Allergies   Allergies   Allergen Reactions    No Known Drug Allergy

## 2023-12-09 ENCOUNTER — PATIENT OUTREACH (OUTPATIENT)
Dept: CARE COORDINATION | Facility: CLINIC | Age: 53
End: 2023-12-09
Payer: COMMERCIAL

## 2023-12-09 NOTE — PROGRESS NOTES
Connected Care Resource Center:   [facility] Contact  [facility]/Voicemail     Clinical Data: Post-Discharge Outreach     Outreach attempted x 2.  Left message on patient's voicemail, providing [facility]'s central phone number of [phone] ([phone]) for questions/concerns and/or to schedule an appt with an [facility] provider, if they do not have a PCP.      Plan:  [facility] will do no further outreaches at this time.       Jacqueline England MA  Connected Care Resource Center, [facility]    *Connected Care Resource Team does NOT follow patient ongoing. Referrals are identified based on internal discharge reports and the outreach is to ensure patient has an understanding of their discharge instructions.

## 2023-12-11 ENCOUNTER — PATIENT OUTREACH (OUTPATIENT)
Dept: ONCOLOGY | Facility: CLINIC | Age: 53
End: 2023-12-11
Payer: COMMERCIAL

## 2023-12-11 NOTE — PROGRESS NOTES
Situation: Patient chart reviewed by oncology care coordinator.    Background: Stephie was admitted observation status 12/6/23-12/7/23 for kidney stones and pain control.    Assessment: Stephie would most appropriately be followed outpatient by primary care.    Plan/Recommendations: Oncology will remain available for any new cancer-related needs. Follow-up with Dr. Knott currently scheduled 2/20/24.    Deepti Mera, RN, BSN, OCN, CBCN  Nurse Care Coordinator  [facility] [place]  P: [phone]     F: [phone]

## 2023-12-13 DIAGNOSIS — N39.0 URINARY TRACT INFECTION: Primary | ICD-10-CM

## 2023-12-13 RX ORDER — CEFDINIR 300 MG/1
300 CAPSULE ORAL 2 TIMES DAILY
Qty: 10 CAPSULE | Refills: 0 | Status: SHIPPED | OUTPATIENT
Start: 2023-12-13 | End: 2023-12-18

## 2023-12-27 ENCOUNTER — MYC REFILL (OUTPATIENT)
Dept: FAMILY MEDICINE | Facility: CLINIC | Age: 53
End: 2023-12-27
Payer: COMMERCIAL

## 2023-12-27 DIAGNOSIS — M79.605 PAIN IN BOTH LOWER EXTREMITIES: ICD-10-CM

## 2023-12-27 DIAGNOSIS — M79.604 PAIN IN BOTH LOWER EXTREMITIES: ICD-10-CM

## 2023-12-28 RX ORDER — GABAPENTIN 300 MG/1
CAPSULE ORAL
Qty: 90 CAPSULE | Refills: 3 | Status: SHIPPED | OUTPATIENT
Start: 2023-12-28 | End: 2024-06-10

## 2024-01-07 ENCOUNTER — MYC MEDICAL ADVICE (OUTPATIENT)
Dept: FAMILY MEDICINE | Facility: CLINIC | Age: 54
End: 2024-01-07
Payer: COMMERCIAL

## 2024-01-07 DIAGNOSIS — E66.01 MORBID OBESITY (H): Primary | ICD-10-CM

## 2024-01-09 NOTE — TELEPHONE ENCOUNTER
We can either do wegovy 1.7mg per dose or 2.4mg per dose.  Which does she want to go with?  ENRIQUE

## 2024-01-10 NOTE — TELEPHONE ENCOUNTER
Routed to [facility].  Pharmacy selected.    Marian Murrieta RN, BSN  [facility]

## 2024-01-22 DIAGNOSIS — C50.919 MALIGNANT NEOPLASM OF BREAST IN FEMALE, ESTROGEN RECEPTOR POSITIVE, UNSPECIFIED LATERALITY, UNSPECIFIED SITE OF BREAST (H): ICD-10-CM

## 2024-01-22 DIAGNOSIS — Z17.0 MALIGNANT NEOPLASM OF BREAST IN FEMALE, ESTROGEN RECEPTOR POSITIVE, UNSPECIFIED LATERALITY, UNSPECIFIED SITE OF BREAST (H): ICD-10-CM

## 2024-01-22 RX ORDER — TAMOXIFEN CITRATE 20 MG/1
20 TABLET ORAL DAILY
Qty: 90 TABLET | Refills: 3 | Status: SHIPPED | OUTPATIENT
Start: 2024-01-22 | End: 2024-04-24

## 2024-01-22 NOTE — TELEPHONE ENCOUNTER
Signed Prescriptions:                        Disp   Refills    tamoxifen (NOLVADEX) 20 MG tablet          90 tab*3        Sig: TAKE 1 TABLET BY MOUTH EVERY DAY  Authorizing Provider: TANG VERDUZCO, RN, BSN, OCN, CBCN  Nurse Care Coordinator  [facility] [place]  P: [phone]     F: [phone]

## 2024-01-22 NOTE — TELEPHONE ENCOUNTER
Pending Prescriptions:                       Disp   Refills    tamoxifen (NOLVADEX) 20 MG tablet [Pharma*90 tab*3            Sig: TAKE 1 TABLET BY MOUTH EVERY DAY          Last Written Prescription Date:  1/26/23  Last Fill Quantity: 90,   # refills: 3  Last Office Visit: 8/22/23 with Dr. Knott  Future Office visit:   2/20/24 with Dr. Knott    Routing refill request to provider for review/approval.    Deepti Mera, RN, BSN, OCN, CBCN  Nurse Care Coordinator  [facility] [place]  P: [phone]     F: [phone]

## 2024-01-23 ENCOUNTER — TRANSFERRED RECORDS (OUTPATIENT)
Dept: HEALTH INFORMATION MANAGEMENT | Facility: CLINIC | Age: 54
End: 2024-01-23
Payer: COMMERCIAL

## 2024-02-20 ENCOUNTER — LAB (OUTPATIENT)
Dept: ONCOLOGY | Facility: CLINIC | Age: 54
End: 2024-02-20
Attending: INTERNAL MEDICINE
Payer: COMMERCIAL

## 2024-02-20 VITALS
OXYGEN SATURATION: 95 % | HEART RATE: 88 BPM | HEIGHT: 63 IN | RESPIRATION RATE: 18 BRPM | SYSTOLIC BLOOD PRESSURE: 131 MMHG | WEIGHT: 193.8 LBS | DIASTOLIC BLOOD PRESSURE: 74 MMHG | TEMPERATURE: 97 F | BODY MASS INDEX: 34.34 KG/M2

## 2024-02-20 DIAGNOSIS — C50.411 MALIGNANT NEOPLASM OF UPPER-OUTER QUADRANT OF RIGHT FEMALE BREAST, UNSPECIFIED ESTROGEN RECEPTOR STATUS (H): Primary | ICD-10-CM

## 2024-02-20 LAB
ALBUMIN SERPL BCG-MCNC: 4.6 G/DL (ref 3.5–5.2)
ALP SERPL-CCNC: 80 U/L (ref 40–150)
ALT SERPL W P-5'-P-CCNC: 29 U/L (ref 0–50)
ANION GAP SERPL CALCULATED.3IONS-SCNC: 11 MMOL/L (ref 7–15)
AST SERPL W P-5'-P-CCNC: 24 U/L (ref 0–45)
BILIRUB SERPL-MCNC: 0.7 MG/DL
BUN SERPL-MCNC: 15.5 MG/DL (ref 6–20)
CALCIUM SERPL-MCNC: 10 MG/DL (ref 8.6–10)
CHLORIDE SERPL-SCNC: 101 MMOL/L (ref 98–107)
CREAT SERPL-MCNC: 0.88 MG/DL (ref 0.51–0.95)
DEPRECATED HCO3 PLAS-SCNC: 27 MMOL/L (ref 22–29)
EGFRCR SERPLBLD CKD-EPI 2021: 78 ML/MIN/1.73M2
GLUCOSE SERPL-MCNC: 85 MG/DL (ref 70–99)
POTASSIUM SERPL-SCNC: 4.2 MMOL/L (ref 3.4–5.3)
PROT SERPL-MCNC: 7.7 G/DL (ref 6.4–8.3)
SODIUM SERPL-SCNC: 139 MMOL/L (ref 135–145)

## 2024-02-20 PROCEDURE — 86300 IMMUNOASSAY TUMOR CA 15-3: CPT | Performed by: INTERNAL MEDICINE

## 2024-02-20 PROCEDURE — 99213 OFFICE O/P EST LOW 20 MIN: CPT | Performed by: INTERNAL MEDICINE

## 2024-02-20 PROCEDURE — 80053 COMPREHEN METABOLIC PANEL: CPT | Performed by: INTERNAL MEDICINE

## 2024-02-20 PROCEDURE — 99214 OFFICE O/P EST MOD 30 MIN: CPT | Performed by: INTERNAL MEDICINE

## 2024-02-20 PROCEDURE — 36415 COLL VENOUS BLD VENIPUNCTURE: CPT

## 2024-02-20 NOTE — PROGRESS NOTES
Shantell Wagner is a 53-year-old patient who is here today for interim followup.     CHIEF COMPLAINT:     1.  Right-sided breast cancer, high risk at diagnosis, ER positive, LA negative, HER-2 receptor negative. 10 oclock position    dx 2016    2.  Diagnosis of CHEK2 mutation.     HISTORY OF PRESENT ILLNESS:  Shantell is 53-year-old patient with a CHEK2 mutation who presented with a locally advanced right-sided breast cancer.  She had a 2.2 cm tumor and a 2.7 cm axillary mass on the right side.  She finished up adjuvant chemotherapy and is continuing to do adjuvant tamoxifen and the plan is to do tamoxifen for a full 10 years.    She is tolerating the tamoxifen well.    A 10 point review of systems is unremarkable   She started a weight loss drug and has lost 10 lbs     Meds and allergies as charted     PMHX   Breast ca   Increased cholesterol  CHEK2 mutation       Physical exam     GENERAL:  She is well-appearing lady in no acute distress.  VITAL SIGNS:  Stable.  NECK:  No masses or goiter.  CHEST:  Clear to auscultation and percussion bilaterally.  HEART:  Heart sounds 1, 2 normal, no added sounds, no murmurs.   BREASTS:  She is status post bilateral mastectomy.  Scars look good.  Right and left axillae are negative.  GASTROINTESTINAL:  Abdomen is soft and nontender.  No hepatosplenomegaly.  EXTREMITIES:  Legs without tenderness or edema.  SKIN:  No rashes or lesions.        Data     Labs in DEC   reviewed and discussed       IMP /PLAN     Shantell is a 53-year-old patient who has a diagnosis of right-sided breast cancer as well as a CHEK2 mutation, the details of which are as outlined above.  She continues on active treatment with adjuvant tamoxifen she is can continue that for a full 10 years.  I will see her back in my clinic in 12 months and she is in agreement with the plan.       Tata Knott MD

## 2024-02-20 NOTE — PROGRESS NOTES
Medical Assistant Note:  Shantell Wagner presents today for BLOOD DRAW.    Patient seen by provider today: Yes: Dr Knott.   present during visit today: Not Applicable.    Concerns: No Concerns.    Procedure:  Lab draw site: lt antecub, Needle type: butterfly, Gauge: 23.    Post Assessment:  Labs drawn without difficulty: Yes.    Discharge Plan:  Departure Mode: Ambulatory.    Face to Face Time: 10.    Evelin Chun CMA

## 2024-02-20 NOTE — LETTER
2/20/2024         RE: Shantell Wagner  [id number]  [place] [address]        Dear Colleague,    Thank you for referring your patient, Shantell Wagner, to the [facility]. Please see a copy of my visit note below.    Shantell Wagner is a 53-year-old patient who is here today for interim followup.     CHIEF COMPLAINT:     1.  Right-sided breast cancer, high risk at diagnosis, ER positive, VT negative, HER-2 receptor negative. 10 oclock position    dx 2016    2.  Diagnosis of CHEK2 mutation.     HISTORY OF PRESENT ILLNESS:  Shantell is 53-year-old patient with a CHEK2 mutation who presented with a locally advanced right-sided breast cancer.  She had a 2.2 cm tumor and a 2.7 cm axillary mass on the right side.  She finished up adjuvant chemotherapy and is continuing to do adjuvant tamoxifen and the plan is to do tamoxifen for a full 10 years.    She is tolerating the tamoxifen well.    A 10 point review of systems is unremarkable   She started a weight loss drug and has lost 10 lbs     Meds and allergies as charted     PMHX   Breast ca   Increased cholesterol  CHEK2 mutation       Physical exam     GENERAL:  She is well-appearing lady in no acute distress.  VITAL SIGNS:  Stable.  NECK:  No masses or goiter.  CHEST:  Clear to auscultation and percussion bilaterally.  HEART:  Heart sounds 1, 2 normal, no added sounds, no murmurs.   BREASTS:  She is status post bilateral mastectomy.  Scars look good.  Right and left axillae are negative.  GASTROINTESTINAL:  Abdomen is soft and nontender.  No hepatosplenomegaly.  EXTREMITIES:  Legs without tenderness or edema.  SKIN:  No rashes or lesions.        Data     Labs in DEC   reviewed and discussed       IMP /PLAN     Shantell is a 53-year-old patient who has a diagnosis of right-sided breast cancer as well as a CHEK2 mutation, the details of which are as outlined above.  She continues on active treatment with adjuvant  tamoxifen she is can continue that for a full 10 years.  I will see her back in my clinic in 12 months and she is in agreement with the plan.       Tata Knott MD     Again, thank you for allowing me to participate in the care of your patient.        Sincerely,        Tata Knott MD

## 2024-02-20 NOTE — NURSING NOTE
"Oncology Rooming Note    February 20, 2024 3:44 PM   Shantell Wagner is a 53 year old female who presents for:    Chief Complaint   Patient presents with    Oncology Clinic Visit     Malignant neoplasm of upper-outer quadrant of right female breast        Initial Vitals: /74   Pulse 88   Temp 97  F (36.1  C) (Oral)   Resp 18   Ht 1.6 m (5' 2.99\")   Wt 87.9 kg (193 lb 12.8 oz)   SpO2 95%   BMI 34.34 kg/m   Estimated body mass index is 34.34 kg/m  as calculated from the following:    Height as of this encounter: 1.6 m (5' 2.99\").    Weight as of this encounter: 87.9 kg (193 lb 12.8 oz). Body surface area is 1.98 meters squared.  Data Unavailable Comment: Data Unavailable   No LMP recorded. Patient is perimenopausal.  Allergies reviewed: Yes  Medications reviewed: Yes    Medications: Medication refills not needed today.  Pharmacy name entered into [employer]: [id number] - [place], [place] - [address]    Frailty Screening:   Is the patient here for a new oncology consult visit in cancer care? 2. No      Clinical concerns: follow up       Fela Puente MA              "

## 2024-02-21 LAB — CANCER AG15-3 SERPL-ACNC: 19 U/ML

## 2024-04-02 ENCOUNTER — TRANSFERRED RECORDS (OUTPATIENT)
Dept: HEALTH INFORMATION MANAGEMENT | Facility: CLINIC | Age: 54
End: 2024-04-02
Payer: COMMERCIAL

## 2024-04-02 ENCOUNTER — TELEPHONE (OUTPATIENT)
Dept: FAMILY MEDICINE | Facility: CLINIC | Age: 54
End: 2024-04-02
Payer: COMMERCIAL

## 2024-04-02 NOTE — TELEPHONE ENCOUNTER
Reason for Call:  Appointment Request    Patient requesting this type of appt: Pre-op    Requested provider: Margaret Longo Ra    Reason patient unable to be scheduled: Not within requested timeframe    When does patient want to be seen/preferred time: 3-7 days    Comments: dos 4/26    Could we send this information to you in [employer] or would you prefer to receive a phone call?:   Patient would like to be contacted via [employer]    Call taken on 4/2/2024 at 8:34 AM by Autumn Trivedi   
Sent MyChart per pt request- patient does have appt for pre op on 4/16.  
03:00

## 2024-04-16 ENCOUNTER — MYC MEDICAL ADVICE (OUTPATIENT)
Dept: FAMILY MEDICINE | Facility: CLINIC | Age: 54
End: 2024-04-16

## 2024-04-16 ENCOUNTER — TELEPHONE (OUTPATIENT)
Dept: FAMILY MEDICINE | Facility: CLINIC | Age: 54
End: 2024-04-16

## 2024-04-16 NOTE — TELEPHONE ENCOUNTER
Pt was able to get scheduled at the [facility] location for her pre-op visit, DOS: 04/26.     Kate Hodges

## 2024-04-16 NOTE — TELEPHONE ENCOUNTER
PETEYM requesting a call back for an appt. Offered openings at [facility] with other providers to accommodate request. Two more attempts will be made.    Letha Ayala  Lead   [facility]

## 2024-04-16 NOTE — TELEPHONE ENCOUNTER
Reason for Call:  Appointment Request    Patient requesting this type of appt: Pre-op    Requested provider: Margaret Longo Ra - or open to others also    Reason patient unable to be scheduled: Not within requested timeframe    When does patient want to be seen/preferred time: 2:15-2:30 or later    Comments: dos 4/26 / wrist - pt can only do 2:15 or 2:30 or later. Was scheduled with PCP today but appt was cancelled because provider was out.    Could we send this information to you in [employer] or would you prefer to receive a phone call?:   Patient would prefer a phone call     Okay to leave a detailed message?: Yes at Cell number on file:    Telephone Information:   Mobile [phone]       Call taken on 4/16/2024 at 11:58 AM by Drea Mathews

## 2024-04-16 NOTE — TELEPHONE ENCOUNTER
Sent [employer] message requesting a call back for an appt. One more attempt will be made.     Letha Ayala  Lead   [facility]

## 2024-04-23 SDOH — HEALTH STABILITY: PHYSICAL HEALTH: ON AVERAGE, HOW MANY DAYS PER WEEK DO YOU ENGAGE IN MODERATE TO STRENUOUS EXERCISE (LIKE A BRISK WALK)?: 3 DAYS

## 2024-04-23 SDOH — HEALTH STABILITY: PHYSICAL HEALTH: ON AVERAGE, HOW MANY MINUTES DO YOU ENGAGE IN EXERCISE AT THIS LEVEL?: 40 MIN

## 2024-04-23 ASSESSMENT — SOCIAL DETERMINANTS OF HEALTH (SDOH)
IN A TYPICAL WEEK, HOW MANY TIMES DO YOU TALK ON THE PHONE WITH FAMILY, FRIENDS, OR NEIGHBORS?: MORE THAN THREE TIMES A WEEK
DO YOU BELONG TO ANY CLUBS OR ORGANIZATIONS SUCH AS CHURCH GROUPS UNIONS, FRATERNAL OR ATHLETIC GROUPS, OR SCHOOL GROUPS?: YES
HOW OFTEN DO YOU ATTENT MEETINGS OF THE CLUB OR ORGANIZATION YOU BELONG TO?: MORE THAN 4 TIMES PER YEAR
HOW OFTEN DO YOU ATTEND CHURCH OR RELIGIOUS SERVICES?: MORE THAN 4 TIMES PER YEAR
HOW OFTEN DO YOU GET TOGETHER WITH FRIENDS OR RELATIVES?: MORE THAN THREE TIMES A WEEK

## 2024-04-23 ASSESSMENT — LIFESTYLE VARIABLES
HOW OFTEN DO YOU HAVE A DRINK CONTAINING ALCOHOL: 2-4 TIMES A MONTH
AUDIT-C TOTAL SCORE: 2
SKIP TO QUESTIONS 9-10: 1
HOW MANY STANDARD DRINKS CONTAINING ALCOHOL DO YOU HAVE ON A TYPICAL DAY: 1 OR 2
HOW OFTEN DO YOU HAVE SIX OR MORE DRINKS ON ONE OCCASION: NEVER

## 2024-04-24 ENCOUNTER — OFFICE VISIT (OUTPATIENT)
Dept: FAMILY MEDICINE | Facility: CLINIC | Age: 54
End: 2024-04-24
Payer: COMMERCIAL

## 2024-04-24 VITALS
HEART RATE: 73 BPM | TEMPERATURE: 97.8 F | SYSTOLIC BLOOD PRESSURE: 121 MMHG | OXYGEN SATURATION: 99 % | RESPIRATION RATE: 12 BRPM | DIASTOLIC BLOOD PRESSURE: 78 MMHG | WEIGHT: 183 LBS | HEIGHT: 64 IN | BODY MASS INDEX: 31.24 KG/M2

## 2024-04-24 DIAGNOSIS — I10 BENIGN ESSENTIAL HYPERTENSION: ICD-10-CM

## 2024-04-24 DIAGNOSIS — Z01.818 PREOP GENERAL PHYSICAL EXAM: Primary | ICD-10-CM

## 2024-04-24 DIAGNOSIS — M67.431 GANGLION CYST OF WRIST, RIGHT: ICD-10-CM

## 2024-04-24 PROBLEM — E66.01 MORBID OBESITY (H): Status: RESOLVED | Noted: 2022-03-03 | Resolved: 2024-04-24

## 2024-04-24 LAB
ANION GAP SERPL CALCULATED.3IONS-SCNC: 11 MMOL/L (ref 7–15)
BUN SERPL-MCNC: 18.1 MG/DL (ref 6–20)
CALCIUM SERPL-MCNC: 9.9 MG/DL (ref 8.6–10)
CHLORIDE SERPL-SCNC: 101 MMOL/L (ref 98–107)
CREAT SERPL-MCNC: 0.79 MG/DL (ref 0.51–0.95)
DEPRECATED HCO3 PLAS-SCNC: 26 MMOL/L (ref 22–29)
EGFRCR SERPLBLD CKD-EPI 2021: 89 ML/MIN/1.73M2
GLUCOSE SERPL-MCNC: 84 MG/DL (ref 70–99)
POTASSIUM SERPL-SCNC: 4.6 MMOL/L (ref 3.4–5.3)
SODIUM SERPL-SCNC: 138 MMOL/L (ref 135–145)

## 2024-04-24 PROCEDURE — 99214 OFFICE O/P EST MOD 30 MIN: CPT | Performed by: PHYSICIAN ASSISTANT

## 2024-04-24 PROCEDURE — 36415 COLL VENOUS BLD VENIPUNCTURE: CPT | Performed by: PHYSICIAN ASSISTANT

## 2024-04-24 PROCEDURE — 80048 BASIC METABOLIC PNL TOTAL CA: CPT | Performed by: PHYSICIAN ASSISTANT

## 2024-04-24 NOTE — PATIENT INSTRUCTIONS
Preparing for Your Surgery  Getting started  A nurse will call you to review your health history and instructions. They will give you an arrival time based on your scheduled surgery time. Please be ready to share:  Your doctor's clinic name and phone number  Your medical, surgical, and anesthesia history  A list of allergies and sensitivities  A list of medicines, including herbal treatments and over-the-counter drugs  Whether the patient has a legal guardian (ask how to send us the papers in advance)  Please tell us if you're pregnant--or if there's any chance you might be pregnant. Some surgeries may injure a fetus (unborn baby), so they require a pregnancy test. Surgeries that are safe for a fetus don't always need a test, and you can choose whether to have one.   If you have a child who's having surgery, please ask for a copy of Preparing for Your Child's Surgery.    Preparing for surgery  Within 10 to 30 days of surgery: Have a pre-op exam (sometimes called an H&P, or History and Physical). This can be done at a clinic or pre-operative center.  If you're having a , you may not need this exam. Talk to your care team.  At your pre-op exam, talk to your care team about all medicines you take. If you need to stop any medicines before surgery, ask when to start taking them again.  We do this for your safety. Many medicines can make you bleed too much during surgery. Some change how well surgery (anesthesia) drugs work.  Call your insurance company to let them know you're having surgery. (If you don't have insurance, call [phone].)  Call your clinic if there's any change in your health. This includes signs of a cold or flu (sore throat, runny nose, cough, rash, fever). It also includes a scrape or scratch near the surgery site.  If you have questions on the day of surgery, call your hospital or surgery center.  Eating and drinking guidelines  For your safety: Unless your surgeon tells you otherwise,  follow the guidelines below.  Eat and drink as usual until 8 hours before you arrive for surgery. After that, no food or milk.  Drink clear liquids until 2 hours before you arrive. These are liquids you can see through, like water, Gatorade, and Propel Water. They also include plain black coffee and tea (no cream or milk), candy, and breath mints. You can spit out gum when you arrive.  If you drink alcohol: Stop drinking it the night before surgery.  If your care team tells you to take medicine on the morning of surgery, it's okay to take it with a sip of water.  Preventing infection  Shower or bathe the night before and morning of your surgery. Follow the instructions your clinic gave you. (If no instructions, use regular soap.)  Don't shave or clip hair near your surgery site. We'll remove the hair if needed.  Don't smoke or vape the morning of surgery. You may chew nicotine gum up to 2 hours before surgery. A nicotine patch is okay.  Note: Some surgeries require you to completely quit smoking and nicotine. Check with your surgeon.  Your care team will make every effort to keep you safe from infection. We will:  Clean our hands often with soap and water (or an alcohol-based hand rub).  Clean the skin at your surgery site with a special soap that kills germs.  Give you a special gown to keep you warm. (Cold raises the risk of infection.)  Wear special hair covers, masks, gowns and gloves during surgery.  Give antibiotic medicine, if prescribed. Not all surgeries need antibiotics.  What to bring on the day of surgery  Photo ID and insurance card  Copy of your health care directive, if you have one  Glasses and hearing aids (bring cases)  You can't wear contacts during surgery  Inhaler and eye drops, if you use them (tell us about these when you arrive)  CPAP machine or breathing device, if you use them  A few personal items, if spending the night  If you have . . .  A pacemaker, ICD (cardiac defibrillator) or other  implant: Bring the ID card.  An implanted stimulator: Bring the remote control.  A legal guardian: Bring a copy of the certified (court-stamped) guardianship papers.  Please remove any jewelry, including body piercings. Leave jewelry and other valuables at home.  If you're going home the day of surgery  You must have a responsible adult drive you home. They should stay with you overnight as well.  If you don't have someone to stay with you, and you aren't safe to go home alone, we may keep you overnight. Insurance often won't pay for this.  After surgery  If it's hard to control your pain or you need more pain medicine, please call your surgeon's office.  Questions?   If you have any questions for your care team, list them here: _________________________________________________________________________________________________________________________________________________________________________ ____________________________________ ____________________________________ ____________________________________  For informational purposes only. Not to replace the advice of your health care provider. Copyright   2003, 2019 [employer]. All rights reserved. Clinically reviewed by Karey High MD. [employer] 625903 - REV 12/22.    How to Take Your Medication Before Surgery  - Hold losartan morning of.

## 2024-04-24 NOTE — PROGRESS NOTES
Preoperative Evaluation  [facility]  [address] [facility] [address]  Phone: [phone]  Primary Provider: Margaret Longo Ra  Pre-op Performing Provider: ROSALIE ROGERS  Apr 24, 2024       Stephie is a 53 year old, presenting for the following:  Pre-Op Exam (DOS 4/26/2024 -Wrist Surgery)        4/24/2024     2:40 PM   Additional Questions   Roomed by Samantha   Accompanied by Self     Surgical Information  Surgery/Procedure: Right Wrist Surgery  Surgery Location: [facility]  Surgeon: Dr Reis  Surgery Date: 4/26/2024  Time of Surgery: 6:30 am  Where patient plans to recover: At home with family  Fax number for surgical facility: [phone]    Assessment & Plan     The proposed surgical procedure is considered INTERMEDIATE risk.    (Z01.818) Preop general physical exam  (primary encounter diagnosis)  Comment: Patient optimized for planned procedure. Ganglion cyst removal right distal radius  Plan: Basic metabolic panel  (Ca, Cl, CO2, Creat,         Gluc, K, Na, BUN), CANCELED: Basic metabolic         panel  (Ca, Cl, CO2, Creat, Gluc, K, Na, BUN)          (M67.431) Ganglion cyst of wrist, right  Comment: indication for surgery  Plan: Optimized for surgery    (I10) Benign essential hypertension  Comment: History of hypertension, currently controlled on Losartan  BMP obtained and normal.  Plan: Basic metabolic panel  (Ca, Cl, CO2, Creat,         Gluc, K, Na, BUN), CANCELED: Basic metabolic         panel  (Ca, Cl, CO2, Creat, Gluc, K, Na, BUN)              - No identified additional risk factors other than previously addressed    Antiplatelet or Anticoagulation Medication Instructions   - Patient is on no antiplatelet or anticoagulation medications.    Additional Medication Instructions   - ACE/ARB: HOLD on day of surgery (minimum 11 hours for general anesthesia).   - GLP-1 Injectable (exenitide, liraglutide, semaglutide, dulaglutide, etc.): HOLD 7 days  before surgery Last dose was 4/14/24    Recommendation  APPROVAL GIVEN to proceed with proposed procedure, without further diagnostic evaluation.    Review of external notes as documented elsewhere in note  Review of the result(s) of each unique test - BMP, recent CBC  Ordering of each unique test  Prescription drug management      Subjective       HPI related to upcoming procedure: ganglion cyst removal right distal radius        4/23/2024     3:43 PM   Preop Questions   1. Have you ever had a heart attack or stroke? No   2. Have you ever had surgery on your heart or blood vessels, such as a stent placement, a coronary artery bypass, or surgery on an artery in your head, neck, heart, or legs? No   3. Do you have chest pain with activity? No   4. Do you have a history of  heart failure? No   5. Do you currently have a cold, bronchitis or symptoms of other infection? No   6. Do you have a cough, shortness of breath, or wheezing? No   7. Do you or anyone in your family have previous history of blood clots? No   8. Do you or does anyone in your family have a serious bleeding problem such as prolonged bleeding following surgeries or cuts? No   9. Have you ever had problems with anemia or been told to take iron pills? No   10. Have you had any abnormal blood loss such as black, tarry or bloody stools, or abnormal vaginal bleeding? No   11. Have you ever had a blood transfusion? No   12. Are you willing to have a blood transfusion if it is medically needed before, during, or after your surgery? Yes   13. Have you or any of your relatives ever had problems with anesthesia? No   14. Do you have sleep apnea, excessive snoring or daytime drowsiness? No   15. Do you have any artifical heart valves or other implanted medical devices like a pacemaker, defibrillator, or continuous glucose monitor? No   16. Do you have artificial joints? No   17. Are you allergic to latex? No   18. Is there any chance that you may be pregnant? No        Health Care Directive  Patient does not have a Health Care Directive or Living Will: Discussed advance care planning with patient; however, patient declined at this time.    Preoperative Review of    reviewed - controlled substances reflected in medication list.- Gabapentin      Status of Chronic Conditions:  See problem list for active medical problems.  Problems all longstanding and stable, except as noted/documented.  See ROS for pertinent symptoms related to these conditions.    Patient Active Problem List    Diagnosis Date Noted    Renal colic 12/07/2023     Priority: Medium    Nephrolithiasis 12/07/2023     Priority: Medium    Carpal tunnel syndrome of left wrist 06/09/2022     Priority: Medium    Morbid obesity (H) 03/03/2022     Priority: Medium    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  + constipation.  [facility] consult.  Recommended colonoscopy now and then every 3-5 years depending on results.  ENRIQUE      Lymphedema 11/04/2016     Priority: Medium    Acquired absence of both breasts and nipples 10/17/2016     Priority: Medium    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) 04/28/2016     Priority: Medium    Papanicolaou smear of cervix with atypical squamous cells cannot exclude high grade squamous intraepithelial lesion (ASC-H) 02/20/2013     Priority: Medium     4/10/09 ASCUS pap  2010, 2012 - NIL paps  02/20/13 ASC-H.   03/11/13 [facility]/ECC= [facility] 1. Repeat HPV screen and ECC 12 months from colp. Due 03/2014 02/20/14 Normal, Neg HPV. Repeat pap with HPV in 12 months. Due 02/2015  03/06/15 Normal, Neg HPV. 3 yr co-testing  4/19/19 NIL/neg HR HPV  3/3/22 NIL pap, neg HPV      Ganglion of left wrist 02/09/2012     Priority: Medium     dorsum; seems a bit larger.       Hyperlipidemia LDL goal <160 05/04/2009     Priority: Medium    Obesity 05/01/2009     Priority: Medium    Plantar fasciitis 07/29/2008     Priority: Medium      Past Medical History:   Diagnosis Date    Abnormal Pap smear, (ASC-H) 02/20/2013    [facility]/ECC= [facility] 1. Repeat HPV screen and ECC 12 months from colp.    Breast cancer 04/2016    Hyperlipidemia     Hypertension     At doctors appointments my blood pressure is elevated.    Neuropathy      Past Surgical History:   Procedure Laterality Date    CARPAL TUNNEL RELEASE RT/LT  03/2010    COLONOSCOPY      INSERT PORT VASCULAR ACCESS Left 04/22/2016    Procedure: INSERT PORT VASCULAR ACCESS;  Surgeon: Nereyda Flowers MD;  Location: [facility] OR    INSERT TISSUE EXPANDER BREAST BILATERAL Bilateral 10/17/2016    Procedure: INSERT TISSUE EXPANDER BREAST BILATERAL;  Surgeon: Jenna Vazquez MD;  Location: [facility] OR    LASER HOLMIUM LITHOTRIPSY URETER(S), INSERT STENT, COMBINED Right 11/30/2023    Procedure: Cystoscopy, right ureteroscopy with laser lithotripsy and stone basketing.  Right ureteral stent placement.;  Surgeon: Dylan Carrero MD;  Location:  OR    LASIK BILATERAL      MASTECTOMY MODIFIED RADICAL Right 10/17/2016    Procedure: MASTECTOMY MODIFIED RADICAL;  Surgeon: Nereyda Flowers MD;  Location: [facility] OR    MASTECTOMY MODIFIED RADICAL, SENTINEL NODE, COMBINED Left 10/17/2016    Procedure: COMBINED MASTECTOMY MODIFIED RADICAL, SENTINEL NODE;  Surgeon: Nereyda Flowers MD;  Location: [facility] OR    PROCURE GRAFT FAT Bilateral 04/25/2018    Procedure: PROCURE GRAFT FAT;;  Surgeon: Jenna Vazquez MD;  Location:  [facility]    RECONSTRUCT BREAST BILATERAL, IMPLANT PROSTHESIS BILATERAL, COMBINED Bilateral 09/13/2017    Procedure: COMBINED RECONSTRUCT BREAST BILATERAL, IMPLANT PROSTHESIS BILATERAL;  BILATERAL SECOND STAGE BREAST RECONSTRUCTION WITH IMPLANT.;  Surgeon: Jenna Vazquez MD;  [facility]    REMOVE CATHETER VASCULAR ACCESS Left  10/17/2016    Procedure: REMOVE CATHETER VASCULAR ACCESS;  Surgeon: Nereyda Flowers MD;  Location: [facility] OR    REVISE RECONSTRUCTED BREAST BILATERAL Bilateral 04/25/2018    Procedure: REVISE RECONSTRUCTED BREAST BILATERAL;  REVISION BILATERAL BREAST RECONSTRUCTION AND  FAT GRAFTING FROM AXILLA TO BILATERAL BREASTS.;  Surgeon: Jenna Vazquez MD;  Location:  [facility]    RHINOPLASTY  1983    Following trauma     Current Outpatient Medications   Medication Sig Dispense Refill    atorvastatin (LIPITOR) 20 MG tablet Take 1 tablet (20 mg) by mouth daily 90 tablet 3    gabapentin (NEURONTIN) 300 MG capsule TAKE 1 CAPSULE BY MOUTH EVERYDAY AT BEDTIME 90 capsule 3    glucosamine-chondroitin 500-400 MG CAPS Take 1 capsule by mouth daily      losartan (COZAAR) 25 MG tablet Take 1 tablet (25 mg) by mouth daily 90 tablet 3    omeprazole (PRILOSEC) 20 MG capsule Take 20 mg by mouth every other day       Semaglutide-Weight Management (WEGOVY) 2.4 MG/0.75ML pen Inject 2.4 mg Subcutaneous once a week 3 mL 3    acetaminophen (TYLENOL) 325 MG tablet Take 3 tablets (975 mg) by mouth every 8 hours as needed for mild pain or other (and adjunct with moderate or severe pain or per patient request) 90 tablet 0    oxyBUTYnin (DITROPAN) 5 MG tablet Take 1 tablet (5 mg) by mouth 3 times daily as needed (ureteral spasm) (Patient not taking: Reported on 2/20/2024) 30 tablet 0    tamoxifen (NOLVADEX) 20 MG tablet TAKE 1 TABLET BY MOUTH EVERY DAY 90 tablet 3       Allergies   Allergen Reactions    No Known Drug Allergy         Social History     Tobacco Use    Smoking status: Never    Smokeless tobacco: Never   Substance Use Topics    Alcohol use: Yes     Comment: rare       History   Drug Use No             Review of Systems  CONSTITUTIONAL: NEGATIVE for fever, chills, change in weight  INTEGUMENTARY/SKIN: NEGATIVE for worrisome rashes, moles or lesions  EYES: NEGATIVE for vision changes or irritation  ENT/MOUTH: NEGATIVE for ear, mouth and  "throat problems  RESP: NEGATIVE for significant cough or SOB  CV: NEGATIVE for chest pain, palpitations or peripheral edema  GI: NEGATIVE for nausea, abdominal pain, heartburn, or change in bowel habits  : NEGATIVE for frequency, dysuria, or hematuria  MUSCULOSKELETAL:Per HPI  NEURO: NEGATIVE for weakness, dizziness or paresthesias  ENDOCRINE: NEGATIVE for temperature intolerance, skin/hair changes  HEME: NEGATIVE for bleeding problems  PSYCHIATRIC: NEGATIVE for changes in mood or affect    Objective    /78 (BP Location: Left arm, Patient Position: Sitting, Cuff Size: Adult Regular)   Pulse 73   Temp 97.8  F (36.6  C) (Oral)   Resp 12   Ht 1.626 m (5' 4\")   Wt 83 kg (183 lb)   SpO2 99%   BMI 31.41 kg/m     Estimated body mass index is 31.41 kg/m  as calculated from the following:    Height as of this encounter: 1.626 m (5' 4\").    Weight as of this encounter: 83 kg (183 lb).  Physical Exam  GENERAL: alert and no distress  HENT: ear canals and TM's normal, nose and mouth without ulcers or lesions  NECK: no adenopathy, no asymmetry, masses, or scars  RESP: lungs clear to auscultation - no rales, rhonchi or wheezes  CV: regular rate and rhythm, normal S1 S2, no S3 or S4, no murmur, click or rub, no peripheral edema   ABDOMEN: soft, nontender, no hepatosplenomegaly, no masses and bowel sounds normal  MS: no gross musculoskeletal defects noted, no edema. Lump noted on right distal radius.  PSYCH: mentation appears normal, affect normal/bright    Recent Labs   Lab Test 02/20/24  1537 12/07/23  0627 12/06/23  2209   HGB  --  12.7 13.9   PLT  --  264 289    141 141   POTASSIUM 4.2 3.9 4.3   CR 0.88 1.06* 1.06*        Diagnostics  Recent Results (from the past 24 hour(s))   Basic metabolic panel  (Ca, Cl, CO2, Creat, Gluc, K, Na, BUN)    Collection Time: 04/24/24  3:26 PM   Result Value Ref Range    Sodium 138 135 - 145 mmol/L    Potassium 4.6 3.4 - 5.3 mmol/L    Chloride 101 98 - 107 mmol/L    Carbon " Dioxide (CO2) 26 22 - 29 mmol/L    Anion Gap 11 7 - 15 mmol/L    Urea Nitrogen 18.1 6.0 - 20.0 mg/dL    Creatinine 0.79 0.51 - 0.95 mg/dL    GFR Estimate 89 >60 mL/min/1.73m2    Calcium 9.9 8.6 - 10.0 mg/dL    Glucose 84 70 - 99 mg/dL      No EKG required, no history of coronary heart disease, significant arrhythmia, peripheral arterial disease or other structural heart disease.    Revised Cardiac Risk Index (RCRI)  The patient has the following serious cardiovascular risks for perioperative complications:   - No serious cardiac risks = 0 points     RCRI Interpretation: 0 points: Class I (very low risk - 0.4% complication rate)         Signed Electronically by: Niki Pham PA-C  Copy of this evaluation report is provided to requesting physician.

## 2024-05-07 DIAGNOSIS — E66.01 MORBID OBESITY (H): ICD-10-CM

## 2024-05-09 ENCOUNTER — TRANSFERRED RECORDS (OUTPATIENT)
Dept: HEALTH INFORMATION MANAGEMENT | Facility: CLINIC | Age: 54
End: 2024-05-09
Payer: COMMERCIAL

## 2024-05-09 RX ORDER — SEMAGLUTIDE 2.4 MG/.75ML
2.4 INJECTION, SOLUTION SUBCUTANEOUS WEEKLY
Qty: 3 ML | Refills: 0 | Status: SHIPPED | OUTPATIENT
Start: 2024-05-09 | End: 2024-06-10

## 2024-06-05 SDOH — HEALTH STABILITY: PHYSICAL HEALTH: ON AVERAGE, HOW MANY MINUTES DO YOU ENGAGE IN EXERCISE AT THIS LEVEL?: 30 MIN

## 2024-06-05 SDOH — HEALTH STABILITY: PHYSICAL HEALTH: ON AVERAGE, HOW MANY DAYS PER WEEK DO YOU ENGAGE IN MODERATE TO STRENUOUS EXERCISE (LIKE A BRISK WALK)?: 2 DAYS

## 2024-06-05 ASSESSMENT — SOCIAL DETERMINANTS OF HEALTH (SDOH): HOW OFTEN DO YOU GET TOGETHER WITH FRIENDS OR RELATIVES?: THREE TIMES A WEEK

## 2024-06-08 DIAGNOSIS — E66.01 MORBID OBESITY (H): ICD-10-CM

## 2024-06-10 ENCOUNTER — OFFICE VISIT (OUTPATIENT)
Dept: FAMILY MEDICINE | Facility: CLINIC | Age: 54
End: 2024-06-10
Attending: NURSE PRACTITIONER
Payer: COMMERCIAL

## 2024-06-10 VITALS
WEIGHT: 179 LBS | TEMPERATURE: 98.4 F | RESPIRATION RATE: 15 BRPM | SYSTOLIC BLOOD PRESSURE: 115 MMHG | DIASTOLIC BLOOD PRESSURE: 75 MMHG | HEIGHT: 64 IN | BODY MASS INDEX: 30.56 KG/M2 | OXYGEN SATURATION: 97 % | HEART RATE: 86 BPM

## 2024-06-10 DIAGNOSIS — E78.5 HYPERLIPIDEMIA LDL GOAL <160: Primary | ICD-10-CM

## 2024-06-10 DIAGNOSIS — M79.605 PAIN IN BOTH LOWER EXTREMITIES: ICD-10-CM

## 2024-06-10 DIAGNOSIS — I10 ESSENTIAL HYPERTENSION: ICD-10-CM

## 2024-06-10 DIAGNOSIS — Z00.00 ROUTINE GENERAL MEDICAL EXAMINATION AT A HEALTH CARE FACILITY: ICD-10-CM

## 2024-06-10 DIAGNOSIS — M79.604 PAIN IN BOTH LOWER EXTREMITIES: ICD-10-CM

## 2024-06-10 DIAGNOSIS — E66.01 MORBID OBESITY (H): ICD-10-CM

## 2024-06-10 LAB
CHOLEST SERPL-MCNC: 199 MG/DL
FASTING STATUS PATIENT QL REPORTED: YES
HDLC SERPL-MCNC: 58 MG/DL
LDLC SERPL CALC-MCNC: 114 MG/DL
NONHDLC SERPL-MCNC: 141 MG/DL
TRIGL SERPL-MCNC: 136 MG/DL

## 2024-06-10 PROCEDURE — 99214 OFFICE O/P EST MOD 30 MIN: CPT | Mod: 25 | Performed by: NURSE PRACTITIONER

## 2024-06-10 PROCEDURE — 90636 HEP A/HEP B VACC ADULT IM: CPT | Performed by: NURSE PRACTITIONER

## 2024-06-10 PROCEDURE — 80061 LIPID PANEL: CPT | Performed by: NURSE PRACTITIONER

## 2024-06-10 PROCEDURE — 36415 COLL VENOUS BLD VENIPUNCTURE: CPT | Performed by: NURSE PRACTITIONER

## 2024-06-10 PROCEDURE — 99396 PREV VISIT EST AGE 40-64: CPT | Mod: 25 | Performed by: NURSE PRACTITIONER

## 2024-06-10 PROCEDURE — 90471 IMMUNIZATION ADMIN: CPT | Performed by: NURSE PRACTITIONER

## 2024-06-10 RX ORDER — SEMAGLUTIDE 2.4 MG/.75ML
2.4 INJECTION, SOLUTION SUBCUTANEOUS WEEKLY
Qty: 3 ML | Refills: 3 | Status: SHIPPED | OUTPATIENT
Start: 2024-06-10 | End: 2024-09-30

## 2024-06-10 RX ORDER — GABAPENTIN 300 MG/1
CAPSULE ORAL
Qty: 90 CAPSULE | Refills: 3 | Status: SHIPPED | OUTPATIENT
Start: 2024-06-10

## 2024-06-10 RX ORDER — SEMAGLUTIDE 2.4 MG/.75ML
2.4 INJECTION, SOLUTION SUBCUTANEOUS WEEKLY
OUTPATIENT
Start: 2024-06-10

## 2024-06-10 RX ORDER — ATORVASTATIN CALCIUM 20 MG/1
20 TABLET, FILM COATED ORAL DAILY
Qty: 90 TABLET | Refills: 3 | Status: SHIPPED | OUTPATIENT
Start: 2024-06-10

## 2024-06-10 RX ORDER — LOSARTAN POTASSIUM 25 MG/1
25 TABLET ORAL DAILY
Qty: 90 TABLET | Refills: 3 | Status: SHIPPED | OUTPATIENT
Start: 2024-06-10

## 2024-06-10 ASSESSMENT — PAIN SCALES - GENERAL: PAINLEVEL: NO PAIN (0)

## 2024-06-10 NOTE — PROGRESS NOTES
"Preventive Care Visit  [facility] PHYLLISMONIKKI Armendariz Ra, CNP, Family Practice  Antony 10, 2024      Assessment & Plan     Hyperlipidemia LDL goal <160  Tolerating well.  Lab today.  Refilled.  - Lipid panel reflex to direct LDL Fasting; Future  - atorvastatin (LIPITOR) 20 MG tablet; Take 1 tablet (20 mg) by mouth daily    Pain in both lower extremities  Effective.  Refilled.  - gabapentin (NEURONTIN) 300 MG capsule; TAKE 1 CAPSULE BY MOUTH EVERYDAY AT BEDTIME    Essential hypertension  Asymptomatic.  She has lost  significant amount of weight.  She plans to hold her losartan for the next 3 weeks and monitor bp.  Med may not be needed.  She will update me through my chart.  - losartan (COZAAR) 25 MG tablet; Take 1 tablet (25 mg) by mouth daily    Morbid obesity (H)  Effective.  Tolerating well.  Refilled.  - Semaglutide-Weight Management (WEGOVY) 2.4 MG/0.75ML pen; Inject 2.4 mg Subcutaneous once a week    Routine general medical examination at a health care facility  Hep a/b immunization today.            BMI  Estimated body mass index is 30.73 kg/m  as calculated from the following:    Height as of this encounter: 1.626 m (5' 4\").    Weight as of this encounter: 81.2 kg (179 lb).       Counseling  Appropriate preventive services were discussed with this patient, including applicable screening as appropriate for fall prevention, nutrition, physical activity, Tobacco-use cessation, weight loss and cognition.  Checklist reviewing preventive services available has been given to the patient.  Reviewed patient's diet, addressing concerns and/or questions.   She is at risk for lack of exercise and has been provided with information to increase physical activity for the benefit of her well-being.   She is at risk for psychosocial distress and has been provided with information to reduce risk.       Kathy Card is a 53 year old, presenting for the following:  Physical        6/10/2024     8:36 " AM   Additional Questions   Roomed by Viry PAUL        Health Care Directive  Patient does not have a Health Care Directive or Living Will: Discussed advance care planning with patient; however, patient declined at this time.      No additional concerns.    Pt is fasting in case of labs.      HPI    Doing well.  Continues to work on weight loss.  Tolerating wegovy without problem.    Taking other meds as directed without issues.              6/5/2024   General Health   How would you rate your overall physical health? Good   Feel stress (tense, anxious, or unable to sleep) Only a little   (!) STRESS CONCERN      6/5/2024   Nutrition   Three or more servings of calcium each day? Yes   Diet: Breakfast skipped   How many servings of fruit and vegetables per day? (!) 2-3   How many sweetened beverages each day? 0-1         6/5/2024   Exercise   Days per week of moderate/strenous exercise 2 days   Average minutes spent exercising at this level 30 min   (!) EXERCISE CONCERN      6/5/2024   Social Factors   Frequency of gathering with friends or relatives Three times a week   Worry food won't last until get money to buy more No   Food not last or not have enough money for food? No   Do you have housing?  Yes   Are you worried about losing your housing? No   Lack of transportation? No   Unable to get utilities (heat,electricity)? No         6/5/2024   Fall Risk   Fallen 2 or more times in the past year? No   Trouble with walking or balance? No          6/5/2024   Dental   Dentist two times every year? Yes         6/5/2024   TB Screening   Were you born outside of the [place]? No         Today's PHQ-2 Score:       6/10/2024     8:36 AM   PHQ-2 ( 1999 [employer])   Q1: Little interest or pleasure in doing things 0   Q2: Feeling down, depressed or hopeless 0   PHQ-2 Score 0   Q1: Little interest or pleasure in doing things Not at all   Q2: Feeling down, depressed or hopeless Not at all   PHQ-2 Score 0           6/5/2024   Substance  Use   Alcohol more than 3/day or more than 7/wk No   Do you use any other substances recreationally? No     Social History     Tobacco Use    Smoking status: Never    Smokeless tobacco: Never   Vaping Use    Vaping status: Never Used   Substance Use Topics    Alcohol use: Yes     Comment: rare    Drug use: No           11/23/2023   LAST FHS-7 RESULTS   1st degree relative breast or ovarian cancer Yes   Any relative bilateral breast cancer No   Any male have breast cancer No   Any ONE woman have BOTH breast AND ovarian cancer Yes   Any woman with breast cancer before 50yrs Yes   2 or more relatives with breast AND/OR ovarian cancer No   2 or more relatives with breast AND/OR bowel cancer No                6/5/2024   STI Screening   New sexual partner(s) since last STI/HIV test? No     History of abnormal Pap smear: No - age 30- 64 PAP with HPV every 5 years recommended        Latest Ref Rng & Units 3/3/2022     1:57 PM 4/19/2019     8:46 AM 4/19/2019     8:40 AM   PAP / HPV   PAP  Negative for Intraepithelial Lesion or Malignancy (NILM)      PAP (Historical)   NIL     HPV 16 DNA Negative Negative   Negative    HPV 18 DNA Negative Negative   Negative    Other HR HPV Negative Negative   Negative      ASCVD Risk   The 10-year ASCVD risk score (Riccardo DEAN, et al., 2019) is: 1.9%    Values used to calculate the score:      Age: 53 years      Sex: Female      Is Non- : No      Diabetic: No      Tobacco smoker: No      Systolic Blood Pressure: 115 mmHg      Is BP treated: Yes      HDL Cholesterol: 51 mg/dL      Total Cholesterol: 193 mg/dL               Reviewed and updated as needed this visit by Provider                    Patient Active Problem List   Diagnosis    Plantar fasciitis    Obesity    Hyperlipidemia LDL goal <160    Ganglion of left wrist    Papanicolaou smear of cervix with atypical squamous cells cannot exclude high grade squamous intraepithelial lesion (ASC-H)    Malignant  neoplasm of upper-outer quadrant of right female breast (H)    Acquired absence of both breasts and nipples    Lymphedema    Monoallelic mutation of CHEK2 gene    Benign essential hypertension    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    Carpal tunnel syndrome of left wrist    Renal colic    Nephrolithiasis     Past Surgical History:   Procedure Laterality Date    CARPAL TUNNEL RELEASE RT/LT  03/2010    COLONOSCOPY      INSERT PORT VASCULAR ACCESS Left 04/22/2016    Procedure: INSERT PORT VASCULAR ACCESS;  Surgeon: Nereyda Flowers MD;  Location: [facility] OR    INSERT TISSUE EXPANDER BREAST BILATERAL Bilateral 10/17/2016    Procedure: INSERT TISSUE EXPANDER BREAST BILATERAL;  Surgeon: Jenna Vazquez MD;  Location: [facility] OR    LASER HOLMIUM LITHOTRIPSY URETER(S), INSERT STENT, COMBINED Right 11/30/2023    Procedure: Cystoscopy, right ureteroscopy with laser lithotripsy and stone basketing.  Right ureteral stent placement.;  Surgeon: Dylan Carrero MD;  Location:  OR    LASIK BILATERAL      MASTECTOMY MODIFIED RADICAL Right 10/17/2016    Procedure: MASTECTOMY MODIFIED RADICAL;  Surgeon: Nereyda Flowers MD;  Location: [facility] OR    MASTECTOMY MODIFIED RADICAL, SENTINEL NODE, COMBINED Left 10/17/2016    Procedure: COMBINED MASTECTOMY MODIFIED RADICAL, SENTINEL NODE;  Surgeon: Nereyda Flowers MD;  Location: [facility] OR    PROCURE GRAFT FAT Bilateral 04/25/2018    Procedure: PROCURE GRAFT FAT;;  Surgeon: Jenna Vazquez MD;  [facility]    RECONSTRUCT BREAST BILATERAL, IMPLANT PROSTHESIS BILATERAL, COMBINED Bilateral 09/13/2017    Procedure: COMBINED RECONSTRUCT BREAST BILATERAL, IMPLANT PROSTHESIS BILATERAL;  BILATERAL SECOND STAGE BREAST RECONSTRUCTION WITH IMPLANT.;  Surgeon: Jenna Vazquez MD;  Location:  [facility]    REMOVE CATHETER VASCULAR ACCESS Left 10/17/2016    Procedure: REMOVE CATHETER VASCULAR ACCESS;  Surgeon: Nereyda Flowers MD;  Location: [facility] OR    REVISE RECONSTRUCTED BREAST BILATERAL  "Bilateral 04/25/2018    Procedure: REVISE RECONSTRUCTED BREAST BILATERAL;  REVISION BILATERAL BREAST RECONSTRUCTION AND  FAT GRAFTING FROM AXILLA TO BILATERAL BREASTS.;  Surgeon: Jenna Vazquez MD;  Location: [facility]    RHINOPLASTY  1983    Following trauma       Social History     Tobacco Use    Smoking status: Never    Smokeless tobacco: Never   Substance Use Topics    Alcohol use: Yes     Comment: rare     Family History   Problem Relation Age of Onset    Diabetes Type 1 Mother     Diabetes Mother     Hyperlipidemia Mother     C.A.D. Father         bypass surg age 68    Coronary Artery Disease Father     Prostate Cancer Father     Hypertension Father     Hyperlipidemia Father     No Known Problems Sister     Breast Cancer Sister     Substance Abuse Sister     Breast Cancer Maternal Grandmother 70        dx'ed age 80s    Brain Cancer Paternal Grandfather     Depression Daughter     Substance Abuse Daughter     Asthma Daughter              Review of Systems  Constitutional, HEENT, cardiovascular, pulmonary, gi and gu systems are negative, except as otherwise noted.     Objective    Exam  /75 (BP Location: Left arm, Patient Position: Sitting, Cuff Size: Adult Large)   Pulse 86   Temp 98.4  F (36.9  C) (Oral)   Resp 15   Ht 1.626 m (5' 4\")   Wt 81.2 kg (179 lb)   SpO2 97%   BMI 30.73 kg/m     Estimated body mass index is 30.73 kg/m  as calculated from the following:    Height as of this encounter: 1.626 m (5' 4\").    Weight as of this encounter: 81.2 kg (179 lb).    Physical Exam  GENERAL: alert and no distress  EYES: Eyes grossly normal to inspection  HENT: ear canals and TM's normal, nose and mouth without ulcers or lesions  NECK: no adenopathy, no asymmetry, masses, or scars  RESP: lungs clear to auscultation - no rales, rhonchi or wheezes  CV: regular rate and rhythm, normal S1 S2, no S3 or S4, no murmur, click or rub, no peripheral edema  ABDOMEN: soft, nontender, no hepatosplenomegaly, no " masses and bowel sounds normal  NEURO: Normal strength and tone, mentation intact and speech normal  PSYCH: mentation appears normal, affect normal/bright        Signed Electronically by: NIKKI Santos Ra CNP

## 2024-07-08 ENCOUNTER — VIRTUAL VISIT (OUTPATIENT)
Dept: FAMILY MEDICINE | Facility: CLINIC | Age: 54
End: 2024-07-08
Payer: COMMERCIAL

## 2024-07-08 DIAGNOSIS — M79.605 PAIN IN BOTH LOWER EXTREMITIES: Primary | ICD-10-CM

## 2024-07-08 DIAGNOSIS — M79.604 PAIN IN BOTH LOWER EXTREMITIES: Primary | ICD-10-CM

## 2024-07-08 PROCEDURE — 99213 OFFICE O/P EST LOW 20 MIN: CPT | Mod: 95 | Performed by: NURSE PRACTITIONER

## 2024-07-08 PROCEDURE — G2211 COMPLEX E/M VISIT ADD ON: HCPCS | Mod: 95 | Performed by: NURSE PRACTITIONER

## 2024-07-08 NOTE — PROGRESS NOTES
"Stephie is a 53 year old who is being evaluated via a billable video visit.    How would you like to obtain your AVS? MyChart  If the video visit is dropped, the invitation should be resent by: Text to cell phone: [phone]  Will anyone else be joining your video visit? No      Assessment & Plan     Pain in both lower extremities  Will evaluate for possible vascular etiology.  She will also try some low pressure compression stockings during the day to see if this is helpful.  - Vascular Medicine Referral; Future      The longitudinal plan of care for the diagnosis(es)/condition(s) as documented were addressed during this visit. Due to the added complexity in care, I will continue to support Stephie in the subsequent management and with ongoing continuity of care.     BMI  Estimated body mass index is 30.73 kg/m  as calculated from the following:    Height as of 6/10/24: 1.626 m (5' 4\").    Weight as of 6/10/24: 81.2 kg (179 lb).             Subjective   Stpehie is a 53 year old, presenting for the following health issues:  Musculoskeletal Problem      7/8/2024    11:56 AM   Additional Questions   Roomed by Viry APUL     Musculoskeletal Problem    History of Present Illness       Reason for visit:  Achy legs  Symptom onset:  More than a month  Symptoms include:  My legs ache like when you are sick  Symptom intensity:  Moderate  Symptom progression:  Worsening  Had these symptoms before:  Yes  Has tried/received treatment for these symptoms:  No  What makes it worse:  Not that i can tell  What makes it better:  Not really    She eats 0-1 servings of fruits and vegetables daily.She consumes 1 sweetened beverage(s) daily.She exercises with enough effort to increase her heart rate 30 to 60 minutes per day.  She exercises with enough effort to increase her heart rate 3 or less days per week.   She is taking medications regularly.     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  Treated with gabapentin.  For the " past 1+ years she has noticed a different pain in her lower legs.  Aching.  No change in appearance.  She does have some varicose veins but nothing that seems to have changed over the past year.  She is active, running for exercise.  She does not notice an improvement between sitting and standing.  She does not wear compression stockings.              Review of Systems  Constitutional, HEENT, cardiovascular, pulmonary, gi and gu systems are negative, except as otherwise noted.      Objective           Vitals:  No vitals were obtained today due to virtual visit.    Physical Exam   GENERAL: alert and no distress  EYES: Eyes grossly normal to inspection.  No discharge or erythema, or obvious scleral/conjunctival abnormalities.  RESP: No audible wheeze, cough, or visible cyanosis.    SKIN: Visible skin clear. No significant rash, abnormal pigmentation or lesions.  NEURO: Cranial nerves grossly intact.  Mentation and speech appropriate for age.  PSYCH: Appropriate affect, tone, and pace of words          Video-Visit Details    Type of service:  Video Visit   Originating Location (pt. Location): Home    Distant Location (provider location):  On-site  Platform used for Video Visit: Nany  Signed Electronically by: NIKKI Santos Ra CNP

## 2024-07-09 ENCOUNTER — TELEPHONE (OUTPATIENT)
Dept: OTHER | Facility: CLINIC | Age: 54
End: 2024-07-09
Payer: COMMERCIAL

## 2024-07-09 NOTE — TELEPHONE ENCOUNTER
Referral received via [employer] on 7/9/24.    Pt referred to [facility] by Margaret Longo Ra, APRN CNP  for pain in both lower extremities/varicose veins    Routing to scheduling to coordinate the following:    NEW VASCULAR PATIENT consult with Vascular Medicine  Please schedule this at next available    Appt note:  Pt referred to [facility] by Margaret Longo Ra, APRN CNP  for pain in both lower extremities/varicose veins    Katy KENDALL, KRISHNA    [facility]  Office: [phone]  Fax: [phone]

## 2024-07-24 ENCOUNTER — OFFICE VISIT (OUTPATIENT)
Dept: OTHER | Facility: CLINIC | Age: 54
End: 2024-07-24
Attending: INTERNAL MEDICINE
Payer: COMMERCIAL

## 2024-07-24 VITALS
WEIGHT: 176.4 LBS | DIASTOLIC BLOOD PRESSURE: 73 MMHG | OXYGEN SATURATION: 97 % | BODY MASS INDEX: 30.28 KG/M2 | SYSTOLIC BLOOD PRESSURE: 106 MMHG | HEART RATE: 107 BPM

## 2024-07-24 DIAGNOSIS — M79.605 PAIN IN BOTH LOWER EXTREMITIES: ICD-10-CM

## 2024-07-24 DIAGNOSIS — M79.604 PAIN IN BOTH LOWER EXTREMITIES: ICD-10-CM

## 2024-07-24 PROCEDURE — 99215 OFFICE O/P EST HI 40 MIN: CPT | Performed by: INTERNAL MEDICINE

## 2024-07-24 PROCEDURE — 99213 OFFICE O/P EST LOW 20 MIN: CPT | Performed by: INTERNAL MEDICINE

## 2024-07-24 PROCEDURE — G2211 COMPLEX E/M VISIT ADD ON: HCPCS | Performed by: INTERNAL MEDICINE

## 2024-07-24 PROCEDURE — 99417 PROLNG OP E/M EACH 15 MIN: CPT | Performed by: INTERNAL MEDICINE

## 2024-07-24 NOTE — PROGRESS NOTES
INITIAL VASCULAR MEDICAL ASSESSMENT  REFERRAL SOURCE: Margaret Longo Ra, NIKKI CNP  f  REASON FOR CONSULT: or pain in both   lower extremities/varicose veins     A/P:      (M79.604,  M79.605) Pain in both lower extremities    Comment: Clinically and by exam this seems likely neurogenic in origin. Whether it is of spinal or peripheral origin is unknown. It is definitely not arterial in origin. There is no pelvic outlet obstruction seen on recent CT. There is no asymmetry of history or exam. She has had a VNUS closure in the past long ago. She does have small spider veins in the right calf.     Plan: Check venous comp study, RTC one week later, pursue neurologic etiologies of leg pain through PCP if no significant venous disease found on above imaging      The longitudinal care of plan for Stephie was addressed during this visit. Due to added complexity of care, we will continue to support Stephie and the subsequent management of this condition and with ongoing continuity of care for this condition.     63 minutes total medical care on today's date.     HPI:     Shantell Wagner is a 53 year old female with a h/o VNUS closure in the RLE of a varicose vein for cosmetic reasons. She also has a CHEK2 mutation and in 2016 presented with a locally advanced right-sided breast cancer in the 10 oclock position which was high risk at diagnosis, ER positive, [place] negative, HER-2 receptor negative. She had a 2.2 cm tumor and a 2.7 cm axillary mass on the right side.  She finished up adjuvant chemotherapy and is continuing to do adjuvant tamoxifen and the plan is to do tamoxifen for a full 10 years until 2026. She developed RLS after having received chemo and additionally notes BLE symmetrical leg pain worse with positional changes of going form seated to standing but not going down stairs. It is not worse at the end of a day or week, and it is not associated with typical arterial claudication sxs. . The patient presents today to  address the above    Review Of Systems  Skin: negative  Eyes: negative  Ears/Nose/Throat: negative  Respiratory: No shortness of breath, dyspnea on exertion, cough, or hemoptysis  Cardiovascular: negative  Gastrointestinal: negative  Genitourinary: negative  Musculoskeletal: as above  Neurologic: as above  Psychiatric: negative  Hematologic/Lymphatic/Immunologic: negative  Endocrine: negative      PAST MEDICAL HISTORY:                  Past Medical History:   Diagnosis Date    Abnormal Pap smear, (ASC-H) 02/20/2013    [place]/ECC= [facility]GR[facility] 1. Repeat HPV screen and ECC 12 months from colp.    Breast cancer 04/2016    Hyperlipidemia     Hypertension     At doctors appointments my blood pressure is elevated.    Neuropathy        PAST SURGICAL HISTORY:                  Past Surgical History:   Procedure Laterality Date    CARPAL TUNNEL RELEASE RT/LT  03/2010    COLONOSCOPY      INSERT PORT VASCULAR ACCESS Left 04/22/2016    Procedure: INSERT PORT VASCULAR ACCESS;  Surgeon: Nereyda Flowers MD;  Location: [facility] OR    INSERT TISSUE EXPANDER BREAST BILATERAL Bilateral 10/17/2016    Procedure: INSERT TISSUE EXPANDER BREAST BILATERAL;  Surgeon: Jenna Vazquez MD;  Location:  OR    LASER HOLMIUM LITHOTRIPSY URETER(S), INSERT STENT, COMBINED Right 11/30/2023    Procedure: Cystoscopy, right ureteroscopy with laser lithotripsy and stone basketing.  Right ureteral stent placement.;  Surgeon: Dylan Carrero MD;  Location:  OR    LAS BILATERAL      MASTECTOMY MODIFIED RADICAL Right 10/17/2016    Procedure: MASTECTOMY MODIFIED RADICAL;  Surgeon: Nereyda Flowers MD;  Location:  OR    MASTECTOMY MODIFIED RADICAL, SENTINEL NODE, COMBINED Left 10/17/2016    Procedure: COMBINED MASTECTOMY MODIFIED RADICAL, SENTINEL NODE;  Surgeon: Nereyda Flowers MD;  Location:  OR    PROCURE GRAFT FAT Bilateral 04/25/2018    Procedure: PROCURE GRAFT FAT;;  Surgeon: Jenna Vazquez MD;  Location: [facility]    RECONSTRUCT  BREAST BILATERAL, IMPLANT PROSTHESIS BILATERAL, COMBINED Bilateral 09/13/2017    Procedure: COMBINED RECONSTRUCT BREAST BILATERAL, IMPLANT PROSTHESIS BILATERAL;  BILATERAL SECOND STAGE BREAST RECONSTRUCTION WITH IMPLANT.;  Surgeon: Jenna Vazquez MD;  Location: [facility]    REMOVE CATHETER VASCULAR ACCESS Left 10/17/2016    Procedure: REMOVE CATHETER VASCULAR ACCESS;  Surgeon: Nereyda Flowers MD;  Location: [facility] OR    REVISE RECONSTRUCTED BREAST BILATERAL Bilateral 04/25/2018    Procedure: REVISE RECONSTRUCTED BREAST BILATERAL;  REVISION BILATERAL BREAST RECONSTRUCTION AND  FAT GRAFTING FROM AXILLA TO BILATERAL BREASTS.;  Surgeon: Jenna Vazquez MD;  Location: [facility]    RHINOPLASTY  1983    Following trauma       CURRENT MEDICATIONS:                  Current Outpatient Medications   Medication Sig Dispense Refill    acetaminophen (TYLENOL) 325 MG tablet Take 3 tablets (975 mg) by mouth every 8 hours as needed for mild pain or other (and adjunct with moderate or severe pain or per patient request) 90 tablet 0    atorvastatin (LIPITOR) 20 MG tablet Take 1 tablet (20 mg) by mouth daily 90 tablet 3    gabapentin (NEURONTIN) 300 MG capsule TAKE 1 CAPSULE BY MOUTH EVERYDAY AT BEDTIME 90 capsule 3    glucosamine-chondroitin 500-400 MG CAPS Take 1 capsule by mouth daily      losartan (COZAAR) 25 MG tablet Take 1 tablet (25 mg) by mouth daily 90 tablet 3    omeprazole (PRILOSEC) 20 MG capsule Take 20 mg by mouth every other day       Semaglutide-Weight Management (WEGOVY) 2.4 MG/0.75ML pen Inject 2.4 mg Subcutaneous once a week 3 mL 3       ALLERGIES:                  Allergies   Allergen Reactions    No Known Drug Allergy        SOCIAL HISTORY:                  Social History     Socioeconomic History    Marital status:      Spouse name: Not on file    Number of children: Not on file    Years of education: Not on file    Highest education level: Not on file   Occupational History    Not on file    Tobacco Use    Smoking status: Never    Smokeless tobacco: Never   Vaping Use    Vaping status: Never Used   Substance and Sexual Activity    Alcohol use: Yes     Comment: rare    Drug use: No    Sexual activity: Yes     Partners: Male     Birth control/protection: Male Surgical   Other Topics Concern    Parent/sibling w/ CABG, MI or angioplasty before 65F 55M? No   Social History Narrative    Not on file     Social Determinants of Health     Financial Resource Strain: Low Risk  (6/5/2024)    Financial Resource Strain     Within the past 12 months, have you or your family members you live with been unable to get utilities (heat, electricity) when it was really needed?: No   Food Insecurity: Low Risk  (6/5/2024)    Food Insecurity     Within the past 12 months, did you worry that your food would run out before you got money to buy more?: No     Within the past 12 months, did the food you bought just not last and you didn t have money to get more?: No   Transportation Needs: Low Risk  (6/5/2024)    Transportation Needs     Within the past 12 months, has lack of transportation kept you from medical appointments, getting your medicines, non-medical meetings or appointments, work, or from getting things that you need?: No   Physical Activity: Insufficiently Active (6/5/2024)    Exercise Vital Sign     Days of Exercise per Week: 2 days     Minutes of Exercise per Session: 30 min   Stress: No Stress Concern Present (6/5/2024)    [employer] [place] of [employer] - Occupational Stress Questionnaire     Feeling of Stress : Only a little   Social Connections: Socially Integrated (6/5/2024)    Social Connection and Isolation Panel [NHANES]     Frequency of Communication with Friends and Family: More than three times a week     Frequency of Social Gatherings with Friends and Family: Three times a week     Attends Muslim Services: More than 4 times per year     Active Member of Clubs or Organizations: Yes     Attends  Club or Organization Meetings: More than 4 times per year     Marital Status:    Interpersonal Safety: Low Risk  (11/29/2023)    Interpersonal Safety     Do you feel physically and emotionally safe where you currently live?: Yes     Within the past 12 months, have you been hit, slapped, kicked or otherwise physically hurt by someone?: No     Within the past 12 months, have you been humiliated or emotionally abused in other ways by your partner or ex-partner?: No   Housing Stability: Low Risk  (6/5/2024)    Housing Stability     Do you have housing? : Yes     Are you worried about losing your housing?: No       FAMILY HISTORY:                   Family History   Problem Relation Age of Onset    Diabetes Type 1 Mother     Diabetes Mother     Hyperlipidemia Mother     C.A.D. Father         bypass surg age 68    Coronary Artery Disease Father     Prostate Cancer Father     Hypertension Father     Hyperlipidemia Father     No Known Problems Sister     Breast Cancer Sister     Substance Abuse Sister     Breast Cancer Maternal Grandmother 70        dx'ed age 80s    Brain Cancer Paternal Grandfather     Depression Daughter     Substance Abuse Daughter     Asthma Daughter          Physical exam Reveals:    O/P: WNL  HEENT: WNL  NECK: No JVD, thyromegaly, or lymphadenopathy  HEART: RRR, no murmurs, gallops, or rubs  LUNGS: CTA bilaterally without rales, wheezes, or rhonchi  GI: NABS, nondistended, nontender, soft  EXT:without cyanosis, clubbing, or edema; no leg asymmetry no LLE venous insuff; CEAP C2 RLE venous insufficiency  NEURO: nonfocal  : no flank tenderness      Rt femoral: 3 plus palpable   Rt popliteal: 3 plus palpable   Rt DP:  3 plus palpable  Rt PT:   3 plus palpable       Lt femoral: 3 plus palpable   Lt popliteal: 3 plus palpable   Lt DP:  3 plus palpable   Lt PT:   3 plus palpable             EXAM: CT ABDOMEN PELVIS W/O CONTRAST  LOCATION: [facility]  DATE: 12/7/2023      INDICATION: R f;ank pain, ureteral stent removed today  COMPARISON: 01/30/2023  TECHNIQUE: CT scan of the abdomen and pelvis was performed without IV contrast. Multiplanar reformats were obtained. Dose reduction techniques were used.  CONTRAST: None.     FINDINGS:   LOWER CHEST: Normal.     HEPATOBILIARY: Normal.     PANCREAS: Normal.     SPLEEN: Normal.     ADRENAL GLANDS: Normal.     KIDNEYS/BLADDER: New modest right hydronephrosis/hydroureter. Moderate soft tissue stranding surrounding right kidney and ureter. Ureter is dilated down to the level the bladder with no obstructing stone evident near UVJ. Its possible there is edema at   the ureteral orifice that results in the obstruction. There are 2-3 stone fragments within right lower pole, largest of these measures 8 x 5 mm. Faint 1 mm stone right upper pole.     Stable left kidney, no left hydronephrosis. No bladder stones. Single tiny air bubble within bladder.     BOWEL: No obstruction or inflammatory change. Appendix normal.     LYMPH NODES: Normal.     VASCULATURE: Unremarkable.     PELVIC ORGANS: Normal.     MUSCULOSKELETAL: Normal.                                                                      IMPRESSION:   1.  New modest right hydronephrosis/hydroureter with soft tissue stranding now surrounding right kidney and ureter. No obstructing distal right ureteral stone evident.  2.  Multiple stones are present within right kidney.         Narrative & Impression   NM LYMPHOSCINTIGRAPHY INJECTION ONLY 10/17/2016 6:39 AM LEFT     HISTORY:  Malignant neoplasm of unspecified site of right female  breast      CONSENT: The risks and benefits were discussed with the patient. The  patient agreed to have the procedure.      TECHNIQUE: 540 uCi Tc-Lymphoseek.      LILY WONG MD         Combined Report of:    PET and CT on  4/27/2016 9:10 AM :     1. PET of the neck, chest, abdomen, and pelvis.  2. PET CT Fusion for Attenuation Correction and  Anatomical  Localization:  Images were evaluated in axial, coronal, and sagittal  planes in bone, soft tissue, and lung windows.   3. Diagnostic CT scan of the chest, abdomen, and pelvis with  intravenous contrast for interpretation.  4. 3D MIP and PET-CT fused images were processed on an independent  workstation and archived to PACS and reviewed by a radiologist.     Technique:     1. PET: The patient received 11.41 mCi of F-18-FDG; the serum glucose  was 85 prior to administration, body weight was 82.5 kg. Images were  evaluated in the axial, sagittal, and coronal planes as well as the  rotational whole body MIP. Images were acquired from the Vertex to the  Feet.     UPTAKE WAS MEASURED AT 63 MINUTES.      2. CT: Volumetric acquisition for clinical interpretation of the  chest, abdomen, and pelvis acquired at 3 mm sections . The chest,  abdomen, and pelvis were evaluated at 5 mm sections in bone, soft  tissue, and lung windows.  The patient received 100 cc. Of Isovue 370  intravenously for the examination.    --     INDICATION: stage breast ca, Malignant neoplasm of unspecified site of  right female breast     ADDITIONAL INFORMATION OBTAINED FROM EMR: none     COMPARISON: 4/18/2016 MR, 4/1/2016 and 3/13/2015 mammograms     FINDINGS:      HEAD/NECK:  Mild asymmetry in the right palatine tonsil mucosal space. There is  symmetric uptake of radiotracer in the palatine tonsils with SUV max  of 6.02 on the right and 5.94 on the left suggests tonsillitis over  malignancy.      The paranasal sinuses are clear as are the mastoid air cells.      No masses, mass effect or pathologically enlarged lymph nodes are  evident. The thyroid gland is within normal limits.     CHEST:  1. Biopsy clip contained within an irregular shaped, spiculated 19 x  13 mm right breast mass with an SUV max of 13.18.     At least 7 right level I axillary lymph nodes display increased FDG  avidity.  2. The largest of these is 16 mm in short axis  diameter with SUV max  of 17.63.  3. The second largest is 14 mm with an SUV max of 14.11.     4. One level II axillary lymph node displays increased FDG avidity and  measures 8 mm with an SUV max of 20.74.     Left chest wall Port-A-Cath tip ends in the right atrium.  There are no pathologically enlarged mediastinal or perihilar lymph  nodes.  There are no suspicious lung nodules or evidence for infection.            There is no significant pericardial effusion.     ABDOMEN AND PELVIS:  There is no suspicious FDG uptake in the abdomen or pelvis.     There are no suspicious hepatic lesions. Round soft tissue density  perisplenic structures without FDG avidity are likely splenules.  Pancreas is normal..  There are no suspicious adrenal mass lesions or opaque gallbladder  calculi. Kidneys are normal in size with no masses identified.  Prominent columns of Malachi. Several right-sided collecting system  stones measuring up to 5 mm in the inferior pole calyx. There is  moderate right-sided pelviectasis and trace hydronephrosis. No  hydroureter or ureterolithiasis There is no evidence for  diverticulitis, bowel obstruction or free fluid.  3.7 cm left ovarian  cyst. Several nabothian cysts.     LOWER EXTREMITIES:   No abnormal masses or hypermetabolic lesions.     BONES:   There are no suspicious lytic or blastic osseous lesions.  There is no  abnormal FDG uptake in the skeleton. Degenerative disc disease at  L5-S1 with disc herniation and at least moderate narrowing of the left  L5-S1 neural foramen.        IMPRESSION:   1. Hypermetabolic right breast mass.  2. Multiple right axillary lymph node metastases.  3. No distant metastatic disease.  4. Mild palatine tonsillar hypermetabolism and asymmetric right  tonsillar enlargement could suggest tonsillitis. Recommend direct  visualization.  5. Incidentally noted right nephrolithiasis and pelviectasis with no  ureterolithiasis or hydroureter.     I have personally reviewed  the examination and initial interpretation  and I agree with the findings.     CONI AMOS MD

## 2024-07-24 NOTE — PROGRESS NOTES
[facility]        Patient is here for a consult to discuss Varicose vein(s).     Pt is currently taking Statin.    /73 (BP Location: Left arm, Patient Position: Chair, Cuff Size: Adult Regular)   Pulse 107   Wt 176 lb 6.4 oz (80 kg)   SpO2 97%   BMI 30.28 kg/m      The provider has been notified that the patient has no concerns.     Questions patient would like addressed today are: N/A.    Refills are needed: N/A    Has homecare services and agency name:  Erika Higgins MA

## 2024-07-26 ENCOUNTER — NURSE TRIAGE (OUTPATIENT)
Dept: NURSING | Facility: CLINIC | Age: 54
End: 2024-07-26

## 2024-07-26 ENCOUNTER — VIRTUAL VISIT (OUTPATIENT)
Dept: FAMILY MEDICINE | Facility: CLINIC | Age: 54
End: 2024-07-26
Payer: COMMERCIAL

## 2024-07-26 ENCOUNTER — LAB (OUTPATIENT)
Dept: LAB | Facility: CLINIC | Age: 54
End: 2024-07-26
Attending: NURSE PRACTITIONER
Payer: COMMERCIAL

## 2024-07-26 DIAGNOSIS — J02.0 ACUTE STREPTOCOCCAL PHARYNGITIS: Primary | ICD-10-CM

## 2024-07-26 DIAGNOSIS — J02.9 ACUTE SORE THROAT: ICD-10-CM

## 2024-07-26 DIAGNOSIS — U07.1 INFECTION DUE TO 2019 NOVEL CORONAVIRUS: ICD-10-CM

## 2024-07-26 DIAGNOSIS — J02.9 ACUTE SORE THROAT: Primary | ICD-10-CM

## 2024-07-26 LAB
DEPRECATED S PYO AG THROAT QL EIA: NEGATIVE
GROUP A STREP BY PCR: DETECTED

## 2024-07-26 PROCEDURE — 99213 OFFICE O/P EST LOW 20 MIN: CPT | Mod: 95 | Performed by: NURSE PRACTITIONER

## 2024-07-26 PROCEDURE — 87651 STREP A DNA AMP PROBE: CPT

## 2024-07-26 RX ORDER — PENICILLIN V POTASSIUM 500 MG/1
1 TABLET, FILM COATED ORAL
COMMUNITY
Start: 2024-07-23

## 2024-07-26 RX ORDER — CEPHALEXIN 500 MG/1
500 CAPSULE ORAL 2 TIMES DAILY
Qty: 20 CAPSULE | Refills: 0 | Status: SHIPPED | OUTPATIENT
Start: 2024-07-26 | End: 2024-08-05

## 2024-07-26 ASSESSMENT — ENCOUNTER SYMPTOMS: SORE THROAT: 1

## 2024-07-26 NOTE — PROGRESS NOTES
Stephie is a 53 year old who is being evaluated via a billable video visit.    How would you like to obtain your AVS? MyChart  If the video visit is dropped, the invitation should be resent by: Text to cell phone: [phone]  Will anyone else be joining your video visit? No        ICD-10-CM    1. Acute sore throat  J02.9 Streptococcus A Rapid Screen w/Reflex to PCR - Clinic Collect      2. Infection due to 2019 novel coronavirus  U07.1         Discussed treatment options.  She will continue penicillin.  She did not have a strep test.  Will obtain rapid strep and culture.  If either are positive, will discontinue penicillin and start cephalexin for broader treatment.  We did discuss that she may not have strep.  This may be ongoing from the COVID-19 infection.  Discussed symptomatic treatment with over-the-counter acetaminophen or ibuprofen as needed.  Recommend warm salt water gargles.  She is to change her toothbrush.  Further plans pending the results.  She is content with the plan.    Subjective   Stephie is a 53 year old, presenting for the following health issues:  Pharyngitis      7/26/2024     9:55 AM   Additional Questions   Roomed by Sandra     Pharyngitis     History of Present Illness       Reason for visit:  Had Covid on 7/13 had Strep on 7/23, still not feeling totally better after 3 days on antibiotics.  Symptom onset:  3-7 days ago  Symptom intensity:  Moderate  Symptom progression:  Improving    She eats 2-3 servings of fruits and vegetables daily.She consumes 1 sweetened beverage(s) daily.She exercises with enough effort to increase her heart rate 30 to 60 minutes per day.  She exercises with enough effort to increase her heart rate 3 or less days per week.   She is taking medications regularly.       Patient is concerned of strep.  Patient tested positive for COVID around July 7.  Her symptoms improved after 2 weeks.  Her son was diagnosed with strep a few days ago.  Patient completed a virtual  appointment and was not tested for strep.  She has been taking penicillin for 3 days.  Her symptoms have improved, but she continues to experience discomfort on the left side of her throat.  Eating is painful.  She has had some left ear discomfort.  She continues to experience postnasal drainage and a nonproductive cough.  She denies headache, stomachache, nausea, vomiting, fever, shortness of breath, chest tightness, wheezing.  She has been taking Sudafed and Mucinex.    Review of Systems  Constitutional, HEENT, cardiovascular, pulmonary, GI, , musculoskeletal, neuro, skin, endocrine and psych systems are negative, except as otherwise noted.      Objective           Vitals:  No vitals were obtained today due to virtual visit.    Physical Exam   GENERAL: alert and no distress  EYES: Eyes grossly normal to inspection.  No discharge or erythema, or obvious scleral/conjunctival abnormalities.  RESP: No audible wheeze or visible cyanosis.  She does cough intermittently throughout the visit.   SKIN: Visible skin clear. No significant rash, abnormal pigmentation or lesions.  NEURO: Cranial nerves grossly intact.  Mentation and speech appropriate for age.  PSYCH: Appropriate affect, tone, and pace of words        Video-Visit Details    Type of service:  Video Visit   Originating Location (pt. Location): Home    Distant Location (provider location):  On-site  Platform used for Video Visit: Nany  Signed Electronically by: NIKKI Cope CNP

## 2024-07-27 ENCOUNTER — NURSE TRIAGE (OUTPATIENT)
Dept: NURSING | Facility: CLINIC | Age: 54
End: 2024-07-27

## 2024-07-27 NOTE — TELEPHONE ENCOUNTER
Nurse Triage SBAR    Situation: Lab Result    Background: Pt has a VV today and had some labs done.     Assessment: Dr Christina is calling with a Lab results - She wants the information below to be relayed to the patient.     Recommendation:     Elysia Christina MD  7/26/2024 10:18 PM CDT Back to Top      Stop penicillin, Rx for cephalexin sent as was outlined in plan at time of visit. FNA RN team to notify patient of results and new Rx.       If the patient is not able to get the medication tonight and has not taken the penicillin tonight they can take the penicillin - stop it tomorrow and start the Cephalexin tomorrow.     Provider Recommendation Follow Up:   Unable to reach patient/caregiver. Left message to return call to [phone]. Upon return call please notify caller of provider's recommendations.    Mel Marquez RN Nursing Advisor 7/26/2024 10:43 PM      Reason for Disposition   Lab or radiology calling with CRITICAL test results    Additional Information   Negative: Lab calling with strep throat test results and triager can call in prescription   Negative: Lab calling with urinalysis test results and triager can call in prescription   Negative: Medication questions   Negative: Medication renewal and refill questions   Negative: Pre-operative or pre-procedural questions   Negative: ED call to PCP (i.e., primary care provider; doctor, NP, or PA)   Negative: Doctor (or NP/PA) call to PCP   Negative: Call about patient who is currently hospitalized    Protocols used: PCP Call - No Triage-A-

## 2024-07-27 NOTE — PROGRESS NOTES
Notified of positive Grp A Strep PCR.  Per plan as outlined by Renay Rene NP at virtual visit today, will discontinue penicillin and begin cephalexin.

## 2024-07-27 NOTE — TELEPHONE ENCOUNTER
Returned call from clinic.  I gave Stephie the positive result from her strep throat culture test.  I told her too that an Rx had been sent to [facility] in [place] on [address].  Caller stated understanding and agreement.  Jamia VASQUEZ RN [facility]

## 2024-07-29 ENCOUNTER — TELEPHONE (OUTPATIENT)
Dept: OTHER | Facility: CLINIC | Age: 54
End: 2024-07-29
Payer: COMMERCIAL

## 2024-07-29 NOTE — TELEPHONE ENCOUNTER
Per 7/24/24 visit with Dr. Hylton, pt needs the following:    Bilateral LE venous comp (schedule at next available)  Follow up 1-2 weeks later; visit may be virtual or in clinic    Routing to scheduling to contact patient to coordinate above.    Appt note in order comments.    Clarice Lewis, MKN, RN, -[facility]

## 2024-08-09 ENCOUNTER — ANCILLARY PROCEDURE (OUTPATIENT)
Dept: ULTRASOUND IMAGING | Facility: CLINIC | Age: 54
End: 2024-08-09
Attending: INTERNAL MEDICINE
Payer: COMMERCIAL

## 2024-08-09 DIAGNOSIS — M79.604 PAIN IN BOTH LOWER EXTREMITIES: ICD-10-CM

## 2024-08-09 DIAGNOSIS — M79.605 PAIN IN BOTH LOWER EXTREMITIES: ICD-10-CM

## 2024-08-09 PROCEDURE — 93970 EXTREMITY STUDY: CPT | Performed by: SURGERY

## 2024-08-22 ENCOUNTER — VIRTUAL VISIT (OUTPATIENT)
Dept: OTHER | Facility: CLINIC | Age: 54
End: 2024-08-22
Attending: INTERNAL MEDICINE
Payer: COMMERCIAL

## 2024-08-22 DIAGNOSIS — M79.605 PAIN IN BOTH LOWER EXTREMITIES: ICD-10-CM

## 2024-08-22 DIAGNOSIS — M79.604 PAIN IN BOTH LOWER EXTREMITIES: ICD-10-CM

## 2024-08-22 PROCEDURE — 99443 PR PHYSICIAN TELEPHONE EVALUATION 21-30 MIN: CPT | Mod: 93 | Performed by: INTERNAL MEDICINE

## 2024-08-22 NOTE — PROGRESS NOTES
Stephie is a 53 year old who is being evaluated via a billable telephone visit.    What phone number would you like to be contacted at? [phone].   How would you like to obtain your AVS? Patricia Higgins MA

## 2024-08-22 NOTE — PROGRESS NOTES
VASCULAR MEDICAL ASSESSMENT  REFERRAL SOURCE: Margaret Longo Ra, APRN CNP  f  REASON FOR CONSULT: or pain in both   lower extremities/varicose veins      A/P:        (M79.604,  M79.605) Pain in both lower extremities     Comment: Clinically and by exam this seems likely neurogenic in origin. Whether it is of spinal or peripheral origin is unknown. It is definitely not arterial in origin. There is no pelvic outlet obstruction seen on recent CT. There is no asymmetry of history or exam. She has had a VNUS closure in the past long ago. She does have small spider veins in the right calf. Venous comp study of 08/09/2024 reveals no BLE DVT or SVT. She has an incompetent Rt GSV from the proximal thigh through knee. She has an incompetent Lt CFV, SFJ, calf SSV, AASGSJ.       Plan: RTC three to six months prn dissatisfaction. Consider surgical treatment options if having failed to control sxs with conservative measures of compression.         22 minutes total medical care on today's date.    MD location: office  Pt location: home    Phone call start: 16:21  Phone call end: 16:43       HPI:      Shantell Wagner is a 53 year old female with a h/o VNUS closure in the RLE of a varicose vein for cosmetic reasons. She also has a CHEK2 mutation and in 2016 presented with a locally advanced right-sided breast cancer in the 10 oclock position which was high risk at diagnosis, ER positive, ME negative, HER-2 receptor negative. She had a 2.2 cm tumor and a 2.7 cm axillary mass on the right side.  She finished up adjuvant chemotherapy and is continuing to do adjuvant tamoxifen and the plan is to do tamoxifen for a full 10 years until 2026. She developed RLS after having received chemo and additionally notes BLE symmetrical leg pain worse with positional changes of going form seated to standing but not going down stairs. It is not worse at the end of a day or week, and it is not associated with typical arterial claudication sxs. .  The patient presents today to address the above     Review Of Systems  Skin: negative  Eyes: negative  Ears/Nose/Throat: negative  Respiratory: No shortness of breath, dyspnea on exertion, cough, or hemoptysis  Cardiovascular: negative  Gastrointestinal: negative  Genitourinary: negative  Musculoskeletal: as above  Neurologic: as above  Psychiatric: negative  Hematologic/Lymphatic/Immunologic: negative  Endocrine: negative        PAST MEDICAL HISTORY:                  Past Medical History        Past Medical History:   Diagnosis Date    Abnormal Pap smear, ([facility]) 02/20/2013     [place]/ECC= [facility]GR[facility] 1. Repeat HPV screen and ECC 12 months from colp.    Breast cancer 04/2016    Hyperlipidemia      Hypertension       At doctors appointments my blood pressure is elevated.    Neuropathy              PAST SURGICAL HISTORY:                  Past Surgical History         Past Surgical History:   Procedure Laterality Date    CARPAL TUNNEL RELEASE RT/LT   03/2010    COLONOSCOPY        INSERT PORT VASCULAR ACCESS Left 04/22/2016     Procedure: INSERT PORT VASCULAR ACCESS;  Surgeon: Nereyda Flowers MD;  Location: [facility]    INSERT TISSUE EXPANDER BREAST BILATERAL Bilateral 10/17/2016     Procedure: INSERT TISSUE EXPANDER BREAST BILATERAL;  Surgeon: Jenna Vazquez MD;  Location: [facility] OR    LASER HOLMIUM LITHOTRIPSY URETER(S), INSERT STENT, COMBINED Right 11/30/2023     Procedure: Cystoscopy, right ureteroscopy with laser lithotripsy and stone basketing.  Right ureteral stent placement.;  Surgeon: Dylan Carrero MD;  Location: [facility]    LASIK BILATERAL        MASTECTOMY MODIFIED RADICAL Right 10/17/2016     Procedure: MASTECTOMY MODIFIED RADICAL;  Surgeon: Nereyda Flowers MD;  Location: [facility]    MASTECTOMY MODIFIED RADICAL, SENTINEL NODE, COMBINED Left 10/17/2016     Procedure: COMBINED MASTECTOMY MODIFIED RADICAL, SENTINEL NODE;  Surgeon: Nereyda Flowers MD;  Location: [facility]    PROCURE GRAFT FAT Bilateral  04/25/2018     Procedure: PROCURE GRAFT FAT;;  Surgeon: Jenna Vazquez MD;  Location: [facility]    RECONSTRUCT BREAST BILATERAL, IMPLANT PROSTHESIS BILATERAL, COMBINED Bilateral 09/13/2017     Procedure: COMBINED RECONSTRUCT BREAST BILATERAL, IMPLANT PROSTHESIS BILATERAL;  BILATERAL SECOND STAGE BREAST RECONSTRUCTION WITH IMPLANT.;  Surgeon: Jenna Vazquez MD;  Location: [facility]    REMOVE CATHETER VASCULAR ACCESS Left 10/17/2016     Procedure: REMOVE CATHETER VASCULAR ACCESS;  Surgeon: Nereyda Flowers MD;  Location: [facility]    REVISE RECONSTRUCTED BREAST BILATERAL Bilateral 04/25/2018     Procedure: REVISE RECONSTRUCTED BREAST BILATERAL;  REVISION BILATERAL BREAST RECONSTRUCTION AND  FAT GRAFTING FROM AXILLA TO BILATERAL BREASTS.;  Surgeon: Jenna Vazquez MD;  Location: [facility]    RHINOPLASTY   1983     Following trauma            CURRENT MEDICATIONS:                  Current Outpatient Prescriptions          Current Outpatient Medications   Medication Sig Dispense Refill    acetaminophen (TYLENOL) 325 MG tablet Take 3 tablets (975 mg) by mouth every 8 hours as needed for mild pain or other (and adjunct with moderate or severe pain or per patient request) 90 tablet 0    atorvastatin (LIPITOR) 20 MG tablet Take 1 tablet (20 mg) by mouth daily 90 tablet 3    gabapentin (NEURONTIN) 300 MG capsule TAKE 1 CAPSULE BY MOUTH EVERYDAY AT BEDTIME 90 capsule 3    glucosamine-chondroitin 500-400 MG CAPS Take 1 capsule by mouth daily        losartan (COZAAR) 25 MG tablet Take 1 tablet (25 mg) by mouth daily 90 tablet 3    omeprazole (PRILOSEC) 20 MG capsule Take 20 mg by mouth every other day         Semaglutide-Weight Management (WEGOVY) 2.4 MG/0.75ML pen Inject 2.4 mg Subcutaneous once a week 3 mL 3            ALLERGIES:                  Allergies        Allergies   Allergen Reactions    No Known Drug Allergy              SOCIAL HISTORY:                  Social History   Social History             Socioeconomic History    Marital status:        Spouse name: Not on file    Number of children: Not on file    Years of education: Not on file    Highest education level: Not on file   Occupational History    Not on file   Tobacco Use    Smoking status: Never    Smokeless tobacco: Never   Vaping Use    Vaping status: Never Used   Substance and Sexual Activity    Alcohol use: Yes       Comment: rare    Drug use: No    Sexual activity: Yes       Partners: Male       Birth control/protection: Male Surgical   Other Topics Concern    Parent/sibling w/ CABG, MI or angioplasty before 65F 55M? No   Social History Narrative    Not on file      Social Determinants of Health           Financial Resource Strain: Low Risk  (6/5/2024)     Financial Resource Strain      Within the past 12 months, have you or your family members you live with been unable to get utilities (heat, electricity) when it was really needed?: No   Food Insecurity: Low Risk  (6/5/2024)     Food Insecurity      Within the past 12 months, did you worry that your food would run out before you got money to buy more?: No      Within the past 12 months, did the food you bought just not last and you didn t have money to get more?: No   Transportation Needs: Low Risk  (6/5/2024)     Transportation Needs      Within the past 12 months, has lack of transportation kept you from medical appointments, getting your medicines, non-medical meetings or appointments, work, or from getting things that you need?: No   Physical Activity: Insufficiently Active (6/5/2024)     Exercise Vital Sign      Days of Exercise per Week: 2 days      Minutes of Exercise per Session: 30 min   Stress: No Stress Concern Present (6/5/2024)     [employer] - Occupational Stress Questionnaire      Feeling of Stress : Only a little   Social Connections: Socially Integrated (6/5/2024)     Social Connection and Isolation Panel [NHANES]      Frequency of  Communication with Friends and Family: More than three times a week      Frequency of Social Gatherings with Friends and Family: Three times a week      Attends [employer] Services: More than 4 times per year      Active Member of Clubs or Organizations: Yes      Attends Club or Organization Meetings: More than 4 times per year      Marital Status:    Interpersonal Safety: Low Risk  (11/29/2023)     Interpersonal Safety      Do you feel physically and emotionally safe where you currently live?: Yes      Within the past 12 months, have you been hit, slapped, kicked or otherwise physically hurt by someone?: No      Within the past 12 months, have you been humiliated or emotionally abused in other ways by your partner or ex-partner?: No   Housing Stability: Low Risk  (6/5/2024)     Housing Stability      Do you have housing? : Yes      Are you worried about losing your housing?: No            FAMILY HISTORY:                   Family History         Family History   Problem Relation Age of Onset    Diabetes Type 1 Mother      Diabetes Mother      Hyperlipidemia Mother      C.A.D. Father           bypass surg age 68    Coronary Artery Disease Father      Prostate Cancer Father      Hypertension Father      Hyperlipidemia Father      No Known Problems Sister      Breast Cancer Sister      Substance Abuse Sister      Breast Cancer Maternal Grandmother 70         dx'ed age 80s    Brain Cancer Paternal Grandfather      Depression Daughter      Substance Abuse Daughter      Asthma Daughter                 Physical exam was not performed today as this was a billable telephone encounter. Exam of 7/24/24 revealed:     O/P: WNL  HEENT: WNL  NECK: No JVD, thyromegaly, or lymphadenopathy  HEART: RRR, no murmurs, gallops, or rubs  LUNGS: CTA bilaterally without rales, wheezes, or rhonchi  GI: NABS, nondistended, nontender, soft  EXT:without cyanosis, clubbing, or edema; no leg asymmetry no LLE venous insuff; CEAP C2 RLE venous  insufficiency  NEURO: nonfocal  : no flank tenderness        Rt femoral:      3 plus palpable   Rt popliteal:     3 plus palpable   Rt DP:              3 plus palpable  Rt PT:              3 plus palpable         Lt femoral:       3 plus palpable   Lt popliteal:      3 plus palpable   Lt DP:              3 plus palpable   Lt PT:               3 plus palpable          Name:  Shantell Wagner                                           Patient ID: [id number]  Date: 2024                                                  : 1970  Sex: female                                                                 Examined by: SADIQ Cueva RVT  Age:  53 year old                                                         Reading MD: JOSETTE Schwartz MD     INDICATION:Bilateral lower extremity pain     EXAM TYPE  BILATERAL LOWER EXTREMITY VENOUS DUPLEX FOR VENOUS INSUFFICIENCY  TECHNICAL SUMMARY     A duplex ultrasound study using color flow was performed, to evaluate the bilateral lower extremity veins for valvular incompetence with the patient in a steep reversed trendelenberg.      RIGHT:     The deep veins demonstrate phasic flow, compress and respond to augmentations.  There is no reflux or DVT.       The GSV demonstrates phasic flow, compresses and responds to augmentations from the saphenofemoral junction to the ankle with no evidence of thrombus. The great saphenous vein measures 7.6 mm at the saphenofemoral junction, 4.2 mm at the proximal thigh, and 4.9 mm at the knee. The GSV is incompetent from Proximal Thigh to Knee, with the greatest reflux time of 44591 milliseconds.  The GSV gives rise to a varicose branch measuring 3.9 mm off the  Knee that courses Medial with a reflux time of 58058 milliseconds.       The AASV is competent ( 3.0 mm) draining into the saphenofemoral junction.      The Giacomini vein is competent ( 3.5 mm) communicating with the small saphenous vein at the knee level.      The SSV  demonstrates phasic flow, compresses and responds to augmentations from the popliteal space to the ankle.  No reflux or thrombus is seen. The saphenopopliteal junction is absent.      Perforators: There is no evidence of incompetent  veins at any level.         LEFT:     The deep veins demonstrate phasic flow, compress and respond to augmentations.  There is no DVT.  The common femoral vein is incompetent and free of thrombus. The remaining deep veins are competent and free of thrombus.      The GSV demonstrates phasic flow, compresses and responds to augmentations from the saphenofemoral junction to the ankle with no evidence of thrombus. The great saphenous vein measures 9.3 mm at the saphenofemoral junction, 4.9 mm at the proximal thigh, and 1.9 mm at the knee. The GSV is incompetent at the SFJ with the greatest reflux time of 7292 milliseconds.       The AASV is incompetent ( 6.1 mm) draining into the saphenofemoral junction. The AASV is incompetent at the saphenofemoal junction with a reflux time of 676 milliseconds. The AASV takes a straight course to the mid thigh.      The Giacomini vein is competent ( 1.7 mm) communicating with the small saphenous vein at the knee level.      The SSV demonstrates phasic flow, compresses and responds to augmentations from the popliteal space to the ankle.  No thrombus is seen. The saphenopopliteal junction is absent. The SSV is incompetent at the Mid Calf with a reflux time of 3926 milliseconds.       Perforators: There is no evidence of incompetent  veins at any level.         FINAL SUMMARY:  Right lower extremity:  Deep veins  No deep vein thrombosis or deep vein valve incompetence     Superficial veins  1.  No superficial vein thrombosis  2.  Proximal thigh through knee great saphenous vein incompetence, the source of an incompetent varicosity in the calf      veins  No incompetent perforators     Left lower extremity:  Deep veins  1.  No  deep vein thrombosis  2.  Common femoral vein incompetence, otherwise deep veins are competent     Superficial veins  1.  No superficial vein thrombosis  2.  Saphenofemoral junction incompetence, otherwise great saphenous vein is competent  3.  Mid calf small saphenous vein incompetence, not clinically significant  4.  Anterior accessory saphenous-great saphenous junction incompetence  5.  Competent vein coursing from proximal anterior accessory saphenous vein      veins  No incompetent perforators ompetent vein coursing from proximal anterior accessory saphenous vein     Incompetence Criteria: Greater than 500 milliseconds reflux in the superficial and  veins and greater than 1000 milliseconds reflux in the deep veins.     YONATAN Schwartz MD, FACS            All relevant labs and imaging reviewed by myself on today's date.

## 2024-08-31 ENCOUNTER — MYC MEDICAL ADVICE (OUTPATIENT)
Dept: FAMILY MEDICINE | Facility: CLINIC | Age: 54
End: 2024-08-31
Payer: COMMERCIAL

## 2024-09-03 ENCOUNTER — TELEPHONE (OUTPATIENT)
Dept: FAMILY MEDICINE | Facility: CLINIC | Age: 54
End: 2024-09-03
Payer: COMMERCIAL

## 2024-09-03 NOTE — TELEPHONE ENCOUNTER
PA needed for:  Semaglutide-Weight Management (WEGOVY) 2.4 MG/0.75ML pen         Loni Bateman RN on 9/3/2024 at 10:06 AM

## 2024-09-04 NOTE — TELEPHONE ENCOUNTER
Prior Authorization Approval    Medication: WEGOVY 2.4 MG/0.75ML SC SOAJ  Authorization Effective Date: 8/5/2024  Authorization Expiration Date: 9/4/2025  Approved Dose/Quantity: as written  Reference #: [id number]   Insurance Company: [employer] - Phone [phone] Fax [phone]  Which Pharmacy is filling the prescription: [id number] - [place], [place] - [address]  Pharmacy Notified: y  Patient Notified: Instructed pharmacy to notify patient once order is ready.

## 2024-09-04 NOTE — TELEPHONE ENCOUNTER
PA Initiation    Medication: WEGOVY 2.4 MG/0.75ML SC SOAJ  Insurance Company: [employer] - Phone [phone] Fax [phone]  Pharmacy Filling the Rx: [id number] - [place], [place] - [address]  Filling Pharmacy Phone: [phone]  Filling Pharmacy Fax: [phone]  Start Date: 9/4/2024

## 2024-09-09 ENCOUNTER — TRANSFERRED RECORDS (OUTPATIENT)
Dept: HEALTH INFORMATION MANAGEMENT | Facility: CLINIC | Age: 54
End: 2024-09-09
Payer: COMMERCIAL

## 2024-09-17 DIAGNOSIS — N20.0 KIDNEY STONE: Primary | ICD-10-CM

## 2024-09-27 ENCOUNTER — MYC REFILL (OUTPATIENT)
Dept: FAMILY MEDICINE | Facility: CLINIC | Age: 54
End: 2024-09-27
Payer: COMMERCIAL

## 2024-09-27 DIAGNOSIS — E66.01 MORBID OBESITY (H): ICD-10-CM

## 2024-09-30 RX ORDER — SEMAGLUTIDE 2.4 MG/.75ML
2.4 INJECTION, SOLUTION SUBCUTANEOUS WEEKLY
Qty: 3 ML | Refills: 1 | Status: SHIPPED | OUTPATIENT
Start: 2024-09-30

## 2024-09-30 RX ORDER — SEMAGLUTIDE 2.4 MG/.75ML
2.4 INJECTION, SOLUTION SUBCUTANEOUS WEEKLY
Qty: 3 ML | Refills: 3 | Status: CANCELLED | OUTPATIENT
Start: 2024-09-30

## 2024-09-30 NOTE — TELEPHONE ENCOUNTER
Change in pharmacy request. Resending per refill protocol.   Efra BABCOCK RN 9/30/2024 at 9:29 AM

## 2024-11-29 DIAGNOSIS — E66.01 MORBID OBESITY (H): ICD-10-CM

## 2024-12-02 RX ORDER — SEMAGLUTIDE 2.4 MG/.75ML
2.4 INJECTION, SOLUTION SUBCUTANEOUS WEEKLY
Qty: 3 ML | Refills: 1 | Status: SHIPPED | OUTPATIENT
Start: 2024-12-02

## 2024-12-20 DIAGNOSIS — M79.604 PAIN IN BOTH LOWER EXTREMITIES: Primary | ICD-10-CM

## 2024-12-20 DIAGNOSIS — M79.605 PAIN IN BOTH LOWER EXTREMITIES: Primary | ICD-10-CM

## 2025-01-23 DIAGNOSIS — E66.01 MORBID OBESITY (H): ICD-10-CM

## 2025-01-23 RX ORDER — SEMAGLUTIDE 2.4 MG/.75ML
2.4 INJECTION, SOLUTION SUBCUTANEOUS WEEKLY
Qty: 3 ML | Refills: 0 | Status: SHIPPED | OUTPATIENT
Start: 2025-01-23

## 2025-01-23 NOTE — TELEPHONE ENCOUNTER
PETEYM to call back for an appointment. Two more attempts will be made.     Letha Ayala  Lead   [facility]

## 2025-01-31 ENCOUNTER — HOSPITAL ENCOUNTER (OUTPATIENT)
Dept: GENERAL RADIOLOGY | Facility: CLINIC | Age: 55
Discharge: HOME | End: 2025-01-31
Attending: PHYSICIAN ASSISTANT | Admitting: PHYSICIAN ASSISTANT
Payer: COMMERCIAL

## 2025-01-31 DIAGNOSIS — N20.0 KIDNEY STONE: ICD-10-CM

## 2025-01-31 PROCEDURE — 74018 RADEX ABDOMEN 1 VIEW: CPT

## 2025-02-05 ENCOUNTER — MYC MEDICAL ADVICE (OUTPATIENT)
Dept: FAMILY MEDICINE | Facility: CLINIC | Age: 55
End: 2025-02-05
Payer: COMMERCIAL

## 2025-02-06 ENCOUNTER — E-VISIT (OUTPATIENT)
Dept: FAMILY MEDICINE | Facility: CLINIC | Age: 55
End: 2025-02-06
Payer: COMMERCIAL

## 2025-02-06 DIAGNOSIS — F40.243 FEAR OF FLYING: Primary | ICD-10-CM

## 2025-02-10 RX ORDER — ALPRAZOLAM 0.25 MG
TABLET ORAL
Qty: 4 TABLET | Refills: 0 | Status: SHIPPED | OUTPATIENT
Start: 2025-02-10

## 2025-02-12 ENCOUNTER — TELEPHONE (OUTPATIENT)
Dept: UROLOGY | Facility: CLINIC | Age: 55
End: 2025-02-12
Payer: COMMERCIAL

## 2025-02-12 NOTE — TELEPHONE ENCOUNTER
----- Message from Jackie Fernandes sent at 2/12/2025 11:17 AM CST -----  That's fine. As a virtual is fine too.  ----- Message -----  From: Aviva Sparrow  Sent: 2/7/2025   9:35 AM CST  To: Jackie Fernandes PA-C    Is it ok to work her in somewhere?  ----- Message -----  From: Jackie Fernandes PA-C  Sent: 2/5/2025   4:38 PM CST  To: [facility] - Scheduling Pool    KUB done, but follow up is not scheduled yet.  She had it but it was before the KUB, so just needs to be put back on the schedule.

## 2025-02-18 ENCOUNTER — ONCOLOGY VISIT (OUTPATIENT)
Dept: ONCOLOGY | Facility: CLINIC | Age: 55
End: 2025-02-18
Attending: INTERNAL MEDICINE
Payer: COMMERCIAL

## 2025-02-18 VITALS
HEART RATE: 91 BPM | BODY MASS INDEX: 29.41 KG/M2 | SYSTOLIC BLOOD PRESSURE: 127 MMHG | WEIGHT: 172.3 LBS | TEMPERATURE: 96 F | RESPIRATION RATE: 14 BRPM | OXYGEN SATURATION: 99 % | HEIGHT: 64 IN | DIASTOLIC BLOOD PRESSURE: 88 MMHG

## 2025-02-18 DIAGNOSIS — Z15.09 MONOALLELIC MUTATION OF CHEK2 GENE: Primary | ICD-10-CM

## 2025-02-18 PROCEDURE — 99213 OFFICE O/P EST LOW 20 MIN: CPT | Performed by: INTERNAL MEDICINE

## 2025-02-18 PROCEDURE — 99214 OFFICE O/P EST MOD 30 MIN: CPT | Performed by: INTERNAL MEDICINE

## 2025-02-18 ASSESSMENT — PAIN SCALES - GENERAL: PAINLEVEL_OUTOF10: NO PAIN (0)

## 2025-02-18 NOTE — NURSING NOTE
"Oncology Rooming Note    February 18, 2025 3:43 PM   Shantell Wagner is a 54 year old female who presents for:    Chief Complaint   Patient presents with    Oncology Clinic Visit     Malignant neoplasm of upper-outer quadrant of right female breast           Initial Vitals: /88   Pulse 91   Temp (!) 96  F (35.6  C) (Temporal)   Resp 14   Ht 1.626 m (5' 4.02\")   Wt 78.2 kg (172 lb 4.8 oz)   SpO2 99%   BMI 29.56 kg/m   Estimated body mass index is 29.56 kg/m  as calculated from the following:    Height as of this encounter: 1.626 m (5' 4.02\").    Weight as of this encounter: 78.2 kg (172 lb 4.8 oz). Body surface area is 1.88 meters squared.  No Pain (0) Comment: Data Unavailable   No LMP recorded. Patient is perimenopausal.  Allergies reviewed: Yes  Medications reviewed: Yes    Medications: Medication refills not needed today.  Pharmacy name entered into [employer]:    [id number] - CLOSED - [place], [place] - [address]  [id number] - [place], [place] - [address]    Frailty Screening:   Is the patient here for a new oncology consult visit in cancer care? 2. No    PHQ9:  Did this patient require a PHQ9?: No      Clinical concerns: Follow up       Fela Puente MA              "

## 2025-02-18 NOTE — Clinical Note
2025      Shantell Wagner  [address]  [facility] [address]      Dear Colleague,    Thank you for referring your patient, Shantell Wagner, to the [facility]. Please see a copy of my visit note below.    Oncology Progress Note    Name: Shantell Wagner  : 1970  MRN: [id number]    CC: Breast Cancer    HPI: Shantell Wagner is a 54 year old female with a CHEK2 mutation as well as a right clinical T2N1 hormone receptor positive, HER2 negative breast cancer s/p neoadjuvant chemotherapy, s/p bilateral mastectomies with right ypT1N0(i+) disease s/p 7 year of tamoxifen here for transfer of care.    She was previously cared for by Dr. Knott.  Her cancer history is below and reviewed. Prior medical oncology notes reviewed.    She stopped tamoxifen a year ago. No menses.  No chest wall masses. No new meds.  Colonoscopy due Dec 2025      History  -16:neoadjuvant ddAC followed by Taxol  10/17/16: bilateral mastectomies, right with pT1N0(i+), left benign  2017-2024: tamoxifen    Review of systems: All other systems reviewed and are negative except for what is described in the history of present illness.      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  + constipation.  [facility] consult.  Recommended colonoscopy now and then every 3-5 years depending on results.  ENRIQUE      Lymphedema 2016     Priority: Medium    Acquired absence of both breasts and nipples 10/17/2016     Priority: Medium    Malignant neoplasm of  upper-outer quadrant of right female breast (H) 04/28/2016     Priority: Medium    Papanicolaou smear of cervix with atypical squamous cells cannot exclude high grade squamous intraepithelial lesion (ASC-H) 02/20/2013     Priority: Medium     4/10/09 ASCUS pap  2010, 2012 - NIL paps  02/20/13 ASC-H.   03/11/13 [place]/ECC= NEGRITA 1. Repeat HPV screen and ECC 12 months from colp. Due 03/2014 02/20/14 Normal, Neg HPV. Repeat pap with HPV in 12 months. Due 02/2015  03/06/15 Normal, Neg HPV. 3 yr co-testing  4/19/19 NIL/neg HR HPV  3/3/22 NIL pap, neg HPV      Ganglion of left wrist 02/09/2012     Priority: Medium     dorsum; seems a bit larger.      Hyperlipidemia LDL goal <160 05/04/2009     Priority: Medium    Obesity 05/01/2009     Priority: Medium    Plantar fasciitis 07/29/2008     Priority: Medium     Past Medical History:   Diagnosis Date    Abnormal Pap smear, (ASC-H) 02/20/2013    [place]/ECC= NEGRITA 1. Repeat HPV screen and ECC 12 months from colp.    Breast cancer 04/2016    Hyperlipidemia     Hypertension     At doctors appointments my blood pressure is elevated.    Neuropathy        Medications:   Current Outpatient Medications:     ALPRAZolam (XANAX) 0.25 MG tablet, Take 1 tablet 30 minutes prior to flying., Disp: 4 tablet, Rfl: 0    atorvastatin (LIPITOR) 20 MG tablet, Take 1 tablet (20 mg) by mouth daily, Disp: 90 tablet, Rfl: 3    gabapentin (NEURONTIN) 300 MG capsule, TAKE 1 CAPSULE BY MOUTH EVERYDAY AT BEDTIME, Disp: 90 capsule, Rfl: 3    glucosamine-chondroitin 500-400 MG CAPS, Take 1 capsule by mouth daily, Disp: , Rfl:     losartan (COZAAR) 25 MG tablet, Take 1 tablet (25 mg) by mouth daily, Disp: 90 tablet, Rfl: 3    omeprazole (PRILOSEC) 20 MG capsule, Take 20 mg by mouth every other day , Disp: , Rfl:     Semaglutide-Weight Management (WEGOVY) 2.4 MG/0.75ML pen, INJECT 2.4 MG SUBCUTANEOUSLY ONCE A WEEK., Disp: 3 mL, Rfl: 0      Allergies: No Known Allergies      Social history:   Social History      Socioeconomic History    Marital status:      Spouse name: Not on file    Number of children: Not on file    Years of education: Not on file    Highest education level: Not on file   Occupational History    Not on file   Tobacco Use    Smoking status: Never     Passive exposure: Never    Smokeless tobacco: Never   Vaping Use    Vaping status: Never Used   Substance and Sexual Activity    Alcohol use: Yes     Comment: rare    Drug use: No    Sexual activity: Yes     Partners: Male     Birth control/protection: Male Surgical   Other Topics Concern    Parent/sibling w/ CABG, MI or angioplasty before 65F 55M? No   Social History Narrative    Not on file     Social Drivers of Health     Financial Resource Strain: Low Risk  (6/5/2024)    Financial Resource Strain     Within the past 12 months, have you or your family members you live with been unable to get utilities (heat, electricity) when it was really needed?: No   Food Insecurity: Low Risk  (6/5/2024)    Food Insecurity     Within the past 12 months, did you worry that your food would run out before you got money to buy more?: No     Within the past 12 months, did the food you bought just not last and you didn t have money to get more?: No   Transportation Needs: Low Risk  (6/5/2024)    Transportation Needs     Within the past 12 months, has lack of transportation kept you from medical appointments, getting your medicines, non-medical meetings or appointments, work, or from getting things that you need?: No   Physical Activity: Insufficiently Active (6/5/2024)    Exercise Vital Sign     Days of Exercise per Week: 2 days     Minutes of Exercise per Session: 30 min   Stress: No Stress Concern Present (6/5/2024)    [employer] - Occupational Stress Questionnaire     Feeling of Stress : Only a little   Social Connections: Socially Integrated (6/5/2024)    Social Connection and Isolation Panel [NHANES]     Frequency of Communication  "with Friends and Family: More than three times a week     Frequency of Social Gatherings with Friends and Family: Three times a week     Attends Church Services: More than 4 times per year     Active Member of Clubs or Organizations: Yes     Attends Club or Organization Meetings: More than 4 times per year     Marital Status:    Interpersonal Safety: Low Risk  (11/29/2023)    Interpersonal Safety     Do you feel physically and emotionally safe where you currently live?: Yes     Within the past 12 months, have you been hit, slapped, kicked or otherwise physically hurt by someone?: No     Within the past 12 months, have you been humiliated or emotionally abused in other ways by your partner or ex-partner?: No   Housing Stability: Low Risk  (6/5/2024)    Housing Stability     Do you have housing? : Yes     Are you worried about losing your housing?: No         Family history:I have reviewed this patient's family history and updated it with pertinent information if needed.  Family History   Problem Relation Age of Onset    Diabetes Type 1 Mother     Diabetes Mother     Hyperlipidemia Mother     C.A.D. Father         bypass surg age 68    Coronary Artery Disease Father     Prostate Cancer Father     Hypertension Father     Hyperlipidemia Father     No Known Problems Sister     Breast Cancer Sister     Substance Abuse Sister     Breast Cancer Maternal Grandmother 70        dx'ed age 80s    Brain Cancer Paternal Grandfather     Depression Daughter     Substance Abuse Daughter     Asthma Daughter            Physical exam:  Vitals:/88   Pulse 91   Temp (!) 96  F (35.6  C) (Temporal)   Resp 14   Ht 1.626 m (5' 4.02\")   Wt 78.2 kg (172 lb 4.8 oz)   SpO2 99%   BMI 29.56 kg/m    GENERAL: No acute distress, pleasant, PS 0  EYES: Pupils equal round reactive to light, sclera anicteric  NECK: Supple  LYMPH NODES: No cervical, supraclavicular lymphadenopathy  CARDIOVASCULAR: Regular rate and rhythm  LUNGS: " Clear to auscultation bilaterally  GASTROINTESTINAL: Soft, nondistended.    EXTREMITIES: No edema  SKIN: No rashes or petechiae  NEURO: Normal gait  BREASTS: s/p bilateral mastectomies with reconstruction. No dominant masses palpated bilaterally.  No axillary lymphadenopathy bilaterally.    Labs:  Lab Results   Component Value Date    WBC 7.9 12/07/2023    HGB 12.7 12/07/2023    HCT 37.8 12/07/2023     12/07/2023     04/24/2024    POTASSIUM 4.6 04/24/2024    CHLORIDE 101 04/24/2024    CO2 26 04/24/2024    BUN 18.1 04/24/2024    CR 0.79 04/24/2024    GLC 84 04/24/2024    DD 1.1 (H) 02/15/2017    AST 24 02/20/2024    ALT 29 02/20/2024    ALKPHOS 80 02/20/2024    BILITOTAL 0.7 02/20/2024       Assessment/plan:   Shantell Wagner is a 54 year old female with a CHEK2 mutation as well as a right clinical T2N1 hormone receptor positive, HER2 negative breast cancer s/p neoadjuvant chemotherapy, s/p bilateral mastectomies with right ypT1N0(i+) disease s/p 7 year of tamoxifen here for transfer of care.    1. Breast Cancer: No clinical evidence of recurrence.  She is nearly 9 years out from diagnosis and completed 7 years of tamoxifen. At this point, she needs annual chest wall exams and this can be done with PCP with oncology available as needed. Call if any new concerns.    2. CHEK2 mutation: colonoscopy due Dec 2025    All of her questions were answered.    Return to the office as needed.    Thank you for allowing me to participate in the care of this patient.  Please call with any questions.    I personally reviewed the recent studies and I explained the rationale of the tests ordered today to the patient.       No orders of the defined types were placed in this encounter.        Again, thank you for allowing me to participate in the care of your patient.        Sincerely,        Layla Bocanegra MD    Electronically signed

## 2025-02-18 NOTE — PROGRESS NOTES
Oncology Progress Note    Name: Shantell Wagner  : 1970  MRN: [id number]    CC: Breast Cancer    HPI: Shantell Wagner is a 54 year old female with a CHEK2 mutation as well as a right clinical T2N1 hormone receptor positive, HER2 negative breast cancer s/p neoadjuvant chemotherapy, s/p bilateral mastectomies with right ypT1N0(i+) disease s/p 7 year of tamoxifen here for transfer of care.    She was previously cared for by Dr. Knott.  Her cancer history is below and reviewed. Prior medical oncology notes reviewed.    She stopped tamoxifen a year ago. No menses.  No chest wall masses. No new meds.  Colonoscopy due Dec 2025      History  -16:neoadjuvant ddAC followed by Taxol  10/17/16: bilateral mastectomies, right with pT1N0(i+), left benign  2017-2024: tamoxifen    Review of systems: All other systems reviewed and are negative except for what is described in the history of present illness.      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  + constipation.  [facility] consult.  Recommended colonoscopy now and then every 3-5 years depending on results.  ENRIQUE      Lymphedema 2016     Priority: Medium    Acquired absence of both breasts and nipples 10/17/2016     Priority: Medium    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) 2016     Priority: Medium    Papanicolaou smear of cervix with atypical squamous cells cannot exclude high grade squamous intraepithelial lesion (ASC-H) 2013     Priority: Medium     4/10/09 ASCUS  pap  2010, 2012 - NIL paps  02/20/13 ASC-H.   03/11/13 [place]/ECC= NEGRITA 1. Repeat HPV screen and ECC 12 months from colp. Due 03/2014 02/20/14 Normal, Neg HPV. Repeat pap with HPV in 12 months. Due 02/2015  03/06/15 Normal, Neg HPV. 3 yr co-testing  4/19/19 NIL/neg HR HPV  3/3/22 NIL pap, neg HPV      Ganglion of left wrist 02/09/2012     Priority: Medium     dorsum; seems a bit larger.      Hyperlipidemia LDL goal <160 05/04/2009     Priority: Medium    Obesity 05/01/2009     Priority: Medium    Plantar fasciitis 07/29/2008     Priority: Medium     Past Medical History:   Diagnosis Date    Abnormal Pap smear, (ASC-H) 02/20/2013    [place]/ECC= NEGRITA 1. Repeat HPV screen and ECC 12 months from colp.    Breast cancer 04/2016    Hyperlipidemia     Hypertension     At doctors appointments my blood pressure is elevated.    Neuropathy        Medications:   Current Outpatient Medications:     ALPRAZolam (XANAX) 0.25 MG tablet, Take 1 tablet 30 minutes prior to flying., Disp: 4 tablet, Rfl: 0    atorvastatin (LIPITOR) 20 MG tablet, Take 1 tablet (20 mg) by mouth daily, Disp: 90 tablet, Rfl: 3    gabapentin (NEURONTIN) 300 MG capsule, TAKE 1 CAPSULE BY MOUTH EVERYDAY AT BEDTIME, Disp: 90 capsule, Rfl: 3    glucosamine-chondroitin 500-400 MG CAPS, Take 1 capsule by mouth daily, Disp: , Rfl:     losartan (COZAAR) 25 MG tablet, Take 1 tablet (25 mg) by mouth daily, Disp: 90 tablet, Rfl: 3    omeprazole (PRILOSEC) 20 MG capsule, Take 20 mg by mouth every other day , Disp: , Rfl:     Semaglutide-Weight Management (WEGOVY) 2.4 MG/0.75ML pen, INJECT 2.4 MG SUBCUTANEOUSLY ONCE A WEEK., Disp: 3 mL, Rfl: 0      Allergies: No Known Allergies      Social history:   Social History     Socioeconomic History    Marital status:      Spouse name: Not on file    Number of children: Not on file    Years of education: Not on file    Highest education level: Not on file   Occupational History    Not on file   Tobacco Use    Smoking status:  Never     Passive exposure: Never    Smokeless tobacco: Never   Vaping Use    Vaping status: Never Used   Substance and Sexual Activity    Alcohol use: Yes     Comment: rare    Drug use: No    Sexual activity: Yes     Partners: Male     Birth control/protection: Male Surgical   Other Topics Concern    Parent/sibling w/ CABG, MI or angioplasty before 65F 55M? No   Social History Narrative    Not on file     Social Drivers of Health     Financial Resource Strain: Low Risk  (6/5/2024)    Financial Resource Strain     Within the past 12 months, have you or your family members you live with been unable to get utilities (heat, electricity) when it was really needed?: No   Food Insecurity: Low Risk  (6/5/2024)    Food Insecurity     Within the past 12 months, did you worry that your food would run out before you got money to buy more?: No     Within the past 12 months, did the food you bought just not last and you didn t have money to get more?: No   Transportation Needs: Low Risk  (6/5/2024)    Transportation Needs     Within the past 12 months, has lack of transportation kept you from medical appointments, getting your medicines, non-medical meetings or appointments, work, or from getting things that you need?: No   Physical Activity: Insufficiently Active (6/5/2024)    Exercise Vital Sign     Days of Exercise per Week: 2 days     Minutes of Exercise per Session: 30 min   Stress: No Stress Concern Present (6/5/2024)    [employer] - Occupational Stress Questionnaire     Feeling of Stress : Only a little   Social Connections: Socially Integrated (6/5/2024)    Social Connection and Isolation Panel [NHANES]     Frequency of Communication with Friends and Family: More than three times a week     Frequency of Social Gatherings with Friends and Family: Three times a week     Attends [employer] Services: More than 4 times per year     Active Member of Clubs or Organizations: Yes     Attends Club or  "Organization Meetings: More than 4 times per year     Marital Status:    Interpersonal Safety: Low Risk  (11/29/2023)    Interpersonal Safety     Do you feel physically and emotionally safe where you currently live?: Yes     Within the past 12 months, have you been hit, slapped, kicked or otherwise physically hurt by someone?: No     Within the past 12 months, have you been humiliated or emotionally abused in other ways by your partner or ex-partner?: No   Housing Stability: Low Risk  (6/5/2024)    Housing Stability     Do you have housing? : Yes     Are you worried about losing your housing?: No         Family history:I have reviewed this patient's family history and updated it with pertinent information if needed.  Family History   Problem Relation Age of Onset    Diabetes Type 1 Mother     Diabetes Mother     Hyperlipidemia Mother     C.A.D. Father         bypass surg age 68    Coronary Artery Disease Father     Prostate Cancer Father     Hypertension Father     Hyperlipidemia Father     No Known Problems Sister     Breast Cancer Sister     Substance Abuse Sister     Breast Cancer Maternal Grandmother 70        dx'ed age 80s    Brain Cancer Paternal Grandfather     Depression Daughter     Substance Abuse Daughter     Asthma Daughter            Physical exam:  Vitals:/88   Pulse 91   Temp (!) 96  F (35.6  C) (Temporal)   Resp 14   Ht 1.626 m (5' 4.02\")   Wt 78.2 kg (172 lb 4.8 oz)   SpO2 99%   BMI 29.56 kg/m    GENERAL: No acute distress, pleasant, PS 0  EYES: Pupils equal round reactive to light, sclera anicteric  NECK: Supple  LYMPH NODES: No cervical, supraclavicular lymphadenopathy  CARDIOVASCULAR: Regular rate and rhythm  LUNGS: Clear to auscultation bilaterally  GASTROINTESTINAL: Soft, nondistended.    EXTREMITIES: No edema  SKIN: No rashes or petechiae  NEURO: Normal gait  BREASTS: s/p bilateral mastectomies with reconstruction. No dominant masses palpated bilaterally.  No axillary " lymphadenopathy bilaterally.    Labs:  Lab Results   Component Value Date    WBC 7.9 12/07/2023    HGB 12.7 12/07/2023    HCT 37.8 12/07/2023     12/07/2023     04/24/2024    POTASSIUM 4.6 04/24/2024    CHLORIDE 101 04/24/2024    CO2 26 04/24/2024    BUN 18.1 04/24/2024    CR 0.79 04/24/2024    GLC 84 04/24/2024    DD 1.1 (H) 02/15/2017    AST 24 02/20/2024    ALT 29 02/20/2024    ALKPHOS 80 02/20/2024    BILITOTAL 0.7 02/20/2024       Assessment/plan:   Shantell Wagner is a 54 year old female with a CHEK2 mutation as well as a right clinical T2N1 hormone receptor positive, HER2 negative breast cancer s/p neoadjuvant chemotherapy, s/p bilateral mastectomies with right ypT1N0(i+) disease s/p 7 year of tamoxifen here for transfer of care.    1. Breast Cancer: No clinical evidence of recurrence.  She is nearly 9 years out from diagnosis and completed 7 years of tamoxifen. At this point, she needs annual chest wall exams and this can be done with PCP with oncology available as needed. Call if any new concerns.    2. CHEK2 mutation: colonoscopy due Dec 2025    All of her questions were answered.    Return to the office as needed.    Thank you for allowing me to participate in the care of this patient.  Please call with any questions.    I personally reviewed the recent studies and I explained the rationale of the tests ordered today to the patient.       No orders of the defined types were placed in this encounter.

## 2025-02-18 NOTE — LETTER
2025         RE: Shantell Wagner  [address]  [facility] [address]      Oncology Progress Note    Name: Shantell Wagner  : 1970  MRN: [id number]    CC: Breast Cancer    HPI: Shantell Wagner is a 54 year old female with a CHEK2 mutation as well as a right clinical T2N1 hormone receptor positive, HER2 negative breast cancer s/p neoadjuvant chemotherapy, s/p bilateral mastectomies with right ypT1N0(i+) disease s/p 7 year of tamoxifen here for transfer of care.    She was previously cared for by Dr. Knott.  Her cancer history is below and reviewed. Prior medical oncology notes reviewed.    She stopped tamoxifen a year ago. No menses.  No chest wall masses. No new meds.  Colonoscopy due Dec 2025      History  -16:neoadjuvant ddAC followed by Taxol  10/17/16: bilateral mastectomies, right with pT1N0(i+), left benign  2017-2024: tamoxifen    Review of systems: All other systems reviewed and are negative except for what is described in the history of present illness.      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  + constipation.  [facility] consult.  Recommended colonoscopy now and then every 3-5 years depending on results.  ENRIQUE       Lymphedema 2016     Priority: Medium     Acquired absence of both breasts and nipples 10/17/2016     Priority: Medium     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) 2016     Priority: Medium     Papanicolaou smear of cervix with atypical squamous cells cannot exclude  high grade squamous intraepithelial lesion (ASC-H) 02/20/2013     Priority: Medium     4/10/09 ASCUS pap  2010, 2012 - NIL paps  02/20/13 ASC-H.   03/11/13 [place]/ECC= [facility] 1. Repeat HPV screen and ECC 12 months from colp. Due 03/2014 02/20/14 Normal, Neg HPV. Repeat pap with HPV in 12 months. Due 02/2015  03/06/15 Normal, Neg HPV. 3 yr co-testing  4/19/19 NIL/neg HR HPV  3/3/22 NIL pap, neg HPV       Ganglion of left wrist 02/09/2012     Priority: Medium     dorsum; seems a bit larger.       Hyperlipidemia LDL goal <160 05/04/2009     Priority: Medium     Obesity 05/01/2009     Priority: Medium     Plantar fasciitis 07/29/2008     Priority: Medium     Past Medical History:   Diagnosis Date     Abnormal Pap smear, (ASC-H) 02/20/2013    [place]/ECC= [facility] 1. Repeat HPV screen and ECC 12 months from colp.     Breast cancer 04/2016     Hyperlipidemia      Hypertension     At doctors appointments my blood pressure is elevated.     Neuropathy        Medications:   Current Outpatient Medications:      ALPRAZolam (XANAX) 0.25 MG tablet, Take 1 tablet 30 minutes prior to flying., Disp: 4 tablet, Rfl: 0     atorvastatin (LIPITOR) 20 MG tablet, Take 1 tablet (20 mg) by mouth daily, Disp: 90 tablet, Rfl: 3     gabapentin (NEURONTIN) 300 MG capsule, TAKE 1 CAPSULE BY MOUTH EVERYDAY AT BEDTIME, Disp: 90 capsule, Rfl: 3     glucosamine-chondroitin 500-400 MG CAPS, Take 1 capsule by mouth daily, Disp: , Rfl:      losartan (COZAAR) 25 MG tablet, Take 1 tablet (25 mg) by mouth daily, Disp: 90 tablet, Rfl: 3     omeprazole (PRILOSEC) 20 MG capsule, Take 20 mg by mouth every other day , Disp: , Rfl:      Semaglutide-Weight Management (WEGOVY) 2.4 MG/0.75ML pen, INJECT 2.4 MG SUBCUTANEOUSLY ONCE A WEEK., Disp: 3 mL, Rfl: 0      Allergies: No Known Allergies      Social history:   Social History     Socioeconomic History     Marital status:      Spouse name: Not on file     Number of children: Not on file     Years of education: Not on  file     Highest education level: Not on file   Occupational History     Not on file   Tobacco Use     Smoking status: Never     Passive exposure: Never     Smokeless tobacco: Never   Vaping Use     Vaping status: Never Used   Substance and Sexual Activity     Alcohol use: Yes     Comment: rare     Drug use: No     Sexual activity: Yes     Partners: Male     Birth control/protection: Male Surgical   Other Topics Concern     Parent/sibling w/ CABG, MI or angioplasty before 65F 55M? No   Social History Narrative     Not on file     Social Drivers of Health     Financial Resource Strain: Low Risk  (6/5/2024)    Financial Resource Strain      Within the past 12 months, have you or your family members you live with been unable to get utilities (heat, electricity) when it was really needed?: No   Food Insecurity: Low Risk  (6/5/2024)    Food Insecurity      Within the past 12 months, did you worry that your food would run out before you got money to buy more?: No      Within the past 12 months, did the food you bought just not last and you didn t have money to get more?: No   Transportation Needs: Low Risk  (6/5/2024)    Transportation Needs      Within the past 12 months, has lack of transportation kept you from medical appointments, getting your medicines, non-medical meetings or appointments, work, or from getting things that you need?: No   Physical Activity: Insufficiently Active (6/5/2024)    Exercise Vital Sign      Days of Exercise per Week: 2 days      Minutes of Exercise per Session: 30 min   Stress: No Stress Concern Present (6/5/2024)    [employer] [place] [employer] - Occupational Stress Questionnaire      Feeling of Stress : Only a little   Social Connections: Socially Integrated (6/5/2024)    Social Connection and Isolation Panel [NHANES]      Frequency of Communication with Friends and Family: More than three times a week      Frequency of Social Gatherings with Friends and Family: Three times a  "week      Attends Synagogue Services: More than 4 times per year      Active Member of Clubs or Organizations: Yes      Attends Club or Organization Meetings: More than 4 times per year      Marital Status:    Interpersonal Safety: Low Risk  (11/29/2023)    Interpersonal Safety      Do you feel physically and emotionally safe where you currently live?: Yes      Within the past 12 months, have you been hit, slapped, kicked or otherwise physically hurt by someone?: No      Within the past 12 months, have you been humiliated or emotionally abused in other ways by your partner or ex-partner?: No   Housing Stability: Low Risk  (6/5/2024)    Housing Stability      Do you have housing? : Yes      Are you worried about losing your housing?: No         Family history:I have reviewed this patient's family history and updated it with pertinent information if needed.  Family History   Problem Relation Age of Onset     Diabetes Type 1 Mother      Diabetes Mother      Hyperlipidemia Mother      C.A.D. Father         bypass surg age 68     Coronary Artery Disease Father      Prostate Cancer Father      Hypertension Father      Hyperlipidemia Father      No Known Problems Sister      Breast Cancer Sister      Substance Abuse Sister      Breast Cancer Maternal Grandmother 70        dx'ed age 80s     Brain Cancer Paternal Grandfather      Depression Daughter      Substance Abuse Daughter      Asthma Daughter            Physical exam:  Vitals:/88   Pulse 91   Temp (!) 96  F (35.6  C) (Temporal)   Resp 14   Ht 1.626 m (5' 4.02\")   Wt 78.2 kg (172 lb 4.8 oz)   SpO2 99%   BMI 29.56 kg/m    GENERAL: No acute distress, pleasant, PS 0  EYES: Pupils equal round reactive to light, sclera anicteric  NECK: Supple  LYMPH NODES: No cervical, supraclavicular lymphadenopathy  CARDIOVASCULAR: Regular rate and rhythm  LUNGS: Clear to auscultation bilaterally  GASTROINTESTINAL: Soft, nondistended.    EXTREMITIES: No edema  SKIN: No " rashes or petechiae  NEURO: Normal gait  BREASTS: s/p bilateral mastectomies with reconstruction. No dominant masses palpated bilaterally.  No axillary lymphadenopathy bilaterally.    Labs:  Lab Results   Component Value Date    WBC 7.9 12/07/2023    HGB 12.7 12/07/2023    HCT 37.8 12/07/2023     12/07/2023     04/24/2024    POTASSIUM 4.6 04/24/2024    CHLORIDE 101 04/24/2024    CO2 26 04/24/2024    BUN 18.1 04/24/2024    CR 0.79 04/24/2024    GLC 84 04/24/2024    DD 1.1 (H) 02/15/2017    AST 24 02/20/2024    ALT 29 02/20/2024    ALKPHOS 80 02/20/2024    BILITOTAL 0.7 02/20/2024       Assessment/plan:   Shantell Wagner is a 54 year old female with a CHEK2 mutation as well as a right clinical T2N1 hormone receptor positive, HER2 negative breast cancer s/p neoadjuvant chemotherapy, s/p bilateral mastectomies with right ypT1N0(i+) disease s/p 7 year of tamoxifen here for transfer of care.    1. Breast Cancer: No clinical evidence of recurrence.  She is nearly 9 years out from diagnosis and completed 7 years of tamoxifen. At this point, she needs annual chest wall exams and this can be done with PCP with oncology available as needed. Call if any new concerns.    2. CHEK2 mutation: colonoscopy due Dec 2025    All of her questions were answered.    Return to the office as needed.    Thank you for allowing me to participate in the care of this patient.  Please call with any questions.    I personally reviewed the recent studies and I explained the rationale of the tests ordered today to the patient.       No orders of the defined types were placed in this encounter.        Layla Bocanegra MD

## 2025-02-19 DIAGNOSIS — E66.01 MORBID OBESITY (H): ICD-10-CM

## 2025-02-20 RX ORDER — SEMAGLUTIDE 2.4 MG/.75ML
2.4 INJECTION, SOLUTION SUBCUTANEOUS WEEKLY
Qty: 3 ML | Refills: 1 | Status: SHIPPED | OUTPATIENT
Start: 2025-02-20

## 2025-05-12 ENCOUNTER — PATIENT OUTREACH (OUTPATIENT)
Dept: CARE COORDINATION | Facility: CLINIC | Age: 55
End: 2025-05-12
Payer: COMMERCIAL

## 2025-06-23 DIAGNOSIS — M79.604 PAIN IN BOTH LOWER EXTREMITIES: ICD-10-CM

## 2025-06-23 DIAGNOSIS — M79.605 PAIN IN BOTH LOWER EXTREMITIES: ICD-10-CM

## 2025-06-24 RX ORDER — GABAPENTIN 300 MG/1
300 CAPSULE ORAL AT BEDTIME
Qty: 90 CAPSULE | Refills: 0 | Status: SHIPPED | OUTPATIENT
Start: 2025-06-24

## 2025-07-01 NOTE — TELEPHONE ENCOUNTER
Central Prior Authorization Team   Phone: [phone]    PA Initiation    Medication: Semaglutide-Weight Management (WEGOVY) 0.5 MG/0.5ML pen  Insurance Company: [employer] - Phone [phone] Fax [phone]  Pharmacy Filling the Rx: [id number] - [place], [place] - [address]  Filling Pharmacy Phone: [phone]  Filling Pharmacy Fax:    Start Date: 8/18/2023          
Prior Authorization Approval    Authorization Effective Date: 7/19/2023  Authorization Expiration Date: 8/17/2024  Medication: Semaglutide-Weight Management (WEGOVY) 0.5 MG/0.5ML pen  Approved Dose/Quantity:    Reference #:     Insurance Company: [employer] - Phone [phone] Fax [phone]  Expected CoPay:       CoPay Card Available:      Foundation Assistance Needed:    Which Pharmacy is filling the prescription (Not needed for infusion/clinic administered): [id number] - [place], [place] - [address] [facility]  Pharmacy Notified: Yes  Patient Notified: Yes **Instructed pharmacy to notify patient when script is ready to /ship.**              
Prior Authorization Retail Medication Request    Medication/Dose: Semaglutide-Weight Management (WEGOVY) 0.5 MG/0.5ML pen  ICD code (if different than what is on RX): Morbid obesity (H) [E66.01]  - Primary   Previously Tried and Failed:   Rationale:     Insurance Name: [employer] ACC[employer]   Insurance ID: [id number]       Pharmacy Information (if different than what is on RX)  Name:   [id number] - [facility] [address]     Phone: [phone]     Briseyda BENITEZ, RN, BSN, PHN    
Received the following automated message:  Interface, Eprescribing  P Rm Refill  An error occurred while processing the e-prescribing message.    The notification of a prior authorization refusal could not be sent electronically to the requesting pharmacy. Call to notify the pharmacy.    Code: 900 - Transaction rejected  Description Code: Code: 220 - Transaction is a duplicate  Partner rejected transaction with an error    Per chart review there has been no PA started for this medication. Called pharmacy and verified that a PA is needed for this medication.    Started PA encounter and routed to PA team.        
No

## 2025-07-09 SDOH — HEALTH STABILITY: PHYSICAL HEALTH: ON AVERAGE, HOW MANY DAYS PER WEEK DO YOU ENGAGE IN MODERATE TO STRENUOUS EXERCISE (LIKE A BRISK WALK)?: 3 DAYS

## 2025-07-09 SDOH — HEALTH STABILITY: PHYSICAL HEALTH: ON AVERAGE, HOW MANY MINUTES DO YOU ENGAGE IN EXERCISE AT THIS LEVEL?: 30 MIN

## 2025-07-09 ASSESSMENT — SOCIAL DETERMINANTS OF HEALTH (SDOH): HOW OFTEN DO YOU GET TOGETHER WITH FRIENDS OR RELATIVES?: TWICE A WEEK

## 2025-07-10 ENCOUNTER — OFFICE VISIT (OUTPATIENT)
Dept: FAMILY MEDICINE | Facility: CLINIC | Age: 55
End: 2025-07-10
Payer: COMMERCIAL

## 2025-07-10 DIAGNOSIS — M79.605 PAIN IN BOTH LOWER EXTREMITIES: ICD-10-CM

## 2025-07-10 DIAGNOSIS — M79.604 PAIN IN BOTH LOWER EXTREMITIES: ICD-10-CM

## 2025-07-10 DIAGNOSIS — I10 ESSENTIAL HYPERTENSION: ICD-10-CM

## 2025-07-10 DIAGNOSIS — Z00.00 ROUTINE GENERAL MEDICAL EXAMINATION AT A HEALTH CARE FACILITY: ICD-10-CM

## 2025-07-10 DIAGNOSIS — E78.5 HYPERLIPIDEMIA LDL GOAL <160: Primary | ICD-10-CM

## 2025-07-10 DIAGNOSIS — E66.01 MORBID OBESITY (H): ICD-10-CM

## 2025-07-10 LAB
ANION GAP SERPL CALCULATED.3IONS-SCNC: 9 MMOL/L (ref 7–15)
BUN SERPL-MCNC: 20.3 MG/DL (ref 6–20)
CALCIUM SERPL-MCNC: 9.6 MG/DL (ref 8.8–10.4)
CHLORIDE SERPL-SCNC: 104 MMOL/L (ref 98–107)
CHOLEST SERPL-MCNC: 190 MG/DL
CREAT SERPL-MCNC: 0.89 MG/DL (ref 0.51–0.95)
EGFRCR SERPLBLD CKD-EPI 2021: 77 ML/MIN/1.73M2
FASTING STATUS PATIENT QL REPORTED: YES
FASTING STATUS PATIENT QL REPORTED: YES
GLUCOSE SERPL-MCNC: 89 MG/DL (ref 70–99)
HCO3 SERPL-SCNC: 27 MMOL/L (ref 22–29)
HDLC SERPL-MCNC: 58 MG/DL
LDLC SERPL CALC-MCNC: 103 MG/DL
NONHDLC SERPL-MCNC: 132 MG/DL
POTASSIUM SERPL-SCNC: 4.5 MMOL/L (ref 3.4–5.3)
SODIUM SERPL-SCNC: 140 MMOL/L (ref 135–145)
TRIGL SERPL-MCNC: 144 MG/DL

## 2025-07-10 RX ORDER — SEMAGLUTIDE 2.4 MG/.75ML
2.4 INJECTION, SOLUTION SUBCUTANEOUS WEEKLY
Qty: 3 ML | Refills: 5 | Status: SHIPPED | OUTPATIENT
Start: 2025-07-10

## 2025-07-10 RX ORDER — LOSARTAN POTASSIUM 25 MG/1
25 TABLET ORAL DAILY
Qty: 90 TABLET | Refills: 3 | Status: SHIPPED | OUTPATIENT
Start: 2025-07-10

## 2025-07-10 RX ORDER — GABAPENTIN 300 MG/1
300 CAPSULE ORAL AT BEDTIME
Qty: 90 CAPSULE | Refills: 3 | Status: SHIPPED | OUTPATIENT
Start: 2025-07-10

## 2025-07-10 RX ORDER — ATORVASTATIN CALCIUM 20 MG/1
20 TABLET, FILM COATED ORAL DAILY
Qty: 90 TABLET | Refills: 3 | Status: SHIPPED | OUTPATIENT
Start: 2025-07-10

## 2025-07-10 NOTE — PATIENT INSTRUCTIONS
Patient Education   Preventive Care Advice   This is general advice given by our system to help you stay healthy. However, your care team may have specific advice just for you. Please talk to your care team about your preventive care needs.  Nutrition  Eat 5 or more servings of fruits and vegetables each day.  Try wheat bread, brown rice and whole grain pasta (instead of white bread, rice, and pasta).  Get enough calcium and vitamin D. Check the label on foods and aim for 100% of the RDA (recommended daily allowance).  Lifestyle  Exercise at least 150 minutes each week  (30 minutes a day, 5 days a week).  Do muscle strengthening activities 2 days a week. These help control your weight and prevent disease.  No smoking.  Wear sunscreen to prevent skin cancer.  Have a dental exam and cleaning every 6 months.  Yearly exams  See your health care team every year to talk about:  Any changes in your health.  Any medicines your care team has prescribed.  Preventive care, family planning, and ways to prevent chronic diseases.  Shots (vaccines)   HPV shots (up to age 26), if you've never had them before.  Hepatitis B shots (up to age 59), if you've never had them before.  COVID-19 shot: Get this shot when it's due.  Flu shot: Get a flu shot every year.  Tetanus shot: Get a tetanus shot every 10 years.  Pneumococcal, hepatitis A, and RSV shots: Ask your care team if you need these based on your risk.  Shingles shot (for age 50 and up)  General health tests  Diabetes screening:  Starting at age 35, Get screened for diabetes at least every 3 years.  If you are younger than age 35, ask your care team if you should be screened for diabetes.  Cholesterol test: At age 39, start having a cholesterol test every 5 years, or more often if advised.  Bone density scan (DEXA): At age 50, ask your care team if you should have this scan for osteoporosis (brittle bones).  Hepatitis C: Get tested at least once in your life.  STIs (sexually  transmitted infections)  Before age 24: Ask your care team if you should be screened for STIs.  After age 24: Get screened for STIs if you're at risk. You are at risk for STIs (including HIV) if:  You are sexually active with more than one person.  You don't use condoms every time.  You or a partner was diagnosed with a sexually transmitted infection.  If you are at risk for HIV, ask about PrEP medicine to prevent HIV.  Get tested for HIV at least once in your life, whether you are at risk for HIV or not.  Cancer screening tests  Cervical cancer screening: If you have a cervix, begin getting regular cervical cancer screening tests starting at age 21.  Breast cancer scan (mammogram): If you've ever had breasts, begin having regular mammograms starting at age 40. This is a scan to check for breast cancer.  Colon cancer screening: It is important to start screening for colon cancer at age 45.  Have a colonoscopy test every 10 years (or more often if you're at risk) Or, ask your provider about stool tests like a FIT test every year or Cologuard test every 3 years.  To learn more about your testing options, visit:   .  For help making a decision, visit:   https://bit.ly/zc07635.  Prostate cancer screening test: If you have a prostate, ask your care team if a prostate cancer screening test (PSA) at age 55 is right for you.  Lung cancer screening: If you are a current or former smoker ages 50 to 80, ask your care team if ongoing lung cancer screenings are right for you.  For informational purposes only. Not to replace the advice of your health care provider. Copyright   2023 [employer]. All rights reserved. Clinically reviewed by the [facility] Transitions Program. [employer] [id number] - REV 01/24.

## 2025-07-10 NOTE — PROGRESS NOTES
"Preventive Care Visit  [facility] PHYLLISMOBuster Ureña CNP, Family Practice  Jul 10, 2025      Assessment & Plan     Benign essential hypertension:  - Refill of medication.    Hyperlipidemia LDL goal <160:  - Refill of cholesterol medication. Order cholesterol panel.    Routine general medical examination at a health care facility:  - Order metabolic panel to assess kidney function and blood sugar.    Pain in both lower extremities:  - Refill of gabapentin, with patient considering discontinuation.    Morbid obesity (H):  - Refill of Wegovy, with potential need for APA.    Consent was obtained from the patient to use an AI documentation tool in the creation of this note.    BMI  Estimated body mass index is 30.27 kg/m  as calculated from the following:    Height as of an earlier encounter on 7/10/25: 1.6 m (5' 3\").    Weight as of an earlier encounter on 7/10/25: 77.5 kg (170 lb 14.4 oz).   Taking wegovy.      Subjective   Stephie is a 54 year old, presenting for the following:  No chief complaint on file.         HPI  Stephie Wagner, 54-year-old female  - History of intermittent allergy and asthma symptoms, not present for a while, did not see allergy and asthma specialist as symptoms resolved  - Gabapentin use ongoing, attempted to discontinue recently after running out, considering stopping but currently still using as it is still helpful.  - No current symptoms reported, feeling well.  Taking medications as directed without side effects.  Continues wegovy.      Advance Care Planning            7/9/2025   General Health   How would you rate your overall physical health? Good   Feel stress (tense, anxious, or unable to sleep) Only a little   (!) STRESS CONCERN      7/9/2025   Nutrition   Three or more servings of calcium each day? Yes   Diet: Regular (no restrictions)    Breakfast skipped   How many servings of fruit and vegetables per day? (!) 0-1   How many sweetened beverages each " day? 0-1       Multiple values from one day are sorted in reverse-chronological order         7/9/2025   Exercise   Days per week of moderate/strenous exercise 3 days   Average minutes spent exercising at this level 30 min         7/9/2025   Social Factors   Frequency of gathering with friends or relatives Twice a week   Worry food won't last until get money to buy more No   Food not last or not have enough money for food? No   Do you have housing? (Housing is defined as stable permanent housing and does not include staying outside in a car, in a tent, in an abandoned building, in an overnight shelter, or couch-surfing.) Yes   Are you worried about losing your housing? No   Lack of transportation? No   Unable to get utilities (heat,electricity)? No         7/9/2025   Fall Risk   Fallen 2 or more times in the past year? No   Trouble with walking or balance? No          7/9/2025   Dental   Dentist two times every year? Yes           Today's PHQ-2 Score:       2/28/2025    11:05 AM   PHQ-2 ( 1999 [employer])   Q1: Little interest or pleasure in doing things 0   Q2: Feeling down, depressed or hopeless 0   PHQ-2 Score 0    Q1: Little interest or pleasure in doing things Not at all   Q2: Feeling down, depressed or hopeless Not at all   PHQ-2 Score 0       Patient-reported         7/9/2025   Substance Use   Alcohol more than 3/day or more than 7/wk No   Do you use any other substances recreationally? No     Social History     Tobacco Use    Smoking status: Never     Passive exposure: Never    Smokeless tobacco: Never   Vaping Use    Vaping status: Never Used   Substance Use Topics    Alcohol use: Yes     Comment: rare    Drug use: No           11/23/2023   LAST FHS-7 RESULTS   1st degree relative breast or ovarian cancer Yes   Any relative bilateral breast cancer No   Any male have breast cancer No   Any ONE woman have BOTH breast AND ovarian cancer Yes   Any woman with breast cancer before 50yrs Yes   2 or more relatives with  breast AND/OR ovarian cancer No   2 or more relatives with breast AND/OR bowel cancer No                7/9/2025   STI Screening   New sexual partner(s) since last STI/HIV test? No     History of abnormal Pap smear: No - age 30- 64 PAP with HPV every 5 years recommended        Latest Ref Rng & Units 3/3/2022     1:57 PM 4/19/2019     8:46 AM 4/19/2019     8:40 AM   PAP / HPV   PAP  Negative for Intraepithelial Lesion or Malignancy (NILM)      PAP (Historical)   NIL     HPV 16 DNA Negative Negative   Negative    HPV 18 DNA Negative Negative   Negative    Other HR HPV Negative Negative   Negative      ASCVD Risk   The 10-year ASCVD risk score (Riccardo DEAN, et al., 2019) is: 2.3%    Values used to calculate the score:      Age: 54 years      Sex: Female      Is Non- : No      Diabetic: No      Tobacco smoker: No      Systolic Blood Pressure: 128 mmHg      Is BP treated: Yes      HDL Cholesterol: 58 mg/dL      Total Cholesterol: 199 mg/dL           Reviewed and updated as needed this visit by Provider                    Patient Active Problem List   Diagnosis    Plantar fasciitis    Obesity    Hyperlipidemia LDL goal <160    Ganglion of left wrist    Papanicolaou smear of cervix with atypical squamous cells cannot exclude high grade squamous intraepithelial lesion (ASC-H)    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)    Acquired absence of both breasts and nipples    Lymphedema    Monoallelic mutation of CHEK2 gene    Benign essential hypertension    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    Carpal tunnel syndrome of left wrist    Renal colic    Nephrolithiasis     Past Surgical History:   Procedure Laterality Date    CARPAL TUNNEL RELEASE RT/LT  03/2010    COLONOSCOPY      INSERT PORT VASCULAR ACCESS Left 04/22/2016    Procedure: INSERT PORT VASCULAR ACCESS;  Surgeon: Nereyda Flowers MD;  Location: [facility] OR    INSERT TISSUE EXPANDER BREAST BILATERAL Bilateral 10/17/2016     Procedure: INSERT TISSUE EXPANDER BREAST BILATERAL;  Surgeon: Jenna Vazquez MD;  Location: [facility] OR    LASER HOLMIUM LITHOTRIPSY URETER(S), INSERT STENT, COMBINED Right 11/30/2023    Procedure: Cystoscopy, right ureteroscopy with laser lithotripsy and stone basketing.  Right ureteral stent placement.;  Surgeon: Dylan Carrero MD;  Location:  OR    LASIK BILATERAL      MASTECTOMY MODIFIED RADICAL Right 10/17/2016    Procedure: MASTECTOMY MODIFIED RADICAL;  Surgeon: Nereyda Flowers MD;  Location: [facility] OR    MASTECTOMY MODIFIED RADICAL, SENTINEL NODE, COMBINED Left 10/17/2016    Procedure: COMBINED MASTECTOMY MODIFIED RADICAL, SENTINEL NODE;  Surgeon: Nereyda Flowers MD;  Location: [facility] OR    PROCURE GRAFT FAT Bilateral 04/25/2018    Procedure: PROCURE GRAFT FAT;;  Surgeon: Jenna Vazquez MD;  Location: [facility]    RECONSTRUCT BREAST BILATERAL, IMPLANT PROSTHESIS BILATERAL, COMBINED Bilateral 09/13/2017    Procedure: COMBINED RECONSTRUCT BREAST BILATERAL, IMPLANT PROSTHESIS BILATERAL;  BILATERAL SECOND STAGE BREAST RECONSTRUCTION WITH IMPLANT.;  Surgeon: Jenna Vazquez MD;  Location: [facility]    REMOVE CATHETER VASCULAR ACCESS Left 10/17/2016    Procedure: REMOVE CATHETER VASCULAR ACCESS;  Surgeon: Nereyda Flowers MD;  Location:  OR    REVISE RECONSTRUCTED BREAST BILATERAL Bilateral 04/25/2018    Procedure: REVISE RECONSTRUCTED BREAST BILATERAL;  REVISION BILATERAL BREAST RECONSTRUCTION AND  FAT GRAFTING FROM AXILLA TO BILATERAL BREASTS.;  Surgeon: Jenna Vazquez MD;  Location: [facility]    RHINOPLASTY  1983    Following trauma       Social History     Tobacco Use    Smoking status: Never     Passive exposure: Never    Smokeless tobacco: Never   Substance Use Topics    Alcohol use: Yes     Comment: rare     Family History   Problem Relation Age of Onset    Diabetes Type 1 Mother     Diabetes Mother     Hyperlipidemia Mother     C.A.D. Father         bypass surg age 68     "Coronary Artery Disease Father     Prostate Cancer Father     Hypertension Father     Hyperlipidemia Father     No Known Problems Sister     Breast Cancer Sister     Substance Abuse Sister     Breast Cancer Maternal Grandmother 70        dx'ed age 80s    Brain Cancer Paternal Grandfather     Depression Daughter     Substance Abuse Daughter     Asthma Daughter              Review of Systems  Constitutional, HEENT, cardiovascular, pulmonary, gi and gu systems are negative, except as otherwise noted.     Objective    Exam  There were no vitals taken for this visit.   Estimated body mass index is 30.27 kg/m  as calculated from the following:    Height as of an earlier encounter on 7/10/25: 1.6 m (5' 3\").    Weight as of an earlier encounter on 7/10/25: 77.5 kg (170 lb 14.4 oz).    BP Readings from Last 1 Encounters:   07/10/25 128/82      Pulse Readings from Last 1 Encounters:   07/10/25 70      Resp Readings from Last 1 Encounters:   07/10/25 18      Temp Readings from Last 1 Encounters:   07/10/25 98  F (36.7  C) (Oral)      SpO2 Readings from Last 1 Encounters:   07/10/25 98%      Wt Readings from Last 1 Encounters:   07/10/25 77.5 kg (170 lb 14.4 oz)      Ht Readings from Last 1 Encounters:   07/10/25 1.6 m (5' 3\")      Physical Exam  GENERAL: alert and no distress  EYES: Eyes grossly normal to inspection  HENT: ear canals and TM's normal, nose and mouth without ulcers or lesions  NECK: no adenopathy, no asymmetry, masses, or scars  RESP: lungs clear to auscultation - no rales, rhonchi or wheezes  CV: regular rate and rhythm, normal S1 S2, no S3 or S4, no murmur, click or rub, no peripheral edema  ABDOMEN: soft, nontender, no hepatosplenomegaly, no masses and bowel sounds normal  NEURO: Normal strength and tone, mentation intact and speech normal  PSYCH: mentation appears normal, affect normal/bright        Signed Electronically by: NIKKI Bright CNP    "

## (undated) DEVICE — DRSG XEROFORM 5X9" 8884431605

## (undated) DEVICE — SHEATH URETERAL ACCESS NAVIGATOR HD 13/15FRX46CM M0062502290

## (undated) DEVICE — SU WND CLOSURE VLOC 180 ABS 2-0 6" GS-21 VLOCL0305

## (undated) DEVICE — SU MONOCRYL 4-0 PS-2 18" UND Y496G

## (undated) DEVICE — ENDO SEAL BX PORT BPS-A

## (undated) DEVICE — PACK MAJOR SBA15MAFSI

## (undated) DEVICE — SYR 10ML LL W/O NDL 302995

## (undated) DEVICE — SU PDS II 2-0 CT-2 27"  Z333H

## (undated) DEVICE — LASER FIBER HOLMIUM MOSES 360 D/F/L AC-10030110

## (undated) DEVICE — CATH URETERAL FLEX TIP TIGERTAIL 06FRX70CM 139006

## (undated) DEVICE — STPL SKIN 35W APPOSE 8886803712

## (undated) DEVICE — DRSG ABDOMINAL 07 1/2X8" 7197D

## (undated) DEVICE — PREP SKIN SCRUB TRAY 4461A

## (undated) DEVICE — DRSG KERLIX 4 1/2"X4YDS ROLL 6715

## (undated) DEVICE — PAD CHUX UNDERPAD 23X24" 7136

## (undated) DEVICE — BLADE KNIFE SURG 11 371111

## (undated) DEVICE — TUBING INFUSION INFILTRATION LIPOSUCTION 156" 24-6008

## (undated) DEVICE — SOL ADH LIQUID BENZOIN SWAB 0.6ML C1544

## (undated) DEVICE — PACK CYSTOSCOPY SBA15CYFSI

## (undated) DEVICE — TUBING IRR TUR Y TYPE 2C4041

## (undated) DEVICE — SOL WATER IRRIG 1000ML BOTTLE 2F7114

## (undated) DEVICE — TUBING SUCTION LIPECTOMY

## (undated) DEVICE — SYR 50ML LL W/O NDL 309653

## (undated) DEVICE — SUCTION CANISTER MEDIVAC LINER 1500ML W/LID 65651-515

## (undated) DEVICE — DRAPE BREAST/CHEST 29420

## (undated) DEVICE — LINEN TOWEL PACK X5 5464

## (undated) DEVICE — DECANTER VIAL 2006S

## (undated) DEVICE — GLOVE PROTEXIS BLUE W/NEU-THERA 6.5  2D73EB65

## (undated) DEVICE — DRSG STERI STRIP 1/2X4" R1547

## (undated) DEVICE — BLADE KNIFE SURG 20 371120

## (undated) DEVICE — DECANTER BAG 2002S

## (undated) DEVICE — SHEATH URETERAL ACCESS NAVIGATOR HD 12/14FRX36CM M0062502250

## (undated) DEVICE — BASKET NITINOL TIPLESS HALO  1.5FRX120CM 554120

## (undated) DEVICE — SOL WATER IRRIG 3000ML BAG 2B7117

## (undated) DEVICE — SU MONOCRYL 3-0 PS-2 27" Y427H

## (undated) DEVICE — BNDG ELASTIC 4" DBL LENGTH UNSTERILE 6611-14

## (undated) DEVICE — SUCTION CANISTER MEDIVAC LINER 3000ML W/LID 65651-530

## (undated) DEVICE — CATH URETERAL TIGERTAIL 05FR 70CM 139005

## (undated) DEVICE — SU PLAIN FAST ABSORB 5-0 PC-1 18" 1915G

## (undated) DEVICE — TUBING SUCTION MEDI-VAC 1/4"X20' N620A

## (undated) DEVICE — GUIDEWIRE URO STR STIFF .035"X150CM NITINOL 150NSS35

## (undated) RX ORDER — FENTANYL CITRATE 50 UG/ML
INJECTION, SOLUTION INTRAMUSCULAR; INTRAVENOUS
Status: DISPENSED
Start: 2023-11-30

## (undated) RX ORDER — ONDANSETRON 2 MG/ML
INJECTION INTRAMUSCULAR; INTRAVENOUS
Status: DISPENSED
Start: 2017-09-13

## (undated) RX ORDER — FENTANYL CITRATE 50 UG/ML
INJECTION, SOLUTION INTRAMUSCULAR; INTRAVENOUS
Status: DISPENSED
Start: 2017-09-13

## (undated) RX ORDER — ONDANSETRON 2 MG/ML
INJECTION INTRAMUSCULAR; INTRAVENOUS
Status: DISPENSED
Start: 2018-04-25

## (undated) RX ORDER — PROPOFOL 10 MG/ML
INJECTION, EMULSION INTRAVENOUS
Status: DISPENSED
Start: 2017-09-13

## (undated) RX ORDER — HYDROCODONE BITARTRATE AND ACETAMINOPHEN 5; 325 MG/1; MG/1
TABLET ORAL
Status: DISPENSED
Start: 2018-04-25

## (undated) RX ORDER — PROPOFOL 10 MG/ML
INJECTION, EMULSION INTRAVENOUS
Status: DISPENSED
Start: 2023-11-30

## (undated) RX ORDER — CEFAZOLIN SODIUM 2 G/100ML
INJECTION, SOLUTION INTRAVENOUS
Status: DISPENSED
Start: 2017-09-13

## (undated) RX ORDER — PROPOFOL 10 MG/ML
INJECTION, EMULSION INTRAVENOUS
Status: DISPENSED
Start: 2018-04-25

## (undated) RX ORDER — FENTANYL CITRATE 50 UG/ML
INJECTION, SOLUTION INTRAMUSCULAR; INTRAVENOUS
Status: DISPENSED
Start: 2018-04-25

## (undated) RX ORDER — LIDOCAINE HYDROCHLORIDE 10 MG/ML
INJECTION, SOLUTION EPIDURAL; INFILTRATION; INTRACAUDAL; PERINEURAL
Status: DISPENSED
Start: 2018-04-25

## (undated) RX ORDER — DEXAMETHASONE SODIUM PHOSPHATE 4 MG/ML
INJECTION, SOLUTION INTRA-ARTICULAR; INTRALESIONAL; INTRAMUSCULAR; INTRAVENOUS; SOFT TISSUE
Status: DISPENSED
Start: 2018-04-25

## (undated) RX ORDER — ACETAMINOPHEN 500 MG
TABLET ORAL
Status: DISPENSED
Start: 2018-04-25

## (undated) RX ORDER — DEXAMETHASONE SODIUM PHOSPHATE 4 MG/ML
INJECTION, SOLUTION INTRA-ARTICULAR; INTRALESIONAL; INTRAMUSCULAR; INTRAVENOUS; SOFT TISSUE
Status: DISPENSED
Start: 2017-09-13

## (undated) RX ORDER — DEXAMETHASONE SODIUM PHOSPHATE 4 MG/ML
INJECTION, SOLUTION INTRA-ARTICULAR; INTRALESIONAL; INTRAMUSCULAR; INTRAVENOUS; SOFT TISSUE
Status: DISPENSED
Start: 2023-11-30

## (undated) RX ORDER — CEFAZOLIN SODIUM 2 G/100ML
INJECTION, SOLUTION INTRAVENOUS
Status: DISPENSED
Start: 2018-04-25

## (undated) RX ORDER — ONDANSETRON 2 MG/ML
INJECTION INTRAMUSCULAR; INTRAVENOUS
Status: DISPENSED
Start: 2023-11-30

## (undated) RX ORDER — LIDOCAINE HYDROCHLORIDE 20 MG/ML
INJECTION, SOLUTION EPIDURAL; INFILTRATION; INTRACAUDAL; PERINEURAL
Status: DISPENSED
Start: 2017-09-13

## (undated) RX ORDER — HYDROCODONE BITARTRATE AND ACETAMINOPHEN 5; 325 MG/1; MG/1
TABLET ORAL
Status: DISPENSED
Start: 2017-09-13

## (undated) RX ORDER — LIDOCAINE HYDROCHLORIDE 20 MG/ML
INJECTION, SOLUTION EPIDURAL; INFILTRATION; INTRACAUDAL; PERINEURAL
Status: DISPENSED
Start: 2018-04-25

## (undated) RX ORDER — ACETAMINOPHEN 325 MG/1
TABLET ORAL
Status: DISPENSED
Start: 2017-09-13